# Patient Record
Sex: FEMALE | Race: ASIAN | NOT HISPANIC OR LATINO | URBAN - METROPOLITAN AREA
[De-identification: names, ages, dates, MRNs, and addresses within clinical notes are randomized per-mention and may not be internally consistent; named-entity substitution may affect disease eponyms.]

---

## 2019-04-11 ENCOUNTER — EMERGENCY (EMERGENCY)
Facility: HOSPITAL | Age: 64
LOS: 1 days | Discharge: ANOTHER HOSPITAL | End: 2019-04-11
Attending: EMERGENCY MEDICINE | Admitting: EMERGENCY MEDICINE
Payer: MEDICAID

## 2019-04-11 ENCOUNTER — INPATIENT (INPATIENT)
Facility: HOSPITAL | Age: 64
LOS: 35 days | Discharge: NURSING, REHAB OR LONG-TERM CARE | DRG: 4 | End: 2019-05-17
Attending: INTERNAL MEDICINE | Admitting: INTERNAL MEDICINE
Payer: MEDICARE

## 2019-04-11 VITALS
DIASTOLIC BLOOD PRESSURE: 90 MMHG | OXYGEN SATURATION: 100 % | SYSTOLIC BLOOD PRESSURE: 172 MMHG | RESPIRATION RATE: 15 BRPM

## 2019-04-11 VITALS
OXYGEN SATURATION: 95 % | HEART RATE: 112 BPM | SYSTOLIC BLOOD PRESSURE: 131 MMHG | DIASTOLIC BLOOD PRESSURE: 79 MMHG | TEMPERATURE: 98 F | RESPIRATION RATE: 16 BRPM

## 2019-04-11 VITALS
DIASTOLIC BLOOD PRESSURE: 103 MMHG | SYSTOLIC BLOOD PRESSURE: 242 MMHG | WEIGHT: 160.94 LBS | HEIGHT: 58 IN | RESPIRATION RATE: 12 BRPM | HEART RATE: 76 BPM

## 2019-04-11 DIAGNOSIS — Z90.49 ACQUIRED ABSENCE OF OTHER SPECIFIED PARTS OF DIGESTIVE TRACT: Chronic | ICD-10-CM

## 2019-04-11 DIAGNOSIS — I61.3 NONTRAUMATIC INTRACEREBRAL HEMORRHAGE IN BRAIN STEM: ICD-10-CM

## 2019-04-11 LAB
ALBUMIN SERPL ELPH-MCNC: 3.8 G/DL — SIGNIFICANT CHANGE UP (ref 3.3–5)
ALBUMIN SERPL ELPH-MCNC: 3.9 G/DL — SIGNIFICANT CHANGE UP (ref 3.3–5)
ALBUMIN SERPL ELPH-MCNC: 4.3 G/DL — SIGNIFICANT CHANGE UP (ref 3.3–5)
ALP SERPL-CCNC: 58 U/L — SIGNIFICANT CHANGE UP (ref 40–120)
ALP SERPL-CCNC: 67 U/L — SIGNIFICANT CHANGE UP (ref 40–120)
ALP SERPL-CCNC: 73 U/L — SIGNIFICANT CHANGE UP (ref 30–120)
ALT FLD-CCNC: 10 U/L — SIGNIFICANT CHANGE UP (ref 10–45)
ALT FLD-CCNC: 11 U/L — SIGNIFICANT CHANGE UP (ref 10–45)
ALT FLD-CCNC: 22 U/L DA — SIGNIFICANT CHANGE UP (ref 10–60)
ANION GAP SERPL CALC-SCNC: 11 MMOL/L — SIGNIFICANT CHANGE UP (ref 5–17)
ANION GAP SERPL CALC-SCNC: 17 MMOL/L — SIGNIFICANT CHANGE UP (ref 5–17)
APTT BLD: 25.7 SEC — LOW (ref 28.5–37)
APTT BLD: 26.4 SEC — LOW (ref 27.5–36.3)
AST SERPL-CCNC: 14 U/L — SIGNIFICANT CHANGE UP (ref 10–40)
AST SERPL-CCNC: 18 U/L — SIGNIFICANT CHANGE UP (ref 10–40)
AST SERPL-CCNC: 18 U/L — SIGNIFICANT CHANGE UP (ref 10–40)
BASE EXCESS BLDA CALC-SCNC: 0.8 MMOL/L — SIGNIFICANT CHANGE UP (ref -2–2)
BASOPHILS # BLD AUTO: 0.08 K/UL — SIGNIFICANT CHANGE UP (ref 0–0.2)
BASOPHILS # BLD AUTO: 0.1 K/UL — SIGNIFICANT CHANGE UP (ref 0–0.2)
BASOPHILS NFR BLD AUTO: 0.5 % — SIGNIFICANT CHANGE UP (ref 0–2)
BASOPHILS NFR BLD AUTO: 0.6 % — SIGNIFICANT CHANGE UP (ref 0–2)
BILIRUB DIRECT SERPL-MCNC: 0.1 MG/DL — SIGNIFICANT CHANGE UP (ref 0–0.2)
BILIRUB INDIRECT FLD-MCNC: 0.5 MG/DL — SIGNIFICANT CHANGE UP (ref 0.2–1)
BILIRUB SERPL-MCNC: 0.4 MG/DL — SIGNIFICANT CHANGE UP (ref 0.2–1.2)
BILIRUB SERPL-MCNC: 0.4 MG/DL — SIGNIFICANT CHANGE UP (ref 0.2–1.2)
BILIRUB SERPL-MCNC: 0.6 MG/DL — SIGNIFICANT CHANGE UP (ref 0.2–1.2)
BLD GP AB SCN SERPL QL: NEGATIVE — SIGNIFICANT CHANGE UP
BLD GP AB SCN SERPL QL: SIGNIFICANT CHANGE UP
BLOOD GAS SOURCE: SIGNIFICANT CHANGE UP
BUN SERPL-MCNC: 16 MG/DL — SIGNIFICANT CHANGE UP (ref 7–23)
BUN SERPL-MCNC: 19 MG/DL — SIGNIFICANT CHANGE UP (ref 7–23)
CALCIUM SERPL-MCNC: 8.8 MG/DL — SIGNIFICANT CHANGE UP (ref 8.4–10.5)
CALCIUM SERPL-MCNC: 9.3 MG/DL — SIGNIFICANT CHANGE UP (ref 8.4–10.5)
CHLORIDE SERPL-SCNC: 103 MMOL/L — SIGNIFICANT CHANGE UP (ref 96–108)
CHLORIDE SERPL-SCNC: 104 MMOL/L — SIGNIFICANT CHANGE UP (ref 96–108)
CK MB BLD-MCNC: 1.4 % — SIGNIFICANT CHANGE UP (ref 0–3.5)
CK MB CFR SERPL CALC: 1.5 NG/ML — SIGNIFICANT CHANGE UP (ref 0–3.6)
CK MB CFR SERPL CALC: 2 NG/ML — SIGNIFICANT CHANGE UP (ref 0–3.8)
CK SERPL-CCNC: 107 U/L — SIGNIFICANT CHANGE UP (ref 26–192)
CK SERPL-CCNC: 78 U/L — SIGNIFICANT CHANGE UP (ref 25–170)
CO2 SERPL-SCNC: 22 MMOL/L — SIGNIFICANT CHANGE UP (ref 22–31)
CO2 SERPL-SCNC: 26 MMOL/L — SIGNIFICANT CHANGE UP (ref 22–31)
CREAT SERPL-MCNC: 0.69 MG/DL — SIGNIFICANT CHANGE UP (ref 0.5–1.3)
CREAT SERPL-MCNC: 0.94 MG/DL — SIGNIFICANT CHANGE UP (ref 0.5–1.3)
EOSINOPHIL # BLD AUTO: 0 K/UL — SIGNIFICANT CHANGE UP (ref 0–0.5)
EOSINOPHIL # BLD AUTO: 0.01 K/UL — SIGNIFICANT CHANGE UP (ref 0–0.5)
EOSINOPHIL NFR BLD AUTO: 0.1 % — SIGNIFICANT CHANGE UP (ref 0–6)
EOSINOPHIL NFR BLD AUTO: 0.2 % — SIGNIFICANT CHANGE UP (ref 0–6)
GAS PNL BLDA: SIGNIFICANT CHANGE UP
GLUCOSE BLDC GLUCOMTR-MCNC: 119 MG/DL — HIGH (ref 70–99)
GLUCOSE SERPL-MCNC: 105 MG/DL — HIGH (ref 70–99)
GLUCOSE SERPL-MCNC: 131 MG/DL — HIGH (ref 70–99)
HCO3 BLDA-SCNC: 25 MMOL/L — SIGNIFICANT CHANGE UP (ref 21–29)
HCT VFR BLD CALC: 41.6 % — SIGNIFICANT CHANGE UP (ref 34.5–45)
HCT VFR BLD CALC: 41.9 % — SIGNIFICANT CHANGE UP (ref 34.5–45)
HGB BLD-MCNC: 13.6 G/DL — SIGNIFICANT CHANGE UP (ref 11.5–15.5)
HGB BLD-MCNC: 13.9 G/DL — SIGNIFICANT CHANGE UP (ref 11.5–15.5)
HOROWITZ INDEX BLDA+IHG-RTO: 60 — SIGNIFICANT CHANGE UP
IMM GRANULOCYTES NFR BLD AUTO: 0.6 % — SIGNIFICANT CHANGE UP (ref 0–1.5)
INR BLD: 1.01 RATIO — SIGNIFICANT CHANGE UP (ref 0.88–1.16)
INR BLD: 1.06 RATIO — SIGNIFICANT CHANGE UP (ref 0.88–1.16)
LYMPHOCYTES # BLD AUTO: 13 % — SIGNIFICANT CHANGE UP (ref 13–44)
LYMPHOCYTES # BLD AUTO: 2.5 K/UL — SIGNIFICANT CHANGE UP (ref 1–3.3)
LYMPHOCYTES # BLD AUTO: 34.5 % — SIGNIFICANT CHANGE UP (ref 13–44)
LYMPHOCYTES # BLD AUTO: 4.73 K/UL — HIGH (ref 1–3.3)
MCHC RBC-ENTMCNC: 31.6 PG — SIGNIFICANT CHANGE UP (ref 27–34)
MCHC RBC-ENTMCNC: 32.3 PG — SIGNIFICANT CHANGE UP (ref 27–34)
MCHC RBC-ENTMCNC: 32.7 GM/DL — SIGNIFICANT CHANGE UP (ref 32–36)
MCHC RBC-ENTMCNC: 33.1 GM/DL — SIGNIFICANT CHANGE UP (ref 32–36)
MCV RBC AUTO: 96.7 FL — SIGNIFICANT CHANGE UP (ref 80–100)
MCV RBC AUTO: 97.5 FL — SIGNIFICANT CHANGE UP (ref 80–100)
MONOCYTES # BLD AUTO: 0.86 K/UL — SIGNIFICANT CHANGE UP (ref 0–0.9)
MONOCYTES # BLD AUTO: 1.2 K/UL — HIGH (ref 0–0.9)
MONOCYTES NFR BLD AUTO: 6.2 % — SIGNIFICANT CHANGE UP (ref 2–14)
MONOCYTES NFR BLD AUTO: 6.3 % — SIGNIFICANT CHANGE UP (ref 2–14)
NEUTROPHILS # BLD AUTO: 15.6 K/UL — HIGH (ref 1.8–7.4)
NEUTROPHILS # BLD AUTO: 7.97 K/UL — HIGH (ref 1.8–7.4)
NEUTROPHILS NFR BLD AUTO: 57.9 % — SIGNIFICANT CHANGE UP (ref 43–77)
NEUTROPHILS NFR BLD AUTO: 80.1 % — HIGH (ref 43–77)
NRBC # BLD: 0 /100 WBCS — SIGNIFICANT CHANGE UP (ref 0–0)
PCO2 BLDA: 38 MMHG — SIGNIFICANT CHANGE UP (ref 32–46)
PH BLD: 7.43 — SIGNIFICANT CHANGE UP (ref 7.35–7.45)
PLATELET # BLD AUTO: 289 K/UL — SIGNIFICANT CHANGE UP (ref 150–400)
PLATELET # BLD AUTO: 316 K/UL — SIGNIFICANT CHANGE UP (ref 150–400)
PO2 BLDA: 211 MMHG — HIGH (ref 74–108)
POTASSIUM SERPL-MCNC: 3.5 MMOL/L — SIGNIFICANT CHANGE UP (ref 3.5–5.3)
POTASSIUM SERPL-MCNC: 4.1 MMOL/L — SIGNIFICANT CHANGE UP (ref 3.5–5.3)
POTASSIUM SERPL-SCNC: 3.5 MMOL/L — SIGNIFICANT CHANGE UP (ref 3.5–5.3)
POTASSIUM SERPL-SCNC: 4.1 MMOL/L — SIGNIFICANT CHANGE UP (ref 3.5–5.3)
PROT SERPL-MCNC: 6.6 G/DL — SIGNIFICANT CHANGE UP (ref 6–8.3)
PROT SERPL-MCNC: 7.4 G/DL — SIGNIFICANT CHANGE UP (ref 6–8.3)
PROT SERPL-MCNC: 7.8 G/DL — SIGNIFICANT CHANGE UP (ref 6–8.3)
PROTHROM AB SERPL-ACNC: 11.5 SEC — SIGNIFICANT CHANGE UP (ref 10–12.9)
PROTHROM AB SERPL-ACNC: 11.6 SEC — SIGNIFICANT CHANGE UP (ref 10–12.9)
RBC # BLD: 4.3 M/UL — SIGNIFICANT CHANGE UP (ref 3.8–5.2)
RBC # BLD: 4.3 M/UL — SIGNIFICANT CHANGE UP (ref 3.8–5.2)
RBC # FLD: 12.3 % — SIGNIFICANT CHANGE UP (ref 10.3–14.5)
RBC # FLD: 12.7 % — SIGNIFICANT CHANGE UP (ref 10.3–14.5)
RH IG SCN BLD-IMP: POSITIVE — SIGNIFICANT CHANGE UP
RH IG SCN BLD-IMP: POSITIVE — SIGNIFICANT CHANGE UP
SAO2 % BLDA: 100 % — HIGH (ref 92–96)
SODIUM SERPL-SCNC: 141 MMOL/L — SIGNIFICANT CHANGE UP (ref 135–145)
SODIUM SERPL-SCNC: 142 MMOL/L — SIGNIFICANT CHANGE UP (ref 135–145)
T4 FREE SERPL-MCNC: 1.2 NG/DL — SIGNIFICANT CHANGE UP (ref 0.9–1.8)
TROPONIN I SERPL-MCNC: 0 NG/ML — LOW (ref 0.02–0.06)
TROPONIN T, HIGH SENSITIVITY RESULT: 15 NG/L — SIGNIFICANT CHANGE UP (ref 0–51)
TSH SERPL-MCNC: 0.97 UIU/ML — SIGNIFICANT CHANGE UP (ref 0.27–4.2)
WBC # BLD: 13.73 K/UL — HIGH (ref 3.8–10.5)
WBC # BLD: 19.5 K/UL — HIGH (ref 3.8–10.5)
WBC # FLD AUTO: 13.73 K/UL — HIGH (ref 3.8–10.5)
WBC # FLD AUTO: 19.5 K/UL — HIGH (ref 3.8–10.5)

## 2019-04-11 PROCEDURE — 84484 ASSAY OF TROPONIN QUANT: CPT

## 2019-04-11 PROCEDURE — 85610 PROTHROMBIN TIME: CPT

## 2019-04-11 PROCEDURE — 36415 COLL VENOUS BLD VENIPUNCTURE: CPT

## 2019-04-11 PROCEDURE — 71045 X-RAY EXAM CHEST 1 VIEW: CPT | Mod: 26,77,76

## 2019-04-11 PROCEDURE — 70450 CT HEAD/BRAIN W/O DYE: CPT | Mod: 26,59

## 2019-04-11 PROCEDURE — 82550 ASSAY OF CK (CPK): CPT

## 2019-04-11 PROCEDURE — 86850 RBC ANTIBODY SCREEN: CPT

## 2019-04-11 PROCEDURE — 82803 BLOOD GASES ANY COMBINATION: CPT

## 2019-04-11 PROCEDURE — 99291 CRITICAL CARE FIRST HOUR: CPT | Mod: 25

## 2019-04-11 PROCEDURE — 86900 BLOOD TYPING SEROLOGIC ABO: CPT

## 2019-04-11 PROCEDURE — 70450 CT HEAD/BRAIN W/O DYE: CPT

## 2019-04-11 PROCEDURE — 70498 CT ANGIOGRAPHY NECK: CPT | Mod: 26

## 2019-04-11 PROCEDURE — 93005 ELECTROCARDIOGRAM TRACING: CPT

## 2019-04-11 PROCEDURE — 80053 COMPREHEN METABOLIC PANEL: CPT

## 2019-04-11 PROCEDURE — 93010 ELECTROCARDIOGRAM REPORT: CPT

## 2019-04-11 PROCEDURE — 99291 CRITICAL CARE FIRST HOUR: CPT

## 2019-04-11 PROCEDURE — 82962 GLUCOSE BLOOD TEST: CPT

## 2019-04-11 PROCEDURE — 85027 COMPLETE CBC AUTOMATED: CPT

## 2019-04-11 PROCEDURE — 36620 INSERTION CATHETER ARTERY: CPT

## 2019-04-11 PROCEDURE — 70496 CT ANGIOGRAPHY HEAD: CPT | Mod: 26

## 2019-04-11 PROCEDURE — 31500 INSERT EMERGENCY AIRWAY: CPT

## 2019-04-11 PROCEDURE — 85730 THROMBOPLASTIN TIME PARTIAL: CPT

## 2019-04-11 PROCEDURE — 71045 X-RAY EXAM CHEST 1 VIEW: CPT | Mod: 26

## 2019-04-11 PROCEDURE — 86901 BLOOD TYPING SEROLOGIC RH(D): CPT

## 2019-04-11 PROCEDURE — 82553 CREATINE MB FRACTION: CPT

## 2019-04-11 PROCEDURE — 99292 CRITICAL CARE ADDL 30 MIN: CPT

## 2019-04-11 PROCEDURE — 71045 X-RAY EXAM CHEST 1 VIEW: CPT

## 2019-04-11 PROCEDURE — 99222 1ST HOSP IP/OBS MODERATE 55: CPT | Mod: 25

## 2019-04-11 PROCEDURE — 93970 EXTREMITY STUDY: CPT | Mod: 26

## 2019-04-11 RX ORDER — NICARDIPINE HYDROCHLORIDE 30 MG/1
5 CAPSULE, EXTENDED RELEASE ORAL
Qty: 40 | Refills: 0 | Status: DISCONTINUED | OUTPATIENT
Start: 2019-04-11 | End: 2019-04-15

## 2019-04-11 RX ORDER — LEVETIRACETAM 250 MG/1
500 TABLET, FILM COATED ORAL EVERY 12 HOURS
Qty: 0 | Refills: 0 | Status: DISCONTINUED | OUTPATIENT
Start: 2019-04-11 | End: 2019-04-12

## 2019-04-11 RX ORDER — DESMOPRESSIN ACETATE 0.1 MG/1
20 TABLET ORAL ONCE
Qty: 0 | Refills: 0 | Status: COMPLETED | OUTPATIENT
Start: 2019-04-11 | End: 2019-04-11

## 2019-04-11 RX ORDER — CHLORHEXIDINE GLUCONATE 213 G/1000ML
1 SOLUTION TOPICAL
Qty: 0 | Refills: 0 | Status: DISCONTINUED | OUTPATIENT
Start: 2019-04-11 | End: 2019-04-16

## 2019-04-11 RX ORDER — DOCUSATE SODIUM 100 MG
100 CAPSULE ORAL EVERY 8 HOURS
Qty: 0 | Refills: 0 | Status: DISCONTINUED | OUTPATIENT
Start: 2019-04-11 | End: 2019-04-16

## 2019-04-11 RX ORDER — NICARDIPINE HYDROCHLORIDE 30 MG/1
5 CAPSULE, EXTENDED RELEASE ORAL
Qty: 40 | Refills: 0 | Status: DISCONTINUED | OUTPATIENT
Start: 2019-04-11 | End: 2019-04-12

## 2019-04-11 RX ORDER — ONDANSETRON 8 MG/1
4 TABLET, FILM COATED ORAL EVERY 6 HOURS
Qty: 0 | Refills: 0 | Status: DISCONTINUED | OUTPATIENT
Start: 2019-04-11 | End: 2019-04-16

## 2019-04-11 RX ORDER — SODIUM CHLORIDE 5 G/100ML
1000 INJECTION, SOLUTION INTRAVENOUS
Qty: 0 | Refills: 0 | Status: DISCONTINUED | OUTPATIENT
Start: 2019-04-11 | End: 2019-04-14

## 2019-04-11 RX ORDER — SENNA PLUS 8.6 MG/1
2 TABLET ORAL AT BEDTIME
Qty: 0 | Refills: 0 | Status: DISCONTINUED | OUTPATIENT
Start: 2019-04-11 | End: 2019-04-11

## 2019-04-11 RX ORDER — DILTIAZEM HCL 120 MG
60 CAPSULE, EXT RELEASE 24 HR ORAL EVERY 6 HOURS
Qty: 0 | Refills: 0 | Status: DISCONTINUED | OUTPATIENT
Start: 2019-04-11 | End: 2019-04-11

## 2019-04-11 RX ORDER — ACETAMINOPHEN 500 MG
650 TABLET ORAL EVERY 6 HOURS
Qty: 0 | Refills: 0 | Status: DISCONTINUED | OUTPATIENT
Start: 2019-04-11 | End: 2019-04-16

## 2019-04-11 RX ORDER — PROPOFOL 10 MG/ML
10 INJECTION, EMULSION INTRAVENOUS
Qty: 500 | Refills: 0 | Status: DISCONTINUED | OUTPATIENT
Start: 2019-04-11 | End: 2019-04-11

## 2019-04-11 RX ORDER — DILTIAZEM HCL 120 MG
10 CAPSULE, EXT RELEASE 24 HR ORAL ONCE
Qty: 0 | Refills: 0 | Status: COMPLETED | OUTPATIENT
Start: 2019-04-11 | End: 2019-04-11

## 2019-04-11 RX ORDER — SODIUM CHLORIDE 9 MG/ML
3 INJECTION INTRAMUSCULAR; INTRAVENOUS; SUBCUTANEOUS EVERY 8 HOURS
Qty: 0 | Refills: 0 | Status: DISCONTINUED | OUTPATIENT
Start: 2019-04-11 | End: 2019-04-11

## 2019-04-11 RX ORDER — PROPOFOL 10 MG/ML
10 INJECTION, EMULSION INTRAVENOUS
Qty: 1000 | Refills: 0 | Status: DISCONTINUED | OUTPATIENT
Start: 2019-04-11 | End: 2019-04-15

## 2019-04-11 RX ORDER — LABETALOL HCL 100 MG
100 TABLET ORAL EVERY 8 HOURS
Qty: 0 | Refills: 0 | Status: DISCONTINUED | OUTPATIENT
Start: 2019-04-11 | End: 2019-04-15

## 2019-04-11 RX ORDER — LABETALOL HCL 100 MG
100 TABLET ORAL THREE TIMES A DAY
Qty: 0 | Refills: 0 | Status: DISCONTINUED | OUTPATIENT
Start: 2019-04-11 | End: 2019-04-11

## 2019-04-11 RX ORDER — PANTOPRAZOLE SODIUM 20 MG/1
40 TABLET, DELAYED RELEASE ORAL DAILY
Qty: 0 | Refills: 0 | Status: DISCONTINUED | OUTPATIENT
Start: 2019-04-11 | End: 2019-04-16

## 2019-04-11 RX ORDER — SENNA PLUS 8.6 MG/1
2 TABLET ORAL AT BEDTIME
Qty: 0 | Refills: 0 | Status: DISCONTINUED | OUTPATIENT
Start: 2019-04-11 | End: 2019-04-16

## 2019-04-11 RX ORDER — DILTIAZEM HCL 120 MG
60 CAPSULE, EXT RELEASE 24 HR ORAL EVERY 6 HOURS
Qty: 0 | Refills: 0 | Status: DISCONTINUED | OUTPATIENT
Start: 2019-04-11 | End: 2019-04-15

## 2019-04-11 RX ORDER — DOCUSATE SODIUM 100 MG
100 CAPSULE ORAL THREE TIMES A DAY
Qty: 0 | Refills: 0 | Status: DISCONTINUED | OUTPATIENT
Start: 2019-04-11 | End: 2019-04-12

## 2019-04-11 RX ORDER — MIDAZOLAM HYDROCHLORIDE 1 MG/ML
5 INJECTION, SOLUTION INTRAMUSCULAR; INTRAVENOUS ONCE
Qty: 0 | Refills: 0 | Status: DISCONTINUED | OUTPATIENT
Start: 2019-04-11 | End: 2019-04-11

## 2019-04-11 RX ORDER — VALACYCLOVIR 500 MG/1
1 TABLET, FILM COATED ORAL
Qty: 0 | Refills: 0 | COMMUNITY

## 2019-04-11 RX ADMIN — DESMOPRESSIN ACETATE 220 MICROGRAM(S): 0.1 TABLET ORAL at 15:35

## 2019-04-11 RX ADMIN — CHLORHEXIDINE GLUCONATE 1 APPLICATION(S): 213 SOLUTION TOPICAL at 21:05

## 2019-04-11 RX ADMIN — Medication 60 MILLIGRAM(S): at 19:41

## 2019-04-11 RX ADMIN — LEVETIRACETAM 500 MILLIGRAM(S): 250 TABLET, FILM COATED ORAL at 17:55

## 2019-04-11 RX ADMIN — Medication 60 MILLIGRAM(S): at 23:19

## 2019-04-11 RX ADMIN — SENNA PLUS 2 TABLET(S): 8.6 TABLET ORAL at 21:06

## 2019-04-11 RX ADMIN — Medication 100 MILLIGRAM(S): at 17:55

## 2019-04-11 RX ADMIN — MIDAZOLAM HYDROCHLORIDE 5 MILLIGRAM(S): 1 INJECTION, SOLUTION INTRAMUSCULAR; INTRAVENOUS at 12:10

## 2019-04-11 RX ADMIN — PROPOFOL 4.38 MICROGRAM(S)/KG/MIN: 10 INJECTION, EMULSION INTRAVENOUS at 14:18

## 2019-04-11 RX ADMIN — Medication 10 MILLIGRAM(S): at 15:23

## 2019-04-11 RX ADMIN — Medication 10 MILLIGRAM(S): at 19:38

## 2019-04-11 RX ADMIN — NICARDIPINE HYDROCHLORIDE 25 MG/HR: 30 CAPSULE, EXTENDED RELEASE ORAL at 12:45

## 2019-04-11 RX ADMIN — Medication 100 MILLIGRAM(S): at 21:15

## 2019-04-11 NOTE — CHART NOTE - NSCHARTNOTEFT_GEN_A_CORE
CAPRINI SCORE [CLOT] Score on Admission for     AGE RELATED RISK FACTORS                                                       MOBILITY RELATED FACTORS  [ ] Age 41-60 years                                            (1 Point)                  [ x] Bed rest                                                        (1 Point)  [x] Age: 61-74 years                                           (2 Points)                 [ ] Plaster cast                                                   (2 Points)  [ ] Age= 75 years                                              (3 Points)                 [ ] Bed bound for more than 72 hours                 (2 Points)    DISEASE RELATED RISK FACTORS                                               GENDER SPECIFIC FACTORS  [ ] Edema in the lower extremities                       (1 Point)                  [ ] Pregnancy                                                     (1 Point)  [ ] Varicose veins                                               (1 Point)                  [ ] Post-partum < 6 weeks                                   (1 Point)             [ ] BMI > 25 Kg/m2                                            (1 Point)                  [ ] Hormonal therapy  or oral contraception          (1 Point)                 [ ] Sepsis (in the previous month)                        (1 Point)                  [ ] History of pregnancy complications                 (1 point)  [ ] Pneumonia or serious lung disease                                               [ ] Unexplained or recurrent                     (1 Point)           (in the previous month)                               (1 Point)  [ ] Abnormal pulmonary function test                     (1 Point)                 SURGERY RELATED RISK FACTORS (include planned surgeries)  [ ] Acute myocardial infarction                              (1 Point)                 [ ]  Section                                             (1 Point)  [ ] Congestive heart failure (in the previous month)  (1 Point)         [ ] Minor surgery                                                  (1 Point)   [ ] Inflammatory bowel disease                             (1 Point)                 [ ] Arthroscopic surgery                                        (2 Points)  [ ] Central venous access                                      (2 Points)                [ ] General surgery lasting more than 45 minutes   (2 Points)       [x ] Stroke (in the previous month)                          (5 Points)               [ ] Elective arthroplasty                                         (5 Points)            [ ] current or past malignancy                              (2 Points)                                                                                                       HEMATOLOGY RELATED FACTORS                                                 TRAUMA RELATED RISK FACTORS  [ ] Prior episodes of VTE                                     (3 Points)                [ ] Fracture of the hip, pelvis, or leg                       (5 Points)  [ ] Positive family history for VTE                         (3 Points)                 [ ] Acute spinal cord injury (in the previous month)  (5 Points)  [ ] Prothrombin 75182 A                                     (3 Points)                 [ ] Paralysis  (less than 1 month)                             (5 Points)  [ ] Factor V Leiden                                             (3 Points)                  [ ] Multiple Trauma within 1 month                        (5 Points)  [ ] Lupus anticoagulants                                     (3 Points)                                                           [ ] Anticardiolipin antibodies                               (3 Points)                                                       [ ] High homocysteine in the blood                      (3 Points)                                             [ ] Other congenital or acquired thrombophilia      (3 Points)                                                [ ] Heparin induced thrombocytopenia                  (3 Points)                                          Total Score [  8  ]    Risk:  Very low 0   Low 1 to 2   Moderate 3 to 4   High =5       VTE Prophylasix Recommednations:  [ x] mechanical pneumatic compression devices                                      [ ] contraindicated: _____________________  [ ] chemo prophylasix                                                                                   [ x] contraindicated _____________________    **** HIGH LIKELIHOOD DVT PRESENT ON ADMISSION  [ ] (please order LE dopplers within 24 hours of admission)

## 2019-04-11 NOTE — ED PROVIDER NOTE - PROGRESS NOTE DETAILS
per EMS; patient on ASA; per d/w neurosurgery, to start ddavp and platelets. -Giles ecg shows afib - no apparent h/o afib; neurosurgery resident aware. -Giles

## 2019-04-11 NOTE — PROGRESS NOTE ADULT - SUBJECTIVE AND OBJECTIVE BOX
Rapid afib. Given diltiazem. Rapid afib. Given diltiazem.     No eye opening, no commands, right pupil 2mm reactive, left pupil 1mm reactive, no cough, no gag, no corneals, trace hand movement with noxious, b/l LE w/d

## 2019-04-11 NOTE — PROCEDURE NOTE - NSINDICATIONS_GEN_A_CORE
arterial puncture to obtain ABG's/monitoring purposes/blood sampling/critical patient/cannulation purposes

## 2019-04-11 NOTE — PROGRESS NOTE ADULT - ASSESSMENT
pontine intracerebral hemorrhage   - -140mmHg  - afib: diltiazem  - 2% for cerebral edema  - CT head for stability  - Vean vent as tolerated  - Goals of care discussion with family    40 minutes critical care time

## 2019-04-11 NOTE — ED ADULT NURSE NOTE - NSIMPLEMENTINTERV_GEN_ALL_ED
Implemented All Fall with Harm Risk Interventions:  [place] to call system. Call bell, personal items and telephone within reach. Instruct patient to call for assistance. Room bathroom lighting operational. Non-slip footwear when patient is off stretcher. Physically safe environment: no spills, clutter or unnecessary equipment. Stretcher in lowest position, wheels locked, appropriate side rails in place. Provide visual cue, wrist band, yellow gown, etc. Monitor gait and stability. Monitor for mental status changes and reorient to person, place, and time. Review medications for side effects contributing to fall risk. Reinforce activity limits and safety measures with patient and family. Provide visual clues: red socks.

## 2019-04-11 NOTE — ED PROVIDER NOTE - UNABLE TO OBTAIN
transfer for pontine/brainstem hemorrhage Urgent need for Intervention intubated, brainstem hemorrhage

## 2019-04-11 NOTE — ED PROVIDER NOTE - NSREASONFORTRANSFER_ED_A_ED
Higher Level of Care or Service Not Available/Consultant Request/No Available Appropriate Bed at this Facility

## 2019-04-11 NOTE — H&P ADULT - NSHPPHYSICALEXAM_GEN_ALL_CORE
Intubated, held prop for 15 minutes  Eyes closed, R pupil 3 and sluggish, L 1mm and brisk  extensor posturing BUE and TF lowers to noxious  No cough/gag/corneals

## 2019-04-11 NOTE — CONSULT NOTE ADULT - SUBJECTIVE AND OBJECTIVE BOX
CHIEF COMPLAINT:Patient is a 63y old  Female who presents with a chief complaint of ICH (11 Apr 2019 16:04)      HISTORY OF PRESENT ILLNESS:    63 female with history as below admitted with pontine hemorrhage likely due to uncontrolled htn  ROS is limited as pt currently sedated on ventilator.     PAST MEDICAL & SURGICAL HISTORY:  High cholesterol  Hypertension, unspecified type  Cerebrovascular accident (CVA), unspecified mechanism  History of appendectomy          MEDICATIONS:  niCARdipine Infusion 5 mG/Hr IV Continuous <Continuous>        acetaminophen   Tablet .. 650 milliGRAM(s) Oral every 6 hours PRN  levETIRAcetam 500 milliGRAM(s) Oral every 12 hours  ondansetron Injectable 4 milliGRAM(s) IV Push every 6 hours PRN    docusate sodium 100 milliGRAM(s) Oral three times a day  pantoprazole  Injectable 40 milliGRAM(s) IV Push daily  senna 2 Tablet(s) Oral at bedtime PRN      chlorhexidine 4% Liquid 1 Application(s) Topical <User Schedule>  sodium chloride 0.9% lock flush 3 milliLiter(s) IV Push every 8 hours  sodium chloride 2% . 1000 milliLiter(s) IV Continuous <Continuous>      FAMILY HISTORY:      Non-contributory    SOCIAL HISTORY:    No tobacco, drugs or etoh    Allergies    Allergy Status Unknown    Intolerances    	    REVIEW OF SYSTEMS:  as above  The rest of the 14 points ROS reviewed and except above they are unremarkable.        PHYSICAL EXAM:  T(C): 37.3 (04-11-19 @ 15:23), Max: 37.3 (04-11-19 @ 15:23)  HR: 97 (04-11-19 @ 15:45) (76 - 133)  BP: 121/100 (04-11-19 @ 15:45) (114/74 - 242/103)  RR: 16 (04-11-19 @ 15:45) (12 - 21)  SpO2: 100% (04-11-19 @ 15:45) (95% - 100%)  Wt(kg): --  I&O's Summary    11 Apr 2019 07:01  -  11 Apr 2019 16:27  --------------------------------------------------------  IN: 15 mL / OUT: 350 mL / NET: -335 mL      Appearance: on vent 	  Cardiovascular: Normal S1 S2,    Murmur:   Neck: JVP limited to be evaluated   Respiratory: Lungs few rhonchi   Gastrointestinal:  Soft  Skin: normal   Neuro: limited as pt on vent   LABS/DATA:    TELEMETRY: 	  afib , lvh , st changes likely due to LVH  ECG:  	   	  CARDIAC MARKERS:  Troponin I, Serum: .000 ng/mL (04-11 @ 12:35)                                      13.9   19.5  )-----------( 289      ( 11 Apr 2019 14:31 )             41.9     04-11    142  |  103  |  16  ----------------------------<  105<H>  3.5   |  22  |  0.69    Ca    9.3      11 Apr 2019 14:31    TPro  7.4  /  Alb  4.3  /  TBili  0.4  /  DBili  x   /  AST  18  /  ALT  10  /  AlkPhos  67  04-11    proBNP:   Lipid Profile:   HgA1c:   TSH:

## 2019-04-11 NOTE — ED ADULT NURSE NOTE - OBJECTIVE STATEMENT
Patient arrives to ED unresponsive with 100 O2 via NRB, and a patent 18G IV LAC, as per EMS patient was on the phone with a family member at 11am today and suddenly became unresponsive, no family present on arrival, code stroke called upon arrival, patient immediately weighed and then brought to CT for CT head, no obvious injury noted, blood sugar in triage 119

## 2019-04-11 NOTE — ED ADULT NURSE NOTE - PMH
Cerebrovascular accident (CVA), unspecified mechanism    High cholesterol    Hypertension, unspecified type

## 2019-04-11 NOTE — ED ADULT NURSE NOTE - NSIMPLEMENTINTERV_GEN_ALL_ED
Implemented All Fall with Harm Risk Interventions:  [place] to call system. Call bell, personal items and telephone within reach. Instruct patient to call for assistance. Room bathroom lighting operational. Non-slip footwear when patient is off stretcher. Physically safe environment: no spills, clutter or unnecessary equipment. Stretcher in lowest position, wheels locked, appropriate side rails in place. Provide visual cue, wrist band, yellow gown, etc. Monitor gait and stability. Monitor for mental status changes and reorient to person, place, and time. Review medications for side effects contributing to fall risk. Reinforce activity limits and safety measures with patient and family. Provide visual clues: red socks.

## 2019-04-11 NOTE — CONSULT NOTE ADULT - ASSESSMENT
64 y/o  lady w/ PMHx HTN, HLD, ischemic stroke (2004), developed AMS around 11AM found to have elevated BP and a pontine hemorrhage at OSH and transferred to [facility] for neuroSx eval intubated.     Impression: change in mentation likely from hemorrhagic stroke involving pradip etiology likely long-standing HTN; less likely vascular or amyloid angiopathy.     [] Strict BP control goal <140/90 w/ Cardene drip if needed  [x] NSx following; hydrocephalus watch  [] Repeat CTH in 24hrs or prn for worsening mental status or neuro exam  [] q2 neuro checks, vitals  [] hold ASA, lovenox, use mechanical DVT prophylaxis for now  [] Telemetry Monitoring  [] MRI brain w/ and w/o cont to look for underlying lesions, malformations.   [] MRA brain w/o contast MRA neck w/con  [] HgA1c, Lipid profile  [] TTE  [] GOC

## 2019-04-11 NOTE — PROGRESS NOTE ADULT - ASSESSMENT
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     NEURO:    Neurochecks q1 hrs,    Pontine hemorrhage:  s/p DDAVP and Plts   Hydrocephalus watch: CT Head tonight - EVD per NSGY   Activity: [] mobilize as tolerated [x] Bedrest [] PT [] OT [] PMNR    PULM:  Respiratory failure 2/2 pontine hemorrhage     CV:  SBP goal 100 - 160    RENAL:  Fluids:  NS @ 75    GI:    Diet: NPO   GI prophylaxis [] not indicated [x] PPI [] other:  Bowel regimen [x] colace [x] senna [] other:    ENDO:   Goal euglycemia (-180)    HEME/ONC:  VTE prophylaxis: [] SCDs [] chemoprophylaxis [x] hold chemoprophylaxis due to: [facility]    ID:  afebrile     SOCIAL/FAMILY:  [x] awaiting [] updated at bedside [] family meeting    CODE STATUS:  [x] Full Code [] DNR [] DNI [] Palliative/Comfort Care    DISPOSITION:  [x] ICU [] Stroke Unit [] Floor [] EMU [] RCU [] PCU    [x] Patient is at high risk of neurologic deterioration/death due to:  hemorrhage, herniation       Time spent: 45 critical care minutes    Contact: [phone] 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     NEURO:    Neurochecks q1 hrs,    Pontine hemorrhage:  s/p DDAVP and Plts   Hydrocephalus watch: CT Head tonight - EVD per NSGY   Activity: [] mobilize as tolerated [x] Bedrest [] PT [] OT [] PMNR    PULM:  Respiratory failure 2/2 pontine hemorrhage   full mechanical vent support  Daily CXR, ABG nightly     CV:  SBP goal 100 - 160  ?New onset Afib - start Labetalol 100 TID  Appreciate Cardiology consult     RENAL:  Fluids:  NS @ 75    GI:    Diet: NPO, NGT   GI prophylaxis [] not indicated [x] PPI [] other:  Bowel regimen [x] colace [x] senna [] other:    ENDO:   Goal euglycemia (-180)    HEME/ONC:  VTE prophylaxis: [] SCDs [] chemoprophylaxis [x] hold chemoprophylaxis due to: IPH    ID:  afebrile     SOCIAL/FAMILY:  [x] awaiting [] updated at bedside [] family meeting    CODE STATUS:  [x] Full Code [] DNR [] DNI [] Palliative/Comfort Care    DISPOSITION:  [x] ICU [] Stroke Unit [] Floor [] EMU [] RCU [] PCU    [x] Patient is at high risk of neurologic deterioration/death due to:  hemorrhage, herniation       Time spent: 45 critical care minutes    Contact: [phone] 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     NEURO:    Neurochecks q1 hrs,    Pontine hemorrhage:  s/p DDAVP and Plts   Hydrocephalus watch: CT Head tonight - EVD per NSGY   Activity: [] mobilize as tolerated [x] Bedrest [] PT [] OT [] PMNR    PULM:  Respiratory failure 2/2 pontine hemorrhage   full mechanical vent support  Daily CXR, ABG nightly     CV:  SBP goal 100 - 160  ?New onset Afib - start Labetalol 100 TID  Appreciate Cardiology consult     RENAL:  Fluids:  NS @ 75    GI:    Diet: NPO, NGT   GI prophylaxis [] not indicated [x] PPI [] other:  Bowel regimen [x] colace [x] senna [] other:    ENDO:   Goal euglycemia (-180)    HEME/ONC:  VTE prophylaxis: [] SCDs [] chemoprophylaxis [x] hold chemoprophylaxis due to: IPH    ID:  afebrile     SOCIAL/FAMILY:  [] awaiting [x] updated at bedside met with , daughter, son, and daughter-in-law--in shock, appropriately grieving [] family meeting    CODE STATUS:  [x] Full Code [] DNR [] DNI [] Palliative/Comfort Care    DISPOSITION:  [x] ICU [] Stroke Unit [] Floor [] EMU [] RCU [] PCU    [x] Patient is at high risk of neurologic deterioration/death due to:  hemorrhage, herniation     Time spent: 45 critical care minutes    Contact: [phone]

## 2019-04-11 NOTE — H&P ADULT - HISTORY OF PRESENT ILLNESS
62 y/o F w/ PMHx HTN, HLD, ischemic CVA (2004), presents to the ED BIBA from home for evaluation of AMS- was reportedly talking. As per EMS, pt was talking to a family member on the phone at 11:00 today. Hx is limited due to pt's current medical condition.  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

## 2019-04-11 NOTE — ED PROVIDER NOTE - OBJECTIVE STATEMENT
62 y/o F w/ PMHx HTN, HLD, ischemic CVA (2004), presents to the ED BIBA from home for evaluation of AMS- was reportedly talking. As per EMS, pt was talking to a family member on the phone at 11:00 today. Hx is limited due to pt's current medical condition.  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; intubated; on cardiene infusion.

## 2019-04-11 NOTE — H&P ADULT - NSICDXPASTMEDICALHX_GEN_ALL_CORE_FT
PAST MEDICAL HISTORY:  Cerebrovascular accident (CVA), unspecified mechanism     High cholesterol     Hypertension, unspecified type

## 2019-04-11 NOTE — H&P ADULT - ATTENDING COMMENTS
Pt seen and examined with resident.  Agree with resident evaluation and assessment.  PT with HTN collapsed and found to have pontine hemorrhage.  There is no hydrocephalus and repeat imaging revealed no significant change and no evidence of ventricular obstruction at this time  Pt with right extensor posturing, Pupils 2 mm bilaterally, no corneal, cough or gag.  Plan medical management.  Can follow serial CT to see if pt develops hydrocephalus and can insert EVD.  Discussed with family regarding pt's condition.

## 2019-04-11 NOTE — CONSULT NOTE ADULT - SUBJECTIVE AND OBJECTIVE BOX
*************************************  VASCULAR NEUROLOGY CONSULT  NOTE  **************************************    PATTI RODRIGUEZ  Female  MRN-[id number]    HPI:  62 y/o F w/ PMHx HTN, HLD, ischemic stroke (2004), presents to the ED BIBA from home for evaluation of AMS- was reportedly talking. As per EMS, pt was talking to a family member on the phone at 11:00 today. Hx is limited due to pt's current medical condition.  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; intubated; on cardiene infusion (11 Apr 2019 14:29)    NIHSS = ; MRS = 2; ICH =3        ROS: All negative except as mentioned in HPI    PAST MEDICAL & SURGICAL HISTORY:  High cholesterol  Hypertension, unspecified type  Cerebrovascular accident (CVA), unspecified mechanism  History of appendectomy    MEDICATIONS  (STANDING):  desmopressin IVPB 20 MICROGram(s) IV Intermittent Once  docusate sodium 100 milliGRAM(s) Oral three times a day  levETIRAcetam 500 milliGRAM(s) Oral every 12 hours  niCARdipine Infusion 5 mG/Hr (25 mL/Hr) IV Continuous <Continuous>  sodium chloride 0.9% lock flush 3 milliLiter(s) IV Push every 8 hours    MEDICATIONS  (PRN):  acetaminophen   Tablet .. 650 milliGRAM(s) Oral every 6 hours PRN Temp greater or equal to 38C (100.4F), Mild Pain (1 - 3)  ondansetron Injectable 4 milliGRAM(s) IV Push every 6 hours PRN Nausea and/or Vomiting  senna 2 Tablet(s) Oral at bedtime PRN Constipation    Allergies    Allergy Status Unknown    Intolerances        VITAL SIGNS:  Vital Signs Last 24 Hrs  T(C): 36.9 (11 Apr 2019 14:13), Max: 36.9 (11 Apr 2019 14:13)  T(F): 98.4 (11 Apr 2019 14:13), Max: 98.4 (11 Apr 2019 14:13)  HR: 109 (11 Apr 2019 14:23) (76 - 123)  BP: 131/76 (11 Apr 2019 14:23) (131/76 - 242/103)  BP(mean): 91 (11 Apr 2019 14:13) (91 - 91)  RR: 14 (11 Apr 2019 14:23) (12 - 21)  SpO2: 100% (11 Apr 2019 14:23) (95% - 100%)    PHYSICAL EXAMINATION:  General: Well-developed, well nourished, in no acute distress.  Eyes: Conjunctiva and sclera clear.  Neck: Supple, nontender  Skin: no rash, no edema noted  Lung: no respiratory distress noted  Neurologic:  - Mental Status:  Alert, awake, oriented to person, place, and time; Speech is fluent with intact naming, repetition, and comprehension; crosses midline, no extinction or neglect noted; good recall  - Cranial Nerves II-XII:  VFF, No nystagmus or APD noted, EOMI, PERRLA, V1-V3 intact, no facial asymmetry, t/p midline, SCM/trap intact.  - Motor:  Strength is 5/5 throughout.  There is no pronator drift.  Normal muscle bulk and tone throughout. No myoclonus or tremor.  - Reflexes:  2+ and symmetric at the biceps, triceps, brachioradialis, knees, and ankles.  Plantar responses flexor.  - Sensory:  Intact to light touch, pin prick, vibration, and joint-position sense throughout.  - Coordination:  Finger-nose-finger and heel-knee-shin intact without dysmetria.  Rapid alternating hand movements intact.  - Gait:   Normal steps, base, arm swing, and turning.  Heel and toe walking are normal.  Tandem gait is normal.  Romberg testing is negative.    LABS:                          13.9   19.5  )-----------( 289      ( 11 Apr 2019 14:31 )             41.9     04-11    142  |  103  |  16  ----------------------------<  105<H>  3.5   |  22  |  0.69    Ca    9.3      11 Apr 2019 14:31    TPro  7.4  /  Alb  4.3  /  TBili  0.4  /  DBili  x   /  AST  18  /  ALT  10  /  AlkPhos  67  04-11    PT/INR - ( 11 Apr 2019 14:31 )   PT: 11.5 sec;   INR: 1.01 ratio         PTT - ( 11 Apr 2019 14:31 )  PTT:26.4 sec    RADIOLOGY & ADDITIONAL STUDIES: *************************************  VASCULAR NEUROLOGY CONSULT  NOTE  **************************************    PATTI RODRIGUEZ  Female  MRN-[id number]    HPI:  64 y/o  lady w/ PMHx HTN, HLD, ischemic stroke (2004), presents to the ED BIBA from home for evaluation of AMS- was reportedly talking. As per EMS, pt was talking to a family member on the phone at 11:00 today when she suddenly stopped talking and reportedly had AMS. Hx is limited due to pt's current medical condition.  Evaluated at OSH, found to have a SBP >240s and a pontine hemorrhage on CT; intubated; on Cardene infusion. Transferred to [facility] for neuroSx assessment. As per neuroSx note pt was having extensor posturing B/l UE and TF lowers to noxious stimuli. Admitted to NSCU for further management.   NIHSS = ; MRS = 2; ICH =3    ROS: unable to obtain    PAST MEDICAL & SURGICAL HISTORY:  High cholesterol  Hypertension, unspecified type  Cerebrovascular accident (CVA), unspecified mechanism  History of appendectomy    MEDICATIONS  (STANDING):  desmopressin IVPB 20 MICROGram(s) IV Intermittent Once  docusate sodium 100 milliGRAM(s) Oral three times a day  levETIRAcetam 500 milliGRAM(s) Oral every 12 hours  niCARdipine Infusion 5 mG/Hr (25 mL/Hr) IV Continuous <Continuous>  sodium chloride 0.9% lock flush 3 milliLiter(s) IV Push every 8 hours    MEDICATIONS  (PRN):  acetaminophen   Tablet .. 650 milliGRAM(s) Oral every 6 hours PRN Temp greater or equal to 38C (100.4F), Mild Pain (1 - 3)  ondansetron Injectable 4 milliGRAM(s) IV Push every 6 hours PRN Nausea and/or Vomiting  senna 2 Tablet(s) Oral at bedtime PRN Constipation    Allergies  Allergy Status Unknown  Intolerances      VITAL SIGNS:  Vital Signs Last 24 Hrs  T(C): 36.9 (11 Apr 2019 14:13), Max: 36.9 (11 Apr 2019 14:13)  T(F): 98.4 (11 Apr 2019 14:13), Max: 98.4 (11 Apr 2019 14:13)  HR: 109 (11 Apr 2019 14:23) (76 - 123)  BP: 131/76 (11 Apr 2019 14:23) (131/76 - 242/103)  BP(mean): 91 (11 Apr 2019 14:13) (91 - 91)  RR: 14 (11 Apr 2019 14:23) (12 - 21)  SpO2: 100% (11 Apr 2019 14:23) (95% - 100%)    PHYSICAL EXAMINATION:  Eyes: Conjunctiva and sclera clear.  Neck: Supple, nontender  Skin: no rash, no edema noted  Lung: no respiratory distress noted  Neurologic:  - Mental Status:  intubated; recently turned off sedation; does not follow commands  - Cranial Nerves II-XII:  pinpoint pupils, no blink to threat b/l; no apparent facial droop  - Motor:  no spont movement noted  - Sensory: does not localize to noxious stimuli    LABS:                          13.9   19.5  )-----------( 289      ( 11 Apr 2019 14:31 )             41.9     04-11    142  |  103  |  16  ----------------------------<  105<H>  3.5   |  22  |  0.69    Ca    9.3      11 Apr 2019 14:31    TPro  7.4  /  Alb  4.3  /  TBili  0.4  /  DBili  x   /  AST  18  /  ALT  10  /  AlkPhos  67  04-11    PT/INR - ( 11 Apr 2019 14:31 )   PT: 11.5 sec;   INR: 1.01 ratio         PTT - ( 11 Apr 2019 14:31 )  PTT:26.4 sec    RADIOLOGY & ADDITIONAL STUDIES:    < from: CT Brain Stroke Protocol (04.11.19 @ 12:06) >  A pontine hemorrhage is present.    No evidence of  midline shift epidural or subdural collection. Fourth   ventricle is compressed. No unusual calcifications present. Calvarium   intact. No fluid levels in visualized paranasal sinuses. Polyp, anterior   left ethmoid sinus. Right frontal sinus not pneumatized. Right mastoid   pneumatized. Left mastoid air cells fluid or debris-filled.    IMPRESSION: Pontine hemorrhage    < end of copied text >

## 2019-04-11 NOTE — PROCEDURE NOTE - NSPROCDETAILS_GEN_ALL_CORE
location identified, draped/prepped, sterile technique used, needle inserted/introduced/connected to a pressurized flush line/hemostasis with direct pressure, dressing applied/positive blood return obtained via catheter/sutured in place/all materials/supplies accounted for at end of procedure/Seldinger technique

## 2019-04-11 NOTE — PROGRESS NOTE ADULT - SUBJECTIVE AND OBJECTIVE BOX
HPI:  64 y/o F w/ PMHx HTN, HLD, ischemic CVA (2004), presents to the ED BIBA from home for evaluation of AMS- was reportedly talking. As per EMS, pt was talking to a family member on the phone at 11:00 on 4/11. Hx is limited due to pt's current medical condition.  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    EXAMINATION:  General:  intubated   Neuro:  no OE, no FC, pupils non-reactive b/l, not moving extremities to noxious stimuli, no corneals, no cough or gag, overbreaths vent  Cards:  s1, s2 RRR  Respiratory:  lungs clear b/l   Adomen:  soft  Extremities:  no edema  Skin:  warm/dry    ET tube  Left Radial A line HPI:  64 y/o F w/ PMHx HTN, HLD, ischemic CVA (2004), presents to the ED BIBA from home for evaluation of stroke.  As per family patient was having facial droop and was prescribed Valacyclovir and prednisone for "Bell's palsy. As per EMS, pt was talking to a family member on the phone at 11:00 on 4/11 and then had slurred speech and unresponsive.  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    EXAMINATION:  General:  intubated   Neuro:  no OE, no FC, pupils non-reactive b/l, not moving extremities to noxious stimuli, no corneals, no cough or gag, overbreaths vent  Cards:  s1, s2 RRR  Respiratory:  lungs clear b/l   Adomen:  soft  Extremities:  no edema  Skin:  warm/dry    ET tube  Left Radial A line

## 2019-04-11 NOTE — ED PROVIDER NOTE - OBJECTIVE STATEMENT
62 y/o F w/ PMHx CVA presents to the ED BIBA from home for evaluation of unresponsiveness. As per EMS, pt was talking to a family member on the phone at 11:00 today. 62 y/o F w/ PMHx CVA presents to the ED BIBA from home for evaluation of unresponsiveness. As per EMS, pt was talking to a family member on the phone at 11:00 today. Hx is limited due to pt's current medical condition. 62 y/o F w/ PMHx CVA presents to the ED BIBA from home for evaluation of unresponsiveness. As per EMS, pt was talking to a family member on the phone at 11:00 today. Hx is limited due to pt's current medical condition.    Son arrived in ED to provide further information. States pt had a CVA 15 years ago and has been in good health ever since. Pt was talking to a family member around 11:00 today when she suddenly said I do not feel well and went unresponsive. All doctors in [place], no local doctors.

## 2019-04-11 NOTE — H&P ADULT - ASSESSMENT
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  - Admit to intensivist  - CT / CTA head and neck r/o hydro  - Continue holding sedation  - GOC discussion with family

## 2019-04-11 NOTE — CONSULT NOTE ADULT - ASSESSMENT
HTN  Pontine Hemorrhage   uncontrolled afib, newly discovered    BP is stable now  start labetalol for rate control and wean off nicardipine  echo   off a/c due to pontine hemorrhage

## 2019-04-11 NOTE — ED ADULT NURSE NOTE - OBJECTIVE STATEMENT
63 y.o. Female transferred from [facility] for head bleed. As per EMS, pt has history of HTN, CAD, hyperlipidemia, on aspirin. EMS states pt was talking on the phone at 11am and said she did not feel well and stopped responding. GCS was 3. See neuro flowsheet for neuro check. R 3mm pupil sluggish responsive and L pupil 1mm nonreactive. Intubated 22 at lip prior to arrival. Pt arrived with cardizem at 5mg/hr and propofol at 10mcg/kg/min - per Dr. Skaggs, stop propofol infusion. Afib noted on cardiac monitor. Pt arrived with sánchez - draining urine. Pt is responsive to painful stimuli. Dr. Skaggs at bedside. Neurosurg MD at bedside. Pt to CT and to NSCU. 63 y.o. Female transferred from [facility] for head bleed. As per EMS, pt has history of HTN, CAD, hyperlipidemia, on aspirin. EMS states pt was talking on the phone at 11am and said she did not feel well and stopped responding. GCS was 3. See neuro flowsheet for neuro check. R 3mm pupil sluggish responsive and L pupil 1mm nonreactive. Intubated 22 at lip prior to arrival. Pt arrived with cardizem at 5mg/hr and propofol at 10mcg/kg/min - per Dr. Skaggs, stop propofol infusion. Afib noted on cardiac monitor. Pt arrived with sánchez - draining urine. Pt is responsive to painful stimuli. Pt is in no current distress. Comfort and safety provided. Will continue to monitor. Dr. Skaggs at bedside. Neurosurg MD at bedside. Pt to CT and to NSCU.

## 2019-04-11 NOTE — ED PROVIDER NOTE - NSRISKOFTRANSFER_ED_A_ED
Death or Disability/Transportation Risk (There is always a risk of traffic delays resulting in deterioration of condition.)/Deterioration of Condition En Route

## 2019-04-11 NOTE — ED PROVIDER NOTE - CLINICAL SUMMARY MEDICAL DECISION MAKING FREE TEXT BOX
Pt presenting from OSH, for pontine hemorrhage, intubated, on cardiene infusion and propofol.  Propofol held on arrival for neurologic examination (in conjunction with neurosurgery

## 2019-04-11 NOTE — CONSULT NOTE ADULT - ATTENDING COMMENTS
VASCULAR NEUROLOGY ATTENDING  The patient is seen and examined the history and imaging are reviewed. I agree with the resident note unless otherwise noted. Presumed hypertensive pontine ICH. Agree with goals of care discussion. Serial CT. SBP below 160. Supportive care.

## 2019-04-11 NOTE — PATIENT PROFILE ADULT - VISION (WITH CORRECTIVE LENSES IF THE PATIENT USUALLY WEARS THEM):
Normal vision: sees adequately in most situations; can see medication labels, newsprint
Is This A New Presentation, Or A Follow-Up?: Skin Lesion
How Severe Is Your Skin Lesion?: mild
Has Your Skin Lesion Been Treated?: not been treated

## 2019-04-12 LAB
ANION GAP SERPL CALC-SCNC: 10 MMOL/L — SIGNIFICANT CHANGE UP (ref 5–17)
ANION GAP SERPL CALC-SCNC: 11 MMOL/L — SIGNIFICANT CHANGE UP (ref 5–17)
ANION GAP SERPL CALC-SCNC: 11 MMOL/L — SIGNIFICANT CHANGE UP (ref 5–17)
ANION GAP SERPL CALC-SCNC: 13 MMOL/L — SIGNIFICANT CHANGE UP (ref 5–17)
BUN SERPL-MCNC: 16 MG/DL — SIGNIFICANT CHANGE UP (ref 7–23)
BUN SERPL-MCNC: 16 MG/DL — SIGNIFICANT CHANGE UP (ref 7–23)
BUN SERPL-MCNC: 18 MG/DL — SIGNIFICANT CHANGE UP (ref 7–23)
BUN SERPL-MCNC: 19 MG/DL — SIGNIFICANT CHANGE UP (ref 7–23)
CALCIUM SERPL-MCNC: 8.3 MG/DL — LOW (ref 8.4–10.5)
CALCIUM SERPL-MCNC: 8.4 MG/DL — SIGNIFICANT CHANGE UP (ref 8.4–10.5)
CALCIUM SERPL-MCNC: 8.5 MG/DL — SIGNIFICANT CHANGE UP (ref 8.4–10.5)
CALCIUM SERPL-MCNC: 8.8 MG/DL — SIGNIFICANT CHANGE UP (ref 8.4–10.5)
CHLORIDE SERPL-SCNC: 105 MMOL/L — SIGNIFICANT CHANGE UP (ref 96–108)
CHLORIDE SERPL-SCNC: 109 MMOL/L — HIGH (ref 96–108)
CHLORIDE SERPL-SCNC: 111 MMOL/L — HIGH (ref 96–108)
CHLORIDE SERPL-SCNC: 111 MMOL/L — HIGH (ref 96–108)
CHOLEST SERPL-MCNC: 236 MG/DL — HIGH (ref 10–199)
CO2 SERPL-SCNC: 22 MMOL/L — SIGNIFICANT CHANGE UP (ref 22–31)
CO2 SERPL-SCNC: 23 MMOL/L — SIGNIFICANT CHANGE UP (ref 22–31)
CO2 SERPL-SCNC: 23 MMOL/L — SIGNIFICANT CHANGE UP (ref 22–31)
CO2 SERPL-SCNC: 24 MMOL/L — SIGNIFICANT CHANGE UP (ref 22–31)
CREAT SERPL-MCNC: 0.61 MG/DL — SIGNIFICANT CHANGE UP (ref 0.5–1.3)
CREAT SERPL-MCNC: 0.66 MG/DL — SIGNIFICANT CHANGE UP (ref 0.5–1.3)
CREAT SERPL-MCNC: 0.72 MG/DL — SIGNIFICANT CHANGE UP (ref 0.5–1.3)
CREAT SERPL-MCNC: 0.74 MG/DL — SIGNIFICANT CHANGE UP (ref 0.5–1.3)
GAS PNL BLDA: SIGNIFICANT CHANGE UP
GLUCOSE SERPL-MCNC: 123 MG/DL — HIGH (ref 70–99)
GLUCOSE SERPL-MCNC: 129 MG/DL — HIGH (ref 70–99)
GLUCOSE SERPL-MCNC: 132 MG/DL — HIGH (ref 70–99)
GLUCOSE SERPL-MCNC: 147 MG/DL — HIGH (ref 70–99)
HBA1C BLD-MCNC: 5.8 % — HIGH (ref 4–5.6)
HCT VFR BLD CALC: 33.7 % — LOW (ref 34.5–45)
HCT VFR BLD CALC: 34.5 % — SIGNIFICANT CHANGE UP (ref 34.5–45)
HCV AB S/CO SERPL IA: 0.14 S/CO — SIGNIFICANT CHANGE UP (ref 0–0.99)
HCV AB SERPL-IMP: SIGNIFICANT CHANGE UP
HDLC SERPL-MCNC: 63 MG/DL — SIGNIFICANT CHANGE UP
HGB BLD-MCNC: 11.1 G/DL — LOW (ref 11.5–15.5)
HGB BLD-MCNC: 11.3 G/DL — LOW (ref 11.5–15.5)
LIPID PNL WITH DIRECT LDL SERPL: 155 MG/DL — HIGH
MAGNESIUM SERPL-MCNC: 2.1 MG/DL — SIGNIFICANT CHANGE UP (ref 1.6–2.6)
MAGNESIUM SERPL-MCNC: 2.2 MG/DL — SIGNIFICANT CHANGE UP (ref 1.6–2.6)
MAGNESIUM SERPL-MCNC: 2.2 MG/DL — SIGNIFICANT CHANGE UP (ref 1.6–2.6)
MCHC RBC-ENTMCNC: 31.6 PG — SIGNIFICANT CHANGE UP (ref 27–34)
MCHC RBC-ENTMCNC: 32.2 PG — SIGNIFICANT CHANGE UP (ref 27–34)
MCHC RBC-ENTMCNC: 32.7 GM/DL — SIGNIFICANT CHANGE UP (ref 32–36)
MCHC RBC-ENTMCNC: 33 GM/DL — SIGNIFICANT CHANGE UP (ref 32–36)
MCV RBC AUTO: 95.7 FL — SIGNIFICANT CHANGE UP (ref 80–100)
MCV RBC AUTO: 98.4 FL — SIGNIFICANT CHANGE UP (ref 80–100)
PHOSPHATE SERPL-MCNC: 2.4 MG/DL — LOW (ref 2.5–4.5)
PHOSPHATE SERPL-MCNC: 2.6 MG/DL — SIGNIFICANT CHANGE UP (ref 2.5–4.5)
PHOSPHATE SERPL-MCNC: 3.2 MG/DL — SIGNIFICANT CHANGE UP (ref 2.5–4.5)
PLATELET # BLD AUTO: 258 K/UL — SIGNIFICANT CHANGE UP (ref 150–400)
PLATELET # BLD AUTO: 319 K/UL — SIGNIFICANT CHANGE UP (ref 150–400)
POTASSIUM SERPL-MCNC: 3.6 MMOL/L — SIGNIFICANT CHANGE UP (ref 3.5–5.3)
POTASSIUM SERPL-MCNC: 3.7 MMOL/L — SIGNIFICANT CHANGE UP (ref 3.5–5.3)
POTASSIUM SERPL-MCNC: 3.8 MMOL/L — SIGNIFICANT CHANGE UP (ref 3.5–5.3)
POTASSIUM SERPL-MCNC: 4 MMOL/L — SIGNIFICANT CHANGE UP (ref 3.5–5.3)
POTASSIUM SERPL-SCNC: 3.6 MMOL/L — SIGNIFICANT CHANGE UP (ref 3.5–5.3)
POTASSIUM SERPL-SCNC: 3.7 MMOL/L — SIGNIFICANT CHANGE UP (ref 3.5–5.3)
POTASSIUM SERPL-SCNC: 3.8 MMOL/L — SIGNIFICANT CHANGE UP (ref 3.5–5.3)
POTASSIUM SERPL-SCNC: 4 MMOL/L — SIGNIFICANT CHANGE UP (ref 3.5–5.3)
RBC # BLD: 3.5 M/UL — LOW (ref 3.8–5.2)
RBC # BLD: 3.52 M/UL — LOW (ref 3.8–5.2)
RBC # FLD: 12 % — SIGNIFICANT CHANGE UP (ref 10.3–14.5)
RBC # FLD: 12.3 % — SIGNIFICANT CHANGE UP (ref 10.3–14.5)
SODIUM SERPL-SCNC: 141 MMOL/L — SIGNIFICANT CHANGE UP (ref 135–145)
SODIUM SERPL-SCNC: 143 MMOL/L — SIGNIFICANT CHANGE UP (ref 135–145)
SODIUM SERPL-SCNC: 144 MMOL/L — SIGNIFICANT CHANGE UP (ref 135–145)
SODIUM SERPL-SCNC: 145 MMOL/L — SIGNIFICANT CHANGE UP (ref 135–145)
T3 SERPL-MCNC: 63 NG/DL — LOW (ref 80–200)
T4 AB SER-ACNC: 6.3 UG/DL — SIGNIFICANT CHANGE UP (ref 4.6–12)
TOTAL CHOLESTEROL/HDL RATIO MEASUREMENT: 3.8 RATIO — SIGNIFICANT CHANGE UP (ref 3.3–7.1)
TRIGL SERPL-MCNC: 90 MG/DL — SIGNIFICANT CHANGE UP (ref 10–149)
TSH SERPL-MCNC: 0.97 UIU/ML — SIGNIFICANT CHANGE UP (ref 0.27–4.2)
WBC # BLD: 13.8 K/UL — HIGH (ref 3.8–10.5)
WBC # BLD: 16.1 K/UL — HIGH (ref 3.8–10.5)
WBC # FLD AUTO: 13.8 K/UL — HIGH (ref 3.8–10.5)
WBC # FLD AUTO: 16.1 K/UL — HIGH (ref 3.8–10.5)

## 2019-04-12 PROCEDURE — 70450 CT HEAD/BRAIN W/O DYE: CPT | Mod: 26

## 2019-04-12 PROCEDURE — 99231 SBSQ HOSP IP/OBS SF/LOW 25: CPT

## 2019-04-12 PROCEDURE — 99291 CRITICAL CARE FIRST HOUR: CPT

## 2019-04-12 PROCEDURE — 93306 TTE W/DOPPLER COMPLETE: CPT | Mod: 26

## 2019-04-12 RX ORDER — HYDRALAZINE HCL 50 MG
50 TABLET ORAL EVERY 8 HOURS
Qty: 0 | Refills: 0 | Status: DISCONTINUED | OUTPATIENT
Start: 2019-04-12 | End: 2019-04-13

## 2019-04-12 RX ORDER — FENTANYL CITRATE 50 UG/ML
25 INJECTION INTRAVENOUS
Qty: 0 | Refills: 0 | Status: DISCONTINUED | OUTPATIENT
Start: 2019-04-12 | End: 2019-04-16

## 2019-04-12 RX ORDER — POTASSIUM CHLORIDE 20 MEQ
20 PACKET (EA) ORAL ONCE
Qty: 0 | Refills: 0 | Status: COMPLETED | OUTPATIENT
Start: 2019-04-12 | End: 2019-04-12

## 2019-04-12 RX ORDER — CHLORHEXIDINE GLUCONATE 213 G/1000ML
15 SOLUTION TOPICAL
Qty: 0 | Refills: 0 | Status: DISCONTINUED | OUTPATIENT
Start: 2019-04-12 | End: 2019-04-16

## 2019-04-12 RX ORDER — POTASSIUM PHOSPHATE, MONOBASIC POTASSIUM PHOSPHATE, DIBASIC 236; 224 MG/ML; MG/ML
15 INJECTION, SOLUTION INTRAVENOUS ONCE
Qty: 0 | Refills: 0 | Status: COMPLETED | OUTPATIENT
Start: 2019-04-12 | End: 2019-04-13

## 2019-04-12 RX ORDER — POTASSIUM CHLORIDE 20 MEQ
40 PACKET (EA) ORAL ONCE
Qty: 0 | Refills: 0 | Status: COMPLETED | OUTPATIENT
Start: 2019-04-12 | End: 2019-04-12

## 2019-04-12 RX ADMIN — CHLORHEXIDINE GLUCONATE 15 MILLILITER(S): 213 SOLUTION TOPICAL at 06:10

## 2019-04-12 RX ADMIN — CHLORHEXIDINE GLUCONATE 15 MILLILITER(S): 213 SOLUTION TOPICAL at 17:08

## 2019-04-12 RX ADMIN — PANTOPRAZOLE SODIUM 40 MILLIGRAM(S): 20 TABLET, DELAYED RELEASE ORAL at 11:42

## 2019-04-12 RX ADMIN — Medication 100 MILLIGRAM(S): at 14:56

## 2019-04-12 RX ADMIN — Medication 100 MILLIGRAM(S): at 05:06

## 2019-04-12 RX ADMIN — CHLORHEXIDINE GLUCONATE 1 APPLICATION(S): 213 SOLUTION TOPICAL at 21:13

## 2019-04-12 RX ADMIN — Medication 100 MILLIGRAM(S): at 21:13

## 2019-04-12 RX ADMIN — Medication 50 MILLIGRAM(S): at 22:04

## 2019-04-12 RX ADMIN — Medication 60 MILLIGRAM(S): at 23:58

## 2019-04-12 RX ADMIN — Medication 20 MILLIEQUIVALENT(S): at 03:24

## 2019-04-12 RX ADMIN — Medication 40 MILLIEQUIVALENT(S): at 22:04

## 2019-04-12 RX ADMIN — SENNA PLUS 2 TABLET(S): 8.6 TABLET ORAL at 21:13

## 2019-04-12 RX ADMIN — LEVETIRACETAM 500 MILLIGRAM(S): 250 TABLET, FILM COATED ORAL at 05:06

## 2019-04-12 RX ADMIN — Medication 100 MILLIGRAM(S): at 17:08

## 2019-04-12 RX ADMIN — SODIUM CHLORIDE 60 MILLILITER(S): 5 INJECTION, SOLUTION INTRAVENOUS at 05:06

## 2019-04-12 NOTE — PROGRESS NOTE ADULT - SUBJECTIVE AND OBJECTIVE BOX
Visit Summary: Patient seen and evaluated at bedside.    Overnight Events:    Exam:  T(C): 37.8 (04-11-19 @ 23:00), Max: 37.8 (04-11-19 @ 23:00)  HR: 62 (04-12-19 @ 03:15) (52 - 133)  BP: 137/113 (04-11-19 @ 16:00) (114/74 - 242/103)  RR: 29 (04-12-19 @ 03:15) (12 - 29)  SpO2: 100% (04-12-19 @ 03:15) (95% - 100%)  Wt(kg): --    Intubated,  Rt. 3R Lt 2R , overbreathing,no corneal, no cough or gag B/L UE trace mov. to nox, b/l LE WD                        11.1   16.1  )-----------( 319      ( 12 Apr 2019 00:17 )             33.7     04-12    141  |  105  |  16  ----------------------------<  147<H>  3.8   |  23  |  0.66    Ca    8.4      12 Apr 2019 00:17  Phos  3.2     04-12  Mg     2.1     04-12    TPro  6.6  /  Alb  3.8  /  TBili  0.6  /  DBili  0.1  /  AST  14  /  ALT  11  /  AlkPhos  58  04-11  PT/INR - ( 11 Apr 2019 14:31 )   PT: 11.5 sec;   INR: 1.01 ratio         PTT - ( 11 Apr 2019 14:31 )  PTT:26.4 sec

## 2019-04-12 NOTE — PROGRESS NOTE ADULT - ASSESSMENT
HTN  Pontine Hemorrhage   afib, newly discovered    BP is stable now  uptitrate labetalol as needed to wean off nicardipine  echo reviewed   off a/c due to pontine hemorrhage   cont cardizem

## 2019-04-12 NOTE — DIETITIAN INITIAL EVALUATION ADULT. - ENERGY NEEDS
Pt is a 64 yo F with PMH: HTN, hyperlipidemia, ischemic CVA (2004). Presented from home for evaluation of stroke related to slurred speech and unresponsiveness. 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, likely hypertensive related. Pt currently intubated and sedated.     Ht: ? 63"   Wt: 160.9 lbs  BMI: 28.5  kg/m2   IBW: 115 lbs (+/-10%)    140 % IBW       Skin: Intact per nursing flow sheet Edema: None noted

## 2019-04-12 NOTE — PROGRESS NOTE ADULT - SUBJECTIVE AND OBJECTIVE BOX
Subjective: Patient seen and examined. No new events except as noted.     SUBJECTIVE/ROS:  ROS limited       MEDICATIONS:  MEDICATIONS  (STANDING):  chlorhexidine 0.12% Liquid 15 milliLiter(s) Oral Mucosa two times a day  chlorhexidine 4% Liquid 1 Application(s) Topical <User Schedule>  diltiazem    Tablet 60 milliGRAM(s) Oral every 6 hours  docusate sodium Liquid 100 milliGRAM(s) Oral every 8 hours  labetalol 100 milliGRAM(s) Oral every 8 hours  niCARdipine Infusion 5 mG/Hr (25 mL/Hr) IV Continuous <Continuous>  pantoprazole  Injectable 40 milliGRAM(s) IV Push daily  senna 2 Tablet(s) Oral at bedtime  sodium chloride 2% . 1000 milliLiter(s) (60 mL/Hr) IV Continuous <Continuous>      PHYSICAL EXAM:  T(C): 36.7 (04-12-19 @ 11:00), Max: 38.1 (04-12-19 @ 03:00)  HR: 67 (04-12-19 @ 14:30) (52 - 127)  BP: -- 148/55  RR: 17 (04-12-19 @ 14:30) (14 - 36)  SpO2: 100% (04-12-19 @ 14:30) (100% - 100%)  Wt(kg): --  I&O's Summary    11 Apr 2019 07:01  -  12 Apr 2019 07:00  --------------------------------------------------------  IN: 1523.5 mL / OUT: 1195 mL / NET: 328.5 mL    12 Apr 2019 07:01  -  12 Apr 2019 16:13  --------------------------------------------------------  IN: 655 mL / OUT: 350 mL / NET: 305 mL        Appearance: on vent 	  Cardiovascular: Normal S1 S2,    Murmur:   Neck: JVP limited to be evaluated   Respiratory: Lungs few rhonchi   Gastrointestinal:  Soft  Skin: normal   Neuro: limited as pt on vent      LABS/DATA:    CARDIAC MARKERS:  CARDIAC MARKERS ( 11 Apr 2019 18:55 )  x     / x     / 78 U/L / x     / 2.0 ng/mL  CARDIAC MARKERS ( 11 Apr 2019 12:35 )  .000 ng/mL / x     / 107 U/L / x     / 1.5 ng/mL                                11.1   16.1  )-----------( 319      ( 12 Apr 2019 00:17 )             33.7     04-12    145  |  111<H>  |  19  ----------------------------<  123<H>  3.7   |  24  |  0.72    Ca    8.5      12 Apr 2019 14:19  Phos  2.6     04-12  Mg     2.2     04-12    TPro  6.6  /  Alb  3.8  /  TBili  0.6  /  DBili  0.1  /  AST  14  /  ALT  11  /  AlkPhos  58  04-11    proBNP:   Lipid Profile:   HgA1c: Hemoglobin A1C, Whole Blood: 5.8 % (04-12 @ 00:10)    TSH: Thyroid Stimulating Hormone, Serum: 0.97 uIU/mL (04-11 @ 23:11)  Thyroid Stimulating Hormone, Serum: 0.97 uIU/mL (04-11 @ 22:29)      TELE:  EKG:    < from: Transthoracic Echocardiogram (04.12.19 @ 05:22) >  Conclusions:  1. Normal mitral valve. Minimal mitral regurgitation.  2. Calcified trileaflet aortic valve with normal opening.  No aortic valve regurgitation seen.  3. Mildly dilated left atrium.  LA volume index = 37 cc/m2.  4. Hyperdynamic left ventricular systolic function.  5. Normal right ventricular size and function.  *** No previous Echo exam.    < end of copied text >

## 2019-04-12 NOTE — PROGRESS NOTE ADULT - ASSESSMENT
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  - GOC discussion with family  -supportive care per ICU

## 2019-04-12 NOTE — DIETITIAN INITIAL EVALUATION ADULT. - PERTINENT MEDS FT
Peridex, NaCl 2%, Fentanyl, Cardizem, Colace, Normodyne, Keppra, Nicardipine, Zofran, Protonix, Senna

## 2019-04-12 NOTE — PROGRESS NOTE ADULT - ASSESSMENT
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     NEURO:    Neurochecks q1 hrs,    Pontine hemorrhage:  s/p DDAVP and Plts   Hydrocephalus watch: CT Head tonight - EVD per NSGY   Activity: [] mobilize as tolerated [x] Bedrest [] PT [] OT [] PMNR    PULM:  Respiratory failure 2/2 pontine hemorrhage   full mechanical vent support  Daily CXR, ABG nightly     CV:  SBP goal 100 - 160  ?New onset Afib - Labetalol 100 TID and coreg  Appreciate Cardiology consult     RENAL:  Fluids:  NS @ 75    GI:    Diet: NPO, NGT   GI prophylaxis [] not indicated [x] PPI [] other:  Bowel regimen [x] colace [x] senna [] other:    ENDO:   Goal euglycemia (-180)    HEME/ONC:  VTE prophylaxis: [] SCDs [] chemoprophylaxis [x] hold chemoprophylaxis due to: IPH    ID:  afebrile     SOCIAL/FAMILY:  [] awaiting [x] updated at bedside met with , daughter, son, and daughter-in-law--in shock, appropriately grieving [] family meeting    CODE STATUS:  [x] Full Code [] DNR [] DNI [] Palliative/Comfort Care    DISPOSITION:  [x] ICU [] Stroke Unit [] Floor [] EMU [] RCU [] PCU    [x] Patient is at high risk of neurologic deterioration/death due to:  hemorrhage, herniation     Time spent: 45 critical care minutes    Contact: [phone] 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     NEURO:    Neurochecks q1 hrs,    Pontine hemorrhage:  s/p DDAVP and Plts   Pall care consult   Activity: [] mobilize as tolerated [x] Bedrest [] PT [] OT [] PMNR    PULM:  Respiratory failure 2/2 pontine hemorrhage   full mechanical vent support  Daily CXR, ABG nightly     CV:  SBP goal 100 - 160  ?New onset Afib - Labetalol 100 TID and coreg  Appreciate Cardiology consult     RENAL:  Fluids:  NS @ 75    GI:    Diet: Start TF, NGT   GI prophylaxis [] not indicated [x] PPI [] other:  Bowel regimen [x] colace [x] senna [] other:    ENDO:   Goal euglycemia (-180)    HEME/ONC:  VTE prophylaxis: [] SCDs [] chemoprophylaxis [x] hold chemoprophylaxis due to: [facility]    ID:  afebrile     SOCIAL/FAMILY:  [] awaiting [x] updated at bedside met with , daughter, son, and daughter-in-law--in shock, appropriately grieving [] family meeting    CODE STATUS:  [x] Full Code [] DNR [] DNI [] Palliative/Comfort Care    DISPOSITION:  [x] ICU [] Stroke Unit [] Floor [] EMU [] RCU [] PCU    [x] Patient is at high risk of neurologic deterioration/death due to:  hemorrhage, herniation     Time spent: 45 critical care minutes    Contact: [phone] 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     NEURO:    Neurochecks q1 hrs,    Pontine hemorrhage:  s/p DDAVP and Plts   Pall care consult   GOC discussion with family   Activity: [] mobilize as tolerated [x] Bedrest [] PT [] OT [] PMNR    PULM:  Respiratory failure 2/2 pontine hemorrhage   full mechanical vent support  Daily CXR, ABG nightly     CV:  SBP goal 100 - 160  ?New onset Afib - Labetalol 100 TID and coreg  Appreciate Cardiology consult     RENAL:  Fluids:  NS @ 75    GI:    Diet: Start TF, NGT   GI prophylaxis [] not indicated [x] PPI [] other:  Bowel regimen [x] colace [x] senna [] other:    ENDO:   Goal euglycemia (-180)    HEME/ONC:  VTE prophylaxis: [] SCDs [] chemoprophylaxis [x] hold chemoprophylaxis due to: [facility]    ID:  afebrile     SOCIAL/FAMILY:  [] awaiting [x] updated at bedside met with , daughter, son, and daughter-in-law--in shock, appropriately grieving [] family meeting    CODE STATUS:  [x] Full Code [] DNR [] DNI [] Palliative/Comfort Care    DISPOSITION:  [x] ICU [] Stroke Unit [] Floor [] EMU [] RCU [] PCU    [x] Patient is at high risk of neurologic deterioration/death due to:  hemorrhage, herniation     Time spent: 45 critical care minutes    Contact: [phone]

## 2019-04-12 NOTE — PROVIDER CONTACT NOTE (OTHER) - SITUATION
change in exam, patient is extension posturing in response to noxious stimuli on bilateral upper extremeties, abnormal flexion/ triple flexion bilaterally on the lower extremities to noxious stimuli.

## 2019-04-12 NOTE — PROGRESS NOTE ADULT - SUBJECTIVE AND OBJECTIVE BOX
HPI:  64 y/o F w/ PMHx HTN, HLD, ischemic CVA (2004), presents to the ED BIBA from home for evaluation of stroke.  As per family patient was having facial droop and was prescribed Valacyclovir and prednisone for "Bell's palsy. As per EMS, pt was talking to a family member on the phone at 11:00 on 4/11 and then had slurred speech and unresponsive.  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    EXAMINATION:  General:  intubated   Neuro:  no OE, no FC, pupils non-reactive b/l, not moving extremities to noxious stimuli, no corneals, no cough or gag, overbreaths vent  Cards:  s1, s2 RRR  Respiratory:  lungs clear b/l   Adomen:  soft  Extremities:  no edema  Skin:  warm/dry    ET tube  Left Radial A line HPI:  62 y/o F w/ PMHx HTN, HLD, ischemic CVA (2004), presents to the ED BIBA from home for evaluation of stroke.  As per family patient was having facial droop and was prescribed Valacyclovir and prednisone for "Bell's palsy. As per EMS, pt was talking to a family member on the phone at 11:00 on 4/11 and then had slurred speech and unresponsive.  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    EXAMINATION:  General:  intubated   Neuro:  no OE, no FC, pupils non-reactive b/l, ext posturing to noxious stimuli, no corneals, + cough or gag, overbreaths vent  Cards:  s1, s2 RRR  Respiratory:  lungs clear b/l   Adomen:  soft  Extremities:  no edema  Skin:  warm/dry    ET tube  Left Radial A line

## 2019-04-12 NOTE — DISCHARGE NOTE NURSING/CASE MANAGEMENT/SOCIAL WORK - NSDCPEPTSTRK_GEN_ALL_CORE
Risk factors for stroke/Stroke support groups for patients, families, and friends/Call 911 for stroke/Prescribed medications/Stroke warning signs and symptoms/Signs and symptoms of stroke/Need for follow up after discharge/Stroke education booklet

## 2019-04-12 NOTE — DIETITIAN INITIAL EVALUATION ADULT. - NS AS NUTRI INTERV MEALS SNACK
1) Defer EN vs PO diet to medical team. Consistency per medical team/SLP. 2) If PO diet, recommend no therapeutic restrictions at this time./General/healthful diet

## 2019-04-12 NOTE — DISCHARGE NOTE NURSING/CASE MANAGEMENT/SOCIAL WORK - NSDCDPATPORTLINK_GEN_ALL_CORE
You can access the [facility] Patient Portal, offered by [facility], by registering with the following website: [email or web address]

## 2019-04-12 NOTE — PROVIDER CONTACT NOTE (OTHER) - ASSESSMENT
pupils are 2 round and reactive to light bilaterally, upper extremities bilaterally extension posturing in response to noxious stimuli, lower extremities triple flexion.

## 2019-04-12 NOTE — DIETITIAN INITIAL EVALUATION ADULT. - NS AS NUTRI INTERV ENTERAL NUTRITION
Composition/3) If EN initiated, recommend Jevity 1.5 at goal rate 75ml/hr x 18 hrs. To provide: (based on current body weight 73kg): 2025kcal (28kcal/kg), (upper IBW 57.6kg): 86g protein (1.5g protein/kg IBW). 4) Monitor GI tolerance, pressors/propofol infusion. RD to adjust EN formulary, volume/rate PRN. 5) Monitor nutrition related labs, skin integrity and hydration status. 6) Obtain subjective wt/diet history as able.

## 2019-04-12 NOTE — DIETITIAN INITIAL EVALUATION ADULT. - OTHER INFO
Pt seen for length of stay in NSCU. Pt intubated and sedated, with no family present at bedside. Will obtain subjective wt/diet history as able. No known food allergies noted, no history per H&P of prior intake of vitamins/supplements PTA. Baseline chewing/swallowing status unknown at this time. No BM's recorded in-house; on bowel regimen as ordered. Pt currently NPO.

## 2019-04-12 NOTE — PROGRESS NOTE ADULT - SUBJECTIVE AND OBJECTIVE BOX
PT on ventilator, seen this afternoon.  She has equal pupils and has spontaneous stereotypical movement of right LE, and sometime are indicative of withdrawal and are not stereotypical.  Has non-specific movements of both UE.  CT today with no change in pontine hemorrhage and no HCP.  No neurosurgical intervention required at this time.

## 2019-04-12 NOTE — DIETITIAN INITIAL EVALUATION ADULT. - PHYSICAL APPEARANCE
Based on visual observation, no apparent signs of body fat or muscle mass depletion noted. Pt intubated/sedated, unable to obtain consent for Nutrition Focused Physical Assessment./well nourished

## 2019-04-13 DIAGNOSIS — G93.40 ENCEPHALOPATHY, UNSPECIFIED: ICD-10-CM

## 2019-04-13 DIAGNOSIS — Z51.5 ENCOUNTER FOR PALLIATIVE CARE: ICD-10-CM

## 2019-04-13 DIAGNOSIS — I61.3 NONTRAUMATIC INTRACEREBRAL HEMORRHAGE IN BRAIN STEM: ICD-10-CM

## 2019-04-13 LAB
ANION GAP SERPL CALC-SCNC: 12 MMOL/L — SIGNIFICANT CHANGE UP (ref 5–17)
ANION GAP SERPL CALC-SCNC: 12 MMOL/L — SIGNIFICANT CHANGE UP (ref 5–17)
BUN SERPL-MCNC: 13 MG/DL — SIGNIFICANT CHANGE UP (ref 7–23)
BUN SERPL-MCNC: 16 MG/DL — SIGNIFICANT CHANGE UP (ref 7–23)
CALCIUM SERPL-MCNC: 8.5 MG/DL — SIGNIFICANT CHANGE UP (ref 8.4–10.5)
CALCIUM SERPL-MCNC: 8.9 MG/DL — SIGNIFICANT CHANGE UP (ref 8.4–10.5)
CHLORIDE SERPL-SCNC: 112 MMOL/L — HIGH (ref 96–108)
CHLORIDE SERPL-SCNC: 114 MMOL/L — HIGH (ref 96–108)
CO2 SERPL-SCNC: 21 MMOL/L — LOW (ref 22–31)
CO2 SERPL-SCNC: 22 MMOL/L — SIGNIFICANT CHANGE UP (ref 22–31)
CREAT SERPL-MCNC: 0.55 MG/DL — SIGNIFICANT CHANGE UP (ref 0.5–1.3)
CREAT SERPL-MCNC: 0.63 MG/DL — SIGNIFICANT CHANGE UP (ref 0.5–1.3)
GAS PNL BLDA: SIGNIFICANT CHANGE UP
GLUCOSE SERPL-MCNC: 123 MG/DL — HIGH (ref 70–99)
GLUCOSE SERPL-MCNC: 185 MG/DL — HIGH (ref 70–99)
POTASSIUM SERPL-MCNC: 3.9 MMOL/L — SIGNIFICANT CHANGE UP (ref 3.5–5.3)
POTASSIUM SERPL-MCNC: 4 MMOL/L — SIGNIFICANT CHANGE UP (ref 3.5–5.3)
POTASSIUM SERPL-SCNC: 3.9 MMOL/L — SIGNIFICANT CHANGE UP (ref 3.5–5.3)
POTASSIUM SERPL-SCNC: 4 MMOL/L — SIGNIFICANT CHANGE UP (ref 3.5–5.3)
SODIUM SERPL-SCNC: 145 MMOL/L — SIGNIFICANT CHANGE UP (ref 135–145)
SODIUM SERPL-SCNC: 148 MMOL/L — HIGH (ref 135–145)

## 2019-04-13 PROCEDURE — 99223 1ST HOSP IP/OBS HIGH 75: CPT | Mod: GC

## 2019-04-13 PROCEDURE — 99291 CRITICAL CARE FIRST HOUR: CPT

## 2019-04-13 PROCEDURE — 71045 X-RAY EXAM CHEST 1 VIEW: CPT | Mod: 26

## 2019-04-13 RX ORDER — OXYCODONE HYDROCHLORIDE 5 MG/1
5 TABLET ORAL EVERY 8 HOURS
Qty: 0 | Refills: 0 | Status: DISCONTINUED | OUTPATIENT
Start: 2019-04-13 | End: 2019-04-14

## 2019-04-13 RX ORDER — OXYCODONE HYDROCHLORIDE 5 MG/1
10 TABLET ORAL EVERY 6 HOURS
Qty: 0 | Refills: 0 | Status: DISCONTINUED | OUTPATIENT
Start: 2019-04-13 | End: 2019-04-14

## 2019-04-13 RX ORDER — ENOXAPARIN SODIUM 100 MG/ML
40 INJECTION SUBCUTANEOUS
Qty: 0 | Refills: 0 | Status: DISCONTINUED | OUTPATIENT
Start: 2019-04-13 | End: 2019-04-13

## 2019-04-13 RX ORDER — ENOXAPARIN SODIUM 100 MG/ML
40 INJECTION SUBCUTANEOUS
Qty: 0 | Refills: 0 | Status: DISCONTINUED | OUTPATIENT
Start: 2019-04-13 | End: 2019-04-16

## 2019-04-13 RX ORDER — HYDRALAZINE HCL 50 MG
75 TABLET ORAL EVERY 8 HOURS
Qty: 0 | Refills: 0 | Status: DISCONTINUED | OUTPATIENT
Start: 2019-04-13 | End: 2019-04-16

## 2019-04-13 RX ORDER — HYDRALAZINE HCL 50 MG
25 TABLET ORAL ONCE
Qty: 0 | Refills: 0 | Status: COMPLETED | OUTPATIENT
Start: 2019-04-13 | End: 2019-04-13

## 2019-04-13 RX ADMIN — ENOXAPARIN SODIUM 40 MILLIGRAM(S): 100 INJECTION SUBCUTANEOUS at 17:17

## 2019-04-13 RX ADMIN — Medication 100 MILLIGRAM(S): at 09:44

## 2019-04-13 RX ADMIN — CHLORHEXIDINE GLUCONATE 15 MILLILITER(S): 213 SOLUTION TOPICAL at 17:17

## 2019-04-13 RX ADMIN — SODIUM CHLORIDE 60 MILLILITER(S): 5 INJECTION, SOLUTION INTRAVENOUS at 13:36

## 2019-04-13 RX ADMIN — Medication 100 MILLIGRAM(S): at 16:37

## 2019-04-13 RX ADMIN — POTASSIUM PHOSPHATE, MONOBASIC POTASSIUM PHOSPHATE, DIBASIC 62.5 MILLIMOLE(S): 236; 224 INJECTION, SOLUTION INTRAVENOUS at 02:04

## 2019-04-13 RX ADMIN — CHLORHEXIDINE GLUCONATE 15 MILLILITER(S): 213 SOLUTION TOPICAL at 05:02

## 2019-04-13 RX ADMIN — Medication 100 MILLIGRAM(S): at 21:05

## 2019-04-13 RX ADMIN — SODIUM CHLORIDE 60 MILLILITER(S): 5 INJECTION, SOLUTION INTRAVENOUS at 05:01

## 2019-04-13 RX ADMIN — Medication 60 MILLIGRAM(S): at 23:55

## 2019-04-13 RX ADMIN — Medication 25 MILLIGRAM(S): at 23:55

## 2019-04-13 RX ADMIN — Medication 60 MILLIGRAM(S): at 17:17

## 2019-04-13 RX ADMIN — Medication 60 MILLIGRAM(S): at 11:40

## 2019-04-13 RX ADMIN — FENTANYL CITRATE 25 MICROGRAM(S): 50 INJECTION INTRAVENOUS at 20:15

## 2019-04-13 RX ADMIN — Medication 100 MILLIGRAM(S): at 13:35

## 2019-04-13 RX ADMIN — Medication 50 MILLIGRAM(S): at 13:35

## 2019-04-13 RX ADMIN — FENTANYL CITRATE 25 MICROGRAM(S): 50 INJECTION INTRAVENOUS at 20:00

## 2019-04-13 RX ADMIN — SODIUM CHLORIDE 60 MILLILITER(S): 5 INJECTION, SOLUTION INTRAVENOUS at 21:05

## 2019-04-13 RX ADMIN — SENNA PLUS 2 TABLET(S): 8.6 TABLET ORAL at 21:05

## 2019-04-13 RX ADMIN — Medication 100 MILLIGRAM(S): at 02:15

## 2019-04-13 RX ADMIN — PANTOPRAZOLE SODIUM 40 MILLIGRAM(S): 20 TABLET, DELAYED RELEASE ORAL at 11:40

## 2019-04-13 RX ADMIN — CHLORHEXIDINE GLUCONATE 1 APPLICATION(S): 213 SOLUTION TOPICAL at 21:05

## 2019-04-13 RX ADMIN — Medication 100 MILLIGRAM(S): at 05:02

## 2019-04-13 RX ADMIN — Medication 50 MILLIGRAM(S): at 21:05

## 2019-04-13 RX ADMIN — SODIUM CHLORIDE 60 MILLILITER(S): 5 INJECTION, SOLUTION INTRAVENOUS at 21:11

## 2019-04-13 RX ADMIN — SODIUM CHLORIDE 60 MILLILITER(S): 5 INJECTION, SOLUTION INTRAVENOUS at 18:49

## 2019-04-13 RX ADMIN — Medication 50 MILLIGRAM(S): at 05:02

## 2019-04-13 NOTE — CONSULT NOTE ADULT - SUBJECTIVE AND OBJECTIVE BOX
HPI:  62 y/o F w/ PMHx HTN, HLD, ischemic CVA (2004), presents to the ED BIBA from home for evaluation of AMS- was reportedly talking. As per EMS, pt was talking to a family member on the phone at 11:00 today. Hx is limited due to pt's current medical condition.  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; intubated; on cardiene infusion (11 Apr 2019 14:29)    Spoke to son at bedside.  Pt had residual left sided arm weakness after first stroke in 2004.  She was 90% independent with ADLs and iADLs.  She had a facial droop last Sunday and went to PMD on Monday who took a CT and reported was negative.  Pt also as per son had not been taking her bp meds as consistantly lately. Pt then found on floor as above on Thursday of this past week.  Pt now intubated.  Moves right upper and lower extremity spontaneously.      PERTINENT PM/SXH:   High cholesterol  Hypertension, unspecified type  Cerebrovascular accident (CVA), unspecified mechanism    History of appendectomy    FAMILY HISTORY:    ITEMS NOT CHECKED ARE NOT PRESENT    SOCIAL HISTORY:   Significant other/partner:  [x ]  Children:  [x ]  Buddhism/Spirituality:  Substance hx:  [ ]   Tobacco hx:  [ ]   Alcohol hx: [ ]   Home Opioid hx:  [ ] I-Stop Reference No:  Living Situation: [x ]Home  [ ]Long term care  [ ]Rehab [ ]Other    ADVANCE DIRECTIVES:    DNR  MOLST  [ ]  Living Will  [ ]   DECISION MAKER(s):  [ ] Health Care Proxy(s)  [ ] Surrogate(s)  [ ] Guardian           Name(s): Phone Number(s): Ender- son states that pt's , son and daughter make decisions together    BASELINE (I)ADL(s) (prior to admission):  [place]: [ ]Total  [ x] Moderate [ ]Dependent    Allergies    Allergy Status Unknown    Intolerances    MEDICATIONS  (STANDING):  chlorhexidine 0.12% Liquid 15 milliLiter(s) Oral Mucosa two times a day  chlorhexidine 4% Liquid 1 Application(s) Topical <User Schedule>  diltiazem    Tablet 60 milliGRAM(s) Oral every 6 hours  docusate sodium Liquid 100 milliGRAM(s) Oral every 8 hours  enoxaparin Injectable 40 milliGRAM(s) SubCutaneous <User Schedule>  hydrALAZINE 50 milliGRAM(s) Oral every 8 hours  labetalol 100 milliGRAM(s) Oral every 8 hours  pantoprazole  Injectable 40 milliGRAM(s) IV Push daily  senna 2 Tablet(s) Oral at bedtime  sodium chloride 2% . 1000 milliLiter(s) (60 mL/Hr) IV Continuous <Continuous>    MEDICATIONS  (PRN):  acetaminophen   Tablet .. 650 milliGRAM(s) Oral every 6 hours PRN Temp greater or equal to 38C (100.4F), Mild Pain (1 - 3)  fentaNYL    Injectable 25 MICROGram(s) IV Push every 2 hours PRN Severe Pain (7 - 10)  ondansetron Injectable 4 milliGRAM(s) IV Push every 6 hours PRN Nausea and/or Vomiting    PRESENT SYMPTOMS: [x ]Unable to obtain due to poor mentation   Source if other than patient:  [ ]Family   [ ]Team     Pain (Impact on QOL):    Location -         Minimal acceptable level (0-10 scale):                    Aggrevating factors -  Quality -  Radiation -  Severity (0-10 scale) -    Timing -    PAIN AD Score:     [email or web address] (press ctrl +  left click to view)    Dyspnea:                           [ ]Mild [ ]Moderate [ ]Severe  Anxiety:                             [ ]Mild [ ]Moderate [ ]Severe  Fatigue:                             [ ]Mild [ ]Moderate [ ]Severe  Nausea:                             [ ]Mild [ ]Moderate [ ]Severe  Loss of appetite:              [ ]Mild [ ]Moderate [ ]Severe  Constipation:                    [ ]Mild [ ]Moderate [ ]Severe    Other Symptoms:  [ ]All other review of systems negative     Karnofsky Performance Score/Palliative Performance Status Version 2:       20  %  PHYSICAL EXAM:  Vital Signs Last 24 Hrs  T(C): 37 (13 Apr 2019 07:00), Max: 37.9 (12 Apr 2019 23:00)  T(F): 98.6 (13 Apr 2019 07:00), Max: 100.2 (12 Apr 2019 23:00)  HR: 68 (13 Apr 2019 11:10) (57 - 87)  BP: --  BP(mean): --  RR: 16 (13 Apr 2019 09:00) (15 - 35)  SpO2: 100% (13 Apr 2019 11:10) (100% - 100%) I&O's Summary    12 Apr 2019 07:01  -  13 Apr 2019 07:00  --------------------------------------------------------  IN: 2055 mL / OUT: 1800 mL / NET: 255 mL    13 Apr 2019 07:01  -  13 Apr 2019 11:22  --------------------------------------------------------  IN: 343 mL / OUT: 0 mL / NET: 343 mL    GENERAL:  [ ]Alert  [ ]Oriented x   [ ]Lethargic  [ ]Cachexia  [ ]Unarousable  [ ]Verbal  [x ]Non-Verbal  Behavioral:   [ ] Anxiety  [ ] Delirium [ ] Agitation [ ] Other  HEENT:  [ ]Normal   [ ]Dry mouth   [ x]ET Tube/Trach  [ ]Oral lesions  PULMONARY:   [ x]Clear [ ]Tachypnea  [ ]Audible excessive secretions   [ ]Rhonchi        [ ]Right [ ]Left [ ]Bilateral  [ ]Crackles        [ ]Right [ ]Left [ ]Bilateral  [ ]Wheezing     [ ]Right [ ]Left [ ]Bilateral  CARDIOVASCULAR:    [x ]Regular [ ]Irregular [ ]Tachy  [ ]Yobany [ ]Murmur [ ]Other  GASTROINTESTINAL:  [x ]Soft  [ ]Distended   [ x]+BS  [x]Non tender [ ]Tender  [ ]PEG [ x]OGT/ NGT  Last BM:   GENITOURINARY:  [ ]Normal [ ] Incontinent   [ ]Oliguria/Anuria   [ x]Bedoya  MUSCULOSKELETAL:   [ ]Normal   [ ]Weakness  [ x]Bed/Wheelchair bound [ ]Edema  NEUROLOGIC:   [ ]No focal deficits  [ ] Cognitive impairment  [ ] Dysphagia [ ]Dysarthria [x] Paresis [ ]Other   SKIN:   [ x]Normal   [ ]Pressure ulcer(s)  [ ]Rash    CRITICAL CARE:  [ ] Shock Present  [ ]Septic [ ]Cardiogenic [ ]Neurologic [ ]Hypovolemic  [ ]  Vasopressors [ ]  Inotropes   [ ] Respiratory failure present  [ ] Acute  [ ] Chronic [ ] Hypoxic  [ ] Hypercarbic [ ] Other  [ ] Other organ failure     LABS:                        11.3   13.8  )-----------( 258      ( 12 Apr 2019 21:08 )             34.5   04-13    145  |  112<H>  |  13  ----------------------------<  123<H>  4.0   |  21<L>  |  0.55    Ca    8.5      13 Apr 2019 04:07  Phos  2.4     04-12  Mg     2.2     04-12    TPro  6.6  /  Alb  3.8  /  TBili  0.6  /  DBili  0.1  /  AST  14  /  ALT  11  /  AlkPhos  58  04-11  PT/INR - ( 11 Apr 2019 14:31 )   PT: 11.5 sec;   INR: 1.01 ratio         PTT - ( 11 Apr 2019 14:31 )  PTT:26.4 sec      RADIOLOGY & ADDITIONAL STUDIES:    PROTEIN CALORIE MALNUTRITION:   [ ] PPSV2 < or = to 30% [ ] significant weight loss  [ ] poor nutritional intake [ ] catabolic state [ ] anasarca     Albumin, Serum: 3.8 g/dL (04-11-19 @ 21:02)  Artificial Nutrition [ ]     REFERRALS:   [ ]Chaplaincy  [ ] Hospice  [ ]Child Life  [ ]Social Work  [ ]Case management [ ]Holistic Therapy   Goals of Care Discussion Document:

## 2019-04-13 NOTE — PROGRESS NOTE ADULT - SUBJECTIVE AND OBJECTIVE BOX
HPI:  62 y/o F w/ PMHx HTN, HLD, ischemic CVA (2004), presents to the ED BIBA from home for evaluation of stroke.  As per family patient was having facial droop and was prescribed Valacyclovir and prednisone for "Bell's palsy. As per EMS, pt was talking to a family member on the phone at 11:00 on 4/11 and then had slurred speech and unresponsive.  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; intubated; on cardene infusion     On admission, the patient was:  GCS: 3 T  ICH score: 4    Did not tolerate CPAP today     VITALS:  Recent Vitals Reviewed   LABS:  Recent Labs Reviewed  MEDICATIONS: All Recent Medications Reviewed   IMAGING:   Recent imaging studies were reviewed.    EXAMINATION:  General:  intubated   Neuro:  no OE, no FC, pupils non-reactive b/l, purposeful movements L>R on UE, moving spont LE + cough or gag, overbreaths vent  Cards:  s1, s2 RRR  Respiratory:  lungs clear b/l   Adomen:  soft  Extremities:  no edema  Skin:  warm/dry    ET tube  Left Radial A line

## 2019-04-13 NOTE — PROGRESS NOTE ADULT - ASSESSMENT
HTN  Pontine Hemorrhage   afib, newly discovered    cont current meds  HR stable  cont cardizem HTN  Pontine Hemorrhage   afib, newly discovered    cont current meds  HR stable in sinus   cont cardizem

## 2019-04-13 NOTE — PROGRESS NOTE ADULT - SUBJECTIVE AND OBJECTIVE BOX
Subjective: Patient seen and examined. No new events except as noted.     SUBJECTIVE/ROS:  awake       MEDICATIONS:  MEDICATIONS  (STANDING):  chlorhexidine 0.12% Liquid 15 milliLiter(s) Oral Mucosa two times a day  chlorhexidine 4% Liquid 1 Application(s) Topical <User Schedule>  diltiazem    Tablet 60 milliGRAM(s) Oral every 6 hours  docusate sodium Liquid 100 milliGRAM(s) Oral every 8 hours  enoxaparin Injectable 40 milliGRAM(s) SubCutaneous <User Schedule>  hydrALAZINE 50 milliGRAM(s) Oral every 8 hours  labetalol 100 milliGRAM(s) Oral every 8 hours  pantoprazole  Injectable 40 milliGRAM(s) IV Push daily  senna 2 Tablet(s) Oral at bedtime  sodium chloride 2% . 1000 milliLiter(s) (60 mL/Hr) IV Continuous <Continuous>      PHYSICAL EXAM:  T(C): 37 (04-13-19 @ 07:00), Max: 37.9 (04-12-19 @ 23:00)  HR: 68 (04-13-19 @ 11:10) (57 - 87)  BP: --  RR: 16 (04-13-19 @ 09:00) (15 - 35)  SpO2: 100% (04-13-19 @ 11:10) (100% - 100%)  Wt(kg): --  I&O's Summary    12 Apr 2019 07:01  -  13 Apr 2019 07:00  --------------------------------------------------------  IN: 2055 mL / OUT: 1800 mL / NET: 255 mL    13 Apr 2019 07:01  -  13 Apr 2019 11:39  --------------------------------------------------------  IN: 343 mL / OUT: 0 mL / NET: 343 mL            JVP: Normal  Neck: supple  Lung: clear   CV: S1 S2 , Murmur:  Abd: soft  Ext: No edema  neuro: Awake / alert  Psych: flat affect  Skin: normal``    LABS/DATA:    CARDIAC MARKERS:  CARDIAC MARKERS ( 11 Apr 2019 18:55 )  x     / x     / 78 U/L / x     / 2.0 ng/mL  CARDIAC MARKERS ( 11 Apr 2019 12:35 )  .000 ng/mL / x     / 107 U/L / x     / 1.5 ng/mL                                11.3   13.8  )-----------( 258      ( 12 Apr 2019 21:08 )             34.5     04-13    145  |  112<H>  |  13  ----------------------------<  123<H>  4.0   |  21<L>  |  0.55    Ca    8.5      13 Apr 2019 04:07  Phos  2.4     04-12  Mg     2.2     04-12    TPro  6.6  /  Alb  3.8  /  TBili  0.6  /  DBili  0.1  /  AST  14  /  ALT  11  /  AlkPhos  58  04-11    proBNP:   Lipid Profile:   HgA1c:   TSH:     TELE:  EKG: Subjective: Patient seen and examined. No new events except as noted.     SUBJECTIVE/ROS:        MEDICATIONS:  MEDICATIONS  (STANDING):  chlorhexidine 0.12% Liquid 15 milliLiter(s) Oral Mucosa two times a day  chlorhexidine 4% Liquid 1 Application(s) Topical <User Schedule>  diltiazem    Tablet 60 milliGRAM(s) Oral every 6 hours  docusate sodium Liquid 100 milliGRAM(s) Oral every 8 hours  enoxaparin Injectable 40 milliGRAM(s) SubCutaneous <User Schedule>  hydrALAZINE 50 milliGRAM(s) Oral every 8 hours  labetalol 100 milliGRAM(s) Oral every 8 hours  pantoprazole  Injectable 40 milliGRAM(s) IV Push daily  senna 2 Tablet(s) Oral at bedtime  sodium chloride 2% . 1000 milliLiter(s) (60 mL/Hr) IV Continuous <Continuous>      PHYSICAL EXAM:  T(C): 37 (04-13-19 @ 07:00), Max: 37.9 (04-12-19 @ 23:00)  HR: 68 (04-13-19 @ 11:10) (57 - 87)  BP: --  RR: 16 (04-13-19 @ 09:00) (15 - 35)  SpO2: 100% (04-13-19 @ 11:10) (100% - 100%)  Wt(kg): --  I&O's Summary    12 Apr 2019 07:01  -  13 Apr 2019 07:00  --------------------------------------------------------  IN: 2055 mL / OUT: 1800 mL / NET: 255 mL    13 Apr 2019 07:01  -  13 Apr 2019 11:39  --------------------------------------------------------  IN: 343 mL / OUT: 0 mL / NET: 343 mL            JVP: Normal  Neck: supple  Lung: clear   CV: S1 S2 , Murmur:  Abd: soft  Ext: No edema  neuro: Awake / alert  Psych: flat affect  Skin: normal``    LABS/DATA:    CARDIAC MARKERS:  CARDIAC MARKERS ( 11 Apr 2019 18:55 )  x     / x     / 78 U/L / x     / 2.0 ng/mL  CARDIAC MARKERS ( 11 Apr 2019 12:35 )  .000 ng/mL / x     / 107 U/L / x     / 1.5 ng/mL                                11.3   13.8  )-----------( 258      ( 12 Apr 2019 21:08 )             34.5     04-13    145  |  112<H>  |  13  ----------------------------<  123<H>  4.0   |  21<L>  |  0.55    Ca    8.5      13 Apr 2019 04:07  Phos  2.4     04-12  Mg     2.2     04-12    TPro  6.6  /  Alb  3.8  /  TBili  0.6  /  DBili  0.1  /  AST  14  /  ALT  11  /  AlkPhos  58  04-11    proBNP:   Lipid Profile:   HgA1c:   TSH:     TELE:  EKG: Subjective: Patient seen and examined. No new events except as noted.     SUBJECTIVE/ROS:        MEDICATIONS:  MEDICATIONS  (STANDING):  chlorhexidine 0.12% Liquid 15 milliLiter(s) Oral Mucosa two times a day  chlorhexidine 4% Liquid 1 Application(s) Topical <User Schedule>  diltiazem    Tablet 60 milliGRAM(s) Oral every 6 hours  docusate sodium Liquid 100 milliGRAM(s) Oral every 8 hours  enoxaparin Injectable 40 milliGRAM(s) SubCutaneous <User Schedule>  hydrALAZINE 50 milliGRAM(s) Oral every 8 hours  labetalol 100 milliGRAM(s) Oral every 8 hours  pantoprazole  Injectable 40 milliGRAM(s) IV Push daily  senna 2 Tablet(s) Oral at bedtime  sodium chloride 2% . 1000 milliLiter(s) (60 mL/Hr) IV Continuous <Continuous>      PHYSICAL EXAM:  T(C): 37 (04-13-19 @ 07:00), Max: 37.9 (04-12-19 @ 23:00)  HR: 68 (04-13-19 @ 11:10) (57 - 87)  BP: --  RR: 16 (04-13-19 @ 09:00) (15 - 35)  SpO2: 100% (04-13-19 @ 11:10) (100% - 100%)  Wt(kg): --  I&O's Summary    12 Apr 2019 07:01  -  13 Apr 2019 07:00  --------------------------------------------------------  IN: 2055 mL / OUT: 1800 mL / NET: 255 mL    13 Apr 2019 07:01  -  13 Apr 2019 11:39  --------------------------------------------------------  IN: 343 mL / OUT: 0 mL / NET: 343 mL          Appearance: on vent 	  Cardiovascular: Normal S1 S2,    Murmur:   Neck: JVP limited to be evaluated   Respiratory: Lungs few rhonchi   Gastrointestinal:  Soft  Skin: normal   Neuro: limited as pt on vent     LABS/DATA:    CARDIAC MARKERS:  CARDIAC MARKERS ( 11 Apr 2019 18:55 )  x     / x     / 78 U/L / x     / 2.0 ng/mL  CARDIAC MARKERS ( 11 Apr 2019 12:35 )  .000 ng/mL / x     / 107 U/L / x     / 1.5 ng/mL                                11.3   13.8  )-----------( 258      ( 12 Apr 2019 21:08 )             34.5     04-13    145  |  112<H>  |  13  ----------------------------<  123<H>  4.0   |  21<L>  |  0.55    Ca    8.5      13 Apr 2019 04:07  Phos  2.4     04-12  Mg     2.2     04-12    TPro  6.6  /  Alb  3.8  /  TBili  0.6  /  DBili  0.1  /  AST  14  /  ALT  11  /  AlkPhos  58  04-11    proBNP:   Lipid Profile:   HgA1c:   TSH:     TELE:  EKG:

## 2019-04-13 NOTE — PROGRESS NOTE ADULT - ASSESSMENT
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     NEURO:    Neurochecks q1 hrs,    Pontine hemorrhage:  s/p DDAVP and Plts   Pall care consult   GOC discussion with family - family wanting aggressive care- will consult ENT and GI for TRACH and PEG   Activity: [] mobilize as tolerated [x] Bedrest [] PT [] OT [] PMNR    PULM:  Respiratory failure 2/2 pontine hemorrhage   full mechanical vent support - did not tolerated CPAP  Daily CXR, ABG nightly     CV:  SBP goal 100 - 160  ?New onset Afib - Labetalol 100 TID and coreg  Appreciate Cardiology consult     RENAL:  Fluids:  NS @ 75    GI:    Diet: Start TF, NGT   GI prophylaxis [] not indicated [x] PPI [] other:  Bowel regimen [x] colace [x] senna [] other:    ENDO:   Goal euglycemia (-180)    HEME/ONC:  VTE prophylaxis: [] SCDs [] chemoprophylaxis - Lovenox     ID:  afebrile     SOCIAL/FAMILY:  [] awaiting [x] updated at bedside met with , daughter, son, and daughter-in-law--in shock, appropriately grieving [] family meeting    CODE STATUS:  [x] Full Code [] DNR [] DNI [] Palliative/Comfort Care    DISPOSITION:  [x] ICU [] Stroke Unit [] Floor [] EMU [] RCU [] PCU    [x] Patient is at high risk of neurologic deterioration/death due to:  hemorrhage, herniation     Time spent: 45 critical care minutes    Contact: [phone]

## 2019-04-14 LAB
ANION GAP SERPL CALC-SCNC: 10 MMOL/L — SIGNIFICANT CHANGE UP (ref 5–17)
ANION GAP SERPL CALC-SCNC: 13 MMOL/L — SIGNIFICANT CHANGE UP (ref 5–17)
BUN SERPL-MCNC: 17 MG/DL — SIGNIFICANT CHANGE UP (ref 7–23)
BUN SERPL-MCNC: 18 MG/DL — SIGNIFICANT CHANGE UP (ref 7–23)
CALCIUM SERPL-MCNC: 8.8 MG/DL — SIGNIFICANT CHANGE UP (ref 8.4–10.5)
CALCIUM SERPL-MCNC: 8.9 MG/DL — SIGNIFICANT CHANGE UP (ref 8.4–10.5)
CHLORIDE SERPL-SCNC: 115 MMOL/L — HIGH (ref 96–108)
CHLORIDE SERPL-SCNC: 115 MMOL/L — HIGH (ref 96–108)
CO2 SERPL-SCNC: 24 MMOL/L — SIGNIFICANT CHANGE UP (ref 22–31)
CO2 SERPL-SCNC: 24 MMOL/L — SIGNIFICANT CHANGE UP (ref 22–31)
CREAT SERPL-MCNC: 0.65 MG/DL — SIGNIFICANT CHANGE UP (ref 0.5–1.3)
CREAT SERPL-MCNC: 0.71 MG/DL — SIGNIFICANT CHANGE UP (ref 0.5–1.3)
GLUCOSE SERPL-MCNC: 164 MG/DL — HIGH (ref 70–99)
GLUCOSE SERPL-MCNC: 196 MG/DL — HIGH (ref 70–99)
HCT VFR BLD CALC: 31.1 % — LOW (ref 34.5–45)
HGB BLD-MCNC: 10.4 G/DL — LOW (ref 11.5–15.5)
MAGNESIUM SERPL-MCNC: 2.2 MG/DL — SIGNIFICANT CHANGE UP (ref 1.6–2.6)
MAGNESIUM SERPL-MCNC: 2.2 MG/DL — SIGNIFICANT CHANGE UP (ref 1.6–2.6)
MCHC RBC-ENTMCNC: 33.4 PG — SIGNIFICANT CHANGE UP (ref 27–34)
MCHC RBC-ENTMCNC: 33.5 GM/DL — SIGNIFICANT CHANGE UP (ref 32–36)
MCV RBC AUTO: 99.4 FL — SIGNIFICANT CHANGE UP (ref 80–100)
PHOSPHATE SERPL-MCNC: 2.3 MG/DL — LOW (ref 2.5–4.5)
PHOSPHATE SERPL-MCNC: 2.3 MG/DL — LOW (ref 2.5–4.5)
PLATELET # BLD AUTO: 233 K/UL — SIGNIFICANT CHANGE UP (ref 150–400)
POTASSIUM SERPL-MCNC: 3.7 MMOL/L — SIGNIFICANT CHANGE UP (ref 3.5–5.3)
POTASSIUM SERPL-MCNC: 3.8 MMOL/L — SIGNIFICANT CHANGE UP (ref 3.5–5.3)
POTASSIUM SERPL-SCNC: 3.7 MMOL/L — SIGNIFICANT CHANGE UP (ref 3.5–5.3)
POTASSIUM SERPL-SCNC: 3.8 MMOL/L — SIGNIFICANT CHANGE UP (ref 3.5–5.3)
RBC # BLD: 3.12 M/UL — LOW (ref 3.8–5.2)
RBC # FLD: 12.6 % — SIGNIFICANT CHANGE UP (ref 10.3–14.5)
SODIUM SERPL-SCNC: 149 MMOL/L — HIGH (ref 135–145)
SODIUM SERPL-SCNC: 152 MMOL/L — HIGH (ref 135–145)
WBC # BLD: 16.3 K/UL — HIGH (ref 3.8–10.5)
WBC # FLD AUTO: 16.3 K/UL — HIGH (ref 3.8–10.5)

## 2019-04-14 PROCEDURE — 71045 X-RAY EXAM CHEST 1 VIEW: CPT | Mod: 26

## 2019-04-14 PROCEDURE — 99291 CRITICAL CARE FIRST HOUR: CPT

## 2019-04-14 PROCEDURE — 99221 1ST HOSP IP/OBS SF/LOW 40: CPT

## 2019-04-14 RX ORDER — POTASSIUM PHOSPHATE, MONOBASIC POTASSIUM PHOSPHATE, DIBASIC 236; 224 MG/ML; MG/ML
15 INJECTION, SOLUTION INTRAVENOUS ONCE
Qty: 0 | Refills: 0 | Status: COMPLETED | OUTPATIENT
Start: 2019-04-14 | End: 2019-04-14

## 2019-04-14 RX ORDER — POLYETHYLENE GLYCOL 3350 17 G/17G
17 POWDER, FOR SOLUTION ORAL DAILY
Qty: 0 | Refills: 0 | Status: DISCONTINUED | OUTPATIENT
Start: 2019-04-14 | End: 2019-04-15

## 2019-04-14 RX ADMIN — Medication 100 MILLIGRAM(S): at 05:16

## 2019-04-14 RX ADMIN — FENTANYL CITRATE 25 MICROGRAM(S): 50 INJECTION INTRAVENOUS at 09:15

## 2019-04-14 RX ADMIN — Medication 100 MILLIGRAM(S): at 17:31

## 2019-04-14 RX ADMIN — Medication 100 MILLIGRAM(S): at 23:04

## 2019-04-14 RX ADMIN — SENNA PLUS 2 TABLET(S): 8.6 TABLET ORAL at 23:03

## 2019-04-14 RX ADMIN — CHLORHEXIDINE GLUCONATE 1 APPLICATION(S): 213 SOLUTION TOPICAL at 23:04

## 2019-04-14 RX ADMIN — POTASSIUM PHOSPHATE, MONOBASIC POTASSIUM PHOSPHATE, DIBASIC 62.5 MILLIMOLE(S): 236; 224 INJECTION, SOLUTION INTRAVENOUS at 03:21

## 2019-04-14 RX ADMIN — Medication 100 MILLIGRAM(S): at 10:21

## 2019-04-14 RX ADMIN — Medication 75 MILLIGRAM(S): at 23:03

## 2019-04-14 RX ADMIN — Medication 75 MILLIGRAM(S): at 05:16

## 2019-04-14 RX ADMIN — PANTOPRAZOLE SODIUM 40 MILLIGRAM(S): 20 TABLET, DELAYED RELEASE ORAL at 12:30

## 2019-04-14 RX ADMIN — Medication 60 MILLIGRAM(S): at 12:30

## 2019-04-14 RX ADMIN — ENOXAPARIN SODIUM 40 MILLIGRAM(S): 100 INJECTION SUBCUTANEOUS at 17:31

## 2019-04-14 RX ADMIN — OXYCODONE HYDROCHLORIDE 5 MILLIGRAM(S): 5 TABLET ORAL at 01:47

## 2019-04-14 RX ADMIN — Medication 100 MILLIGRAM(S): at 01:47

## 2019-04-14 RX ADMIN — CHLORHEXIDINE GLUCONATE 15 MILLILITER(S): 213 SOLUTION TOPICAL at 17:31

## 2019-04-14 RX ADMIN — OXYCODONE HYDROCHLORIDE 5 MILLIGRAM(S): 5 TABLET ORAL at 02:17

## 2019-04-14 RX ADMIN — Medication 75 MILLIGRAM(S): at 14:57

## 2019-04-14 RX ADMIN — Medication 60 MILLIGRAM(S): at 20:44

## 2019-04-14 RX ADMIN — POLYETHYLENE GLYCOL 3350 17 GRAM(S): 17 POWDER, FOR SOLUTION ORAL at 12:30

## 2019-04-14 RX ADMIN — CHLORHEXIDINE GLUCONATE 15 MILLILITER(S): 213 SOLUTION TOPICAL at 05:16

## 2019-04-14 RX ADMIN — Medication 60 MILLIGRAM(S): at 05:16

## 2019-04-14 RX ADMIN — FENTANYL CITRATE 25 MICROGRAM(S): 50 INJECTION INTRAVENOUS at 09:00

## 2019-04-14 NOTE — CONSULT NOTE ADULT - ASSESSMENT
62 y/o F w/ PMHx HTN, HLD, ischemic CVA (2004), presents to the ED BIBA from home for evaluation of AMS- was reportedly talking. As per EMS, pt was talking to a family member on the phone at 11:00 4/11. Hx is limited due to pt's current medical condition.  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; intubated; on cardiene infusion. ENT asked to evaluate patient for Tracheostomy placement.  Plan for Tracheostomy this week, awaiting family decision/consent (procedure discussed at length with son, would like to discuss further with family prior to consenting)

## 2019-04-14 NOTE — CONSULT NOTE ADULT - SUBJECTIVE AND OBJECTIVE BOX
CC: Respiratory Failure    HPI: 64 y/o F w/ PMHx HTN, HLD, ischemic CVA (2004), presents to the ED BIBA from home for evaluation of AMS- was reportedly talking. As per EMS, pt was talking to a family member on the phone at 11:00 4/11. Hx is limited due to pt's current medical condition.  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; intubated; on cardiene infusion. ENT asked to evaluate patient for Tracheostomy placement, as possible extubation attempted, however patient failed CPAP trials ROS N/A        PAST MEDICAL & SURGICAL HISTORY:  High cholesterol  Hypertension, unspecified type  Cerebrovascular accident (CVA), unspecified mechanism  History of appendectomy    Allergies    Allergy Status Unknown    Intolerances      MEDICATIONS  (STANDING):  chlorhexidine 0.12% Liquid 15 milliLiter(s) Oral Mucosa two times a day  chlorhexidine 4% Liquid 1 Application(s) Topical <User Schedule>  diltiazem    Tablet 60 milliGRAM(s) Oral every 6 hours  docusate sodium Liquid 100 milliGRAM(s) Oral every 8 hours  enoxaparin Injectable 40 milliGRAM(s) SubCutaneous <User Schedule>  hydrALAZINE 75 milliGRAM(s) Oral every 8 hours  labetalol 100 milliGRAM(s) Oral every 8 hours  pantoprazole  Injectable 40 milliGRAM(s) IV Push daily  polyethylene glycol 3350 17 Gram(s) Oral daily  senna 2 Tablet(s) Oral at bedtime    MEDICATIONS  (PRN):  acetaminophen   Tablet .. 650 milliGRAM(s) Oral every 6 hours PRN Temp greater or equal to 38C (100.4F), Mild Pain (1 - 3)  fentaNYL    Injectable 25 MICROGram(s) IV Push every 2 hours PRN Severe Pain (7 - 10)  ondansetron Injectable 4 milliGRAM(s) IV Push every 6 hours PRN Nausea and/or Vomiting      Social History: n/a    Family history: n/a      ROS:   ENT: all negative except as noted in HPI   CV: denies palpitations  Pulm: denies SOB, cough, hemoptysis  GI: denies change in apetite, indigestion, n/v  : denies pertinent urinary symptoms, urgency  Neuro: denies numbness/tingling, loss of sensation  Psych: denies anxiety  MS: denies muscle weakness, instability  Heme: denies easy bruising or bleeding  Endo: denies heat/cold intolerance, excessive sweating  Vascular: denies LE edema    Vital Signs Last 24 Hrs  T(C): 37.8 (15 Apr 2019 03:00), Max: 38.4 (14 Apr 2019 23:00)  T(F): 100 (15 Apr 2019 03:00), Max: 101.1 (14 Apr 2019 23:00)  HR: 60 (15 Apr 2019 04:32) (58 - 90)  BP: --  BP(mean): --  RR: 14 (15 Apr 2019 04:00) (13 - 18)  SpO2: 100% (15 Apr 2019 04:32) (96% - 100%)                          10.4   16.3  )-----------( 233      ( 13 Apr 2019 23:47 )             31.1    04-14    152<H>  |  115<H>  |  18  ----------------------------<  164<H>  3.7   |  24  |  0.71    Ca    8.8      14 Apr 2019 18:13  Phos  2.3     04-14  Mg     2.2     04-14         PHYSICAL EXAM:  Gen: Guarded  Skin: No rashes, bruises, or lesions  Head: Normocephalic, Atraumatic  Face: no edema, erythema, or fluctuance. Parotid glands soft without mass  Eyes: no scleral injection  Nose: Nares bilaterally patent, no discharge  Mouth:ETT in place FOM soft  No Stridor / Drooling / Trismus.  Mucosa moist, tongue/uvula midline, oropharynx clear  Neck: Flat, supple, no lymphadenopathy, trachea midline, no masses neck landmarks visible  Lymphatic: No lymphadenopathy  Resp: breathing easily, no stridor  Neuro: facial nerve intact, no facial droop

## 2019-04-14 NOTE — PROGRESS NOTE ADULT - ASSESSMENT
HTN  Pontine Hemorrhage   afib, newly discovered    cont current meds  HR stable in sinus   cont cardizem   BP control   agree with current meds

## 2019-04-14 NOTE — PROGRESS NOTE ADULT - SUBJECTIVE AND OBJECTIVE BOX
HPI:  64 y/o F w/ PMHx HTN, HLD, ischemic CVA (2004), presents to the ED BIBA from home for evaluation of AMS- was reportedly talking. As per EMS, pt was talking to a family member on the phone at 11:00 today. Hx is limited due to pt's current medical condition.  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; intubated; on cardiene infusion (11 Apr 2019 14:29)    SURGERY:   PAST MEDICAL HISTORY: High cholesterol  Hypertension, unspecified type  Cerebrovascular accident (CVA), unspecified mechanism    PAST SURGICAL HISTORY: History of appendectomy    FAMILY HISTORY:    ALLERGIES: Allergy Status Unknown    **************************************  **************************************    OVERNIGHT EVENTS: [x] None    ROS  Unobtainable due to mental status[] Negative []  Positives:    ADMISSION SCORES: GCS: HH: MF: NIHSS: RASS: CAM-ICU: ICP:    ICU Vital Signs Last 24 Hrs  T(C): 37.7 (14 Apr 2019 07:00), Max: 37.7 (14 Apr 2019 07:00)  T(F): 99.9 (14 Apr 2019 07:00), Max: 99.9 (14 Apr 2019 07:00)  HR: 67 (14 Apr 2019 10:00) (56 - 79)  BP: --  BP(mean): --  ABP: 164/62 (14 Apr 2019 10:00) (132/50 - 166/64)  ABP(mean): 98 (14 Apr 2019 10:00) (77 - 101)  RR: 13 (14 Apr 2019 10:00) (13 - 22)  SpO2: 100% (14 Apr 2019 10:00) (100% - 100%)     04-13 @ 07:01  -  04-14 @ 07:00  --------------------------------------------------------  IN: 2766 mL / OUT: 2175 mL / NET: 591 mL       Mode: AC/ CMV (Assist Control/ Continuous Mandatory Ventilation)  RR (machine): 14  TV (machine): 400  FiO2: 40  PEEP: 5  ITime: 0.8  MAP: 8  PIP: 18      DEVICES: [] Restraints [] ANABELLA/HMV []LD [] ET tube [] Trach [] Chest Tube [] A-line [] Bedoya [] NGT [] Rectal Tube [] EVD [] CVL  [] ICP/LiCOx    NEUROIMAGING:     EEG REPORT:     MEDICATIONS:  acetaminophen   Tablet .. 650 milliGRAM(s) Oral every 6 hours PRN  chlorhexidine 0.12% Liquid 15 milliLiter(s) Oral Mucosa two times a day  chlorhexidine 4% Liquid 1 Application(s) Topical <User Schedule>  diltiazem    Tablet 60 milliGRAM(s) Oral every 6 hours  docusate sodium Liquid 100 milliGRAM(s) Oral every 8 hours  enoxaparin Injectable 40 milliGRAM(s) SubCutaneous <User Schedule>  fentaNYL    Injectable 25 MICROGram(s) IV Push every 2 hours PRN  hydrALAZINE 75 milliGRAM(s) Oral every 8 hours  labetalol 100 milliGRAM(s) Oral every 8 hours  ondansetron Injectable 4 milliGRAM(s) IV Push every 6 hours PRN  oxyCODONE    IR 5 milliGRAM(s) Oral every 8 hours PRN  oxyCODONE    IR 10 milliGRAM(s) Oral every 6 hours PRN  pantoprazole  Injectable 40 milliGRAM(s) IV Push daily  senna 2 Tablet(s) Oral at bedtime  sodium chloride 2% . 1000 milliLiter(s) IV Continuous <Continuous>      PHYSICAL EXAM:  General:  intubated   Neuro:  no OE, no FC, pupils non-reactive b/l, purposeful movements L>R on UE, moving spont LE + cough or gag, overbreaths vent  Cards:  s1, s2 RRR  Respiratory:  lungs clear b/l   Adomen:  soft  Extremities:  no edema  Skin:  warm/dry    LABS:                        10.4   16.3  )-----------( 233      ( 13 Apr 2019 23:47 )             31.1    04-13    149<H>  |  115<H>  |  17  ----------------------------<  196<H>  3.8   |  24  |  0.65    Ca    8.9      13 Apr 2019 23:47  Phos  2.3     04-13  Mg     2.2     04-13     ABG - ( 13 Apr 2019 23:41 )  pH, Arterial: 7.41  pH, Blood: x     /  pCO2: 39    /  pO2: 126   / HCO3: 24    / Base Excess: .2    /  SaO2: 99

## 2019-04-14 NOTE — PROGRESS NOTE ADULT - SUBJECTIVE AND OBJECTIVE BOX
Subjective: Patient seen and examined. No new events except as noted.     SUBJECTIVE/ROS:  ROS limited       MEDICATIONS:  MEDICATIONS  (STANDING):  chlorhexidine 0.12% Liquid 15 milliLiter(s) Oral Mucosa two times a day  chlorhexidine 4% Liquid 1 Application(s) Topical <User Schedule>  diltiazem    Tablet 60 milliGRAM(s) Oral every 6 hours  docusate sodium Liquid 100 milliGRAM(s) Oral every 8 hours  enoxaparin Injectable 40 milliGRAM(s) SubCutaneous <User Schedule>  hydrALAZINE 75 milliGRAM(s) Oral every 8 hours  labetalol 100 milliGRAM(s) Oral every 8 hours  pantoprazole  Injectable 40 milliGRAM(s) IV Push daily  senna 2 Tablet(s) Oral at bedtime  sodium chloride 2% . 1000 milliLiter(s) (60 mL/Hr) IV Continuous <Continuous>      PHYSICAL EXAM:  T(C): 37.7 (04-14-19 @ 07:00), Max: 37.7 (04-14-19 @ 07:00)  HR: 67 (04-14-19 @ 10:00) (56 - 79)  BP: --  RR: 13 (04-14-19 @ 10:00) (13 - 22)  SpO2: 100% (04-14-19 @ 10:00) (100% - 100%)  Wt(kg): --  I&O's Summary    13 Apr 2019 07:01  -  14 Apr 2019 07:00  --------------------------------------------------------  IN: 2766 mL / OUT: 2175 mL / NET: 591 mL          Appearance: on vent 	  Cardiovascular: Normal S1 S2,    Murmur:   Neck: JVP limited to be evaluated   Respiratory: Lungs few rhonchi   Gastrointestinal:  Soft  Skin: normal   Neuro: limited as pt on vent   LABS/DATA:    CARDIAC MARKERS:  CARDIAC MARKERS ( 11 Apr 2019 18:55 )  x     / x     / 78 U/L / x     / 2.0 ng/mL  CARDIAC MARKERS ( 11 Apr 2019 12:35 )  .000 ng/mL / x     / 107 U/L / x     / 1.5 ng/mL                                10.4   16.3  )-----------( 233      ( 13 Apr 2019 23:47 )             31.1     04-13    149<H>  |  115<H>  |  17  ----------------------------<  196<H>  3.8   |  24  |  0.65    Ca    8.9      13 Apr 2019 23:47  Phos  2.3     04-13  Mg     2.2     04-13      proBNP:   Lipid Profile:   HgA1c:   TSH:     TELE:  EKG:

## 2019-04-14 NOTE — PROGRESS NOTE ADULT - ASSESSMENT
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     NEURO:    Neurochecks q1 hrs,    Pontine hemorrhage:  s/p DDAVP and Plts   Pall care consult   GOC discussion with family - family wanting aggressive care- will consult ENT and GI for TRACH and PEG   Activity: [] mobilize as tolerated [x] Bedrest [] PT [] OT [] PMNR    PULM:  Respiratory failure 2/2 pontine hemorrhage   full mechanical vent support - did not tolerated CPAP  Daily CXR, ABG nightly     CV:  SBP goal 100 - 160  ?New onset Afib - Labetalol 100 TID and coreg  Appreciate Cardiology consult     RENAL:  Fluids:  NS @ 75    GI:    Diet: Start TF, NGT   GI prophylaxis [] not indicated [x] PPI [] other:  Bowel regimen [x] colace [x] senna [] other: add miralax-no BM     ENDO:   Goal euglycemia (-180)    HEME/ONC:  VTE prophylaxis: [] SCDs [] chemoprophylaxis - Lovenox     ID:  afebrile     SOCIAL/FAMILY:  [] awaiting [x] updated at bedside met with , daughter, son, and daughter-in-law--in shock, appropriately grieving [] family meeting    CODE STATUS:  [x] Full Code [] DNR [] DNI [] Palliative/Comfort Care    DISPOSITION:  [x] ICU [] Stroke Unit [] Floor [] EMU [] RCU [] PCU    [x] Patient is at high risk of neurologic deterioration/death due to:  hemorrhage, herniation     Time spent: 45 critical care minutes    Contact: [phone]

## 2019-04-15 DIAGNOSIS — J96.90 RESPIRATORY FAILURE, UNSPECIFIED, UNSPECIFIED WHETHER WITH HYPOXIA OR HYPERCAPNIA: ICD-10-CM

## 2019-04-15 DIAGNOSIS — Z71.89 OTHER SPECIFIED COUNSELING: ICD-10-CM

## 2019-04-15 LAB
ANION GAP SERPL CALC-SCNC: 10 MMOL/L — SIGNIFICANT CHANGE UP (ref 5–17)
ANION GAP SERPL CALC-SCNC: 12 MMOL/L — SIGNIFICANT CHANGE UP (ref 5–17)
APPEARANCE UR: CLEAR — SIGNIFICANT CHANGE UP
APTT BLD: 33 SEC — SIGNIFICANT CHANGE UP (ref 27.5–36.3)
BACTERIA # UR AUTO: ABNORMAL
BILIRUB UR-MCNC: NEGATIVE — SIGNIFICANT CHANGE UP
BLD GP AB SCN SERPL QL: NEGATIVE — SIGNIFICANT CHANGE UP
BUN SERPL-MCNC: 18 MG/DL — SIGNIFICANT CHANGE UP (ref 7–23)
BUN SERPL-MCNC: 19 MG/DL — SIGNIFICANT CHANGE UP (ref 7–23)
CALCIUM SERPL-MCNC: 8.6 MG/DL — SIGNIFICANT CHANGE UP (ref 8.4–10.5)
CALCIUM SERPL-MCNC: 8.7 MG/DL — SIGNIFICANT CHANGE UP (ref 8.4–10.5)
CHLORIDE SERPL-SCNC: 110 MMOL/L — HIGH (ref 96–108)
CHLORIDE SERPL-SCNC: 110 MMOL/L — HIGH (ref 96–108)
CO2 SERPL-SCNC: 25 MMOL/L — SIGNIFICANT CHANGE UP (ref 22–31)
CO2 SERPL-SCNC: 27 MMOL/L — SIGNIFICANT CHANGE UP (ref 22–31)
COLOR SPEC: YELLOW — SIGNIFICANT CHANGE UP
CREAT SERPL-MCNC: 0.55 MG/DL — SIGNIFICANT CHANGE UP (ref 0.5–1.3)
CREAT SERPL-MCNC: 0.58 MG/DL — SIGNIFICANT CHANGE UP (ref 0.5–1.3)
DIFF PNL FLD: ABNORMAL
EPI CELLS # UR: 0 /HPF — SIGNIFICANT CHANGE UP
GLUCOSE SERPL-MCNC: 161 MG/DL — HIGH (ref 70–99)
GLUCOSE SERPL-MCNC: 170 MG/DL — HIGH (ref 70–99)
GLUCOSE UR QL: ABNORMAL
GRAM STN FLD: SIGNIFICANT CHANGE UP
HCT VFR BLD CALC: 28.8 % — LOW (ref 34.5–45)
HGB BLD-MCNC: 9.4 G/DL — LOW (ref 11.5–15.5)
HYALINE CASTS # UR AUTO: 1 /LPF — SIGNIFICANT CHANGE UP (ref 0–2)
INR BLD: 1.13 RATIO — SIGNIFICANT CHANGE UP (ref 0.88–1.16)
KETONES UR-MCNC: NEGATIVE — SIGNIFICANT CHANGE UP
LEUKOCYTE ESTERASE UR-ACNC: ABNORMAL
MAGNESIUM SERPL-MCNC: 2.3 MG/DL — SIGNIFICANT CHANGE UP (ref 1.6–2.6)
MCHC RBC-ENTMCNC: 32.8 GM/DL — SIGNIFICANT CHANGE UP (ref 32–36)
MCHC RBC-ENTMCNC: 33.1 PG — SIGNIFICANT CHANGE UP (ref 27–34)
MCV RBC AUTO: 101 FL — HIGH (ref 80–100)
NITRITE UR-MCNC: POSITIVE
PH UR: 5.5 — SIGNIFICANT CHANGE UP (ref 5–8)
PHOSPHATE SERPL-MCNC: 2.3 MG/DL — LOW (ref 2.5–4.5)
PLATELET # BLD AUTO: 189 K/UL — SIGNIFICANT CHANGE UP (ref 150–400)
POTASSIUM SERPL-MCNC: 3.5 MMOL/L — SIGNIFICANT CHANGE UP (ref 3.5–5.3)
POTASSIUM SERPL-MCNC: 4 MMOL/L — SIGNIFICANT CHANGE UP (ref 3.5–5.3)
POTASSIUM SERPL-SCNC: 3.5 MMOL/L — SIGNIFICANT CHANGE UP (ref 3.5–5.3)
POTASSIUM SERPL-SCNC: 4 MMOL/L — SIGNIFICANT CHANGE UP (ref 3.5–5.3)
PROT UR-MCNC: ABNORMAL
PROTHROM AB SERPL-ACNC: 12.9 SEC — SIGNIFICANT CHANGE UP (ref 10–12.9)
RBC # BLD: 2.86 M/UL — LOW (ref 3.8–5.2)
RBC # FLD: 12.5 % — SIGNIFICANT CHANGE UP (ref 10.3–14.5)
RBC CASTS # UR COMP ASSIST: 3 /HPF — SIGNIFICANT CHANGE UP (ref 0–4)
RH IG SCN BLD-IMP: POSITIVE — SIGNIFICANT CHANGE UP
SODIUM SERPL-SCNC: 147 MMOL/L — HIGH (ref 135–145)
SODIUM SERPL-SCNC: 147 MMOL/L — HIGH (ref 135–145)
SP GR SPEC: 1.02 — SIGNIFICANT CHANGE UP (ref 1.01–1.02)
SPECIMEN SOURCE: SIGNIFICANT CHANGE UP
UROBILINOGEN FLD QL: ABNORMAL
WBC # BLD: 12.7 K/UL — HIGH (ref 3.8–10.5)
WBC # FLD AUTO: 12.7 K/UL — HIGH (ref 3.8–10.5)
WBC UR QL: 9 /HPF — HIGH (ref 0–5)

## 2019-04-15 PROCEDURE — 99292 CRITICAL CARE ADDL 30 MIN: CPT

## 2019-04-15 PROCEDURE — 99291 CRITICAL CARE FIRST HOUR: CPT

## 2019-04-15 PROCEDURE — 71045 X-RAY EXAM CHEST 1 VIEW: CPT | Mod: 26

## 2019-04-15 PROCEDURE — 99233 SBSQ HOSP IP/OBS HIGH 50: CPT

## 2019-04-15 PROCEDURE — 99222 1ST HOSP IP/OBS MODERATE 55: CPT

## 2019-04-15 RX ORDER — CEFTRIAXONE 500 MG/1
1 INJECTION, POWDER, FOR SOLUTION INTRAMUSCULAR; INTRAVENOUS EVERY 24 HOURS
Qty: 0 | Refills: 0 | Status: DISCONTINUED | OUTPATIENT
Start: 2019-04-16 | End: 2019-04-16

## 2019-04-15 RX ORDER — CEFTRIAXONE 500 MG/1
INJECTION, POWDER, FOR SOLUTION INTRAMUSCULAR; INTRAVENOUS
Qty: 0 | Refills: 0 | Status: DISCONTINUED | OUTPATIENT
Start: 2019-04-15 | End: 2019-04-16

## 2019-04-15 RX ORDER — POLYETHYLENE GLYCOL 3350 17 G/17G
17 POWDER, FOR SOLUTION ORAL
Qty: 0 | Refills: 0 | Status: DISCONTINUED | OUTPATIENT
Start: 2019-04-15 | End: 2019-04-16

## 2019-04-15 RX ORDER — DOXAZOSIN MESYLATE 4 MG
4 TABLET ORAL DAILY
Qty: 0 | Refills: 0 | Status: DISCONTINUED | OUTPATIENT
Start: 2019-04-15 | End: 2019-04-16

## 2019-04-15 RX ORDER — DILTIAZEM HCL 120 MG
60 CAPSULE, EXT RELEASE 24 HR ORAL EVERY 6 HOURS
Qty: 0 | Refills: 0 | Status: DISCONTINUED | OUTPATIENT
Start: 2019-04-15 | End: 2019-04-16

## 2019-04-15 RX ORDER — POTASSIUM PHOSPHATE, MONOBASIC POTASSIUM PHOSPHATE, DIBASIC 236; 224 MG/ML; MG/ML
15 INJECTION, SOLUTION INTRAVENOUS ONCE
Qty: 0 | Refills: 0 | Status: COMPLETED | OUTPATIENT
Start: 2019-04-15 | End: 2019-04-16

## 2019-04-15 RX ORDER — SODIUM CHLORIDE 9 MG/ML
500 INJECTION, SOLUTION INTRAVENOUS
Qty: 0 | Refills: 0 | Status: DISCONTINUED | OUTPATIENT
Start: 2019-04-15 | End: 2019-04-16

## 2019-04-15 RX ORDER — POTASSIUM CHLORIDE 20 MEQ
40 PACKET (EA) ORAL ONCE
Qty: 0 | Refills: 0 | Status: COMPLETED | OUTPATIENT
Start: 2019-04-15 | End: 2019-04-15

## 2019-04-15 RX ORDER — SODIUM CHLORIDE 9 MG/ML
500 INJECTION INTRAMUSCULAR; INTRAVENOUS; SUBCUTANEOUS ONCE
Qty: 0 | Refills: 0 | Status: COMPLETED | OUTPATIENT
Start: 2019-04-15 | End: 2019-04-15

## 2019-04-15 RX ORDER — DOXAZOSIN MESYLATE 4 MG
4 TABLET ORAL AT BEDTIME
Qty: 0 | Refills: 0 | Status: DISCONTINUED | OUTPATIENT
Start: 2019-04-15 | End: 2019-04-15

## 2019-04-15 RX ORDER — CEFTRIAXONE 500 MG/1
1 INJECTION, POWDER, FOR SOLUTION INTRAMUSCULAR; INTRAVENOUS ONCE
Qty: 0 | Refills: 0 | Status: COMPLETED | OUTPATIENT
Start: 2019-04-15 | End: 2019-04-15

## 2019-04-15 RX ORDER — LABETALOL HCL 100 MG
100 TABLET ORAL
Qty: 0 | Refills: 0 | Status: DISCONTINUED | OUTPATIENT
Start: 2019-04-15 | End: 2019-04-16

## 2019-04-15 RX ADMIN — CHLORHEXIDINE GLUCONATE 15 MILLILITER(S): 213 SOLUTION TOPICAL at 17:22

## 2019-04-15 RX ADMIN — CHLORHEXIDINE GLUCONATE 15 MILLILITER(S): 213 SOLUTION TOPICAL at 06:11

## 2019-04-15 RX ADMIN — POLYETHYLENE GLYCOL 3350 17 GRAM(S): 17 POWDER, FOR SOLUTION ORAL at 11:38

## 2019-04-15 RX ADMIN — FENTANYL CITRATE 25 MICROGRAM(S): 50 INJECTION INTRAVENOUS at 06:10

## 2019-04-15 RX ADMIN — SENNA PLUS 2 TABLET(S): 8.6 TABLET ORAL at 21:49

## 2019-04-15 RX ADMIN — FENTANYL CITRATE 25 MICROGRAM(S): 50 INJECTION INTRAVENOUS at 01:00

## 2019-04-15 RX ADMIN — Medication 4 MILLIGRAM(S): at 13:16

## 2019-04-15 RX ADMIN — Medication 100 MILLIGRAM(S): at 21:49

## 2019-04-15 RX ADMIN — CHLORHEXIDINE GLUCONATE 1 APPLICATION(S): 213 SOLUTION TOPICAL at 21:50

## 2019-04-15 RX ADMIN — CEFTRIAXONE 100 GRAM(S): 500 INJECTION, POWDER, FOR SOLUTION INTRAMUSCULAR; INTRAVENOUS at 20:14

## 2019-04-15 RX ADMIN — Medication 100 MILLIGRAM(S): at 15:07

## 2019-04-15 RX ADMIN — Medication 100 MILLIGRAM(S): at 06:11

## 2019-04-15 RX ADMIN — ENOXAPARIN SODIUM 40 MILLIGRAM(S): 100 INJECTION SUBCUTANEOUS at 17:22

## 2019-04-15 RX ADMIN — FENTANYL CITRATE 25 MICROGRAM(S): 50 INJECTION INTRAVENOUS at 01:15

## 2019-04-15 RX ADMIN — Medication 60 MILLIGRAM(S): at 02:57

## 2019-04-15 RX ADMIN — Medication 60 MILLIGRAM(S): at 08:30

## 2019-04-15 RX ADMIN — Medication 100 MILLIGRAM(S): at 05:10

## 2019-04-15 RX ADMIN — PANTOPRAZOLE SODIUM 40 MILLIGRAM(S): 20 TABLET, DELAYED RELEASE ORAL at 11:38

## 2019-04-15 RX ADMIN — SODIUM CHLORIDE 1000 MILLILITER(S): 9 INJECTION INTRAMUSCULAR; INTRAVENOUS; SUBCUTANEOUS at 11:00

## 2019-04-15 RX ADMIN — FENTANYL CITRATE 25 MICROGRAM(S): 50 INJECTION INTRAVENOUS at 06:25

## 2019-04-15 RX ADMIN — Medication 75 MILLIGRAM(S): at 06:11

## 2019-04-15 RX ADMIN — POLYETHYLENE GLYCOL 3350 17 GRAM(S): 17 POWDER, FOR SOLUTION ORAL at 22:46

## 2019-04-15 RX ADMIN — Medication 60 MILLIGRAM(S): at 22:46

## 2019-04-15 RX ADMIN — Medication 100 MILLIGRAM(S): at 13:13

## 2019-04-15 RX ADMIN — Medication 75 MILLIGRAM(S): at 15:08

## 2019-04-15 RX ADMIN — SODIUM CHLORIDE 75 MILLILITER(S): 9 INJECTION, SOLUTION INTRAVENOUS at 17:22

## 2019-04-15 RX ADMIN — Medication 40 MILLIEQUIVALENT(S): at 20:14

## 2019-04-15 NOTE — CONSULT NOTE ADULT - PROBLEM SELECTOR RECOMMENDATION 2
supportive care  as per son pt is spontaneously moving right side of body upper and lower extremities  hope is for her to be back to baseline
INTUBATED; MANAGEMENT PER NSCU TEAM

## 2019-04-15 NOTE — PRE-ANESTHESIA EVALUATION ADULT - NSANTHOSAYNRD_GEN_A_CORE
Pt prepped for procedure  
No. DAVE screening performed.  STOP BANG Legend: 0-2 = LOW Risk; 3-4 = INTERMEDIATE Risk; 5-8 = HIGH Risk

## 2019-04-15 NOTE — PROGRESS NOTE ADULT - SUBJECTIVE AND OBJECTIVE BOX
Family leaning towards trach/PEG.    Intubated, no eye opening, overbreathing, no corneals, UE no movement, LE some TF, reportedly intermittent eye movements to command (I did not witness this)

## 2019-04-15 NOTE — CONSULT NOTE ADULT - SUBJECTIVE AND OBJECTIVE BOX
HPI:  62 y/o F w/ PMHx HTN, HLD, ischemic CVA (), presents to the ED BIBA from home for evaluation of AMS- was reportedly talking. As per EMS, pt was talking to a family member on the phone at 11:00 today. Hx is limited due to pt's current medical condition.  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; intubated; on cardiene infusion (2019 14:29)    PERTINENT PM/SXH:   High cholesterol  Hypertension, unspecified type  Cerebrovascular accident (CVA), unspecified mechanism    History of appendectomy    FAMILY HISTORY:    ITEMS NOT CHECKED ARE NOT PRESENT    SOCIAL HISTORY:   Significant other/partner:  [X ]  Children:  [X ]  Spiritism/Spirituality:UNK  Substance hx:  [ ]   Tobacco hx:  [ ]   Alcohol hx: [ ]   Home Opioid hx:  [ ] I-Stop Reference No:  Living Situation: [X ]Home  [ ]Long term care  [ ]Rehab [ ]Other    ADVANCE DIRECTIVES:    DNR  MOLST  [ ]  Living Will  [ ]   DECISION MAKER(s):  [ ] Health Care Proxy(s)  [X ] Surrogate(s)  [ ] Guardian           Name(s): Phone Number(s):  : SHE JUSTINE /SON FAVIAN FLETCHER [phone]  BASELINE (I)ADL(s) (prior to admission):  [place]: [ ]Total  [ X] Moderate [ ]Dependent    Allergies    Allergy Status Unknown    Intolerances    MEDICATIONS  (STANDING):  cefTRIAXone   IVPB      chlorhexidine 0.12% Liquid 15 milliLiter(s) Oral Mucosa two times a day  chlorhexidine 4% Liquid 1 Application(s) Topical <User Schedule>  diltiazem    Tablet 60 milliGRAM(s) Oral every 6 hours  docusate sodium Liquid 100 milliGRAM(s) Oral every 8 hours  doxazosin 4 milliGRAM(s) Oral daily  enoxaparin Injectable 40 milliGRAM(s) SubCutaneous <User Schedule>  hydrALAZINE 75 milliGRAM(s) Oral every 8 hours  labetalol 100 milliGRAM(s) Oral every 8 hours  multiple electrolytes Injection Type 1 500 milliLiter(s) (75 mL/Hr) IV Continuous <Continuous>  pantoprazole  Injectable 40 milliGRAM(s) IV Push daily  polyethylene glycol 3350 17 Gram(s) Oral daily  senna 2 Tablet(s) Oral at bedtime    MEDICATIONS  (PRN):  acetaminophen   Tablet .. 650 milliGRAM(s) Oral every 6 hours PRN Temp greater or equal to 38C (100.4F), Mild Pain (1 - 3)  fentaNYL    Injectable 25 MICROGram(s) IV Push every 2 hours PRN Severe Pain (7 - 10)  ondansetron Injectable 4 milliGRAM(s) IV Push every 6 hours PRN Nausea and/or Vomiting    PRESENT SYMPTOMS: [X ]Unable to obtain due to poor mentation   Source if other than patient:  [ ]Family   [ ]Team     Pain (Impact on QOL):    Location -         Minimal acceptable level (0-10 scale):                    Aggrevating factors -  Quality -  Radiation -  Severity (0-10 scale) -    Timing -    PAIN AD Score: 0    [email or web address] (press ctrl +  left click to view)    Dyspnea:                           [ ]Mild [ ]Moderate [ ]Severe  Anxiety:                             [ ]Mild [ ]Moderate [ ]Severe  Fatigue:                             [ ]Mild [ ]Moderate [ ]Severe  Nausea:                             [ ]Mild [ ]Moderate [ ]Severe  Loss of appetite:              [ ]Mild [ ]Moderate [ ]Severe  Constipation:                    [ ]Mild [ ]Moderate [ ]Severe    Other Symptoms:  [ ]All other review of systems negative     Karnofsky Performance Score/Palliative Performance Status Version 2:     10    %  PHYSICAL EXAM:  Vital Signs Last 24 Hrs  T(C): 37.5 (15 Apr 2019 20:21), Max: 38.4 (2019 23:00)  T(F): 99.5 (15 Apr 2019 19:00), Max: 101.1 (2019 23:00)  HR: 63 (15 Apr 2019 20:27) (58 - 78)  BP: --  BP(mean): --  RR: 16 (15 Apr 2019 20:21) (12 - 18)  SpO2: 100% (15 Apr 2019 20:27) (99% - 100%) I&O's Summary    2019 07:01  -  15 Apr 2019 07:00  --------------------------------------------------------  IN: 1955 mL / OUT: 1135 mL / NET: 820 mL    15 Apr 2019 07:01  -  15 Apr 2019 20:37  --------------------------------------------------------  IN: 1640 mL / OUT: 550 mL / NET: 1090 mL    GENERAL:  [ ]Alert  [ ]Oriented x   [ ]Lethargic  [ ]Cachexia  [X ]Unarousable  [ ]Verbal  [ X]Non-Verbal  Behavioral:   [ ] Anxiety  [ ] Delirium [ ] Agitation [ ] Other  HEENT:  [ ]Normal   [ ]Dry mouth   [ X]ET Tube/Trach  [ ]Oral lesions  PULMONARY: VENTED  [ ]Clear [ ]Tachypnea  [ ]Audible excessive secretions   [ ]Rhonchi        [ ]Right [ ]Left [ ]Bilateral  [ ]Crackles        [ ]Right [ ]Left [ ]Bilateral  [ ]Wheezing     [ ]Right [ ]Left [ ]Bilateral  CARDIOVASCULAR:    [ ]Regular [X ]Irregular [ ]Tachy  [ ]Yobany [ ]Murmur [ ]Other  GASTROINTESTINAL:  [X ]Soft  [ ]Distended   [X ]+BS  [ ]Non tender [ ]Tender  [ ]PEG [ X]OGT/ NGT  Last BM:   GENITOURINARY:  [ ]Normal [ X] Incontinent   [ ]Oliguria/Anuria   [ ]Bedoya  MUSCULOSKELETAL:   [ ]Normal   [ ]Weakness  [ X]Bed/Wheelchair bound [ ]Edema  NEUROLOGIC:   [ ]No focal deficits  X[ ] Cognitive impairment  [ ] Dysphagia [ ]Dysarthria [ ] Paresis [ ]Other   SKIN:   [X ]Normal   [ ]Pressure ulcer(s)  [ ]Rash    CRITICAL CARE:  [ ] Shock Present  [ ]Septic [ ]Cardiogenic [ ]Neurologic [ ]Hypovolemic  [ ]  Vasopressors [ ]  Inotropes   [X ] Respiratory failure present  [ ] Acute  [ ] Chronic [ X] Hypoxic  [ X] Hypercarbic [ ] Other  [ ] Other organ failure     LABS:                        10.4   16.3  )-----------( 233      ( 2019 23:47 )             31.1   04-15    147<H>  |  110<H>  |  19  ----------------------------<  170<H>  3.5   |  25  |  0.58    Ca    8.6      15 Apr 2019 18:05  Phos  2.3     04-14  Mg     2.2     04-14        Urinalysis Basic - ( 15 Apr 2019 07:05 )    Color: Yellow / Appearance: Clear / S.025 / pH: x  Gluc: x / Ketone: Negative  / Bili: Negative / Urobili: 2 mg/dL   Blood: x / Protein: 30 mg/dL / Nitrite: Positive   Leuk Esterase: Small / RBC: 3 /hpf / WBC 9 /HPF   Sq Epi: x / Non Sq Epi: 0 /hpf / Bacteria: Many      RADIOLOGY & ADDITIONAL STUDIES:      INTERPRETATION:  Noncontrast CT of the brain.    CLINICAL INDICATION:  pontine heme    TECHNIQUE : Axial CT scanning of the brain was obtained from the skull   base to the vertex without the administration of intravenous contrast.      COMPARISON: CT brain 2019    FINDINGS:      Similar-appearing 1.4 x 2.5 cm parenchymal hemorrhage in the pradip and   midbrain.    No hydrocephalus or supratentorial midline shift.    IMPRESSION:    Similar-appearing 1.4 x 2.5 cm parenchymal hemorrhage in the pradip and   midbrain.    No hydrocephalus or supratentorial midline shift.        PROTEIN CALORIE MALNUTRITION:   [ ] PPSV2 < or = to 30% [ ] significant weight loss  [ ] poor nutritional intake [ ] catabolic state [ ] anasarca     Albumin, Serum: 3.8 g/dL (19 @ 21:02)  Artificial Nutrition [ ]     REFERRALS:   [ ]Chaplaincy  [ ] Hospice  [ ]Child Life  [ ]Social Work  [ ]Case management [ ]Holistic Therapy   Goals of Care Discussion Document:

## 2019-04-15 NOTE — PROGRESS NOTE ADULT - ASSESSMENT
HTN  Pontine Hemorrhage   afib, newly discovered    cont current meds  HR stable in sinus   cont cardizem   BP control   agree with current meds   plan for peg / trach

## 2019-04-15 NOTE — CONSULT NOTE ADULT - ASSESSMENT
64 Y/O MANDARIN SPEAKING FEMALE WITH PMH AS ABOVE ADMITTED WITH AMS IN SETTING OF PONTINE HEMORRHAGE , CALLED FOR GOAL OF CARE

## 2019-04-15 NOTE — PROGRESS NOTE ADULT - SUBJECTIVE AND OBJECTIVE BOX
Subjective: Patient seen and examined. No new events except as noted.     SUBJECTIVE/ROS:  on vent       MEDICATIONS:  MEDICATIONS  (STANDING):  chlorhexidine 0.12% Liquid 15 milliLiter(s) Oral Mucosa two times a day  chlorhexidine 4% Liquid 1 Application(s) Topical <User Schedule>  diltiazem    Tablet 60 milliGRAM(s) Oral every 6 hours  docusate sodium Liquid 100 milliGRAM(s) Oral every 8 hours  enoxaparin Injectable 40 milliGRAM(s) SubCutaneous <User Schedule>  hydrALAZINE 75 milliGRAM(s) Oral every 8 hours  labetalol 100 milliGRAM(s) Oral every 8 hours  pantoprazole  Injectable 40 milliGRAM(s) IV Push daily  polyethylene glycol 3350 17 Gram(s) Oral daily  senna 2 Tablet(s) Oral at bedtime      PHYSICAL EXAM:  T(C): 37.4 (04-15-19 @ 07:00), Max: 38.4 (04-14-19 @ 23:00)  HR: 68 (04-15-19 @ 08:48) (58 - 90)  BP: --  RR: 13 (04-15-19 @ 08:00) (13 - 18)  SpO2: 100% (04-15-19 @ 08:48) (96% - 100%)  Wt(kg): --  I&O's Summary    14 Apr 2019 07:01  -  15 Apr 2019 07:00  --------------------------------------------------------  IN: 1955 mL / OUT: 1135 mL / NET: 820 mL    15 Apr 2019 07:01  -  15 Apr 2019 08:56  --------------------------------------------------------  IN: 55 mL / OUT: 0 mL / NET: 55 mL          Appearance: on vent 	  Cardiovascular: Normal S1 S2,    Murmur:   Neck: JVP limited to be evaluated   Respiratory: Lungs few rhonchi   Gastrointestinal:  Soft  Skin: normal   Neuro: limited as pt on vent     LABS/DATA:    CARDIAC MARKERS:                                10.4   16.3  )-----------( 233      ( 13 Apr 2019 23:47 )             31.1     04-14    152<H>  |  115<H>  |  18  ----------------------------<  164<H>  3.7   |  24  |  0.71    Ca    8.8      14 Apr 2019 18:13  Phos  2.3     04-14  Mg     2.2     04-14      proBNP:   Lipid Profile:   HgA1c:   TSH:     TELE:  EKG:

## 2019-04-15 NOTE — CONSULT NOTE ADULT - SUBJECTIVE AND OBJECTIVE BOX
Patient is a 63y old  Female who presented with a chief complaint of ICH (15 Apr 2019 08:55)      HPI:  62 y/o F w/ PMHx HTN, HLD, ischemic CVA (2004), presents to the ED BIBA from home for evaluation of AMS- was reportedly talking. As per EMS, pt was talking to a family member on the phone at 11:00 today. Hx is limited due to pt's current medical condition.  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; intubated; on cardiene infusion (11 Apr 2019 14:29)    per review of chart and consultant notes :  Pt had residual left sided arm weakness after first stroke in 2004.  She was 90% independent with ADLs and iADLs.  She had a facial droop last Sunday and went to PMD on Monday who took a CT and reported was negative.  Pt also as per son had not been taking her bp meds as consistently lately. Pt then found on floor as above on Thursday of this past week.  Pt now intubated.  Moves right upper and lower extremity spontaneously.  also uncontrolled new AF- medically managed as per Cardiology  Failed CPAP trials for attempt to wean/extubate. Planned trach tomorrow  Family reported to want to pursue PEG    PAST MEDICAL & SURGICAL HISTORY:  High cholesterol  Hypertension, unspecified type  Cerebrovascular accident (CVA), unspecified mechanism  History of appendectomy    Allergies  Allergy Status Unknown      MEDICATIONS  (STANDING):  chlorhexidine 0.12% Liquid 15 milliLiter(s) Oral Mucosa two times a day  chlorhexidine 4% Liquid 1 Application(s) Topical <User Schedule>  diltiazem    Tablet 60 milliGRAM(s) Oral every 6 hours  docusate sodium Liquid 100 milliGRAM(s) Oral every 8 hours  doxazosin 4 milliGRAM(s) Oral daily  enoxaparin Injectable 40 milliGRAM(s) SubCutaneous <User Schedule>  hydrALAZINE 75 milliGRAM(s) Oral every 8 hours  labetalol 100 milliGRAM(s) Oral every 8 hours  pantoprazole  Injectable 40 milliGRAM(s) IV Push daily  polyethylene glycol 3350 17 Gram(s) Oral daily  senna 2 Tablet(s) Oral at bedtime  sodium chloride 0.9% Bolus 500 milliLiter(s) IV Bolus once    MEDICATIONS  (PRN):  acetaminophen   Tablet .. 650 milliGRAM(s) Oral every 6 hours PRN Temp greater or equal to 38C (100.4F), Mild Pain (1 - 3)  fentaNYL    Injectable 25 MICROGram(s) IV Push every 2 hours PRN Severe Pain (7 - 10)  ondansetron Injectable 4 milliGRAM(s) IV Push every 6 hours PRN Nausea and/or Vomiting      Social History:  lives with spouse/family (mandarin speaking)        Advanced Directives: (  X   ) None    (      ) DNR    (     ) DNI    (     ) Health Care Proxy:     Review of Systems:    unable to obtain      Vital Signs Last 24 Hrs  T(C): 37.4 (15 Apr 2019 07:00), Max: 38.4 (14 Apr 2019 23:00)  T(F): 99.3 (15 Apr 2019 07:00), Max: 101.1 (14 Apr 2019 23:00)  HR: 64 (15 Apr 2019 12:19) (58 - 90)  BP: --  BP(mean): --  RR: 13 (15 Apr 2019 10:00) (12 - 18)  SpO2: 100% (15 Apr 2019 12:19) (96% - 100%)    PHYSICAL EXAM:    Constitutional: appears comfortable orally intubated. not responsive, not following commands +NGT  Neck: No LAD, supple  Respiratory: b/l air entry, occ rhonchi  Cardiovascular: S1 and S2, RRR, no M  Gastrointestinal: BS+, softly distended NT no rebound or rigidity  Extremities: No peripheral edema, neg clubbing, cyanosis  Vascular: 2+ peripheral pulses  Neurological: unresponsive, not following commands  Skin: No rashes      LABS:                        10.4   16.3  )-----------( 233      ( 13 Apr 2019 23:47 )             31.1     04-14    152<H>  |  115<H>  |  18  ----------------------------<  164<H>  3.7   |  24  |  0.71    Ca    8.8      14 Apr 2019 18:13  Phos  2.3     04-14  Mg     2.2     04-14        RADIOLOGY & ADDITIONAL TESTS:  < from: CT Head No Cont (04.12.19 @ 11:29) >  FINDINGS:      Similar-appearing 1.4 x 2.5 cm parenchymal hemorrhage in the pradip and   midbrain.    No hydrocephalus or supratentorial midline shift.    IMPRESSION:    Similar-appearing 1.4 x 2.5 cm parenchymal hemorrhage in the pradip and   midbrain.    No hydrocephalus or supratentorial midline shift. Patient is a 63y old  Female who presented with a chief complaint of ICH (15 Apr 2019 08:55)      HPI:  62 y/o F w/ PMHx HTN, HLD, ischemic CVA (2004), presents to the ED BIBA from home for evaluation of AMS- was reportedly talking. As per EMS, pt was talking to a family member on the phone at 11:00 today. Hx is limited due to pt's current medical condition.  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; intubated; on cardene infusion (11 Apr 2019 14:29)    per review of chart and consultant notes :  Pt had residual left sided arm weakness after first stroke in 2004.  She was 90% independent with ADLs and iADLs.  She had a facial droop last Sunday and went to PMD on Monday who took a CT and reported was negative.  Pt also as per son had not been taking her bp meds as consistently lately. Pt then found on floor as above on Thursday of this past week.  Pt now intubated.  Moves right upper and lower extremity spontaneously.  also uncontrolled new AF- medically managed as per Cardiology  Failed CPAP trials for attempt to wean/extubate. Planned trach tomorrow  Family reported to want to pursue PEG    PAST MEDICAL & SURGICAL HISTORY:  High cholesterol  Hypertension, unspecified type  Cerebrovascular accident (CVA), unspecified mechanism  History of appendectomy    Allergies  Allergy Status Unknown      MEDICATIONS  (STANDING):  chlorhexidine 0.12% Liquid 15 milliLiter(s) Oral Mucosa two times a day  chlorhexidine 4% Liquid 1 Application(s) Topical <User Schedule>  diltiazem    Tablet 60 milliGRAM(s) Oral every 6 hours  docusate sodium Liquid 100 milliGRAM(s) Oral every 8 hours  doxazosin 4 milliGRAM(s) Oral daily  enoxaparin Injectable 40 milliGRAM(s) SubCutaneous <User Schedule>  hydrALAZINE 75 milliGRAM(s) Oral every 8 hours  labetalol 100 milliGRAM(s) Oral every 8 hours  pantoprazole  Injectable 40 milliGRAM(s) IV Push daily  polyethylene glycol 3350 17 Gram(s) Oral daily  senna 2 Tablet(s) Oral at bedtime  sodium chloride 0.9% Bolus 500 milliLiter(s) IV Bolus once    MEDICATIONS  (PRN):  acetaminophen   Tablet .. 650 milliGRAM(s) Oral every 6 hours PRN Temp greater or equal to 38C (100.4F), Mild Pain (1 - 3)  fentaNYL    Injectable 25 MICROGram(s) IV Push every 2 hours PRN Severe Pain (7 - 10)  ondansetron Injectable 4 milliGRAM(s) IV Push every 6 hours PRN Nausea and/or Vomiting      Social History:  lives with spouse/family (mandarin speaking)        Advanced Directives: (  X   ) None    (      ) DNR    (     ) DNI    (     ) Health Care Proxy:     Review of Systems:    unable to obtain      Vital Signs Last 24 Hrs  T(C): 37.4 (15 Apr 2019 07:00), Max: 38.4 (14 Apr 2019 23:00)  T(F): 99.3 (15 Apr 2019 07:00), Max: 101.1 (14 Apr 2019 23:00)  HR: 64 (15 Apr 2019 12:19) (58 - 90)  BP: --  BP(mean): --  RR: 13 (15 Apr 2019 10:00) (12 - 18)  SpO2: 100% (15 Apr 2019 12:19) (96% - 100%)    PHYSICAL EXAM:    Constitutional: appears comfortable orally intubated. not responsive, not following commands +NGT  Neck: No LAD, supple  Respiratory: b/l air entry, occ rhonchi  Cardiovascular: S1 and S2, RRR, no M  Gastrointestinal: BS+, softly distended NT no rebound or rigidity  Extremities: No peripheral edema, neg clubbing, cyanosis  Vascular: 2+ peripheral pulses  Neurological: unresponsive, not following commands  Skin: No rashes      LABS:                        10.4   16.3  )-----------( 233      ( 13 Apr 2019 23:47 )             31.1     04-14    152<H>  |  115<H>  |  18  ----------------------------<  164<H>  3.7   |  24  |  0.71    Ca    8.8      14 Apr 2019 18:13  Phos  2.3     04-14  Mg     2.2     04-14        RADIOLOGY & ADDITIONAL TESTS:  < from: CT Head No Cont (04.12.19 @ 11:29) >  FINDINGS:      Similar-appearing 1.4 x 2.5 cm parenchymal hemorrhage in the pradip and   midbrain.    No hydrocephalus or supratentorial midline shift.    IMPRESSION:    Similar-appearing 1.4 x 2.5 cm parenchymal hemorrhage in the pradip and   midbrain.    No hydrocephalus or supratentorial midline shift.

## 2019-04-15 NOTE — CONSULT NOTE ADULT - PROBLEM SELECTOR RECOMMENDATION 4
SPENT >35 MINUTES WITH PATIENTS  (REFUSED , REQUESTED SON) SON , FAVIAN, DAUGHTER GUILHERME WITH OUR FELLOW DR QUEEN AND THIS WRITER.  FAMILY ALL IN AGREEMENT WITH CONTINUATION OF MEDICAL INTERVENTION INCLUDING TRACH AND PEG.  FAMILY AWARE PATIENT WILL NEED EXTENSIVE REHAB WITH LIKELY LONG TERM CARE FACILITY.  WE DISCUSSED CODE STATUS, MOLST FORM, AT THIS TIME, FAMILY DISCUSS DNR AMONGST THEMSELVES AND WILL REACH OUT TO TEAM.  FOR NOW PATIENT REMAINS FULL CODE

## 2019-04-15 NOTE — CONSULT NOTE ADULT - PROBLEM SELECTOR RECOMMENDATION 3
met with son at bedside  pt has  and no hcp  son states  and two children make decisions together  pt had never discussed AD even after pt had last cva.  They are happy she is still here with us and hoping for her to improve and get back to baseline.  agreed to have team follow and follow up in next few days to discuss further AD if pt's symptoms do not improve  pt remains full code
IN SETTING OF ACUTE PONTINE HEMORRHAGE

## 2019-04-15 NOTE — CONSULT NOTE ADULT - PROBLEM SELECTOR RECOMMENDATION 9
s/p hemorrhage  pt previously had CVA with left upper ext weakness  now significant decline
NO SURGICAL INTERVENTION AT THIS TIME  LOCKED IN SYNDROME
Plan for Trach this week  Consent to be obtained  please optimize patient for OR

## 2019-04-15 NOTE — CONSULT NOTE ADULT - ASSESSMENT
64 y/o F w/ PMHx HTN, HLD, ischemic CVA (2004), presents to the ED BIBA from home for evaluation of AMS- found to have pontine hemorrhage    Resp failure - awaiting trach  Dysphagia- on NGTF    RECS:  -Tracha s per ENT  -Continue NGT for feeds and meds  -Will arrange for PEG this week, planned Wednesday. Will d/w family  -Continue Miralax Senna for bowel regimen    Discussed with NSCU team  Thank you for the courtesy of this consult.    Amari Unger PA-C    [facility]  [phone]

## 2019-04-15 NOTE — PROGRESS NOTE ADULT - ASSESSMENT
pontine hemorrhage  - -160mmHg  - afib: rate control  - Vent support, tracheostomy pending  - PEG pending    40 minutes critical care time

## 2019-04-15 NOTE — CHART NOTE - NSCHARTNOTEFT_GEN_A_CORE
Preop Dx: Respiratory Failure   Surgeon: Dr Quintero  Procedure: Tracheostomy     Vital Signs Last 24 Hrs  T(C): 37.5 (15 Apr 2019 20:21), Max: 38.4 (14 Apr 2019 23:00)  T(F): 99.5 (15 Apr 2019 19:00), Max: 101.1 (14 Apr 2019 23:00)  HR: 63 (15 Apr 2019 20:27) (58 - 78)  BP: --  BP(mean): --  RR: 16 (15 Apr 2019 20:21) (12 - 18)  SpO2: 100% (15 Apr 2019 20:27) (99% - 100%)                        10.4   16.3  )-----------( 233      ( 13 Apr 2019 23:47 )             31.1     04-15    147<H>  |  110<H>  |  19  ----------------------------<  170<H>  3.5   |  25  |  0.58    Ca    8.6      15 Apr 2019 18:05  Phos  2.3     04-14  Mg     2.2     04-14        Daily Height in cm: 154.94 (15 Apr 2019 20:21)    Daily     EKG:    Ventricular Rate 115 BPM    Atrial Rate 136 BPM    QRS Duration 96 ms    Q-T Interval 322 ms    QTC Calculation(Bezet) 445 ms    R Axis 75 degrees    T Axis -44 degrees    Diagnosis Line ATRIAL FIBRILLATION WITH RAPID VENTRICULAR RESPONSE  MODERATE VOLTAGE CRITERIA FOR LVH, MAY BE NORMAL VARIANT  ST & T WAVE ABNORMALITY, CONSIDER INFERIOR ISCHEMIA  ABNORMAL ECG        CXR:     PROCEDURE DATE:  04/15/2019            INTERPRETATION:  A single chest x-ray was obtained on April 15, 2019.    Indication: Intubated.    Impression:    The heart is normal in size. The lungs are clear. Endotracheal tube is in   good position. An NG tube is in place and the tip is in the antrum of the   stomach or the pylorus.          Type and Screen: Pending     Plan:  - OR 04/16/19 for tracheostomy with Dr. Quintero  - NPO after midnight except meds  - IVF while NPO  - Consent done  - Medical clearance for OR

## 2019-04-16 DIAGNOSIS — Z93.0 TRACHEOSTOMY STATUS: ICD-10-CM

## 2019-04-16 PROCEDURE — 31600 PLANNED TRACHEOSTOMY: CPT

## 2019-04-16 PROCEDURE — 71045 X-RAY EXAM CHEST 1 VIEW: CPT | Mod: 26

## 2019-04-16 PROCEDURE — 99291 CRITICAL CARE FIRST HOUR: CPT

## 2019-04-16 RX ORDER — PANTOPRAZOLE SODIUM 20 MG/1
40 TABLET, DELAYED RELEASE ORAL DAILY
Qty: 0 | Refills: 0 | Status: DISCONTINUED | OUTPATIENT
Start: 2019-04-16 | End: 2019-04-18

## 2019-04-16 RX ORDER — ONDANSETRON 8 MG/1
4 TABLET, FILM COATED ORAL EVERY 6 HOURS
Qty: 0 | Refills: 0 | Status: DISCONTINUED | OUTPATIENT
Start: 2019-04-16 | End: 2019-05-17

## 2019-04-16 RX ORDER — CEFTRIAXONE 500 MG/1
1 INJECTION, POWDER, FOR SOLUTION INTRAMUSCULAR; INTRAVENOUS EVERY 24 HOURS
Qty: 0 | Refills: 0 | Status: DISCONTINUED | OUTPATIENT
Start: 2019-04-16 | End: 2019-04-19

## 2019-04-16 RX ORDER — ACETAMINOPHEN 500 MG
650 TABLET ORAL EVERY 6 HOURS
Qty: 0 | Refills: 0 | Status: DISCONTINUED | OUTPATIENT
Start: 2019-04-16 | End: 2019-05-14

## 2019-04-16 RX ORDER — CHLORHEXIDINE GLUCONATE 213 G/1000ML
15 SOLUTION TOPICAL
Qty: 0 | Refills: 0 | Status: DISCONTINUED | OUTPATIENT
Start: 2019-04-16 | End: 2019-04-30

## 2019-04-16 RX ORDER — ENOXAPARIN SODIUM 100 MG/ML
40 INJECTION SUBCUTANEOUS
Qty: 0 | Refills: 0 | Status: DISCONTINUED | OUTPATIENT
Start: 2019-04-16 | End: 2019-04-30

## 2019-04-16 RX ORDER — FENTANYL CITRATE 50 UG/ML
25 INJECTION INTRAVENOUS
Qty: 0 | Refills: 0 | Status: DISCONTINUED | OUTPATIENT
Start: 2019-04-16 | End: 2019-04-18

## 2019-04-16 RX ORDER — DILTIAZEM HCL 120 MG
60 CAPSULE, EXT RELEASE 24 HR ORAL EVERY 6 HOURS
Qty: 0 | Refills: 0 | Status: DISCONTINUED | OUTPATIENT
Start: 2019-04-16 | End: 2019-05-07

## 2019-04-16 RX ORDER — HYDRALAZINE HCL 50 MG
75 TABLET ORAL EVERY 8 HOURS
Qty: 0 | Refills: 0 | Status: DISCONTINUED | OUTPATIENT
Start: 2019-04-16 | End: 2019-04-23

## 2019-04-16 RX ORDER — POLYETHYLENE GLYCOL 3350 17 G/17G
17 POWDER, FOR SOLUTION ORAL
Qty: 0 | Refills: 0 | Status: DISCONTINUED | OUTPATIENT
Start: 2019-04-16 | End: 2019-04-23

## 2019-04-16 RX ORDER — LABETALOL HCL 100 MG
100 TABLET ORAL
Qty: 0 | Refills: 0 | Status: DISCONTINUED | OUTPATIENT
Start: 2019-04-16 | End: 2019-04-24

## 2019-04-16 RX ORDER — SENNA PLUS 8.6 MG/1
2 TABLET ORAL AT BEDTIME
Qty: 0 | Refills: 0 | Status: DISCONTINUED | OUTPATIENT
Start: 2019-04-16 | End: 2019-04-28

## 2019-04-16 RX ORDER — DOCUSATE SODIUM 100 MG
100 CAPSULE ORAL EVERY 8 HOURS
Qty: 0 | Refills: 0 | Status: DISCONTINUED | OUTPATIENT
Start: 2019-04-16 | End: 2019-04-28

## 2019-04-16 RX ORDER — CHLORHEXIDINE GLUCONATE 213 G/1000ML
1 SOLUTION TOPICAL
Qty: 0 | Refills: 0 | Status: DISCONTINUED | OUTPATIENT
Start: 2019-04-16 | End: 2019-04-30

## 2019-04-16 RX ORDER — SODIUM CHLORIDE 9 MG/ML
500 INJECTION, SOLUTION INTRAVENOUS
Qty: 0 | Refills: 0 | Status: DISCONTINUED | OUTPATIENT
Start: 2019-04-16 | End: 2019-04-18

## 2019-04-16 RX ADMIN — FENTANYL CITRATE 25 MICROGRAM(S): 50 INJECTION INTRAVENOUS at 19:45

## 2019-04-16 RX ADMIN — Medication 75 MILLIGRAM(S): at 05:31

## 2019-04-16 RX ADMIN — Medication 100 MILLIGRAM(S): at 22:06

## 2019-04-16 RX ADMIN — PANTOPRAZOLE SODIUM 40 MILLIGRAM(S): 20 TABLET, DELAYED RELEASE ORAL at 12:31

## 2019-04-16 RX ADMIN — FENTANYL CITRATE 25 MICROGRAM(S): 50 INJECTION INTRAVENOUS at 20:00

## 2019-04-16 RX ADMIN — Medication 100 MILLIGRAM(S): at 06:42

## 2019-04-16 RX ADMIN — FENTANYL CITRATE 25 MICROGRAM(S): 50 INJECTION INTRAVENOUS at 06:58

## 2019-04-16 RX ADMIN — POLYETHYLENE GLYCOL 3350 17 GRAM(S): 17 POWDER, FOR SOLUTION ORAL at 18:07

## 2019-04-16 RX ADMIN — SODIUM CHLORIDE 75 MILLILITER(S): 9 INJECTION, SOLUTION INTRAVENOUS at 06:43

## 2019-04-16 RX ADMIN — POTASSIUM PHOSPHATE, MONOBASIC POTASSIUM PHOSPHATE, DIBASIC 62.5 MILLIMOLE(S): 236; 224 INJECTION, SOLUTION INTRAVENOUS at 01:36

## 2019-04-16 RX ADMIN — Medication 100 MILLIGRAM(S): at 15:34

## 2019-04-16 RX ADMIN — Medication 75 MILLIGRAM(S): at 01:31

## 2019-04-16 RX ADMIN — CEFTRIAXONE 100 GRAM(S): 500 INJECTION, POWDER, FOR SOLUTION INTRAMUSCULAR; INTRAVENOUS at 15:35

## 2019-04-16 RX ADMIN — Medication 60 MILLIGRAM(S): at 20:05

## 2019-04-16 RX ADMIN — SENNA PLUS 2 TABLET(S): 8.6 TABLET ORAL at 22:06

## 2019-04-16 RX ADMIN — Medication 100 MILLIGRAM(S): at 05:31

## 2019-04-16 RX ADMIN — CHLORHEXIDINE GLUCONATE 1 APPLICATION(S): 213 SOLUTION TOPICAL at 22:06

## 2019-04-16 RX ADMIN — Medication 4 MILLIGRAM(S): at 06:42

## 2019-04-16 RX ADMIN — POLYETHYLENE GLYCOL 3350 17 GRAM(S): 17 POWDER, FOR SOLUTION ORAL at 05:30

## 2019-04-16 RX ADMIN — Medication 60 MILLIGRAM(S): at 12:30

## 2019-04-16 RX ADMIN — CHLORHEXIDINE GLUCONATE 15 MILLILITER(S): 213 SOLUTION TOPICAL at 05:30

## 2019-04-16 RX ADMIN — ENOXAPARIN SODIUM 40 MILLIGRAM(S): 100 INJECTION SUBCUTANEOUS at 18:07

## 2019-04-16 RX ADMIN — Medication 75 MILLIGRAM(S): at 22:06

## 2019-04-16 RX ADMIN — CHLORHEXIDINE GLUCONATE 15 MILLILITER(S): 213 SOLUTION TOPICAL at 18:07

## 2019-04-16 RX ADMIN — Medication 60 MILLIGRAM(S): at 05:05

## 2019-04-16 RX ADMIN — FENTANYL CITRATE 25 MICROGRAM(S): 50 INJECTION INTRAVENOUS at 06:43

## 2019-04-16 NOTE — PROGRESS NOTE ADULT - SUBJECTIVE AND OBJECTIVE BOX
Patient is a 63y old  Female who presented with a chief complaint of ICH (2019 09:20)      INTERVAL HPI/OVERNIGHT EVENTS:    awaiting trach today  no overnight events    MEDICATIONS  (STANDING):  cefTRIAXone   IVPB 1 Gram(s) IV Intermittent every 24 hours  cefTRIAXone   IVPB      chlorhexidine 0.12% Liquid 15 milliLiter(s) Oral Mucosa two times a day  chlorhexidine 4% Liquid 1 Application(s) Topical <User Schedule>  diltiazem    Tablet 60 milliGRAM(s) Oral every 6 hours  docusate sodium Liquid 100 milliGRAM(s) Oral every 8 hours  doxazosin 4 milliGRAM(s) Oral daily  enoxaparin Injectable 40 milliGRAM(s) SubCutaneous <User Schedule>  hydrALAZINE 75 milliGRAM(s) Oral every 8 hours  labetalol 100 milliGRAM(s) Oral two times a day  multiple electrolytes Injection Type 1 500 milliLiter(s) (75 mL/Hr) IV Continuous <Continuous>  pantoprazole  Injectable 40 milliGRAM(s) IV Push daily  polyethylene glycol 3350 17 Gram(s) Oral two times a day  senna 2 Tablet(s) Oral at bedtime    MEDICATIONS  (PRN):  acetaminophen   Tablet .. 650 milliGRAM(s) Oral every 6 hours PRN Temp greater or equal to 38C (100.4F), Mild Pain (1 - 3)  fentaNYL    Injectable 25 MICROGram(s) IV Push every 2 hours PRN Severe Pain (7 - 10)  ondansetron Injectable 4 milliGRAM(s) IV Push every 6 hours PRN Nausea and/or Vomiting      Allergies  Allergy Status Unknown        Review of Systems:  unable to obtain    Vital Signs Last 24 Hrs  T(C): 37.3 (2019 07:00), Max: 37.5 (15 Apr 2019 19:00)  T(F): 99.1 (2019 07:00), Max: 99.5 (15 Apr 2019 19:00)  HR: 60 (2019 08:51) (60 - 94)  BP: --  BP(mean): --  RR: 16 (2019 07:00) (12 - 19)  SpO2: 100% (2019 08:51) (98% - 100%)    PHYSICAL EXAM:  Constitutional: appears comfortable orally intubated. not responsive, not following commands +NGT son at bedside  Neck: No LAD, supple  Respiratory: b/l air entry, grossly clear  Cardiovascular: S1 and S2, RRR, no M  Gastrointestinal: BS+, softly distended NT no rebound or rigidity  Extremities: No peripheral edema, neg clubbing, cyanosis  Vascular: 2+ peripheral pulses  Neurological: unresponsive, not following commands  Skin: No rashes    LABS:                        9.4    12.7  )-----------( 189      ( 15 Apr 2019 23:04 )             28.8     04-15    147<H>  |  110<H>  |  18  ----------------------------<  161<H>  4.0   |  27  |  0.55    Ca    8.7      15 Apr 2019 23:04  Phos  2.3     04-15  Mg     2.3     04-15      PT/INR - ( 15 Apr 2019 23:04 )   PT: 12.9 sec;   INR: 1.13 ratio    PTT - ( 15 Apr 2019 23:04 )  PTT:33.0 sec    Urinalysis Basic - ( 15 Apr 2019 07:05 )    Color: Yellow / Appearance: Clear / S.025 / pH: x  Gluc: x / Ketone: Negative  / Bili: Negative / Urobili: 2 mg/dL   Blood: x / Protein: 30 mg/dL / Nitrite: Positive   Leuk Esterase: Small / RBC: 3 /hpf / WBC 9 /HPF   Sq Epi: x / Non Sq Epi: 0 /hpf / Bacteria: Many      RADIOLOGY & ADDITIONAL TESTS:

## 2019-04-16 NOTE — PROGRESS NOTE ADULT - ASSESSMENT
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     NEURO:    Neurochecks q2 hrs,    Pontine hemorrhage:  s/p DDAVP and Plts   Pall care consult   GOC discussion with family - wanting aggressive care  Activity: [] mobilize as tolerated [x] Bedrest [] PT [] OT [] PMNR    PULM:  Respiratory failure 2/2 pontine hemorrhage   full mechanical vent support   Daily CXR, ABG nightly   TRACH today     CV:  SBP goal 100 - 160  ?New onset Afib - Labetalol and coreg  Appreciate Cardiology consult     RENAL:  Fluids:  NS @ 75    GI:    Diet: NPO for trach, PEG tomorrow   GI prophylaxis [] not indicated [x] PPI [] other:  Bowel regimen [x] colace [x] senna [] other: add Miralax     ENDO:   Goal euglycemia (-180)    HEME/ONC:  VTE prophylaxis: [] SCDs [] chemoprophylaxis - Lovenox     ID:  afebrile     SOCIAL/FAMILY:  [] awaiting [x] updated at bedside met with , daughter, son, and daughter-in-law--in shock, appropriately grieving [] family meeting    CODE STATUS:  [x] Full Code [] DNR [] DNI [] Palliative/Comfort Care    DISPOSITION:  [x] ICU [] Stroke Unit [] Floor [] EMU [] RCU [] PCU    [x] Patient is at high risk of neurologic deterioration/death due to:  hemorrhage, herniation     Time spent: 45 critical care minutes    Contact: [phone]

## 2019-04-16 NOTE — PROGRESS NOTE ADULT - SUBJECTIVE AND OBJECTIVE BOX
HPI:  64 y/o F w/ PMHx HTN, HLD, ischemic CVA (2004), presents to the ED BIBA from home for evaluation of stroke.  As per family patient was having facial droop and was prescribed Valacyclovir and prednisone for "Bell's palsy. As per EMS, pt was talking to a family member on the phone at 11:00 on 4/11 and then had slurred speech and unresponsive.  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; intubated; on cardene infusion     On admission, the patient was:  GCS: 3 T  ICH score: 4    tolerated CPAP   TRACH today     VITALS:  Recent Vitals Reviewed   LABS:  Recent Labs Reviewed  MEDICATIONS: All Recent Medications Reviewed   IMAGING:   Recent imaging studies were reviewed.    EXAMINATION:  General:  intubated   Neuro:  no OE, no FC, pupils non-reactive b/l, purposeful movements L>R on UE, moving spont LE + cough or gag, overbreaths vent  Cards:  s1, s2 RRR  Respiratory:  lungs clear b/l   Adomen:  soft  Extremities:  no edema  Skin:  warm/dry    ET tube  Left Radial A line

## 2019-04-16 NOTE — PROGRESS NOTE ADULT - SUBJECTIVE AND OBJECTIVE BOX
Subjective: Patient seen and examined. No new events except as noted.     SUBJECTIVE/ROS:  on vent       MEDICATIONS:  MEDICATIONS  (STANDING):  cefTRIAXone   IVPB 1 Gram(s) IV Intermittent every 24 hours  cefTRIAXone   IVPB      chlorhexidine 0.12% Liquid 15 milliLiter(s) Oral Mucosa two times a day  chlorhexidine 4% Liquid 1 Application(s) Topical <User Schedule>  diltiazem    Tablet 60 milliGRAM(s) Oral every 6 hours  docusate sodium Liquid 100 milliGRAM(s) Oral every 8 hours  doxazosin 4 milliGRAM(s) Oral daily  enoxaparin Injectable 40 milliGRAM(s) SubCutaneous <User Schedule>  hydrALAZINE 75 milliGRAM(s) Oral every 8 hours  labetalol 100 milliGRAM(s) Oral two times a day  multiple electrolytes Injection Type 1 500 milliLiter(s) (75 mL/Hr) IV Continuous <Continuous>  pantoprazole  Injectable 40 milliGRAM(s) IV Push daily  polyethylene glycol 3350 17 Gram(s) Oral two times a day  senna 2 Tablet(s) Oral at bedtime      PHYSICAL EXAM:  T(C): 37.3 (04-16-19 @ 07:00), Max: 37.7 (04-15-19 @ 11:00)  HR: 60 (04-16-19 @ 08:51) (60 - 94)  BP: --  RR: 16 (04-16-19 @ 07:00) (12 - 19)  SpO2: 100% (04-16-19 @ 08:51) (98% - 100%)  Wt(kg): --  I&O's Summary    15 Apr 2019 07:01  -  16 Apr 2019 07:00  --------------------------------------------------------  IN: 2880 mL / OUT: 1375 mL / NET: 1505 mL      Height (cm): 154.94 (04-15 @ 20:21)  Weight (kg): 73 (04-15 @ 20:21)  BMI (kg/m2): 30.4 (04-15 @ 20:21)  BSA (m2): 1.72 (04-15 @ 20:21)      Appearance: on vent 	  Cardiovascular: Normal S1 S2,    Murmur:   Neck: JVP limited to be evaluated   Respiratory: Lungs few rhonchi   Gastrointestinal:  Soft  Skin: normal   Neuro: limited as pt on vent    LABS/DATA:    CARDIAC MARKERS:                                9.4    12.7  )-----------( 189      ( 15 Apr 2019 23:04 )             28.8     04-15    147<H>  |  110<H>  |  18  ----------------------------<  161<H>  4.0   |  27  |  0.55    Ca    8.7      15 Apr 2019 23:04  Phos  2.3     04-15  Mg     2.3     04-15      proBNP:   Lipid Profile:   HgA1c:   TSH:     TELE:  EKG:

## 2019-04-16 NOTE — PROGRESS NOTE ADULT - SUBJECTIVE AND OBJECTIVE BOX
ENT ISSUE/POD: s/p tracheostomy POD0    HPI: 64 yo female s/p tracheostomy POD0. Pt seen and examined in the NSCU. Pt with #cuffed geremias on the ventilator. No issues since the procedure.        PAST MEDICAL & SURGICAL HISTORY:  High cholesterol  Hypertension, unspecified type  Cerebrovascular accident (CVA), unspecified mechanism  History of appendectomy    Allergies    Allergy Status Unknown    Intolerances      MEDICATIONS  (STANDING):  cefTRIAXone   IVPB 1 Gram(s) IV Intermittent every 24 hours  chlorhexidine 0.12% Liquid 15 milliLiter(s) Oral Mucosa two times a day  chlorhexidine 4% Liquid 1 Application(s) Topical <User Schedule>  diltiazem    Tablet 60 milliGRAM(s) Oral every 6 hours  docusate sodium Liquid 100 milliGRAM(s) Oral every 8 hours  enoxaparin Injectable 40 milliGRAM(s) SubCutaneous <User Schedule>  hydrALAZINE 75 milliGRAM(s) Oral every 8 hours  labetalol 100 milliGRAM(s) Oral two times a day  multiple electrolytes Injection Type 1 500 milliLiter(s) (75 mL/Hr) IV Continuous <Continuous>  pantoprazole  Injectable 40 milliGRAM(s) IV Push daily  polyethylene glycol 3350 17 Gram(s) Oral two times a day  senna 2 Tablet(s) Oral at bedtime    MEDICATIONS  (PRN):  acetaminophen   Tablet .. 650 milliGRAM(s) Oral every 6 hours PRN Temp greater or equal to 38C (100.4F), Mild Pain (1 - 3)  fentaNYL    Injectable 25 MICROGram(s) IV Push every 2 hours PRN Severe Pain (7 - 10)  ondansetron Injectable 4 milliGRAM(s) IV Push every 6 hours PRN Nausea and/or Vomiting      ROS:   unable to obtain due to pts clinical condition      Vital Signs Last 24 Hrs  T(C): 36.9 (16 Apr 2019 19:00), Max: 37.5 (15 Apr 2019 20:21)  T(F): 98.5 (16 Apr 2019 19:00), Max: 99.3 (16 Apr 2019 14:15)  HR: 62 (16 Apr 2019 19:00) (60 - 94)  BP: --  BP(mean): --  RR: 14 (16 Apr 2019 19:00) (14 - 19)  SpO2: 100% (16 Apr 2019 19:00) (98% - 100%)                          9.4    12.7  )-----------( 189      ( 15 Apr 2019 23:04 )             28.8    04-15    147<H>  |  110<H>  |  18  ----------------------------<  161<H>  4.0   |  27  |  0.55    Ca    8.7      15 Apr 2019 23:04  Phos  2.3     04-15  Mg     2.3     04-15     PT/INR - ( 15 Apr 2019 23:04 )   PT: 12.9 sec;   INR: 1.13 ratio         PTT - ( 15 Apr 2019 23:04 )  PTT:33.0 sec    PHYSICAL EXAM:  Gen: NAD  Skin: No rashes, bruises, or lesions  Head: Normocephalic, Atraumatic  Face: no edema, erythema, or fluctuance. Parotid glands soft without mass  Eyes: no scleral injection  Nose: Nares bilaterally patent, no discharge  Mouth: No Stridor / Drooling / Trismus.  Mucosa moist, tongue/uvula midline, oropharynx clear  Neck: Flat, supple, no lymphadenopathy, trachea midline, no masses, #8 shiley with inflated cuff secured with umbilical tie and 4 sutures, 1 piece of surgicel in place, no active bleeding  Lymphatic: No lymphadenopathy  Resp: on ventilator  Neuro: facial nerve intact, no facial droop

## 2019-04-16 NOTE — PROGRESS NOTE ADULT - ASSESSMENT
62 y/o F w/ PMHx HTN, HLD, ischemic CVA (2004), presents to the ED BIBA from home for evaluation of AMS- found to have pontine hemorrhage    Resp failure - awaiting trach  Dysphagia-  s/ p NGT    RECS:  -Trach as per ENT  -Continue NGT, hold TF at MN for PEG  -PEG scheduled tomorrow at 1:30pm, discussed with son at bedside who is in agreement to proceed but wishes to update rest of family before signing consent.  Indications, risks, benefits and alternatives of PEG reviewed with son at bedside  -Continue Miralax Senna for bowel regimen  -current Abx are more than adequate for tavares-op prophylaxis for PEG    Discussed with NSCU team    Amari Unger PA-C    [facility]  [phone]

## 2019-04-16 NOTE — PROGRESS NOTE ADULT - ASSESSMENT
64 yo female s/p tracheostomy POD0 with #8 cuffed shiley on the ventilator w 1 piece of surgicel, no active bleeding, No issues

## 2019-04-16 NOTE — PROGRESS NOTE ADULT - PROBLEM SELECTOR PLAN 1
Suction prn  Please use omniflex extender times at least 7 days  Keep site clean and dry  ENT will continue to follow

## 2019-04-16 NOTE — CHART NOTE - NSCHARTNOTEFT_GEN_A_CORE
Family wish to continue medical care per ICU.  Do not wish to consider advance directives at this time.  Trach and peg with likely long term care facility.  Signing off/  Please reconsult with any acute issues. Thank you

## 2019-04-17 LAB
ANION GAP SERPL CALC-SCNC: 12 MMOL/L — SIGNIFICANT CHANGE UP (ref 5–17)
ANION GAP SERPL CALC-SCNC: 12 MMOL/L — SIGNIFICANT CHANGE UP (ref 5–17)
BUN SERPL-MCNC: 16 MG/DL — SIGNIFICANT CHANGE UP (ref 7–23)
BUN SERPL-MCNC: 17 MG/DL — SIGNIFICANT CHANGE UP (ref 7–23)
CALCIUM SERPL-MCNC: 8.5 MG/DL — SIGNIFICANT CHANGE UP (ref 8.4–10.5)
CALCIUM SERPL-MCNC: 8.8 MG/DL — SIGNIFICANT CHANGE UP (ref 8.4–10.5)
CHLORIDE SERPL-SCNC: 100 MMOL/L — SIGNIFICANT CHANGE UP (ref 96–108)
CHLORIDE SERPL-SCNC: 102 MMOL/L — SIGNIFICANT CHANGE UP (ref 96–108)
CO2 SERPL-SCNC: 24 MMOL/L — SIGNIFICANT CHANGE UP (ref 22–31)
CO2 SERPL-SCNC: 26 MMOL/L — SIGNIFICANT CHANGE UP (ref 22–31)
CREAT SERPL-MCNC: 0.54 MG/DL — SIGNIFICANT CHANGE UP (ref 0.5–1.3)
CREAT SERPL-MCNC: 0.57 MG/DL — SIGNIFICANT CHANGE UP (ref 0.5–1.3)
CULTURE RESULTS: SIGNIFICANT CHANGE UP
GAS PNL BLDA: SIGNIFICANT CHANGE UP
GLUCOSE SERPL-MCNC: 101 MG/DL — HIGH (ref 70–99)
GLUCOSE SERPL-MCNC: 103 MG/DL — HIGH (ref 70–99)
HCT VFR BLD CALC: 28.6 % — LOW (ref 34.5–45)
HCT VFR BLD CALC: 29.2 % — LOW (ref 34.5–45)
HGB BLD-MCNC: 9.5 G/DL — LOW (ref 11.5–15.5)
HGB BLD-MCNC: 9.9 G/DL — LOW (ref 11.5–15.5)
MAGNESIUM SERPL-MCNC: 2.2 MG/DL — SIGNIFICANT CHANGE UP (ref 1.6–2.6)
MAGNESIUM SERPL-MCNC: 2.3 MG/DL — SIGNIFICANT CHANGE UP (ref 1.6–2.6)
MCHC RBC-ENTMCNC: 31.7 PG — SIGNIFICANT CHANGE UP (ref 27–34)
MCHC RBC-ENTMCNC: 32.4 GM/DL — SIGNIFICANT CHANGE UP (ref 32–36)
MCHC RBC-ENTMCNC: 33.5 PG — SIGNIFICANT CHANGE UP (ref 27–34)
MCHC RBC-ENTMCNC: 34.6 GM/DL — SIGNIFICANT CHANGE UP (ref 32–36)
MCV RBC AUTO: 96.6 FL — SIGNIFICANT CHANGE UP (ref 80–100)
MCV RBC AUTO: 98 FL — SIGNIFICANT CHANGE UP (ref 80–100)
PHOSPHATE SERPL-MCNC: 3.8 MG/DL — SIGNIFICANT CHANGE UP (ref 2.5–4.5)
PHOSPHATE SERPL-MCNC: 4.3 MG/DL — SIGNIFICANT CHANGE UP (ref 2.5–4.5)
PLATELET # BLD AUTO: 206 K/UL — SIGNIFICANT CHANGE UP (ref 150–400)
PLATELET # BLD AUTO: 219 K/UL — SIGNIFICANT CHANGE UP (ref 150–400)
POTASSIUM SERPL-MCNC: 3.7 MMOL/L — SIGNIFICANT CHANGE UP (ref 3.5–5.3)
POTASSIUM SERPL-MCNC: 3.9 MMOL/L — SIGNIFICANT CHANGE UP (ref 3.5–5.3)
POTASSIUM SERPL-SCNC: 3.7 MMOL/L — SIGNIFICANT CHANGE UP (ref 3.5–5.3)
POTASSIUM SERPL-SCNC: 3.9 MMOL/L — SIGNIFICANT CHANGE UP (ref 3.5–5.3)
RBC # BLD: 2.96 M/UL — LOW (ref 3.8–5.2)
RBC # BLD: 2.98 M/UL — LOW (ref 3.8–5.2)
RBC # FLD: 11.9 % — SIGNIFICANT CHANGE UP (ref 10.3–14.5)
RBC # FLD: 12 % — SIGNIFICANT CHANGE UP (ref 10.3–14.5)
SODIUM SERPL-SCNC: 136 MMOL/L — SIGNIFICANT CHANGE UP (ref 135–145)
SODIUM SERPL-SCNC: 140 MMOL/L — SIGNIFICANT CHANGE UP (ref 135–145)
SPECIMEN SOURCE: SIGNIFICANT CHANGE UP
WBC # BLD: 10.2 K/UL — SIGNIFICANT CHANGE UP (ref 3.8–10.5)
WBC # BLD: 9.7 K/UL — SIGNIFICANT CHANGE UP (ref 3.8–10.5)
WBC # FLD AUTO: 10.2 K/UL — SIGNIFICANT CHANGE UP (ref 3.8–10.5)
WBC # FLD AUTO: 9.7 K/UL — SIGNIFICANT CHANGE UP (ref 3.8–10.5)

## 2019-04-17 PROCEDURE — 99291 CRITICAL CARE FIRST HOUR: CPT

## 2019-04-17 PROCEDURE — 71045 X-RAY EXAM CHEST 1 VIEW: CPT | Mod: 26

## 2019-04-17 PROCEDURE — 43246 EGD PLACE GASTROSTOMY TUBE: CPT | Mod: GC

## 2019-04-17 PROCEDURE — 99231 SBSQ HOSP IP/OBS SF/LOW 25: CPT

## 2019-04-17 RX ORDER — LACTULOSE 10 G/15ML
20 SOLUTION ORAL EVERY 8 HOURS
Qty: 0 | Refills: 0 | Status: DISCONTINUED | OUTPATIENT
Start: 2019-04-17 | End: 2019-04-18

## 2019-04-17 RX ORDER — FENTANYL CITRATE 50 UG/ML
50 INJECTION INTRAVENOUS ONCE
Qty: 0 | Refills: 0 | Status: DISCONTINUED | OUTPATIENT
Start: 2019-04-17 | End: 2019-04-17

## 2019-04-17 RX ORDER — DOXAZOSIN MESYLATE 4 MG
4 TABLET ORAL DAILY
Qty: 0 | Refills: 0 | Status: DISCONTINUED | OUTPATIENT
Start: 2019-04-17 | End: 2019-04-20

## 2019-04-17 RX ORDER — POTASSIUM CHLORIDE 20 MEQ
40 PACKET (EA) ORAL ONCE
Qty: 0 | Refills: 0 | Status: COMPLETED | OUTPATIENT
Start: 2019-04-17 | End: 2019-04-18

## 2019-04-17 RX ORDER — MIDAZOLAM HYDROCHLORIDE 1 MG/ML
2 INJECTION, SOLUTION INTRAMUSCULAR; INTRAVENOUS ONCE
Qty: 0 | Refills: 0 | Status: DISCONTINUED | OUTPATIENT
Start: 2019-04-17 | End: 2019-04-17

## 2019-04-17 RX ORDER — DOXAZOSIN MESYLATE 4 MG
4 TABLET ORAL DAILY
Qty: 0 | Refills: 0 | Status: DISCONTINUED | OUTPATIENT
Start: 2019-04-17 | End: 2019-04-17

## 2019-04-17 RX ADMIN — Medication 100 MILLIGRAM(S): at 06:17

## 2019-04-17 RX ADMIN — SENNA PLUS 2 TABLET(S): 8.6 TABLET ORAL at 21:19

## 2019-04-17 RX ADMIN — POLYETHYLENE GLYCOL 3350 17 GRAM(S): 17 POWDER, FOR SOLUTION ORAL at 18:23

## 2019-04-17 RX ADMIN — Medication 60 MILLIGRAM(S): at 09:49

## 2019-04-17 RX ADMIN — FENTANYL CITRATE 50 MICROGRAM(S): 50 INJECTION INTRAVENOUS at 14:35

## 2019-04-17 RX ADMIN — Medication 100 MILLIGRAM(S): at 17:11

## 2019-04-17 RX ADMIN — ENOXAPARIN SODIUM 40 MILLIGRAM(S): 100 INJECTION SUBCUTANEOUS at 17:11

## 2019-04-17 RX ADMIN — Medication 100 MILLIGRAM(S): at 14:54

## 2019-04-17 RX ADMIN — SODIUM CHLORIDE 75 MILLILITER(S): 9 INJECTION, SOLUTION INTRAVENOUS at 02:11

## 2019-04-17 RX ADMIN — Medication 75 MILLIGRAM(S): at 21:19

## 2019-04-17 RX ADMIN — Medication 100 MILLIGRAM(S): at 21:18

## 2019-04-17 RX ADMIN — LACTULOSE 20 GRAM(S): 10 SOLUTION ORAL at 05:06

## 2019-04-17 RX ADMIN — CHLORHEXIDINE GLUCONATE 1 APPLICATION(S): 213 SOLUTION TOPICAL at 21:18

## 2019-04-17 RX ADMIN — Medication 60 MILLIGRAM(S): at 02:11

## 2019-04-17 RX ADMIN — LACTULOSE 20 GRAM(S): 10 SOLUTION ORAL at 21:18

## 2019-04-17 RX ADMIN — Medication 75 MILLIGRAM(S): at 15:06

## 2019-04-17 RX ADMIN — Medication 60 MILLIGRAM(S): at 20:29

## 2019-04-17 RX ADMIN — POLYETHYLENE GLYCOL 3350 17 GRAM(S): 17 POWDER, FOR SOLUTION ORAL at 05:06

## 2019-04-17 RX ADMIN — LACTULOSE 20 GRAM(S): 10 SOLUTION ORAL at 14:54

## 2019-04-17 RX ADMIN — PANTOPRAZOLE SODIUM 40 MILLIGRAM(S): 20 TABLET, DELAYED RELEASE ORAL at 11:31

## 2019-04-17 RX ADMIN — Medication 100 MILLIGRAM(S): at 05:06

## 2019-04-17 RX ADMIN — Medication 75 MILLIGRAM(S): at 05:06

## 2019-04-17 RX ADMIN — CHLORHEXIDINE GLUCONATE 15 MILLILITER(S): 213 SOLUTION TOPICAL at 05:06

## 2019-04-17 RX ADMIN — FENTANYL CITRATE 50 MICROGRAM(S): 50 INJECTION INTRAVENOUS at 14:20

## 2019-04-17 RX ADMIN — CHLORHEXIDINE GLUCONATE 15 MILLILITER(S): 213 SOLUTION TOPICAL at 17:11

## 2019-04-17 RX ADMIN — CEFTRIAXONE 100 GRAM(S): 500 INJECTION, POWDER, FOR SOLUTION INTRAMUSCULAR; INTRAVENOUS at 14:54

## 2019-04-17 RX ADMIN — Medication 60 MILLIGRAM(S): at 14:54

## 2019-04-17 NOTE — PROGRESS NOTE ADULT - SUBJECTIVE AND OBJECTIVE BOX
Subjective: Patient seen and examined. No new events except as noted.     SUBJECTIVE/ROS:  s/p trach       MEDICATIONS:  MEDICATIONS  (STANDING):  cefTRIAXone   IVPB 1 Gram(s) IV Intermittent every 24 hours  chlorhexidine 0.12% Liquid 15 milliLiter(s) Oral Mucosa two times a day  chlorhexidine 4% Liquid 1 Application(s) Topical <User Schedule>  diltiazem    Tablet 60 milliGRAM(s) Oral every 6 hours  docusate sodium Liquid 100 milliGRAM(s) Oral every 8 hours  enoxaparin Injectable 40 milliGRAM(s) SubCutaneous <User Schedule>  hydrALAZINE 75 milliGRAM(s) Oral every 8 hours  labetalol 100 milliGRAM(s) Oral two times a day  lactulose Syrup 20 Gram(s) Oral every 8 hours  multiple electrolytes Injection Type 1 500 milliLiter(s) (75 mL/Hr) IV Continuous <Continuous>  pantoprazole  Injectable 40 milliGRAM(s) IV Push daily  polyethylene glycol 3350 17 Gram(s) Oral two times a day  senna 2 Tablet(s) Oral at bedtime      PHYSICAL EXAM:  T(C): 37.3 (04-17-19 @ 07:00), Max: 37.4 (04-16-19 @ 14:15)  HR: 70 (04-17-19 @ 08:12) (59 - 77)  BP: --  RR: 14 (04-17-19 @ 07:00) (14 - 17)  SpO2: 100% (04-17-19 @ 08:12) (99% - 100%)  Wt(kg): --  I&O's Summary    16 Apr 2019 07:01  -  17 Apr 2019 07:00  --------------------------------------------------------  IN: 2585 mL / OUT: 1925 mL / NET: 660 mL      Height (cm): 154.9 (04-17 @ 06:57)  Weight (kg): 73 (04-17 @ 06:57)  BMI (kg/m2): 30.4 (04-17 @ 06:57)  BSA (m2): 1.72 (04-17 @ 06:57)      Appearance: on vent 	  Cardiovascular: Normal S1 S2,    Murmur:   Neck: JVP limited to be evaluated   Respiratory: Lungs few rhonchi   Gastrointestinal:  Soft  Skin: normal   Neuro: limited as pt on vent     LABS/DATA:    CARDIAC MARKERS:                                9.5    10.2  )-----------( 206      ( 17 Apr 2019 01:45 )             29.2     04-17    140  |  102  |  17  ----------------------------<  103<H>  3.9   |  26  |  0.54    Ca    8.5      17 Apr 2019 01:45  Phos  3.8     04-17  Mg     2.2     04-17      proBNP:   Lipid Profile:   HgA1c:   TSH:     TELE:  EKG:

## 2019-04-17 NOTE — PROGRESS NOTE ADULT - ASSESSMENT
ASSESSMENT/PLAN:  pontine hemorrhage, likely hypertensive   UTI, on Ceftriaxone.    Continue current management, no changes.

## 2019-04-17 NOTE — PROGRESS NOTE ADULT - ASSESSMENT
ASSESSMENT/PLAN:  pontine hemorrhage, likely hypertensive   UTI, on Ceftriaxone till 4/23.    Continue current management, no changes.  Possible RCU eval tomorrow.

## 2019-04-17 NOTE — PROGRESS NOTE ADULT - ASSESSMENT
HTN  Pontine Hemorrhage   afib, newly discovered    cont current meds  HR stable in sinus   cont cardizem   s/p trach

## 2019-04-17 NOTE — PROGRESS NOTE ADULT - ASSESSMENT
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     NEURO:    Neurochecks q2 hrs,    Pontine hemorrhage:  s/p DDAVP and Plts   Pall care consult   GOC discussion with family - wanting aggressive care  Activity: [] mobilize as tolerated [x] Bedrest [] PT [] OT [] PMNR    PULM:  Respiratory failure 2/2 pontine hemorrhage   full mechanical vent support   Daily CXR, ABG nightly   TRACH today     CV:  SBP goal 100 - 160  ?New onset Afib - Labetalol and coreg  Appreciate Cardiology consult     RENAL:  Fluids:  NS @ 75    GI:    Diet: NPO for trach, PEG tomorrow   GI prophylaxis [] not indicated [x] PPI [] other:  Bowel regimen [x] colace [x] senna [] other: add Miralax     ENDO:   Goal euglycemia (-180)    HEME/ONC:  VTE prophylaxis: [] SCDs [] chemoprophylaxis - Lovenox     ID:  afebrile     SOCIAL/FAMILY:  [] awaiting [x] updated at bedside met with , daughter, son, and daughter-in-law--in shock, appropriately grieving [] family meeting    CODE STATUS:  [x] Full Code [] DNR [] DNI [] Palliative/Comfort Care    DISPOSITION:  [x] ICU [] Stroke Unit [] Floor [] EMU [] RCU [] PCU    [x] Patient is at high risk of neurologic deterioration/death due to:  hemorrhage, herniation     Time spent: 45 critical care minutes    Contact: [phone] 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     NEURO:    Neurochecks q2 hrs,    Pontine hemorrhage:  s/p DDAVP and Plts   Pall care consult   GOC discussion with family - wanting aggressive care  Activity: [] mobilize as tolerated [x] Bedrest [] PT [] OT [] PMNR    PULM:  Respiratory failure 2/2 pontine hemorrhage   full mechanical vent support - CPAP as tolerated   Daily CXR, ABG nightly   s/p TRACH     CV:  SBP goal 100 - 160  ?New onset Afib - Labetalol and coreg  Appreciate Cardiology consult     RENAL:  Fluids:  NS @ 75    GI:    Diet: NPO for PEG today   GI prophylaxis [] not indicated [x] PPI [] other:  Bowel regimen [x] colace [x] senna [] other: add Miralax     ENDO:   Goal euglycemia (-180)    HEME/ONC:  VTE prophylaxis: [] SCDs [] chemoprophylaxis - Lovenox     ID:  afebrile     SOCIAL/FAMILY:  [] awaiting [x] updated at bedside met with , daughter, son, and daughter-in-law--in shock, appropriately grieving [] family meeting    CODE STATUS:  [x] Full Code [] DNR [] DNI [] Palliative/Comfort Care    DISPOSITION:  [x] ICU [] Stroke Unit [] Floor [] EMU [] RCU [] PCU    [x] Patient is at high risk of neurologic deterioration/death due to:  hemorrhage, herniation     Time spent: 45 critical care minutes    Contact: [phone]

## 2019-04-17 NOTE — PROGRESS NOTE ADULT - SUBJECTIVE AND OBJECTIVE BOX
HPI:  64 y/o F w/ PMHx HTN, HLD, ischemic CVA (2004), presents to the ED BIBA from home for evaluation of stroke.  As per family patient was having facial droop and was prescribed Valacyclovir and prednisone for "Bell's palsy. As per EMS, pt was talking to a family member on the phone at 11:00 on 4/11 and then had slurred speech and unresponsive.  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; intubated; on cardene infusion     On admission, the patient was:  GCS: 3 T  ICH score: 4    tolerated CPAP   TRACH today     VITALS:  Recent Vitals Reviewed   LABS:  Recent Labs Reviewed  MEDICATIONS: All Recent Medications Reviewed   IMAGING:   Recent imaging studies were reviewed.    EXAMINATION:  General:  intubated   Neuro:  no OE, no FC, pupils non-reactive b/l, purposeful movements L>R on UE, moving spont LE + cough or gag, overbreaths vent  Cards:  s1, s2 RRR  Respiratory:  lungs clear b/l   Adomen:  soft  Extremities:  no edema  Skin:  warm/dry    ET tube  Left Radial A line HPI:  64 y/o F w/ PMHx HTN, HLD, ischemic CVA (2004), presents to the ED BIBA from home for evaluation of stroke.  As per family patient was having facial droop and was prescribed Valacyclovir and prednisone for "Bell's palsy. As per EMS, pt was talking to a family member on the phone at 11:00 on 4/11 and then had slurred speech and unresponsive.  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; intubated; on cardene infusion     On admission, the patient was:  GCS: 3 T  ICH score: 4    tolerated CPAP   PEG today     VITALS:  Recent Vitals Reviewed   LABS:  Recent Labs Reviewed  MEDICATIONS: All Recent Medications Reviewed   IMAGING:   Recent imaging studies were reviewed.    EXAMINATION:  General:  intubated   Neuro:  no OE, wiggles Right toe - however inconsistently, pupils non-reactive b/l, purposeful movements L>R on UE, moving spont LE + cough or gag, overbreaths vent  Cards:  s1, s2 RRR  Respiratory:  lungs clear b/l   Adomen:  soft  Extremities:  no edema  Skin:  warm/dry    ET tube  Left Radial A line

## 2019-04-17 NOTE — PROGRESS NOTE ADULT - SUBJECTIVE AND OBJECTIVE BOX
62 y/o F w/ PMHx HTN, HLD, ischemic CVA (2004), presented with pontine hemorrhage on CT.  On admission, the patient was:  GCS: 3 T  ICH score: 4    VITALS:  Recent Vitals Reviewed   LABS:  Recent Labs Reviewed  MEDICATIONS: All Recent Medications Reviewed   IMAGING:   Recent imaging studies were reviewed.    EXAMINATION:  Neuro:  no OE, no FC, pupils non-reactive b/l, purposeful movements L>R on UE, moving spont LE + cough or gag, overbreaths vent  Cards:  s1, s2 RRR  Respiratory: trach, lungs clear b/l   Abdomen:  soft, PEG in place  Extremities:  no edema  Skin:  warm/dry

## 2019-04-17 NOTE — PROGRESS NOTE ADULT - PROBLEM SELECTOR PLAN 1
- Do not remove umbilical trach tie  - HOB elevation  - Suction PRN  - Continue trach care  - Care per primary team

## 2019-04-17 NOTE — PROGRESS NOTE ADULT - SUBJECTIVE AND OBJECTIVE BOX
64 y/o F w/ PMHx HTN, HLD, ischemic CVA (2004), presented with pontine hemorrhage on CT.    On admission, the patient was:  GCS: 3 T  ICH score: 4    VITALS:  Recent Vitals Reviewed   LABS:  Recent Labs Reviewed  MEDICATIONS: All Recent Medications Reviewed   IMAGING:   Recent imaging studies were reviewed.    EXAMINATION:  Neuro:  no OE, no FC, pupils non-reactive b/l, purposeful movements L>R on UE, moving spont LE + cough or gag, overbreaths vent  Cards:  s1, s2 RRR  Respiratory: trach, lungs clear b/l   Abdomen:  soft  Extremities:  no edema  Skin:  warm/dry    ET tube  Left Radial A line

## 2019-04-17 NOTE — PROGRESS NOTE ADULT - SUBJECTIVE AND OBJECTIVE BOX
ENT ISSUE/POD: s/p tracheostomy POD 1    HPI: 62 yo female s/p tracheostomy POD 1. Pt seen and examined at bedside. No acute events overnight. Pt with #8 cuffed shiley, on the ventilator. No issues since the procedure.        PAST MEDICAL & SURGICAL HISTORY:  High cholesterol  Hypertension, unspecified type  Cerebrovascular accident (CVA), unspecified mechanism  History of appendectomy    Allergies    Allergy Status Unknown    Intolerances      MEDICATIONS  (STANDING):  cefTRIAXone   IVPB 1 Gram(s) IV Intermittent every 24 hours  chlorhexidine 0.12% Liquid 15 milliLiter(s) Oral Mucosa two times a day  chlorhexidine 4% Liquid 1 Application(s) Topical <User Schedule>  diltiazem    Tablet 60 milliGRAM(s) Oral every 6 hours  docusate sodium Liquid 100 milliGRAM(s) Oral every 8 hours  enoxaparin Injectable 40 milliGRAM(s) SubCutaneous <User Schedule>  hydrALAZINE 75 milliGRAM(s) Oral every 8 hours  labetalol 100 milliGRAM(s) Oral two times a day  lactulose Syrup 20 Gram(s) Oral every 8 hours  multiple electrolytes Injection Type 1 500 milliLiter(s) (75 mL/Hr) IV Continuous <Continuous>  pantoprazole  Injectable 40 milliGRAM(s) IV Push daily  polyethylene glycol 3350 17 Gram(s) Oral two times a day  senna 2 Tablet(s) Oral at bedtime    MEDICATIONS  (PRN):  acetaminophen   Tablet .. 650 milliGRAM(s) Oral every 6 hours PRN Temp greater or equal to 38C (100.4F), Mild Pain (1 - 3)  fentaNYL    Injectable 25 MICROGram(s) IV Push every 2 hours PRN Severe Pain (7 - 10)  ondansetron Injectable 4 milliGRAM(s) IV Push every 6 hours PRN Nausea and/or Vomiting      Social History: see consult note    Family history: see consult note    ROS:   unable to obtain due to pt's condition       Vital Signs Last 24 Hrs  T(C): 37.3 (17 Apr 2019 07:00), Max: 37.4 (16 Apr 2019 14:15)  T(F): 99.1 (17 Apr 2019 07:00), Max: 99.3 (16 Apr 2019 14:15)  HR: 62 (17 Apr 2019 07:00) (59 - 77)  BP: --  BP(mean): --  RR: 14 (17 Apr 2019 07:00) (14 - 17)  SpO2: 100% (17 Apr 2019 07:00) (99% - 100%)                          9.5    10.2  )-----------( 206      ( 17 Apr 2019 01:45 )             29.2    04-17    140  |  102  |  17  ----------------------------<  103<H>  3.9   |  26  |  0.54    Ca    8.5      17 Apr 2019 01:45  Phos  3.8     04-17  Mg     2.2     04-17     PT/INR - ( 15 Apr 2019 23:04 )   PT: 12.9 sec;   INR: 1.13 ratio         PTT - ( 15 Apr 2019 23:04 )  PTT:33.0 sec    PHYSICAL EXAM:  Gen: NAD  Skin: No rashes, bruises, or lesions  Head: Normocephalic, Atraumatic  Face: no edema, erythema, or fluctuance. Parotid glands soft without mass  Eyes: no scleral injection  Nose: Nares bilaterally patent, no discharge  Mouth: No Stridor / Drooling / Trismus.  Mucosa moist, tongue/uvula midline, oropharynx clear  Neck: Flat, supple, no lymphadenopathy, trachea midline, no masses, #8 shiley with inflated cuff secured with umbilical tie and 4 sutures, surgicel in place, no active bleeding  Lymphatic: No lymphadenopathy  Resp: on ventilator  Neuro: unable to assess ENT ISSUE/POD: s/p tracheostomy POD 1    HPI: 64 yo female s/p tracheostomy POD 1. Pt seen and examined at bedside. No acute events overnight. Pt with #8 cuffed shiley, on the ventilator. No issues since the procedure.        PAST MEDICAL & SURGICAL HISTORY:  High cholesterol  Hypertension, unspecified type  Cerebrovascular accident (CVA), unspecified mechanism  History of appendectomy    Allergies    Allergy Status Unknown    Intolerances      MEDICATIONS  (STANDING):  cefTRIAXone   IVPB 1 Gram(s) IV Intermittent every 24 hours  chlorhexidine 0.12% Liquid 15 milliLiter(s) Oral Mucosa two times a day  chlorhexidine 4% Liquid 1 Application(s) Topical <User Schedule>  diltiazem    Tablet 60 milliGRAM(s) Oral every 6 hours  docusate sodium Liquid 100 milliGRAM(s) Oral every 8 hours  enoxaparin Injectable 40 milliGRAM(s) SubCutaneous <User Schedule>  hydrALAZINE 75 milliGRAM(s) Oral every 8 hours  labetalol 100 milliGRAM(s) Oral two times a day  lactulose Syrup 20 Gram(s) Oral every 8 hours  multiple electrolytes Injection Type 1 500 milliLiter(s) (75 mL/Hr) IV Continuous <Continuous>  pantoprazole  Injectable 40 milliGRAM(s) IV Push daily  polyethylene glycol 3350 17 Gram(s) Oral two times a day  senna 2 Tablet(s) Oral at bedtime    MEDICATIONS  (PRN):  acetaminophen   Tablet .. 650 milliGRAM(s) Oral every 6 hours PRN Temp greater or equal to 38C (100.4F), Mild Pain (1 - 3)  fentaNYL    Injectable 25 MICROGram(s) IV Push every 2 hours PRN Severe Pain (7 - 10)  ondansetron Injectable 4 milliGRAM(s) IV Push every 6 hours PRN Nausea and/or Vomiting      Social History: see consult note    Family history: see consult note    ROS:   unable to obtain due to pt's condition       Vital Signs Last 24 Hrs  T(C): 37.3 (17 Apr 2019 07:00), Max: 37.4 (16 Apr 2019 14:15)  T(F): 99.1 (17 Apr 2019 07:00), Max: 99.3 (16 Apr 2019 14:15)  HR: 62 (17 Apr 2019 07:00) (59 - 77)  BP: --  BP(mean): --  RR: 14 (17 Apr 2019 07:00) (14 - 17)  SpO2: 100% (17 Apr 2019 07:00) (99% - 100%)                          9.5    10.2  )-----------( 206      ( 17 Apr 2019 01:45 )             29.2    04-17    140  |  102  |  17  ----------------------------<  103<H>  3.9   |  26  |  0.54    Ca    8.5      17 Apr 2019 01:45  Phos  3.8     04-17  Mg     2.2     04-17     PT/INR - ( 15 Apr 2019 23:04 )   PT: 12.9 sec;   INR: 1.13 ratio         PTT - ( 15 Apr 2019 23:04 )  PTT:33.0 sec    PHYSICAL EXAM:  Gen: NAD  Skin: No rashes, bruises, or lesions  Head: Normocephalic, Atraumatic  Face: no edema, erythema, or fluctuance. Parotid glands soft without mass  Eyes: no scleral injection  Nose: Nares bilaterally patent, no discharge  Mouth: No Stridor / Drooling / Trismus.  Mucosa moist, tongue/uvula midline, oropharynx clear  Neck: Flat, supple, no lymphadenopathy, trachea midline, no masses, #8 shiley with inflated cuff secured with umbilical tie and 4 sutures, surgicel removed, no active bleeding  Lymphatic: No lymphadenopathy  Resp: on ventilator  Neuro: unable to assess ENT ISSUE/POD: s/p tracheostomy POD 1    HPI: 64 yo female s/p tracheostomy POD 1. Pt seen and examined at bedside. No acute events overnight. Pt with #8 cuffed shiley, on the ventilator. No issues since the procedure.        PAST MEDICAL & SURGICAL HISTORY:  High cholesterol  Hypertension, unspecified type  Cerebrovascular acciden	t (CVA), unspecified mechanism  History of appendectomy    Allergies    Allergy Status Unknown    Intolerances      MEDICATIONS  (STANDING):  cefTRIAXone   IVPB 1 Gram(s) IV Intermittent every 24 hours  chlorhexidine 0.12% Liquid 15 milliLiter(s) Oral Mucosa two times a day  chlorhexidine 4% Liquid 1 Application(s) Topical <User Schedule>  diltiazem    Tablet 60 milliGRAM(s) Oral every 6 hours  docusate sodium Liquid 100 milliGRAM(s) Oral every 8 hours  enoxaparin Injectable 40 milliGRAM(s) SubCutaneous <User Schedule>  hydrALAZINE 75 milliGRAM(s) Oral every 8 hours  labetalol 100 milliGRAM(s) Oral two times a day  lactulose Syrup 20 Gram(s) Oral every 8 hours  multiple electrolytes Injection Type 1 500 milliLiter(s) (75 mL/Hr) IV Continuous <Continuous>  pantoprazole  Injectable 40 milliGRAM(s) IV Push daily  polyethylene glycol 3350 17 Gram(s) Oral two times a day  senna 2 Tablet(s) Oral at bedtime    MEDICATIONS  (PRN):  acetaminophen   Tablet .. 650 milliGRAM(s) Oral every 6 hours PRN Temp greater or equal to 38C (100.4F), Mild Pain (1 - 3)  fentaNYL    Injectable 25 MICROGram(s) IV Push every 2 hours PRN Severe Pain (7 - 10)  ondansetron Injectable 4 milliGRAM(s) IV Push every 6 hours PRN Nausea and/or Vomiting      Social History: see consult note    Family history: see consult note    ROS:   unable to obtain due to pt's condition       Vital Signs Last 24 Hrs  T(C): 37.3 (17 Apr 2019 07:00), Max: 37.4 (16 Apr 2019 14:15)  T(F): 99.1 (17 Apr 2019 07:00), Max: 99.3 (16 Apr 2019 14:15)  HR: 62 (17 Apr 2019 07:00) (59 - 77)  BP: --  BP(mean): --  RR: 14 (17 Apr 2019 07:00) (14 - 17)  SpO2: 100% (17 Apr 2019 07:00) (99% - 100%)                          9.5    10.2  )-----------( 206      ( 17 Apr 2019 01:45 )             29.2    04-17    140  |  102  |  17  ----------------------------<  103<H>  3.9   |  26  |  0.54    Ca    8.5      17 Apr 2019 01:45  Phos  3.8     04-17  Mg     2.2     04-17     PT/INR - ( 15 Apr 2019 23:04 )   PT: 12.9 sec;   INR: 1.13 ratio         PTT - ( 15 Apr 2019 23:04 )  PTT:33.0 sec    PHYSICAL EXAM:  Gen: NAD  Skin: No rashes, bruises, or lesions  Head: Normocephalic, Atraumatic  Face: no edema, erythema, or fluctuance. Parotid glands soft without mass  Eyes: no scleral injection  Nose: Nares bilaterally patent, no discharge  Mouth: No Stridor / Drooling / Trismus.  Mucosa moist, tongue/uvula midline, oropharynx clear  Neck: Flat, supple, no lymphadenopathy, trachea midline, no masses, #8 shiley with inflated cuff secured with umbilical tie and 4 sutures, surgicel removed, no active bleeding  Lymphatic: No lymphadenopathy  Resp: on ventilator  Neuro: unable to assess

## 2019-04-17 NOTE — PROGRESS NOTE ADULT - ASSESSMENT
62yo male s/p trach POD 1. #8 cuffed shiley in place, on the ventilator w 1 piece of surgicel, no active bleeding, No issues. Will remove surgicel today. 62yo male s/p trach POD 1. #8 cuffed shiley in place, on the ventilator. Surgicel removed, no active bleeding, No issues.

## 2019-04-18 LAB
-  AMIKACIN: SIGNIFICANT CHANGE UP
-  AMPICILLIN/SULBACTAM: SIGNIFICANT CHANGE UP
-  AMPICILLIN: SIGNIFICANT CHANGE UP
-  AZTREONAM: SIGNIFICANT CHANGE UP
-  CEFAZOLIN: SIGNIFICANT CHANGE UP
-  CEFEPIME: SIGNIFICANT CHANGE UP
-  CEFOXITIN: SIGNIFICANT CHANGE UP
-  CEFTRIAXONE: SIGNIFICANT CHANGE UP
-  CIPROFLOXACIN: SIGNIFICANT CHANGE UP
-  ERTAPENEM: SIGNIFICANT CHANGE UP
-  GENTAMICIN: SIGNIFICANT CHANGE UP
-  IMIPENEM: SIGNIFICANT CHANGE UP
-  LEVOFLOXACIN: SIGNIFICANT CHANGE UP
-  MEROPENEM: SIGNIFICANT CHANGE UP
-  NITROFURANTOIN: SIGNIFICANT CHANGE UP
-  PIPERACILLIN/TAZOBACTAM: SIGNIFICANT CHANGE UP
-  TIGECYCLINE: SIGNIFICANT CHANGE UP
-  TOBRAMYCIN: SIGNIFICANT CHANGE UP
-  TRIMETHOPRIM/SULFAMETHOXAZOLE: SIGNIFICANT CHANGE UP
ANION GAP SERPL CALC-SCNC: 10 MMOL/L — SIGNIFICANT CHANGE UP (ref 5–17)
BUN SERPL-MCNC: 17 MG/DL — SIGNIFICANT CHANGE UP (ref 7–23)
CALCIUM SERPL-MCNC: 8.6 MG/DL — SIGNIFICANT CHANGE UP (ref 8.4–10.5)
CHLORIDE SERPL-SCNC: 100 MMOL/L — SIGNIFICANT CHANGE UP (ref 96–108)
CO2 SERPL-SCNC: 25 MMOL/L — SIGNIFICANT CHANGE UP (ref 22–31)
CREAT SERPL-MCNC: 0.64 MG/DL — SIGNIFICANT CHANGE UP (ref 0.5–1.3)
CULTURE RESULTS: SIGNIFICANT CHANGE UP
GAS PNL BLDA: SIGNIFICANT CHANGE UP
GLUCOSE SERPL-MCNC: 141 MG/DL — HIGH (ref 70–99)
HCT VFR BLD CALC: 30.8 % — LOW (ref 34.5–45)
HGB BLD-MCNC: 10 G/DL — LOW (ref 11.5–15.5)
MAGNESIUM SERPL-MCNC: 2.2 MG/DL — SIGNIFICANT CHANGE UP (ref 1.6–2.6)
MCHC RBC-ENTMCNC: 31.7 PG — SIGNIFICANT CHANGE UP (ref 27–34)
MCHC RBC-ENTMCNC: 32.3 GM/DL — SIGNIFICANT CHANGE UP (ref 32–36)
MCV RBC AUTO: 98.1 FL — SIGNIFICANT CHANGE UP (ref 80–100)
METHOD TYPE: SIGNIFICANT CHANGE UP
ORGANISM # SPEC MICROSCOPIC CNT: SIGNIFICANT CHANGE UP
ORGANISM # SPEC MICROSCOPIC CNT: SIGNIFICANT CHANGE UP
PHOSPHATE SERPL-MCNC: 3.2 MG/DL — SIGNIFICANT CHANGE UP (ref 2.5–4.5)
PLATELET # BLD AUTO: 231 K/UL — SIGNIFICANT CHANGE UP (ref 150–400)
POTASSIUM SERPL-MCNC: 3.7 MMOL/L — SIGNIFICANT CHANGE UP (ref 3.5–5.3)
POTASSIUM SERPL-SCNC: 3.7 MMOL/L — SIGNIFICANT CHANGE UP (ref 3.5–5.3)
RBC # BLD: 3.14 M/UL — LOW (ref 3.8–5.2)
RBC # FLD: 12.3 % — SIGNIFICANT CHANGE UP (ref 10.3–14.5)
SODIUM SERPL-SCNC: 135 MMOL/L — SIGNIFICANT CHANGE UP (ref 135–145)
SPECIMEN SOURCE: SIGNIFICANT CHANGE UP
WBC # BLD: 7.4 K/UL — SIGNIFICANT CHANGE UP (ref 3.8–10.5)
WBC # FLD AUTO: 7.4 K/UL — SIGNIFICANT CHANGE UP (ref 3.8–10.5)

## 2019-04-18 PROCEDURE — 99232 SBSQ HOSP IP/OBS MODERATE 35: CPT

## 2019-04-18 PROCEDURE — 99223 1ST HOSP IP/OBS HIGH 75: CPT

## 2019-04-18 PROCEDURE — 99291 CRITICAL CARE FIRST HOUR: CPT

## 2019-04-18 PROCEDURE — 99231 SBSQ HOSP IP/OBS SF/LOW 25: CPT

## 2019-04-18 RX ORDER — OXYCODONE HYDROCHLORIDE 5 MG/1
10 TABLET ORAL EVERY 4 HOURS
Qty: 0 | Refills: 0 | Status: DISCONTINUED | OUTPATIENT
Start: 2019-04-18 | End: 2019-04-20

## 2019-04-18 RX ORDER — PANTOPRAZOLE SODIUM 20 MG/1
40 TABLET, DELAYED RELEASE ORAL DAILY
Qty: 0 | Refills: 0 | Status: DISCONTINUED | OUTPATIENT
Start: 2019-04-18 | End: 2019-05-17

## 2019-04-18 RX ADMIN — SENNA PLUS 2 TABLET(S): 8.6 TABLET ORAL at 21:08

## 2019-04-18 RX ADMIN — CHLORHEXIDINE GLUCONATE 1 APPLICATION(S): 213 SOLUTION TOPICAL at 21:07

## 2019-04-18 RX ADMIN — PANTOPRAZOLE SODIUM 40 MILLIGRAM(S): 20 TABLET, DELAYED RELEASE ORAL at 14:48

## 2019-04-18 RX ADMIN — Medication 100 MILLIGRAM(S): at 05:01

## 2019-04-18 RX ADMIN — Medication 40 MILLIEQUIVALENT(S): at 01:55

## 2019-04-18 RX ADMIN — ENOXAPARIN SODIUM 40 MILLIGRAM(S): 100 INJECTION SUBCUTANEOUS at 17:43

## 2019-04-18 RX ADMIN — Medication 100 MILLIGRAM(S): at 21:08

## 2019-04-18 RX ADMIN — Medication 100 MILLIGRAM(S): at 14:48

## 2019-04-18 RX ADMIN — Medication 100 MILLIGRAM(S): at 05:00

## 2019-04-18 RX ADMIN — Medication 75 MILLIGRAM(S): at 05:01

## 2019-04-18 RX ADMIN — CHLORHEXIDINE GLUCONATE 15 MILLILITER(S): 213 SOLUTION TOPICAL at 05:00

## 2019-04-18 RX ADMIN — Medication 60 MILLIGRAM(S): at 01:55

## 2019-04-18 RX ADMIN — CHLORHEXIDINE GLUCONATE 15 MILLILITER(S): 213 SOLUTION TOPICAL at 17:43

## 2019-04-18 RX ADMIN — Medication 60 MILLIGRAM(S): at 14:48

## 2019-04-18 RX ADMIN — Medication 60 MILLIGRAM(S): at 09:37

## 2019-04-18 RX ADMIN — Medication 60 MILLIGRAM(S): at 21:08

## 2019-04-18 RX ADMIN — POLYETHYLENE GLYCOL 3350 17 GRAM(S): 17 POWDER, FOR SOLUTION ORAL at 17:43

## 2019-04-18 RX ADMIN — Medication 100 MILLIGRAM(S): at 17:43

## 2019-04-18 RX ADMIN — Medication 75 MILLIGRAM(S): at 13:41

## 2019-04-18 RX ADMIN — Medication 75 MILLIGRAM(S): at 21:08

## 2019-04-18 RX ADMIN — CEFTRIAXONE 100 GRAM(S): 500 INJECTION, POWDER, FOR SOLUTION INTRAMUSCULAR; INTRAVENOUS at 14:48

## 2019-04-18 RX ADMIN — Medication 4 MILLIGRAM(S): at 05:00

## 2019-04-18 RX ADMIN — POLYETHYLENE GLYCOL 3350 17 GRAM(S): 17 POWDER, FOR SOLUTION ORAL at 05:01

## 2019-04-18 NOTE — PROGRESS NOTE ADULT - ASSESSMENT
62yo male s/p trach POD 2. #8 cuffed shiley in place, on the ventilator. Surgicel removed, no active bleeding, No issues.

## 2019-04-18 NOTE — PROGRESS NOTE ADULT - SUBJECTIVE AND OBJECTIVE BOX
ENT ISSUE/POD: s/p tracheostomy POD 2    HPI: 64 yo female s/p tracheostomy POD 2. Pt seen and examined at bedside. No acute events overnight. Pt with #8 cuffed shiley, on the ventilator. No issues since the procedure.        PAST MEDICAL & SURGICAL HISTORY:  High cholesterol  Hypertension, unspecified type  Cerebrovascular accident (CVA), unspecified mechanism  History of appendectomy    Allergies    Allergy Status Unknown    Intolerances      MEDICATIONS  (STANDING):  cefTRIAXone   IVPB 1 Gram(s) IV Intermittent every 24 hours  chlorhexidine 0.12% Liquid 15 milliLiter(s) Oral Mucosa two times a day  chlorhexidine 4% Liquid 1 Application(s) Topical <User Schedule>  diltiazem    Tablet 60 milliGRAM(s) Oral every 6 hours  docusate sodium Liquid 100 milliGRAM(s) Oral every 8 hours  doxazosin 4 milliGRAM(s) Oral daily  enoxaparin Injectable 40 milliGRAM(s) SubCutaneous <User Schedule>  hydrALAZINE 75 milliGRAM(s) Oral every 8 hours  labetalol 100 milliGRAM(s) Oral two times a day  multiple electrolytes Injection Type 1 500 milliLiter(s) (75 mL/Hr) IV Continuous <Continuous>  pantoprazole  Injectable 40 milliGRAM(s) IV Push daily  polyethylene glycol 3350 17 Gram(s) Oral two times a day  senna 2 Tablet(s) Oral at bedtime    MEDICATIONS  (PRN):  acetaminophen   Tablet .. 650 milliGRAM(s) Oral every 6 hours PRN Temp greater or equal to 38C (100.4F), Mild Pain (1 - 3)  fentaNYL    Injectable 25 MICROGram(s) IV Push every 2 hours PRN Severe Pain (7 - 10)  ondansetron Injectable 4 milliGRAM(s) IV Push every 6 hours PRN Nausea and/or Vomiting      Social History: see consult    Family history: see consult    ROS:   ENT: all negative except as noted in HPI   Pulm: denies SOB, cough, hemoptysis  Neuro: denies numbness/tingling, loss of sensation  Endo: denies heat/cold intolerance, excessive sweating      Vital Signs Last 24 Hrs  T(C): 37.5 (18 Apr 2019 03:00), Max: 37.8 (17 Apr 2019 15:00)  T(F): 99.5 (18 Apr 2019 03:00), Max: 100 (17 Apr 2019 15:00)  HR: 63 (18 Apr 2019 06:00) (60 - 74)  BP: --  BP(mean): --  RR: 15 (18 Apr 2019 06:00) (14 - 22)  SpO2: 100% (18 Apr 2019 06:00) (96% - 100%)                          9.9    9.7   )-----------( 219      ( 17 Apr 2019 21:20 )             28.6    04-17    136  |  100  |  16  ----------------------------<  101<H>  3.7   |  24  |  0.57    Ca    8.8      17 Apr 2019 21:20  Phos  4.3     04-17  Mg     2.3     04-17         PHYSICAL EXAM:  Gen: NAD  Skin: No rashes, bruises, or lesions  Head: Normocephalic, Atraumatic  Face: no edema, erythema, or fluctuance. Parotid glands soft without mass  Eyes: no scleral injection  Nose: Nares bilaterally patent, no discharge  Mouth: No Stridor / Drooling / Trismus.  Mucosa moist, tongue/uvula midline, oropharynx clear  Neck: Flat, supple, no lymphadenopathy, trachea midline, no masses, #8 shiley with inflated cuff secured with umbilical tie and 4 sutures, surgicel removed, no active bleeding  Lymphatic: No lymphadenopathy  Resp: on ventilator  Neuro: unable to assess

## 2019-04-18 NOTE — PROGRESS NOTE ADULT - PROBLEM SELECTOR PLAN 1
- Do not remove umbilical trach tie  - HOB elevation  - Suction PRN  - Continue trach care  - Care per primary team.

## 2019-04-18 NOTE — PROGRESS NOTE ADULT - SUBJECTIVE AND OBJECTIVE BOX
HPI:  62 y/o F w/ PMHx HTN, HLD, ischemic CVA (2004), presents to the ED BIBA from home for evaluation of stroke.  As per family patient was having facial droop and was prescribed Valacyclovir and prednisone for "Bell's palsy. As per EMS, pt was talking to a family member on the phone at 11:00 on 4/11 and then had slurred speech and unresponsive.  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; intubated; on cardene infusion     On admission, the patient was:  GCS: 3 T  ICH score: 4    EVENTS:  s/p PEG yesterday  CPAP well overnight    VITALS:  Recent Vitals Reviewed   LABS:  Recent Labs Reviewed  MEDICATIONS: All Recent Medications Reviewed   IMAGING:   Recent imaging studies were reviewed.    EXAMINATION:  General:  intubated   Neuro:  no OE, wiggles Right toe - however inconsistently, pupils non-reactive b/l, purposeful movements L>R on UE, moving spont LE + cough or gag, overbreaths vent  Cards:  s1, s2 RRR  Respiratory:  lungs clear b/l   Adomen:  soft  Extremities:  no edema  Skin:  warm/dry    ET tube  Left Radial A line

## 2019-04-18 NOTE — PROGRESS NOTE ADULT - ASSESSMENT
HTN  Pontine Hemorrhage   afib, newly discovered    cont current meds  HR stable in sinus   cont cardizem   s/p trach   CV stable

## 2019-04-18 NOTE — PROGRESS NOTE ADULT - ASSESSMENT
ASSESSMENT/PLAN:  pontine hemorrhage, likely hypertensive   UTI, on Ceftriaxone till 4/23.    Continue current management, no changes.  Pending  RCU bed

## 2019-04-18 NOTE — CONSULT NOTE ADULT - ASSESSMENT
63 F with HTN, HLD, ischemic CVA in 2004, now presenting with a acute pontine hemorrhage likely 2/2 uncontrolled HTN, now with no significant neurological recovery and persistent resp failure with hypoxia.     1. Acute resp failure with hypoxia:  - Cont mechanical ventilation at current settings  - Daily weaning trials as tolerated  - Weaning will likely be limited by mental status   - Cont pulm toilet     2. Pontine hemorrhage/ HTN:  - Last Ct head on 4/17 stable   - Neuro exam remains dismal  - Cont neuro checks   - SBP target 100-160 per neuroICU team   - Cont Cardizem and hydralazine    3. A fib:  - Now in SR  - Cont rate control with Cardizem   - unable to AC 2/2 ICH    4. Oropharyngeal dysphagia  - Cont tube feeds via PEG    5. E coli UTI:  - Cont Ceftriaxone  - Follow temp curve and WBC    6. Gen:  - Full code, family refusing to discuss advanced care directives at present Dismal prognosis, will require vent facility.   - Transfer to RCU once bed available 63 F with HTN, HLD, ischemic CVA in 2004, now presenting with a acute pontine hemorrhage likely 2/2 uncontrolled HTN, now with no significant neurological recovery and persistent resp failure with hypoxia.     1. Acute resp failure with hypoxia:  - Cont mechanical ventilation at current settings  - Daily weaning trials as tolerated  - Weaning will likely be limited by mental status   - Cont pulm toilet     2. Pontine hemorrhage/ HTN:  - Last Ct head on 4/17 stable   - Neuro exam remains dismal  - Cont neuro checks   - SBP target 100-160 per neuroICU team   - Cont Labetalol, Cardizem and hydralazine    3. A fib:  - Now in SR  - Cont rate control with Cardizem   - unable to AC 2/2 ICH    4. Oropharyngeal dysphagia  - Cont tube feeds via PEG    5. E coli UTI:  - Cont Ceftriaxone  - Follow temp curve and WBC    6. Gen:  - Full code, family refusing to discuss advanced care directives at present Dismal prognosis, will require vent facility.   - Transfer to RCU once bed available 63 F with HTN, HLD, ischemic CVA in 2004, now presenting with a acute pontine hemorrhage likely 2/2 uncontrolled HTN, now with no significant neurological recovery and persistent resp failure with hypoxia.     1. Acute resp failure with hypoxia:  - Cont mechanical ventilation at current settings  - Daily weaning trials as tolerated  - Weaning will likely be limited by mental status   - Cont pulm toilet     2. Pontine hemorrhage/ HTN:  - Last Ct head on 4/17 stable   - Neuro exam remains dismal  - Cont neuro checks   - SBP target 100-160 per neuroICU team   - Cont Labetalol, Cardizem and hydralazine    3. A fib:  - Now in SR  - Cont rate control with Cardizem   - unable to AC 2/2 ICH    4. Oropharyngeal dysphagia  - Cont tube feeds via PEG    5. E coli UTI:  - Cont Ceftriaxone  - Follow temp curve and WBC    6. Gen:  - Full code, family refusing to discuss advanced care directives at present. Poor prognosis, will require vent facility.   - Transfer to RCU once bed available

## 2019-04-18 NOTE — CHART NOTE - NSCHARTNOTEFT_GEN_A_CORE
Nutrition Follow Up Note  Patient seen for: Nutrition follow up    Pt is a 62 yo F with PMH: HTN, hyperlipidemia, ischemic CVA (). Presented from home for evaluation of stroke related to slurred speech and unresponsiveness. 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, likely hypertensive related. Interim hospital course: Pt s/p trach (), PEG placement today ().     Source: RN, comprehensive chart review, interdisciplinary medical rounds    Diet: Jevity 1.2 at 55ml/hr x 18 hrs + Tolu Active twice daily. To provide (at goal): 1188kcal, 55g protein.     EN Provision:   () Held for PEG placement  () Held s/p Trach   () Held for Trach placement  (4/15) 880ml/89% of goal   () 990ml/100% of goal   () 690ml/70% of goal    Stool Count (per nursing flow sheet):   () 2 ; Pt on bowel regimen as ordered.     Daily Weight in k.1 (-), Weight in k.9 (-)  % Weight Change Wt appears stable.     Pertinent Medications: MEDICATIONS  (STANDING):  cefTRIAXone   IVPB 1 Gram(s) IV Intermittent every 24 hours  chlorhexidine 0.12% Liquid 15 milliLiter(s) Oral Mucosa two times a day  chlorhexidine 4% Liquid 1 Application(s) Topical <User Schedule>  diltiazem    Tablet 60 milliGRAM(s) Oral every 6 hours  docusate sodium Liquid 100 milliGRAM(s) Oral every 8 hours  doxazosin 4 milliGRAM(s) Oral daily  enoxaparin Injectable 40 milliGRAM(s) SubCutaneous <User Schedule>  hydrALAZINE 75 milliGRAM(s) Oral every 8 hours  labetalol 100 milliGRAM(s) Oral two times a day  multiple electrolytes Injection Type 1 500 milliLiter(s) (75 mL/Hr) IV Continuous <Continuous>  polyethylene glycol 3350 17 Gram(s) Oral two times a day  senna 2 Tablet(s) Oral at bedtime    MEDICATIONS  (PRN):  acetaminophen   Tablet .. 650 milliGRAM(s) Oral every 6 hours PRN Temp greater or equal to 38C (100.4F), Mild Pain (1 - 3)  ondansetron Injectable 4 milliGRAM(s) IV Push every 6 hours PRN Nausea and/or Vomiting  oxyCODONE    Solution 10 milliGRAM(s) Oral every 4 hours PRN Severe Pain (7 - 10)    Pertinent Labs:  @ 21:20: Na 136, BUN 16, Cr 0.57, <H>, K+ 3.7, Phos 4.3, Mg 2.3, Alk Phos --, ALT/SGPT --, AST/SGOT --, HbA1c --    Finger Sticks:      Skin per nursing documentation: intact  Edema: 2+ left wrist;  right wrist;  left hand;  right hand    Estimated Needs:   [x] no change since previous assessment  [ ] recalculated:     Previous Nutrition Diagnosis: Increased nutrient needs  Nutrition Diagnosis is: Remains appropriate, being addressed with enteral nutrition    Interventions:     Recommend  1) Recommend to change enteral nutrition formulary to Jevity 1.5 at goal rate 75ml/hr x 18 hrs. To provide: (based on current body weight 73kg): 2025kcal (28kcal/kg), (upper IBW 57.6kg): 86g protein (1.5g protein/kg IBW).   2) Monitor GI tolerance, pressors/propofol infusion. RD to adjust EN formulary, volume/rate PRN.   3) Monitor nutrition related labs, skin integrity and hydration status.   4) Obtain subjective wt/diet history as able.    Monitoring and Evaluation:     Continue to monitor Nutritional intake, Tolerance to diet prescription, weights, labs, skin integrity    RD remains available upon request and will follow up per protocol

## 2019-04-18 NOTE — CONSULT NOTE ADULT - SUBJECTIVE AND OBJECTIVE BOX
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 Per family pt was evaluated for a facial droop as an oupt a few days prior to hospitalization and was prescribed Prednisone and Valacyclovir for possible Bell's palsy. On the day on admission she developed slurred speech followed by unresponsiveness while on the phone with family. She was brought in by EMS to an OSH and required a Cardene gtt for severe hypertension. A CT head showed pontine hemorrhage with 4th ventricle compression. She was transferred to [facility] for neurosurgical management. During her ICU stay she has had minimal recovery of neuro function. Her course has been complicated by persistent hypoxic resp failure, new onset A fib and a UTI. She had a trach placed on 4/18 and PEG placed on 4/17.     PAST MEDICAL & SURGICAL HISTORY:  High cholesterol  Hypertension, unspecified type  Cerebrovascular accident (CVA), unspecified mechanism  History of appendectomy      FAMILY HISTORY:  FH reviewed and no significant FH in first degree relatives     SOCIAL HISTORY:  Smoking: [x ] Never Smoked [ ] Former Smoker (__ packs x ___ years) [ ] Current Smoker  (__ packs x ___ years)  Substance Use: [ x] Never Used [ ] Used ____  EtOH Use: Social  Marital Status: [ ] Single [x ]  [ ]  [ ]   Sexual History: NC  Occupation: Unemployed  Recent Travel: none  Advance Directives: Full code     Allergies    Allergy Status Unknown    Intolerances        HOME MEDICATIONS:  Reviewed     REVIEW OF SYSTEMS:  Constitutional: [ ] negative [ ] fevers [ ] chills [ ] weight loss [ ] weight gain  HEENT: [ ] negative [ ] dry eyes [ ] eye irritation [ ] postnasal drip [ ] nasal congestion  CV: [ ] negative  [ ] chest pain [ ] orthopnea [ ] palpitations [ ] murmur  Resp: [ ] negative [ ] cough [ ] shortness of breath [ ] dyspnea [ ] wheezing [ ] sputum [ ] hemoptysis  GI: [ ] negative [ ] nausea [ ] vomiting [ ] diarrhea [ ] constipation [ ] abd pain [ ] dysphagia   : [ ] negative [ ] dysuria [ ] nocturia [ ] hematuria [ ] increased urinary frequency  Musculoskeletal: [ ] negative [ ] back pain [ ] myalgias [ ] arthralgias [ ] fracture  Skin: [ ] negative [ ] rash [ ] itch  Neurological: [ ] negative [ ] headache [ ] dizziness [ ] syncope [ ] weakness [ ] numbness  Psychiatric: [ ] negative [ ] anxiety [ ] depression  Endocrine: [ ] negative [ ] diabetes [ ] thyroid problem  Hematologic/Lymphatic: [ ] negative [ ] anemia [ ] bleeding problem  Allergic/Immunologic: [ ] negative [ ] itchy eyes [ ] nasal discharge [ ] hives [ ] angioedema  [ ] All other systems negative  [x ] Unable to assess ROS because __pt on vent ______    OBJECTIVE:  ICU Vital Signs Last 24 Hrs  T(C): 37.6 (18 Apr 2019 15:00), Max: 37.7 (17 Apr 2019 23:00)  T(F): 99.7 (18 Apr 2019 15:00), Max: 99.9 (17 Apr 2019 23:00)  HR: 72 (18 Apr 2019 17:22) (61 - 75)  BP: --  BP(mean): --  ABP: 151/57 (18 Apr 2019 15:00) (135/58 - 170/68)  ABP(mean): 89 (18 Apr 2019 15:00) (82 - 103)  RR: 14 (18 Apr 2019 11:00) (14 - 22)  SpO2: 100% (18 Apr 2019 17:22) (96% - 100%)    Mode: AC/ CMV (Assist Control/ Continuous Mandatory Ventilation), RR (machine): 14, TV (machine): 400, FiO2: 30, PEEP: 5, ITime: 0.8, MAP: 9, PIP: 20    04-17 @ 07:01 - 04-18 @ 07:00  --------------------------------------------------------  IN: 1980 mL / OUT: 2275 mL / NET: -295 mL    04-18 @ 07:01 - 04-18 @ 17:36  --------------------------------------------------------  IN: 635 mL / OUT: 750 mL / NET: -115 mL      CAPILLARY BLOOD GLUCOSE          PHYSICAL EXAM:  General: NAD  HEENT: Pupils 2-3 mm b/l, non reactive   Lymph Nodes: No palpable cervical LNs  Neck: Trach in place  Respiratory: Decreased BS b/l bases, no wheezing or crackles   Cardiovascular: RRR, S1+S2+0  Abdomen: Soft, NT, ND, BS+, PEG in place  Extremities: Minimal b/l Le edema  Skin: No rash   Neurological: No response when called, does not open eyes, does not follow commands. + gag, overbreathes the vent    Psychiatry: Unable to assess    HOSPITAL MEDICATIONS:  enoxaparin Injectable 40 milliGRAM(s) SubCutaneous <User Schedule>    cefTRIAXone   IVPB 1 Gram(s) IV Intermittent every 24 hours    diltiazem    Tablet 60 milliGRAM(s) Oral every 6 hours  doxazosin 4 milliGRAM(s) Oral daily  hydrALAZINE 75 milliGRAM(s) Oral every 8 hours  labetalol 100 milliGRAM(s) Oral two times a day        acetaminophen   Tablet .. 650 milliGRAM(s) Oral every 6 hours PRN  ondansetron Injectable 4 milliGRAM(s) IV Push every 6 hours PRN  oxyCODONE    Solution 10 milliGRAM(s) Oral every 4 hours PRN    docusate sodium Liquid 100 milliGRAM(s) Oral every 8 hours  pantoprazole   Suspension 40 milliGRAM(s) Oral daily  polyethylene glycol 3350 17 Gram(s) Oral two times a day  senna 2 Tablet(s) Oral at bedtime        multiple electrolytes Injection Type 1 500 milliLiter(s) IV Continuous <Continuous>      chlorhexidine 0.12% Liquid 15 milliLiter(s) Oral Mucosa two times a day  chlorhexidine 4% Liquid 1 Application(s) Topical <User Schedule>        LABS:                        9.9    9.7   )-----------( 219      ( 17 Apr 2019 21:20 )             28.6     Hgb Trend: 9.9<--, 9.5<--, 9.4<--, 10.4<--, 11.3<--  04-17    136  |  100  |  16  ----------------------------<  101<H>  3.7   |  24  |  0.57    Ca    8.8      17 Apr 2019 21:20  Phos  4.3     04-17  Mg     2.3     04-17      Creatinine Trend: 0.57<--, 0.54<--, 0.55<--, 0.58<--, 0.71<--, 0.65<--      Arterial Blood Gas:  04-17 @ 21:16  7.46/39/92/27/98/3.3  ABG lactate: --        MICROBIOLOGY:   Culture - Urine (04.16.19 @ 06:07)    -  Amikacin: S <=16    -  Ampicillin: R >16 These ampicillin results predict results for amoxicillin    -  Ampicillin/Sulbactam: S <=8/4    -  Aztreonam: S <=4    -  Cefazolin: S <=8 For uncomplicated UTI with K. pneumoniae, E. coli, or P. mirablis: ALISE <=16 is sensitive and ALISE >=32 is resistant. This also predicts results for oral agents cefaclor, cefdinir, cefpodoxime, cefprozil, cefuroxime axetil, cephalexin and locarbef for uncomplicated UTI. Note that some isolates may be susceptible to these agents while testing resistant to cefazolin.    -  Cefepime: S <=4    -  Cefoxitin: S <=8    -  Ceftriaxone: S <=1 Enterobacter, Citrobacter, and Serratia may develop resistance during prolonged therapy    -  Ciprofloxacin: S <=1    -  Ertapenem: S <=1    -  Gentamicin: S <=4    -  Imipenem: S <=1    -  Levofloxacin: S <=2    -  Meropenem: S <=1    -  Nitrofurantoin: S <=32 Should not be used to treat pyelonephritis    -  Piperacillin/Tazobactam: S <=16    -  Tigecycline: S <=2    -  Tobramycin: S <=4    -  Trimethoprim/Sulfamethoxazole: S <=2/38    Specimen Source: .Urine    Culture Results:   >100,000 CFU/ml Escherichia coli    Organism Identification: Escherichia coli    Organism: Escherichia coli    Method Type: ALISE      RADIOLOGY:  < from: Xray Chest 1 View- PORTABLE-Urgent (04.17.19 @ 11:17) >  The heart is normal in size. Poorinspiratory effort. No acute   consolidation could be identified. A tracheostomy tube is in good   position. An NG tube is in the stomach and the tip is in the antrum or   the pylorus. The tip of the NG tube appears to be kinked.      < from: CT Head No Cont (04.12.19 @ 11:29) >  Similar-appearing 1.4 x 2.5 cm parenchymal hemorrhage in the pradip and   midbrain.    No hydrocephalus or supratentorial midline shift.    < from: CT Brain Stroke Protocol (04.11.19 @ 12:06) >  A pontine hemorrhage is present.    No evidence of  midline shift epidural or subdural collection. Fourth   ventricle is compressed. No unusual calcifications present. Calvarium   intact. No fluid levels in visualized paranasal sinuses. Polyp, anterior   left ethmoid sinus. Right frontal sinus not pneumatized. Right mastoid   pneumatized. Left mastoid air cells fluid or debris-filled.      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 Per family pt was evaluated for a facial droop as an oupt a few days prior to hospitalization and was prescribed Prednisone and Valacyclovir for possible Bell's palsy. On the day on admission she developed slurred speech followed by unresponsiveness while on the phone with family. She was brought in by EMS to an OSH and required a Cardene gtt for severe hypertension. A CT head showed pontine hemorrhage with 4th ventricle compression. She was transferred to [facility] for neurosurgical management. During her ICU stay she has had minimal recovery of neuro function. Her course has been complicated by persistent hypoxic resp failure, new onset A fib and a UTI. She had a trach placed on 4/16 and PEG placed on 4/17.     PAST MEDICAL & SURGICAL HISTORY:  High cholesterol  Hypertension, unspecified type  Cerebrovascular accident (CVA), unspecified mechanism  History of appendectomy      FAMILY HISTORY:  FH reviewed and no significant FH in first degree relatives     SOCIAL HISTORY:  Smoking: [x ] Never Smoked [ ] Former Smoker (__ packs x ___ years) [ ] Current Smoker  (__ packs x ___ years)  Substance Use: [ x] Never Used [ ] Used ____  EtOH Use: Social  Marital Status: [ ] Single [x ]  [ ]  [ ]   Sexual History: NC  Occupation: Unemployed  Recent Travel: none  Advance Directives: Full code     Allergies    Allergy Status Unknown    Intolerances        HOME MEDICATIONS:  Reviewed     REVIEW OF SYSTEMS:  Constitutional: [ ] negative [ ] fevers [ ] chills [ ] weight loss [ ] weight gain  HEENT: [ ] negative [ ] dry eyes [ ] eye irritation [ ] postnasal drip [ ] nasal congestion  CV: [ ] negative  [ ] chest pain [ ] orthopnea [ ] palpitations [ ] murmur  Resp: [ ] negative [ ] cough [ ] shortness of breath [ ] dyspnea [ ] wheezing [ ] sputum [ ] hemoptysis  GI: [ ] negative [ ] nausea [ ] vomiting [ ] diarrhea [ ] constipation [ ] abd pain [ ] dysphagia   : [ ] negative [ ] dysuria [ ] nocturia [ ] hematuria [ ] increased urinary frequency  Musculoskeletal: [ ] negative [ ] back pain [ ] myalgias [ ] arthralgias [ ] fracture  Skin: [ ] negative [ ] rash [ ] itch  Neurological: [ ] negative [ ] headache [ ] dizziness [ ] syncope [ ] weakness [ ] numbness  Psychiatric: [ ] negative [ ] anxiety [ ] depression  Endocrine: [ ] negative [ ] diabetes [ ] thyroid problem  Hematologic/Lymphatic: [ ] negative [ ] anemia [ ] bleeding problem  Allergic/Immunologic: [ ] negative [ ] itchy eyes [ ] nasal discharge [ ] hives [ ] angioedema  [ ] All other systems negative  [x ] Unable to assess ROS because __pt on vent ______    OBJECTIVE:  ICU Vital Signs Last 24 Hrs  T(C): 37.6 (18 Apr 2019 15:00), Max: 37.7 (17 Apr 2019 23:00)  T(F): 99.7 (18 Apr 2019 15:00), Max: 99.9 (17 Apr 2019 23:00)  HR: 72 (18 Apr 2019 17:22) (61 - 75)  BP: --  BP(mean): --  ABP: 151/57 (18 Apr 2019 15:00) (135/58 - 170/68)  ABP(mean): 89 (18 Apr 2019 15:00) (82 - 103)  RR: 14 (18 Apr 2019 11:00) (14 - 22)  SpO2: 100% (18 Apr 2019 17:22) (96% - 100%)    Mode: AC/ CMV (Assist Control/ Continuous Mandatory Ventilation), RR (machine): 14, TV (machine): 400, FiO2: 30, PEEP: 5, ITime: 0.8, MAP: 9, PIP: 20    04-17 @ 07:01 - 04-18 @ 07:00  --------------------------------------------------------  IN: 1980 mL / OUT: 2275 mL / NET: -295 mL    04-18 @ 07:01 - 04-18 @ 17:36  --------------------------------------------------------  IN: 635 mL / OUT: 750 mL / NET: -115 mL      CAPILLARY BLOOD GLUCOSE          PHYSICAL EXAM:  General: NAD  HEENT: Pupils 2-3 mm b/l, non reactive   Lymph Nodes: No palpable cervical LNs  Neck: Trach in place  Respiratory: Decreased BS b/l bases, no wheezing or crackles   Cardiovascular: RRR, S1+S2+0  Abdomen: Soft, NT, ND, BS+, PEG in place  Extremities: Minimal b/l Le edema  Skin: No rash   Neurological: No response when called, does not open eyes, does not follow commands. + gag, overbreathes the vent    Psychiatry: Unable to assess    HOSPITAL MEDICATIONS:  enoxaparin Injectable 40 milliGRAM(s) SubCutaneous <User Schedule>    cefTRIAXone   IVPB 1 Gram(s) IV Intermittent every 24 hours    diltiazem    Tablet 60 milliGRAM(s) Oral every 6 hours  doxazosin 4 milliGRAM(s) Oral daily  hydrALAZINE 75 milliGRAM(s) Oral every 8 hours  labetalol 100 milliGRAM(s) Oral two times a day        acetaminophen   Tablet .. 650 milliGRAM(s) Oral every 6 hours PRN  ondansetron Injectable 4 milliGRAM(s) IV Push every 6 hours PRN  oxyCODONE    Solution 10 milliGRAM(s) Oral every 4 hours PRN    docusate sodium Liquid 100 milliGRAM(s) Oral every 8 hours  pantoprazole   Suspension 40 milliGRAM(s) Oral daily  polyethylene glycol 3350 17 Gram(s) Oral two times a day  senna 2 Tablet(s) Oral at bedtime        multiple electrolytes Injection Type 1 500 milliLiter(s) IV Continuous <Continuous>      chlorhexidine 0.12% Liquid 15 milliLiter(s) Oral Mucosa two times a day  chlorhexidine 4% Liquid 1 Application(s) Topical <User Schedule>        LABS:                        9.9    9.7   )-----------( 219      ( 17 Apr 2019 21:20 )             28.6     Hgb Trend: 9.9<--, 9.5<--, 9.4<--, 10.4<--, 11.3<--  04-17    136  |  100  |  16  ----------------------------<  101<H>  3.7   |  24  |  0.57    Ca    8.8      17 Apr 2019 21:20  Phos  4.3     04-17  Mg     2.3     04-17      Creatinine Trend: 0.57<--, 0.54<--, 0.55<--, 0.58<--, 0.71<--, 0.65<--      Arterial Blood Gas:  04-17 @ 21:16  7.46/39/92/27/98/3.3  ABG lactate: --        MICROBIOLOGY:   Culture - Urine (04.16.19 @ 06:07)    -  Amikacin: S <=16    -  Ampicillin: R >16 These ampicillin results predict results for amoxicillin    -  Ampicillin/Sulbactam: S <=8/4    -  Aztreonam: S <=4    -  Cefazolin: S <=8 For uncomplicated UTI with K. pneumoniae, E. coli, or P. mirablis: ALISE <=16 is sensitive and ALISE >=32 is resistant. This also predicts results for oral agents cefaclor, cefdinir, cefpodoxime, cefprozil, cefuroxime axetil, cephalexin and locarbef for uncomplicated UTI. Note that some isolates may be susceptible to these agents while testing resistant to cefazolin.    -  Cefepime: S <=4    -  Cefoxitin: S <=8    -  Ceftriaxone: S <=1 Enterobacter, Citrobacter, and Serratia may develop resistance during prolonged therapy    -  Ciprofloxacin: S <=1    -  Ertapenem: S <=1    -  Gentamicin: S <=4    -  Imipenem: S <=1    -  Levofloxacin: S <=2    -  Meropenem: S <=1    -  Nitrofurantoin: S <=32 Should not be used to treat pyelonephritis    -  Piperacillin/Tazobactam: S <=16    -  Tigecycline: S <=2    -  Tobramycin: S <=4    -  Trimethoprim/Sulfamethoxazole: S <=2/38    Specimen Source: .Urine    Culture Results:   >100,000 CFU/ml Escherichia coli    Organism Identification: Escherichia coli    Organism: Escherichia coli    Method Type: ALISE      RADIOLOGY:  < from: Xray Chest 1 View- PORTABLE-Urgent (04.17.19 @ 11:17) >  The heart is normal in size. Poorinspiratory effort. No acute   consolidation could be identified. A tracheostomy tube is in good   position. An NG tube is in the stomach and the tip is in the antrum or   the pylorus. The tip of the NG tube appears to be kinked.      < from: CT Head No Cont (04.12.19 @ 11:29) >  Similar-appearing 1.4 x 2.5 cm parenchymal hemorrhage in the pradip and   midbrain.    No hydrocephalus or supratentorial midline shift.    < from: CT Brain Stroke Protocol (04.11.19 @ 12:06) >  A pontine hemorrhage is present.    No evidence of  midline shift epidural or subdural collection. Fourth   ventricle is compressed. No unusual calcifications present. Calvarium   intact. No fluid levels in visualized paranasal sinuses. Polyp, anterior   left ethmoid sinus. Right frontal sinus not pneumatized. Right mastoid   pneumatized. Left mastoid air cells fluid or debris-filled.      [x ] Reviewed and interpreted by me    PULMONARY FUNCTION TESTS:    EKG:

## 2019-04-18 NOTE — PROGRESS NOTE ADULT - ASSESSMENT
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     NEURO:    Neurochecks q4 hrs,    Pontine hemorrhage:  s/p DDAVP and Plts   GOC discussion with family - wanting aggressive care  Activity: [] mobilize as tolerated [x] Bedrest [] PT [] OT [] PMNR    PULM:  Respiratory failure 2/2 pontine hemorrhage   full mechanical vent support - CPAP as tolerated   Daily CXR, ABG nightly   s/p TRACH   RCU consult     CV:  SBP goal 100 - 160  New onset Afib on admission- Labetalol and coreg for rate control   Appreciate Cardiology consult     RENAL:  Fluids:  NS @ 75    GI:    Diet: start TF via PEG   GI prophylaxis [] not indicated [x] PPI [] other:  Bowel regimen [x] colace [x] senna [] other: add Miralax     ENDO:   Goal euglycemia (-180)    HEME/ONC:  VTE prophylaxis: [] SCDs [] chemoprophylaxis - Lovenox     ID:  UTI - +U/A c/w ceftriaxone until final cx's return      SOCIAL/FAMILY:  [] awaiting [x] updated at bedside met with , daughter, son, and daughter-in-law--in shock, appropriately grieving [] family meeting    CODE STATUS:  [x] Full Code [] DNR [] DNI [] Palliative/Comfort Care    DISPOSITION:  [x] ICU [] Stroke Unit [] Floor [] EMU [] RCU [] PCU    [x] Patient is at high risk of neurologic deterioration/death due to:  hemorrhage, herniation     Time spent: 45 critical care minutes    Contact: [phone]

## 2019-04-18 NOTE — PROGRESS NOTE ADULT - SUBJECTIVE AND OBJECTIVE BOX
Subjective: Patient seen and examined. No new events except as noted.     SUBJECTIVE/ROS:        MEDICATIONS:  MEDICATIONS  (STANDING):  cefTRIAXone   IVPB 1 Gram(s) IV Intermittent every 24 hours  chlorhexidine 0.12% Liquid 15 milliLiter(s) Oral Mucosa two times a day  chlorhexidine 4% Liquid 1 Application(s) Topical <User Schedule>  diltiazem    Tablet 60 milliGRAM(s) Oral every 6 hours  docusate sodium Liquid 100 milliGRAM(s) Oral every 8 hours  doxazosin 4 milliGRAM(s) Oral daily  enoxaparin Injectable 40 milliGRAM(s) SubCutaneous <User Schedule>  hydrALAZINE 75 milliGRAM(s) Oral every 8 hours  labetalol 100 milliGRAM(s) Oral two times a day  multiple electrolytes Injection Type 1 500 milliLiter(s) (75 mL/Hr) IV Continuous <Continuous>  polyethylene glycol 3350 17 Gram(s) Oral two times a day  senna 2 Tablet(s) Oral at bedtime      PHYSICAL EXAM:  T(C): 37 (04-18-19 @ 07:00), Max: 37.8 (04-17-19 @ 15:00)  HR: 64 (04-18-19 @ 08:28) (61 - 74)  BP: --  RR: 15 (04-18-19 @ 08:00) (14 - 22)  SpO2: 100% (04-18-19 @ 08:28) (96% - 100%)  Wt(kg): --  I&O's Summary    17 Apr 2019 07:01  -  18 Apr 2019 07:00  --------------------------------------------------------  IN: 1980 mL / OUT: 2275 mL / NET: -295 mL    18 Apr 2019 07:01  -  18 Apr 2019 09:34  --------------------------------------------------------  IN: 75 mL / OUT: 0 mL / NET: 75 mL            JVP: Normal  Neck: supple  Lung: clear   CV: S1 S2 , Murmur:  Abd: soft  Ext: No edema  Psych: flat affect  Skin: normal``    LABS/DATA:    CARDIAC MARKERS:                                9.9    9.7   )-----------( 219      ( 17 Apr 2019 21:20 )             28.6     04-17    136  |  100  |  16  ----------------------------<  101<H>  3.7   |  24  |  0.57    Ca    8.8      17 Apr 2019 21:20  Phos  4.3     04-17  Mg     2.3     04-17      proBNP:   Lipid Profile:   HgA1c:   TSH:     TELE:  EKG:

## 2019-04-18 NOTE — PROGRESS NOTE ADULT - SUBJECTIVE AND OBJECTIVE BOX
Patient is a 63y old  Female who presented with a chief complaint of ICH (18 Apr 2019 09:34)      INTERVAL HPI/OVERNIGHT EVENTS:  no overnight events  +Bms overnight    MEDICATIONS  (STANDING):  cefTRIAXone   IVPB 1 Gram(s) IV Intermittent every 24 hours  chlorhexidine 0.12% Liquid 15 milliLiter(s) Oral Mucosa two times a day  chlorhexidine 4% Liquid 1 Application(s) Topical <User Schedule>  diltiazem    Tablet 60 milliGRAM(s) Oral every 6 hours  docusate sodium Liquid 100 milliGRAM(s) Oral every 8 hours  doxazosin 4 milliGRAM(s) Oral daily  enoxaparin Injectable 40 milliGRAM(s) SubCutaneous <User Schedule>  hydrALAZINE 75 milliGRAM(s) Oral every 8 hours  labetalol 100 milliGRAM(s) Oral two times a day  multiple electrolytes Injection Type 1 500 milliLiter(s) (75 mL/Hr) IV Continuous <Continuous>  pantoprazole   Suspension 40 milliGRAM(s) Oral daily  polyethylene glycol 3350 17 Gram(s) Oral two times a day  senna 2 Tablet(s) Oral at bedtime    MEDICATIONS  (PRN):  acetaminophen   Tablet .. 650 milliGRAM(s) Oral every 6 hours PRN Temp greater or equal to 38C (100.4F), Mild Pain (1 - 3)  ondansetron Injectable 4 milliGRAM(s) IV Push every 6 hours PRN Nausea and/or Vomiting  oxyCODONE    Solution 10 milliGRAM(s) Oral every 4 hours PRN Severe Pain (7 - 10)      Allergies  Allergy Status Unknown      Review of Systems:  unable to obtain    Vital Signs Last 24 Hrs  T(C): 37.2 (18 Apr 2019 11:00), Max: 37.8 (17 Apr 2019 15:00)  T(F): 99 (18 Apr 2019 11:00), Max: 100 (17 Apr 2019 15:00)  HR: 75 (18 Apr 2019 11:00) (61 - 75)  BP: --  BP(mean): --  RR: 14 (18 Apr 2019 11:00) (14 - 22)  SpO2: 100% (18 Apr 2019 11:00) (96% - 100%)    PHYSICAL EXAM:  Constitutional: appears comfortable orally intubated. not responsive, not following commands. spouse at bedside  Neck: +trach  Respiratory: b/l air entry, grossly clear  Cardiovascular: S1 and S2, RRR, no M  Gastrointestinal: BS+, soft ND NT no rebound or rigidity +PEG site clean and dry, bumper at 5cm spins freely 360 degrees  Extremities: No peripheral edema, neg clubbing, cyanosis  Vascular: 2+ peripheral pulses  Neurological: unresponsive, not following commands  Skin: No rashes    LABS:                        9.9    9.7   )-----------( 219      ( 17 Apr 2019 21:20 )             28.6     04-17    136  |  100  |  16  ----------------------------<  101<H>  3.7   |  24  |  0.57    Ca    8.8      17 Apr 2019 21:20  Phos  4.3     04-17  Mg     2.3     04-17        RADIOLOGY & ADDITIONAL TESTS:

## 2019-04-18 NOTE — PROGRESS NOTE ADULT - ASSESSMENT
62 y/o F w/ PMHx HTN, HLD, ischemic CVA (2004), presents to the ED BIBA from home for evaluation of AMS- found to have pontine hemorrhage    Resp failure - s/p trach  Dysphagia-  s/ p PEG 4/18/19    RECS:  -PEG feeds, titrate to goal. Monitor tolerance  -Continue Miralax Senna for bowel regimen    Discussed with NSCU team    Amari Unger PA-C    [facility]  [phone]

## 2019-04-18 NOTE — PROGRESS NOTE ADULT - SUBJECTIVE AND OBJECTIVE BOX
62 y/o F w/ PMHx HTN, HLD, ischemic CVA (2004), presented with pontine hemorrhage on CT.  On admission, the patient was:  GCS: 3 T  ICH score: 4    Accepted by RCU, awaiting for a bed.    VITALS:  Recent Vitals Reviewed   LABS:  Recent Labs Reviewed  MEDICATIONS: All Recent Medications Reviewed   IMAGING:   Recent imaging studies were reviewed.    EXAMINATION:  Neuro:  no OE, no FC, pupils non-reactive b/l, purposeful movements L>R on UE, moving spont LE + cough or gag, overbreaths vent  Cards:  s1, s2 RRR  Respiratory: trach, lungs clear b/l   Abdomen:  soft, PEG in place  Extremities:  no edema  Skin:  warm/dry

## 2019-04-19 LAB — GAS PNL BLDA: SIGNIFICANT CHANGE UP

## 2019-04-19 PROCEDURE — ZZZZZ: CPT

## 2019-04-19 PROCEDURE — 99231 SBSQ HOSP IP/OBS SF/LOW 25: CPT

## 2019-04-19 PROCEDURE — 99233 SBSQ HOSP IP/OBS HIGH 50: CPT

## 2019-04-19 PROCEDURE — 99232 SBSQ HOSP IP/OBS MODERATE 35: CPT

## 2019-04-19 RX ORDER — CEPHALEXIN 500 MG
500 CAPSULE ORAL EVERY 12 HOURS
Qty: 0 | Refills: 0 | Status: DISCONTINUED | OUTPATIENT
Start: 2019-04-19 | End: 2019-04-19

## 2019-04-19 RX ORDER — POTASSIUM CHLORIDE 20 MEQ
40 PACKET (EA) ORAL ONCE
Qty: 0 | Refills: 0 | Status: COMPLETED | OUTPATIENT
Start: 2019-04-19 | End: 2019-04-19

## 2019-04-19 RX ORDER — CEPHALEXIN 500 MG
500 CAPSULE ORAL EVERY 12 HOURS
Qty: 0 | Refills: 0 | Status: COMPLETED | OUTPATIENT
Start: 2019-04-19 | End: 2019-04-21

## 2019-04-19 RX ADMIN — Medication 100 MILLIGRAM(S): at 17:28

## 2019-04-19 RX ADMIN — CHLORHEXIDINE GLUCONATE 1 APPLICATION(S): 213 SOLUTION TOPICAL at 21:22

## 2019-04-19 RX ADMIN — POLYETHYLENE GLYCOL 3350 17 GRAM(S): 17 POWDER, FOR SOLUTION ORAL at 17:28

## 2019-04-19 RX ADMIN — Medication 75 MILLIGRAM(S): at 05:01

## 2019-04-19 RX ADMIN — Medication 60 MILLIGRAM(S): at 21:22

## 2019-04-19 RX ADMIN — Medication 60 MILLIGRAM(S): at 08:41

## 2019-04-19 RX ADMIN — Medication 100 MILLIGRAM(S): at 05:00

## 2019-04-19 RX ADMIN — SENNA PLUS 2 TABLET(S): 8.6 TABLET ORAL at 21:23

## 2019-04-19 RX ADMIN — Medication 60 MILLIGRAM(S): at 01:23

## 2019-04-19 RX ADMIN — Medication 100 MILLIGRAM(S): at 13:01

## 2019-04-19 RX ADMIN — Medication 4 MILLIGRAM(S): at 05:01

## 2019-04-19 RX ADMIN — Medication 100 MILLIGRAM(S): at 05:01

## 2019-04-19 RX ADMIN — Medication 60 MILLIGRAM(S): at 14:26

## 2019-04-19 RX ADMIN — Medication 75 MILLIGRAM(S): at 21:22

## 2019-04-19 RX ADMIN — PANTOPRAZOLE SODIUM 40 MILLIGRAM(S): 20 TABLET, DELAYED RELEASE ORAL at 12:43

## 2019-04-19 RX ADMIN — Medication 500 MILLIGRAM(S): at 12:44

## 2019-04-19 RX ADMIN — Medication 75 MILLIGRAM(S): at 13:01

## 2019-04-19 RX ADMIN — ENOXAPARIN SODIUM 40 MILLIGRAM(S): 100 INJECTION SUBCUTANEOUS at 17:28

## 2019-04-19 RX ADMIN — Medication 40 MILLIEQUIVALENT(S): at 01:23

## 2019-04-19 RX ADMIN — CHLORHEXIDINE GLUCONATE 15 MILLILITER(S): 213 SOLUTION TOPICAL at 05:00

## 2019-04-19 RX ADMIN — CHLORHEXIDINE GLUCONATE 15 MILLILITER(S): 213 SOLUTION TOPICAL at 17:27

## 2019-04-19 RX ADMIN — Medication 500 MILLIGRAM(S): at 17:28

## 2019-04-19 RX ADMIN — Medication 100 MILLIGRAM(S): at 21:23

## 2019-04-19 RX ADMIN — POLYETHYLENE GLYCOL 3350 17 GRAM(S): 17 POWDER, FOR SOLUTION ORAL at 05:00

## 2019-04-19 NOTE — PROGRESS NOTE ADULT - SUBJECTIVE AND OBJECTIVE BOX
Subjective: Patient seen and examined. No new events except as noted.     SUBJECTIVE/ROS:        MEDICATIONS:  MEDICATIONS  (STANDING):  cephalexin Suspension 50 mG/mL 500 milliGRAM(s) Oral every 12 hours  chlorhexidine 0.12% Liquid 15 milliLiter(s) Oral Mucosa two times a day  chlorhexidine 4% Liquid 1 Application(s) Topical <User Schedule>  diltiazem    Tablet 60 milliGRAM(s) Oral every 6 hours  docusate sodium Liquid 100 milliGRAM(s) Oral every 8 hours  doxazosin 4 milliGRAM(s) Oral daily  enoxaparin Injectable 40 milliGRAM(s) SubCutaneous <User Schedule>  hydrALAZINE 75 milliGRAM(s) Oral every 8 hours  labetalol 100 milliGRAM(s) Oral two times a day  pantoprazole   Suspension 40 milliGRAM(s) Oral daily  polyethylene glycol 3350 17 Gram(s) Oral two times a day  senna 2 Tablet(s) Oral at bedtime      PHYSICAL EXAM:  T(C): 37.1 (04-19-19 @ 11:00), Max: 37.8 (04-18-19 @ 23:00)  HR: 70 (04-19-19 @ 12:13) (61 - 668)  BP: --  RR: 18 (04-19-19 @ 11:00) (14 - 21)  SpO2: 98% (04-19-19 @ 12:13) (98% - 100%)  Wt(kg): --  I&O's Summary    18 Apr 2019 07:01  -  19 Apr 2019 07:00  --------------------------------------------------------  IN: 1511 mL / OUT: 2200 mL / NET: -689 mL    19 Apr 2019 07:01  -  19 Apr 2019 12:22  --------------------------------------------------------  IN: 325 mL / OUT: 0 mL / NET: 325 mL            JVP: Normal  Neck: supple  Lung: clear   CV: S1 S2 , Murmur:  Abd: soft  Ext: No edema  neuro: Awake   Psych: flat affect  Skin: normal``    LABS/DATA:    CARDIAC MARKERS:                                10.0   7.4   )-----------( 231      ( 18 Apr 2019 21:43 )             30.8     04-18    135  |  100  |  17  ----------------------------<  141<H>  3.7   |  25  |  0.64    Ca    8.6      18 Apr 2019 21:43  Phos  3.2     04-18  Mg     2.2     04-18      proBNP:   Lipid Profile:   HgA1c:   TSH:     TELE:  EKG:

## 2019-04-19 NOTE — PROGRESS NOTE ADULT - ASSESSMENT
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     NEURO:    Neurochecks q4 hrs,    Pontine hemorrhage:  s/p DDAVP and Plts   GOC discussion with family - wanting aggressive care  Activity: [] mobilize as tolerated [x] Bedrest [] PT [] OT [] PMNR    PULM:  Respiratory failure 2/2 pontine hemorrhage   full mechanical vent support - CPAP as tolerated   Daily CXR, ABG nightly   s/p TRACH   RCU consult     CV:  SBP goal 100 - 160  New onset Afib on admission- Labetalol and coreg for rate control   Appreciate Cardiology consult     RENAL:  Fluids:  NS @ 75    GI:    Diet: start TF via PEG   GI prophylaxis [] not indicated [x] PPI [] other:  Bowel regimen [x] colace [x] senna [] other: add Miralax     ENDO:   Goal euglycemia (-180)    HEME/ONC:  VTE prophylaxis: [] SCDs [] chemoprophylaxis - Lovenox     ID:  UTI - +U/A c/w ceftriaxone until final cx's return      SOCIAL/FAMILY:  [] awaiting [x] updated at bedside met with , daughter, son, and daughter-in-law--in shock, appropriately grieving [] family meeting    CODE STATUS:  [x] Full Code [] DNR [] DNI [] Palliative/Comfort Care    DISPOSITION:  [x] ICU [] Stroke Unit [] Floor [] EMU [] RCU [] PCU    [x] Patient is at high risk of neurologic deterioration/death due to:  hemorrhage, herniation     Time spent: 45 critical care minutes    Contact: [phone] 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     NEURO:    Neurochecks q4 hrs,    Pontine hemorrhage:  s/p DDAVP and Plts   GOC discussion with family - wanting aggressive care  Activity: [] mobilize as tolerated [x] Bedrest [] PT [] OT [] PMNR    PULM:  Respiratory failure 2/2 pontine hemorrhage   full mechanical vent support - CPAP as tolerated   Daily CXR, ABG nightly   s/p TRACH   RCU consult appreciated     CV:  SBP goal 100 - 160  New onset Afib on admission- Labetalol and coreg for rate control     RENAL:  Fluids:  NS @ 75 - IVL once at goal TF     GI:    Diet: TF via PEG   GI prophylaxis [] not indicated [x] PPI [] other:  Bowel regimen [x] colace [x] senna [] other: add Miralax     ENDO:   Goal euglycemia (-180)    HEME/ONC:  VTE prophylaxis: [] SCDs [] chemoprophylaxis - Lovenox     ID:  UTI - +U/A c/w ceftriaxone until final cx's return      SOCIAL/FAMILY:  [] awaiting [x] updated at bedside met with , daughter, son, and daughter-in-law--in shock, appropriately grieving [] family meeting    CODE STATUS:  [x] Full Code [] DNR [] DNI [] Palliative/Comfort Care    DISPOSITION:  [] Stroke Unit [] Floor [] EMU [x] RCU [] PCU    [x] Patient is at high risk of neurologic deterioration/death due to:  hemorrhage, herniation     Time spent: 45 critical care minutes    Contact: [phone]

## 2019-04-19 NOTE — PROGRESS NOTE ADULT - SUBJECTIVE AND OBJECTIVE BOX
ENT ISSUE/POD: s/p tracheostomy POD #3    HPI:   64 yo female with Respiratory failure now s/p tracheostomy POD #3. Pt seen and examined at bedside. No acute events overnight. Pt with #8 cuffed shiley, on the ventilator. No air leak.    PAST MEDICAL & SURGICAL HISTORY:  High cholesterol  Hypertension, unspecified type  Cerebrovascular accident (CVA), unspecified mechanism  History of appendectomy    Allergies    Allergy Status Unknown    Intolerances      MEDICATIONS  (STANDING):  cefTRIAXone   IVPB 1 Gram(s) IV Intermittent every 24 hours  chlorhexidine 0.12% Liquid 15 milliLiter(s) Oral Mucosa two times a day  chlorhexidine 4% Liquid 1 Application(s) Topical <User Schedule>  diltiazem    Tablet 60 milliGRAM(s) Oral every 6 hours  docusate sodium Liquid 100 milliGRAM(s) Oral every 8 hours  doxazosin 4 milliGRAM(s) Oral daily  enoxaparin Injectable 40 milliGRAM(s) SubCutaneous <User Schedule>  hydrALAZINE 75 milliGRAM(s) Oral every 8 hours  labetalol 100 milliGRAM(s) Oral two times a day  pantoprazole   Suspension 40 milliGRAM(s) Oral daily  polyethylene glycol 3350 17 Gram(s) Oral two times a day  senna 2 Tablet(s) Oral at bedtime    MEDICATIONS  (PRN):  acetaminophen   Tablet .. 650 milliGRAM(s) Oral every 6 hours PRN Temp greater or equal to 38C (100.4F), Mild Pain (1 - 3)  ondansetron Injectable 4 milliGRAM(s) IV Push every 6 hours PRN Nausea and/or Vomiting  oxyCODONE    Solution 10 milliGRAM(s) Oral every 4 hours PRN Severe Pain (7 - 10)      Social History: See consult    Family history: See consult    ROS:   ENT: all negative except as noted in HPI   Pulm: denies SOB, cough, hemoptysis  Neuro: denies numbness/tingling, loss of sensation  Endo: denies heat/cold intolerance, excessive sweating      Vital Signs Last 24 Hrs  T(C): 37.2 (19 Apr 2019 03:00), Max: 37.8 (18 Apr 2019 23:00)  T(F): 99 (19 Apr 2019 03:00), Max: 100 (18 Apr 2019 23:00)  HR: 61 (19 Apr 2019 06:00) (61 - 668)  BP: --  BP(mean): --  RR: 17 (19 Apr 2019 06:00) (14 - 21)  SpO2: 100% (19 Apr 2019 06:00) (99% - 100%)                          10.0   7.4   )-----------( 231      ( 18 Apr 2019 21:43 )             30.8    04-18    135  |  100  |  17  ----------------------------<  141<H>  3.7   |  25  |  0.64    Ca    8.6      18 Apr 2019 21:43  Phos  3.2     04-18  Mg     2.2     04-18    PHYSICAL EXAM:  Gen: NAD  Skin: No rashes, bruises, or lesions  Head: Normocephalic, Atraumatic  Face: no edema, erythema, or fluctuance. Parotid glands soft without mass  Eyes: no scleral injection  Nose: Nares bilaterally patent, no discharge  Mouth: No Stridor / Drooling / Trismus.  Mucosa moist, tongue/uvula midline, oropharynx clear  Neck: Flat, supple, no lymphadenopathy, no masses, #8 shiley with inflated cuff secured with umbilical tie and 4 sutures. No active bleeding.+ Tracheal secretions.  Lymphatic: No lymphadenopathy  Resp: on ventilator  Neuro: unable to assess

## 2019-04-19 NOTE — PROGRESS NOTE ADULT - ASSESSMENT
62yo male s/p trach POD #3. Patient with #8 cuffed shiley in place, doing well on the ventilator.      Problem/Plan - 1:  ·  Problem: Tracheostomy in place.    Plan:   - Keep umbilical trach tie/sutures in place  - HOB elevation to 30 degrees  - Suction PRN, use normal saline bullets every 4-6h to prevent mucous plugging of trach  - Continue trach care  - Care per primary team  - ENT to follow

## 2019-04-19 NOTE — PROGRESS NOTE ADULT - ASSESSMENT
62 y/o F w/ PMHx HTN, HLD, ischemic CVA (2004), presents to the ED BIBA from home for evaluation of AMS- found to have pontine hemorrhage    Resp failure - s/p trach  Dysphagia-  s/ p PEG 4/18/19    RECS:  -PEG feeds and local care.  -Continue Miralax Senna for bowel regimen  -Monitor BMs  -Abx per NSCU    Discussed with NSCU team  Please call over weekend prn with GI concerns   GI service : [phone]    Amari Unger PA-C    [facility]

## 2019-04-19 NOTE — PROGRESS NOTE ADULT - SUBJECTIVE AND OBJECTIVE BOX
HPI:  62 y/o F w/ PMHx HTN, HLD, ischemic CVA (2004), presents to the ED BIBA from home for evaluation of stroke.  As per family patient was having facial droop and was prescribed Valacyclovir and prednisone for "Bell's palsy. As per EMS, pt was talking to a family member on the phone at 11:00 on 4/11 and then had slurred speech and unresponsive.  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; intubated; on cardene infusion     On admission, the patient was:  GCS: 3 T  ICH score: 4    EVENTS:  s/p PEG yesterday  CPAP well overnight    VITALS:  Recent Vitals Reviewed   LABS:  Recent Labs Reviewed  MEDICATIONS: All Recent Medications Reviewed   IMAGING:   Recent imaging studies were reviewed.    EXAMINATION:  General:  intubated   Neuro:  no OE, wiggles Right toe - however inconsistently, pupils non-reactive b/l, purposeful movements L>R on UE, moving spont LE + cough or gag, overbreaths vent  Cards:  s1, s2 RRR  Respiratory:  lungs clear b/l   Adomen:  soft  Extremities:  no edema  Skin:  warm/dry    ET tube  Left Radial A line HPI:  64 y/o F w/ PMHx HTN, HLD, ischemic CVA (2004), presents to the ED BIBA from home for evaluation of stroke.  As per family patient was having facial droop and was prescribed Valacyclovir and prednisone for "Bell's palsy. As per EMS, pt was talking to a family member on the phone at 11:00 on 4/11 and then had slurred speech and unresponsive.  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; intubated; on cardene infusion     On admission, the patient was:  GCS: 3 T  ICH score: 4    EVENTS:  CPAP well overnight, still following commands     VITALS:  Recent Vitals Reviewed   LABS:  Recent Labs Reviewed  MEDICATIONS: All Recent Medications Reviewed   IMAGING:   Recent imaging studies were reviewed.    EXAMINATION:  General:  intubated   Neuro:  no OE, wiggles Right toe - however inconsistently, pupils non-reactive b/l, purposeful movements L>R on UE, moving spont LE + cough or gag, overbreaths vent  Cards:  s1, s2 RRR  Respiratory:  lungs clear b/l   Adomen:  soft  Extremities:  no edema  Skin:  warm/dry    ET tube  Left Radial A line

## 2019-04-19 NOTE — PROGRESS NOTE ADULT - ASSESSMENT
HTN  stable  cont current meds    Afib  resolved  cont current avn blockers  off a/c due to pontine bleed

## 2019-04-19 NOTE — PROGRESS NOTE ADULT - SUBJECTIVE AND OBJECTIVE BOX
Patient is a 63y old  Female who presented with a chief complaint of ICH (19 Apr 2019 12:22)      INTERVAL HPI/OVERNIGHT EVENTS:  tolerating PEG feeds at goal  no diarrhea      MEDICATIONS  (STANDING):  cephalexin Suspension 50 mG/mL 500 milliGRAM(s) Oral every 12 hours  chlorhexidine 0.12% Liquid 15 milliLiter(s) Oral Mucosa two times a day  chlorhexidine 4% Liquid 1 Application(s) Topical <User Schedule>  diltiazem    Tablet 60 milliGRAM(s) Oral every 6 hours  docusate sodium Liquid 100 milliGRAM(s) Oral every 8 hours  doxazosin 4 milliGRAM(s) Oral daily  enoxaparin Injectable 40 milliGRAM(s) SubCutaneous <User Schedule>  hydrALAZINE 75 milliGRAM(s) Oral every 8 hours  labetalol 100 milliGRAM(s) Oral two times a day  pantoprazole   Suspension 40 milliGRAM(s) Oral daily  polyethylene glycol 3350 17 Gram(s) Oral two times a day  senna 2 Tablet(s) Oral at bedtime    MEDICATIONS  (PRN):  acetaminophen   Tablet .. 650 milliGRAM(s) Oral every 6 hours PRN Temp greater or equal to 38C (100.4F), Mild Pain (1 - 3)  ondansetron Injectable 4 milliGRAM(s) IV Push every 6 hours PRN Nausea and/or Vomiting  oxyCODONE    Solution 10 milliGRAM(s) Oral every 4 hours PRN Severe Pain (7 - 10)      Allergies  Allergy Status Unknown      Review of Systems:  unable to obtain    Vital Signs Last 24 Hrs  T(C): 37.1 (19 Apr 2019 11:00), Max: 37.8 (18 Apr 2019 23:00)  T(F): 98.7 (19 Apr 2019 11:00), Max: 100 (18 Apr 2019 23:00)  HR: 70 (19 Apr 2019 12:13) (61 - 668)  BP: --  BP(mean): --  RR: 18 (19 Apr 2019 11:00) (14 - 21)  SpO2: 98% (19 Apr 2019 12:13) (98% - 100%)    PHYSICAL EXAM:  Constitutional: appears comfortable orally intubated. not responsive, not following commands  Neck: +trach  Respiratory: b/l air entry, grossly clear  Cardiovascular: S1 and S2, RRR, no M  Gastrointestinal: BS+, soft ND NT no rebound or rigidity +PEG site clean and dry, bumper at 5cm spins freely 360 degrees  Extremities: No peripheral edema, neg clubbing, cyanosis  Vascular: 2+ peripheral pulses  Neurological: unresponsive, not following commands  Skin: No rashes    LABS:                        10.0   7.4   )-----------( 231      ( 18 Apr 2019 21:43 )             30.8     04-18    135  |  100  |  17  ----------------------------<  141<H>  3.7   |  25  |  0.64    Ca    8.6      18 Apr 2019 21:43  Phos  3.2     04-18  Mg     2.2     04-18      RADIOLOGY & ADDITIONAL TESTS:

## 2019-04-19 NOTE — PROGRESS NOTE ADULT - SUBJECTIVE AND OBJECTIVE BOX
64 y/o F w/ PMHx HTN, HLD, ischemic CVA (2004), presented with pontine hemorrhage on CT.  On admission, the patient was:  GCS: 3 T  ICH score: 4    Accepted by RCU, awaiting for a bed.    VITALS:  Recent Vitals Reviewed   LABS:  Recent Labs Reviewed  MEDICATIONS: All Recent Medications Reviewed   IMAGING:   Recent imaging studies were reviewed.    EXAMINATION:  Neuro:  no OE, no FC, pupils non-reactive b/l, purposeful movements L>R on UE, moving spont LE + cough or gag, overbreaths vent  Cards:  s1, s2 RRR  Respiratory: trach, lungs clear b/l   Abdomen:  soft, PEG in place  Extremities:  no edema  Skin:  warm/dry

## 2019-04-19 NOTE — PROGRESS NOTE ADULT - ASSESSMENT
ASSESSMENT/PLAN:  pontine hemorrhage, likely hypertensive   UTI, on Ceftriaxone till 4/23.    Pending  RCU bed  not critically ill

## 2019-04-20 LAB
CULTURE RESULTS: SIGNIFICANT CHANGE UP
CULTURE RESULTS: SIGNIFICANT CHANGE UP
GAS PNL BLDA: SIGNIFICANT CHANGE UP
SPECIMEN SOURCE: SIGNIFICANT CHANGE UP
SPECIMEN SOURCE: SIGNIFICANT CHANGE UP

## 2019-04-20 PROCEDURE — 99232 SBSQ HOSP IP/OBS MODERATE 35: CPT

## 2019-04-20 RX ORDER — DOXAZOSIN MESYLATE 4 MG
8 TABLET ORAL AT BEDTIME
Qty: 0 | Refills: 0 | Status: DISCONTINUED | OUTPATIENT
Start: 2019-04-20 | End: 2019-05-14

## 2019-04-20 RX ADMIN — Medication 75 MILLIGRAM(S): at 21:53

## 2019-04-20 RX ADMIN — Medication 100 MILLIGRAM(S): at 21:53

## 2019-04-20 RX ADMIN — CHLORHEXIDINE GLUCONATE 1 APPLICATION(S): 213 SOLUTION TOPICAL at 21:54

## 2019-04-20 RX ADMIN — Medication 100 MILLIGRAM(S): at 13:02

## 2019-04-20 RX ADMIN — Medication 60 MILLIGRAM(S): at 21:53

## 2019-04-20 RX ADMIN — POLYETHYLENE GLYCOL 3350 17 GRAM(S): 17 POWDER, FOR SOLUTION ORAL at 17:54

## 2019-04-20 RX ADMIN — PANTOPRAZOLE SODIUM 40 MILLIGRAM(S): 20 TABLET, DELAYED RELEASE ORAL at 12:25

## 2019-04-20 RX ADMIN — ENOXAPARIN SODIUM 40 MILLIGRAM(S): 100 INJECTION SUBCUTANEOUS at 17:54

## 2019-04-20 RX ADMIN — Medication 100 MILLIGRAM(S): at 05:16

## 2019-04-20 RX ADMIN — CHLORHEXIDINE GLUCONATE 15 MILLILITER(S): 213 SOLUTION TOPICAL at 05:17

## 2019-04-20 RX ADMIN — Medication 60 MILLIGRAM(S): at 03:30

## 2019-04-20 RX ADMIN — Medication 100 MILLIGRAM(S): at 05:17

## 2019-04-20 RX ADMIN — Medication 4 MILLIGRAM(S): at 05:12

## 2019-04-20 RX ADMIN — CHLORHEXIDINE GLUCONATE 15 MILLILITER(S): 213 SOLUTION TOPICAL at 17:54

## 2019-04-20 RX ADMIN — Medication 60 MILLIGRAM(S): at 13:41

## 2019-04-20 RX ADMIN — Medication 60 MILLIGRAM(S): at 08:33

## 2019-04-20 RX ADMIN — Medication 100 MILLIGRAM(S): at 17:54

## 2019-04-20 RX ADMIN — Medication 75 MILLIGRAM(S): at 05:16

## 2019-04-20 RX ADMIN — Medication 8 MILLIGRAM(S): at 22:13

## 2019-04-20 RX ADMIN — Medication 75 MILLIGRAM(S): at 13:02

## 2019-04-20 RX ADMIN — SENNA PLUS 2 TABLET(S): 8.6 TABLET ORAL at 21:53

## 2019-04-20 RX ADMIN — POLYETHYLENE GLYCOL 3350 17 GRAM(S): 17 POWDER, FOR SOLUTION ORAL at 05:16

## 2019-04-20 RX ADMIN — OXYCODONE HYDROCHLORIDE 10 MILLIGRAM(S): 5 TABLET ORAL at 00:30

## 2019-04-20 RX ADMIN — Medication 500 MILLIGRAM(S): at 17:55

## 2019-04-20 RX ADMIN — OXYCODONE HYDROCHLORIDE 10 MILLIGRAM(S): 5 TABLET ORAL at 01:00

## 2019-04-20 RX ADMIN — Medication 500 MILLIGRAM(S): at 05:17

## 2019-04-20 NOTE — PROGRESS NOTE ADULT - ASSESSMENT
HTN  stable  cont current meds    Afib  resolved  cont current avn blockers  off a/c due to pontine bleed     resp failrue  s/p trach

## 2019-04-20 NOTE — PROGRESS NOTE ADULT - PROBLEM SELECTOR PLAN 1
- Keep umbilical trach tie/sutures in place  - HOB elevation to 30 degrees  - Continue trach care  - Care per primary team  - ENT to follow

## 2019-04-20 NOTE — PROGRESS NOTE ADULT - SUBJECTIVE AND OBJECTIVE BOX
ENT ISSUE/POD: s/p tracheostomy POD #4    HPI:   64 yo female with Respiratory failure now s/p tracheostomy POD # 4. Pt seen and examined at bedside. No acute events overnight. Pt with #8 cuffed shiley, on the ventilator. No air leak.        PAST MEDICAL & SURGICAL HISTORY:  High cholesterol  Hypertension, unspecified type  Cerebrovascular accident (CVA), unspecified mechanism  History of appendectomy    Allergies    Allergy Status Unknown    Intolerances      MEDICATIONS  (STANDING):  cephalexin Suspension 50 mG/mL 500 milliGRAM(s) Oral every 12 hours  chlorhexidine 0.12% Liquid 15 milliLiter(s) Oral Mucosa two times a day  chlorhexidine 4% Liquid 1 Application(s) Topical <User Schedule>  diltiazem    Tablet 60 milliGRAM(s) Oral every 6 hours  docusate sodium Liquid 100 milliGRAM(s) Oral every 8 hours  doxazosin 8 milliGRAM(s) Oral at bedtime  enoxaparin Injectable 40 milliGRAM(s) SubCutaneous <User Schedule>  hydrALAZINE 75 milliGRAM(s) Oral every 8 hours  labetalol 100 milliGRAM(s) Oral two times a day  pantoprazole   Suspension 40 milliGRAM(s) Oral daily  polyethylene glycol 3350 17 Gram(s) Oral two times a day  senna 2 Tablet(s) Oral at bedtime    MEDICATIONS  (PRN):  acetaminophen   Tablet .. 650 milliGRAM(s) Oral every 6 hours PRN Temp greater or equal to 38C (100.4F), Mild Pain (1 - 3)  ondansetron Injectable 4 milliGRAM(s) IV Push every 6 hours PRN Nausea and/or Vomiting  oxyCODONE    Solution 10 milliGRAM(s) Oral every 4 hours PRN Severe Pain (7 - 10)          ROS:   ENT: all negative except as noted in HPI   Pulm: denies SOB, cough, hemoptysis  Neuro: denies numbness/tingling, loss of sensation  Endo: denies heat/cold intolerance, excessive sweating      Vital Signs Last 24 Hrs  T(C): 37.3 (20 Apr 2019 11:00), Max: 37.9 (20 Apr 2019 07:00)  T(F): 99.1 (20 Apr 2019 11:00), Max: 100.2 (20 Apr 2019 07:00)  HR: 62 (20 Apr 2019 16:00) (62 - 89)  BP: 122/60 (20 Apr 2019 16:00) (119/59 - 143/67)  BP(mean): 79 (20 Apr 2019 16:00) (78 - 88)  RR: 15 (20 Apr 2019 16:00) (14 - 23)  SpO2: 98% (20 Apr 2019 16:00) (96% - 100%)                          10.0   7.4   )-----------( 231      ( 18 Apr 2019 21:43 )             30.8    04-18    135  |  100  |  17  ----------------------------<  141<H>  3.7   |  25  |  0.64    Ca    8.6      18 Apr 2019 21:43  Phos  3.2     04-18  Mg     2.2     04-18         PHYSICAL EXAM:  Gen: Guarded  Skin: No rashes, bruises, or lesions  Head: Normocephalic, Atraumatic  Face: no edema, erythema, or fluctuance. Parotid glands soft without mass  Eyes: no scleral injection  Nose: Nares bilaterally patent, no discharge  Mouth: No Stridor / Drooling / Trismus.  Mucosa moist, tongue/uvula midline, oropharynx clear  Neck: Shiley 8 LPC in place stoma intact with  minimal secretions no bleeding or purulence neck Flat, supple, no lymphadenopathy, trachea midline, no masses  Lymphatic: No lymphadenopathy  Resp: breathing easily, no stridor  Neuro: facial nerve intact, no facial droop

## 2019-04-20 NOTE — PROGRESS NOTE ADULT - SUBJECTIVE AND OBJECTIVE BOX
Subjective: Patient seen and examined. No new events except as noted.     SUBJECTIVE/ROS:        MEDICATIONS:  MEDICATIONS  (STANDING):  cephalexin Suspension 50 mG/mL 500 milliGRAM(s) Oral every 12 hours  chlorhexidine 0.12% Liquid 15 milliLiter(s) Oral Mucosa two times a day  chlorhexidine 4% Liquid 1 Application(s) Topical <User Schedule>  diltiazem    Tablet 60 milliGRAM(s) Oral every 6 hours  docusate sodium Liquid 100 milliGRAM(s) Oral every 8 hours  doxazosin 8 milliGRAM(s) Oral at bedtime  enoxaparin Injectable 40 milliGRAM(s) SubCutaneous <User Schedule>  hydrALAZINE 75 milliGRAM(s) Oral every 8 hours  labetalol 100 milliGRAM(s) Oral two times a day  pantoprazole   Suspension 40 milliGRAM(s) Oral daily  polyethylene glycol 3350 17 Gram(s) Oral two times a day  senna 2 Tablet(s) Oral at bedtime      PHYSICAL EXAM:  T(C): 37.3 (04-20-19 @ 11:00), Max: 37.9 (04-20-19 @ 07:00)  HR: 68 (04-20-19 @ 11:00) (65 - 89)  BP: --  RR: 17 (04-20-19 @ 11:00) (14 - 23)  SpO2: 97% (04-20-19 @ 11:00) (96% - 100%)  Wt(kg): --  I&O's Summary    19 Apr 2019 07:01  -  20 Apr 2019 07:00  --------------------------------------------------------  IN: 2043 mL / OUT: 2250 mL / NET: -207 mL    20 Apr 2019 07:01  -  20 Apr 2019 12:13  --------------------------------------------------------  IN: 500 mL / OUT: 550 mL / NET: -50 mL            JVP: Normal  Neck: supple  Lung: clear   CV: S1 S2 , Murmur:  Abd: soft  Ext: No edema  neuro: Awake   Psych: flat affect  Skin: normal``    LABS/DATA:    CARDIAC MARKERS:                                10.0   7.4   )-----------( 231      ( 18 Apr 2019 21:43 )             30.8     04-18    135  |  100  |  17  ----------------------------<  141<H>  3.7   |  25  |  0.64    Ca    8.6      18 Apr 2019 21:43  Phos  3.2     04-18  Mg     2.2     04-18      proBNP:   Lipid Profile:   HgA1c:   TSH:     TELE:  EKG:

## 2019-04-20 NOTE — PROGRESS NOTE ADULT - SUBJECTIVE AND OBJECTIVE BOX
PI:  62 y/o F w/ PMHx HTN, HLD, ischemic CVA (2004), presents to the ED BIBA from home for evaluation of stroke.  As per family patient was having facial droop and was prescribed Valacyclovir and prednisone for "Bell's palsy. As per EMS, pt was talking to a family member on the phone at 11:00 on 4/11 and then had slurred speech and unresponsive.  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; intubated; on cardene infusion     On admission, the patient was:  GCS: 3 T  ICH score: 4            REVIEW OF SYSTEMS: [x ] Unable to Assess due to neurologic exam   [ ] All ROS addressed below are non-contributory, except:  Neuro: [ ] Headache [ ] Back pain [ ] Numbness [ ] Weakness [ ] Ataxia [ ] Dizziness [ ] Aphasia [ ] Dysarthria [ ] Visual disturbance  Resp: [ ] Shortness of breath/dyspnea, [ ] Orthopnea [ ] Cough  CV: [ ] Chest pain [ ] Palpitation [ ] Lightheadedness [ ] Syncope  Renal: [ ] Thirst [ ] Edema  GI: [ ] Nausea [ ] Emesis [ ] Abdominal pain [ ] Constipation [ ] Diarrhea  Hem: [ ] Hematemesis [ ] bright red blood per rectum  ID: [ ] Fever [ ] Chills [ ] Dysuria  ENT: [ ] Rhinorrhea    PAST MEDICAL & SURGICAL HISTORY:  High cholesterol  Hypertension, unspecified type  Cerebrovascular accident (CVA), unspecified mechanism  History of appendectomy    Allergies    Allergy Status Unknown    Intolerances          T(C): 37.9 (04-20-19 @ 07:00), Max: 37.9 (04-20-19 @ 07:00)  HR: 68 (04-20-19 @ 09:01) (65 - 89)  BP: --  FAMILY HISTORY:  RR: 16 (04-20-19 @ 09:00) (14 - 23)  SpO2: 97% (04-20-19 @ 09:01) (96% - 100%)  04-19-19 @ 07:01  -  04-20-19 @ 07:00  --------------------------------------------------------  IN: 2043 mL / OUT: 2250 mL / NET: -207 mL    04-20-19 @ 07:01  -  04-20-19 @ 09:59  --------------------------------------------------------  IN: 150 mL / OUT: 0 mL / NET: 150 mL    acetaminophen   Tablet .. 650 milliGRAM(s) Oral every 6 hours PRN  cephalexin Suspension 50 mG/mL 500 milliGRAM(s) Oral every 12 hours  chlorhexidine 0.12% Liquid 15 milliLiter(s) Oral Mucosa two times a day  chlorhexidine 4% Liquid 1 Application(s) Topical <User Schedule>  diltiazem    Tablet 60 milliGRAM(s) Oral every 6 hours  docusate sodium Liquid 100 milliGRAM(s) Oral every 8 hours  doxazosin 4 milliGRAM(s) Oral daily  enoxaparin Injectable 40 milliGRAM(s) SubCutaneous <User Schedule>  hydrALAZINE 75 milliGRAM(s) Oral every 8 hours  labetalol 100 milliGRAM(s) Oral two times a day  ondansetron Injectable 4 milliGRAM(s) IV Push every 6 hours PRN  oxyCODONE    Solution 10 milliGRAM(s) Oral every 4 hours PRN  pantoprazole   Suspension 40 milliGRAM(s) Oral daily  polyethylene glycol 3350 17 Gram(s) Oral two times a day  senna 2 Tablet(s) Oral at bedtime  Mode: AC/ CMV (Assist Control/ Continuous Mandatory Ventilation), RR (machine): 14, TV (machine): 400, FiO2: 30, PEEP: 5, ITime: 0.8, MAP: 8, PIP: 18    EXAMINATION:  General:  intubated   Neuro:  no OE, wiggles Right toe - however inconsistently, pupils non-reactive b/l, purposeful movements L>R on UE, moving spont LE + cough or gag, overbreaths vent  Cards:  s1, s2 RRR  Respiratory:  lungs clear b/l   Adomen:  soft  Extremities:  no edema  Skin:  warm/dry      LABS:  Na: 135 (04-18 @ 21:43), 136 (04-17 @ 21:20)  K: 3.7 (04-18 @ 21:43), 3.7 (04-17 @ 21:20)  Cl: 100 (04-18 @ 21:43), 100 (04-17 @ 21:20)  CO2: 25 (04-18 @ 21:43), 24 (04-17 @ 21:20)  BUN: 17 (04-18 @ 21:43), 16 (04-17 @ 21:20)  Cr: 0.64 (04-18 @ 21:43), 0.57 (04-17 @ 21:20)  Glu: 141(04-18 @ 21:43), 101(04-17 @ 21:20)    Hgb: 10.0 (04-18 @ 21:43), 9.9 (04-17 @ 21:20)  Hct: 30.8 (04-18 @ 21:43), 28.6 (04-17 @ 21:20)  WBC: 7.4 (04-18 @ 21:43), 9.7 (04-17 @ 21:20)  Plt: 231 (04-18 @ 21:43), 219 (04-17 @ 21:20)    INR:   PTT:       ET tube  Left Radial A line     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     NEURO:    Neurochecks q4 hrs,    Pontine hemorrhage:  likley HTN  [facility] discussion with family - wanting aggressive care  Activity: [] mobilize as tolerated [x] Bedrest [] PT [] OT [] PMNR    PULM:  Respiratory failure 2/2 pontine hemorrhage   full mechanical vent support - CPAP as tolerated   s/p TRACH   RCU consult   oral secretions     CV:  SBP goal 100 - 160 mmhg   d/c a line  New onset Afib on admission- Labetalol and coreg for rate control       RENAL:  Fluids:  NS @ 75    GI:    Diet: start TF via PEG   GI prophylaxis [] not indicated [x] PPI [] other:  Bowel regimen [x] colace [x] senna [] other: add Miralax   last BM 4/18    ENDO:   Goal euglycemia (-180)    HEME/ONC:  VTE prophylaxis: [] SCDs [] chemoprophylaxis - Lovenox     ID:  UTI - +U/A on cephalexin     SOCIAL/FAMILY:  [] awaiting [x] updated at bedside met with , daughter, son, and daughter-in-law--in shock, appropriately grieving [] family meeting    CODE STATUS:  [x] Full Code [] DNR [] DNI [] Palliative/Comfort Care    DISPOSITION:  [x] ICU [] Stroke Unit [] Floor [] EMU [] RCU [] PCU    [x] Patient is at high risk of neurologic deterioration/death due to:  hemorrhage, herniation     moderate complexity     Contact: [phone] PI:  62 y/o F w/ PMHx HTN, HLD, ischemic CVA (2004), presents to the ED BIBA from home for evaluation of stroke.  As per family patient was having facial droop and was prescribed Valacyclovir and prednisone for "Bell's palsy. As per EMS, pt was talking to a family member on the phone at 11:00 on 4/11 and then had slurred speech and unresponsive.  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; intubated; on cardene infusion     On admission, the patient was:  GCS: 3 T  ICH score: 4            REVIEW OF SYSTEMS: [x ] Unable to Assess due to neurologic exam   [ ] All ROS addressed below are non-contributory, except:  Neuro: [ ] Headache [ ] Back pain [ ] Numbness [ ] Weakness [ ] Ataxia [ ] Dizziness [ ] Aphasia [ ] Dysarthria [ ] Visual disturbance  Resp: [ ] Shortness of breath/dyspnea, [ ] Orthopnea [ ] Cough  CV: [ ] Chest pain [ ] Palpitation [ ] Lightheadedness [ ] Syncope  Renal: [ ] Thirst [ ] Edema  GI: [ ] Nausea [ ] Emesis [ ] Abdominal pain [ ] Constipation [ ] Diarrhea  Hem: [ ] Hematemesis [ ] bright red blood per rectum  ID: [ ] Fever [ ] Chills [ ] Dysuria  ENT: [ ] Rhinorrhea    PAST MEDICAL & SURGICAL HISTORY:  High cholesterol  Hypertension, unspecified type  Cerebrovascular accident (CVA), unspecified mechanism  History of appendectomy    Allergies    Allergy Status Unknown    Intolerances          T(C): 37.9 (04-20-19 @ 07:00), Max: 37.9 (04-20-19 @ 07:00)  HR: 68 (04-20-19 @ 09:01) (65 - 89)  BP: --  FAMILY HISTORY:  RR: 16 (04-20-19 @ 09:00) (14 - 23)  SpO2: 97% (04-20-19 @ 09:01) (96% - 100%)  04-19-19 @ 07:01  -  04-20-19 @ 07:00  --------------------------------------------------------  IN: 2043 mL / OUT: 2250 mL / NET: -207 mL    04-20-19 @ 07:01  -  04-20-19 @ 09:59  --------------------------------------------------------  IN: 150 mL / OUT: 0 mL / NET: 150 mL    acetaminophen   Tablet .. 650 milliGRAM(s) Oral every 6 hours PRN  cephalexin Suspension 50 mG/mL 500 milliGRAM(s) Oral every 12 hours  chlorhexidine 0.12% Liquid 15 milliLiter(s) Oral Mucosa two times a day  chlorhexidine 4% Liquid 1 Application(s) Topical <User Schedule>  diltiazem    Tablet 60 milliGRAM(s) Oral every 6 hours  docusate sodium Liquid 100 milliGRAM(s) Oral every 8 hours  doxazosin 4 milliGRAM(s) Oral daily  enoxaparin Injectable 40 milliGRAM(s) SubCutaneous <User Schedule>  hydrALAZINE 75 milliGRAM(s) Oral every 8 hours  labetalol 100 milliGRAM(s) Oral two times a day  ondansetron Injectable 4 milliGRAM(s) IV Push every 6 hours PRN  oxyCODONE    Solution 10 milliGRAM(s) Oral every 4 hours PRN  pantoprazole   Suspension 40 milliGRAM(s) Oral daily  polyethylene glycol 3350 17 Gram(s) Oral two times a day  senna 2 Tablet(s) Oral at bedtime  Mode: AC/ CMV (Assist Control/ Continuous Mandatory Ventilation), RR (machine): 14, TV (machine): 400, FiO2: 30, PEEP: 5, ITime: 0.8, MAP: 8, PIP: 18    EXAMINATION:  General:  intubated   Neuro:  no OE, wiggles Right toe - however inconsistently, she turns her head to the right when asked, moves eyes up and down, pupils non-reactive b/l, purposeful movements L>R on UE, moving spont LE + cough or gag, overbreaths vent  Cards:  s1, s2 RRR  Respiratory:  lungs clear b/l   Adomen:  soft  Extremities:  no edema  Skin:  warm/dry      LABS:  Na: 135 (04-18 @ 21:43), 136 (04-17 @ 21:20)  K: 3.7 (04-18 @ 21:43), 3.7 (04-17 @ 21:20)  Cl: 100 (04-18 @ 21:43), 100 (04-17 @ 21:20)  CO2: 25 (04-18 @ 21:43), 24 (04-17 @ 21:20)  BUN: 17 (04-18 @ 21:43), 16 (04-17 @ 21:20)  Cr: 0.64 (04-18 @ 21:43), 0.57 (04-17 @ 21:20)  Glu: 141(04-18 @ 21:43), 101(04-17 @ 21:20)    Hgb: 10.0 (04-18 @ 21:43), 9.9 (04-17 @ 21:20)  Hct: 30.8 (04-18 @ 21:43), 28.6 (04-17 @ 21:20)  WBC: 7.4 (04-18 @ 21:43), 9.7 (04-17 @ 21:20)  Plt: 231 (04-18 @ 21:43), 219 (04-17 @ 21:20)    INR:   PTT:       ET tube  Left Radial A line     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     NEURO:    Neurochecks q4 hrs,    Pontine hemorrhage:  likley HTN  [facility] discussion with family - wanting aggressive care  Activity: [] mobilize as tolerated [x] Bedrest [] PT [] OT [] PMNR    PULM:  Respiratory failure 2/2 pontine hemorrhage   full mechanical vent support - CPAP as tolerated   s/p TRACH   RCU consult   oral secretions     CV:  SBP goal 100 - 160 mmhg   d/c a line  New onset Afib on admission- Labetalol and coreg for rate control       RENAL:  Fluids:  NS @ 75    GI:    Diet: start TF via PEG   GI prophylaxis [] not indicated [x] PPI [] other:  Bowel regimen [x] colace [x] senna [] other: add Miralax   last BM 4/18    ENDO:   Goal euglycemia (-180)    HEME/ONC:  VTE prophylaxis: [] SCDs [] chemoprophylaxis - Lovenox     ID:  UTI - +U/A on cephalexin     SOCIAL/FAMILY:  [] awaiting [x] updated at bedside met with , daughter, son, and daughter-in-law--in shock, appropriately grieving [] family meeting    CODE STATUS:  [x] Full Code [] DNR [] DNI [] Palliative/Comfort Care    DISPOSITION:  [x] ICU [] Stroke Unit [] Floor [] EMU [] RCU [] PCU    [x] Patient is at high risk of neurologic deterioration/death due to:  hemorrhage, herniation     moderate complexity     Contact: [phone]

## 2019-04-21 LAB
ANION GAP SERPL CALC-SCNC: 8 MMOL/L — SIGNIFICANT CHANGE UP (ref 5–17)
BUN SERPL-MCNC: 15 MG/DL — SIGNIFICANT CHANGE UP (ref 7–23)
CALCIUM SERPL-MCNC: 9.4 MG/DL — SIGNIFICANT CHANGE UP (ref 8.4–10.5)
CHLORIDE SERPL-SCNC: 101 MMOL/L — SIGNIFICANT CHANGE UP (ref 96–108)
CO2 SERPL-SCNC: 29 MMOL/L — SIGNIFICANT CHANGE UP (ref 22–31)
CREAT SERPL-MCNC: 0.57 MG/DL — SIGNIFICANT CHANGE UP (ref 0.5–1.3)
GLUCOSE SERPL-MCNC: 115 MG/DL — HIGH (ref 70–99)
HCT VFR BLD CALC: 31.4 % — LOW (ref 34.5–45)
HGB BLD-MCNC: 10.5 G/DL — LOW (ref 11.5–15.5)
MAGNESIUM SERPL-MCNC: 2.3 MG/DL — SIGNIFICANT CHANGE UP (ref 1.6–2.6)
MCHC RBC-ENTMCNC: 33.2 PG — SIGNIFICANT CHANGE UP (ref 27–34)
MCHC RBC-ENTMCNC: 33.3 GM/DL — SIGNIFICANT CHANGE UP (ref 32–36)
MCV RBC AUTO: 99.6 FL — SIGNIFICANT CHANGE UP (ref 80–100)
PHOSPHATE SERPL-MCNC: 5.1 MG/DL — HIGH (ref 2.5–4.5)
PLATELET # BLD AUTO: 250 K/UL — SIGNIFICANT CHANGE UP (ref 150–400)
POTASSIUM SERPL-MCNC: 4.1 MMOL/L — SIGNIFICANT CHANGE UP (ref 3.5–5.3)
POTASSIUM SERPL-SCNC: 4.1 MMOL/L — SIGNIFICANT CHANGE UP (ref 3.5–5.3)
RBC # BLD: 3.15 M/UL — LOW (ref 3.8–5.2)
RBC # FLD: 12.5 % — SIGNIFICANT CHANGE UP (ref 10.3–14.5)
SODIUM SERPL-SCNC: 138 MMOL/L — SIGNIFICANT CHANGE UP (ref 135–145)
WBC # BLD: 8.8 K/UL — SIGNIFICANT CHANGE UP (ref 3.8–10.5)
WBC # FLD AUTO: 8.8 K/UL — SIGNIFICANT CHANGE UP (ref 3.8–10.5)

## 2019-04-21 PROCEDURE — 99233 SBSQ HOSP IP/OBS HIGH 50: CPT

## 2019-04-21 RX ADMIN — Medication 60 MILLIGRAM(S): at 16:00

## 2019-04-21 RX ADMIN — CHLORHEXIDINE GLUCONATE 1 APPLICATION(S): 213 SOLUTION TOPICAL at 21:22

## 2019-04-21 RX ADMIN — Medication 60 MILLIGRAM(S): at 21:19

## 2019-04-21 RX ADMIN — Medication 100 MILLIGRAM(S): at 14:41

## 2019-04-21 RX ADMIN — SENNA PLUS 2 TABLET(S): 8.6 TABLET ORAL at 21:19

## 2019-04-21 RX ADMIN — Medication 75 MILLIGRAM(S): at 21:19

## 2019-04-21 RX ADMIN — Medication 100 MILLIGRAM(S): at 05:39

## 2019-04-21 RX ADMIN — Medication 500 MILLIGRAM(S): at 05:41

## 2019-04-21 RX ADMIN — Medication 100 MILLIGRAM(S): at 21:19

## 2019-04-21 RX ADMIN — CHLORHEXIDINE GLUCONATE 15 MILLILITER(S): 213 SOLUTION TOPICAL at 05:40

## 2019-04-21 RX ADMIN — CHLORHEXIDINE GLUCONATE 15 MILLILITER(S): 213 SOLUTION TOPICAL at 17:47

## 2019-04-21 RX ADMIN — POLYETHYLENE GLYCOL 3350 17 GRAM(S): 17 POWDER, FOR SOLUTION ORAL at 17:46

## 2019-04-21 RX ADMIN — Medication 60 MILLIGRAM(S): at 09:44

## 2019-04-21 RX ADMIN — Medication 60 MILLIGRAM(S): at 04:13

## 2019-04-21 RX ADMIN — PANTOPRAZOLE SODIUM 40 MILLIGRAM(S): 20 TABLET, DELAYED RELEASE ORAL at 12:51

## 2019-04-21 RX ADMIN — Medication 100 MILLIGRAM(S): at 19:03

## 2019-04-21 RX ADMIN — Medication 75 MILLIGRAM(S): at 05:40

## 2019-04-21 RX ADMIN — POLYETHYLENE GLYCOL 3350 17 GRAM(S): 17 POWDER, FOR SOLUTION ORAL at 05:40

## 2019-04-21 RX ADMIN — ENOXAPARIN SODIUM 40 MILLIGRAM(S): 100 INJECTION SUBCUTANEOUS at 17:46

## 2019-04-21 RX ADMIN — Medication 8 MILLIGRAM(S): at 21:19

## 2019-04-21 RX ADMIN — Medication 75 MILLIGRAM(S): at 14:41

## 2019-04-21 NOTE — PROGRESS NOTE ADULT - PROBLEM SELECTOR PLAN 1
Keep umbilical trach tie/sutures in place  - HOB elevation to 30 degrees  - Continue trach care  - Care per primary team  - ENT to follow.

## 2019-04-21 NOTE — PROGRESS NOTE ADULT - SUBJECTIVE AND OBJECTIVE BOX
Subjective: Patient seen and examined. No new events except as noted.     SUBJECTIVE/ROS:        MEDICATIONS:  MEDICATIONS  (STANDING):  chlorhexidine 0.12% Liquid 15 milliLiter(s) Oral Mucosa two times a day  chlorhexidine 4% Liquid 1 Application(s) Topical <User Schedule>  diltiazem    Tablet 60 milliGRAM(s) Oral every 6 hours  docusate sodium Liquid 100 milliGRAM(s) Oral every 8 hours  doxazosin 8 milliGRAM(s) Oral at bedtime  enoxaparin Injectable 40 milliGRAM(s) SubCutaneous <User Schedule>  hydrALAZINE 75 milliGRAM(s) Oral every 8 hours  labetalol 100 milliGRAM(s) Oral two times a day  pantoprazole   Suspension 40 milliGRAM(s) Oral daily  polyethylene glycol 3350 17 Gram(s) Oral two times a day  senna 2 Tablet(s) Oral at bedtime      PHYSICAL EXAM:  T(C): 36.6 (04-21-19 @ 04:00), Max: 37.3 (04-20-19 @ 11:00)  HR: 67 (04-21-19 @ 07:00) (56 - 78)  BP: 141/67 (04-21-19 @ 07:00) (117/61 - 146/70)  RR: 17 (04-21-19 @ 07:00) (14 - 21)  SpO2: 97% (04-21-19 @ 07:00) (96% - 99%)  Wt(kg): --  I&O's Summary    20 Apr 2019 07:01  -  21 Apr 2019 07:00  --------------------------------------------------------  IN: 2125 mL / OUT: 3150 mL / NET: -1025 mL            JVP: Normal  Neck: supple  Lung: clear   CV: S1 S2 , Murmur:  Abd: soft  Ext: No edema  neuro: somnolent   Psych: flat affect  Skin: normal``    LABS/DATA:    CARDIAC MARKERS:                                10.5   8.8   )-----------( 250      ( 20 Apr 2019 23:57 )             31.4     04-20    138  |  101  |  15  ----------------------------<  115<H>  4.1   |  29  |  0.57    Ca    9.4      20 Apr 2019 23:57  Phos  5.1     04-20  Mg     2.3     04-20      proBNP:   Lipid Profile:   HgA1c:   TSH:     TELE:  EKG:

## 2019-04-21 NOTE — PROGRESS NOTE ADULT - ASSESSMENT
HTN  stable  cont current meds    Afib  resolved  cont current avn blockers  off a/c due to pontine bleed     resp failrue  s/p trach     awaiting RCU bed

## 2019-04-21 NOTE — PROGRESS NOTE ADULT - ASSESSMENT
ASSESSMENT/PLAN:  pontine hemorrhage, likely hypertensive   UTI, on Ceftriaxone till 4/23.    lab holiday  no CBCs  Pending  RCU bed  not critically ill

## 2019-04-21 NOTE — PROGRESS NOTE ADULT - SUBJECTIVE AND OBJECTIVE BOX
64 y/o F w/ PMHx HTN, HLD, ischemic CVA (2004), presented with pontine hemorrhage on CT.  On admission, the patient was:  GCS: 3 T  ICH score: 4    Accepted by RCU, awaiting for a bed.  No acute events.     VITALS:  Recent Vitals Reviewed   LABS:  Recent Labs Reviewed  MEDICATIONS: All Recent Medications Reviewed   IMAGING:   Recent imaging studies were reviewed.    EXAMINATION:  Neuro:  no OE, no FC, pupils non-reactive b/l, purposeful movements L>R on UE, moving spont LE + cough or gag, overbreaths vent  Cards:  s1, s2 RRR  Respiratory: trach, lungs clear b/l   Abdomen:  soft, PEG in place  Extremities:  no edema  Skin:  warm/dry

## 2019-04-21 NOTE — PROGRESS NOTE ADULT - SUBJECTIVE AND OBJECTIVE BOX
Subjective and Objective:   HPI:  62 y/o F w/ PMHx HTN, HLD, ischemic CVA (2004), presents to the ED BIBA from home for evaluation of stroke.  As per family patient was having facial droop and was prescribed Valacyclovir and prednisone for "Bell's palsy. As per EMS, pt was talking to a family member on the phone at 11:00 on 4/11 and then had slurred speech and unresponsive.  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; intubated; on cardene infusion     On admission, the patient was:  GCS: 3 T  ICH score: 4        REVIEW OF SYSTEMS: [x ] Unable to Assess due to neurologic exam   [ ] All ROS addressed below are non-contributory, except:  Neuro: [ ] Headache [ ] Back pain [ ] Numbness [ ] Weakness [ ] Ataxia [ ] Dizziness [ ] Aphasia [ ] Dysarthria [ ] Visual disturbance  Resp: [ ] Shortness of breath/dyspnea, [ ] Orthopnea [ ] Cough  CV: [ ] Chest pain [ ] Palpitation [ ] Lightheadedness [ ] Syncope  Renal: [ ] Thirst [ ] Edema  GI: [ ] Nausea [ ] Emesis [ ] Abdominal pain [ ] Constipation [ ] Diarrhea  Hem: [ ] Hematemesis [ ] bright red blood per rectum  ID: [ ] Fever [ ] Chills [ ] Dysuria  ENT: [ ] Rhinorrhea    PAST MEDICAL & SURGICAL HISTORY:  High cholesterol  Hypertension, unspecified type  Cerebrovascular accident (CVA), unspecified mechanism  History of appendectomy    Allergies    Allergy Status Unknown    Intolerances      T(C): 36.6 (04-21-19 @ 04:00), Max: 37.3 (04-20-19 @ 11:00)  HR: 67 (04-21-19 @ 07:00) (56 - 78)  BP: 141/67 (04-21-19 @ 07:00) (117/61 - 146/70)  RR: 17 (04-21-19 @ 07:00) (14 - 21)  SpO2: 97% (04-21-19 @ 07:00) (96% - 99%)  04-20-19 @ 07:01  -  04-21-19 @ 07:00  --------------------------------------------------------  IN: 2125 mL / OUT: 3150 mL / NET: -1025 mL    acetaminophen   Tablet .. 650 milliGRAM(s) Oral every 6 hours PRN  chlorhexidine 0.12% Liquid 15 milliLiter(s) Oral Mucosa two times a day  chlorhexidine 4% Liquid 1 Application(s) Topical <User Schedule>  diltiazem    Tablet 60 milliGRAM(s) Oral every 6 hours  docusate sodium Liquid 100 milliGRAM(s) Oral every 8 hours  doxazosin 8 milliGRAM(s) Oral at bedtime  enoxaparin Injectable 40 milliGRAM(s) SubCutaneous <User Schedule>  hydrALAZINE 75 milliGRAM(s) Oral every 8 hours  labetalol 100 milliGRAM(s) Oral two times a day  ondansetron Injectable 4 milliGRAM(s) IV Push every 6 hours PRN  oxyCODONE    Solution 10 milliGRAM(s) Oral every 4 hours PRN  pantoprazole   Suspension 40 milliGRAM(s) Oral daily  polyethylene glycol 3350 17 Gram(s) Oral two times a day  senna 2 Tablet(s) Oral at bedtime  Mode: AC/ CMV (Assist Control/ Continuous Mandatory Ventilation), RR (machine): 14, TV (machine): 400, FiO2: 30, PEEP: 5, ITime: 1, MAP: 8, PIP: 13    EXAMINATION:  General:  intubated   Neuro:  no OE, wiggles Right toe - however inconsistently, she turns her head to the right when asked, moves eyes up and down, pupils non-reactive b/l, purposeful movements L>R on UE, moving spont LE + cough or gag, overbreaths vent  Cards:  s1, s2 RRR  Respiratory:  lungs clear b/l   Adomen:  soft  Extremities:  no edema  Skin:  warm/dry      LABS:  Na: 138 (04-20 @ 23:57), 135 (04-18 @ 21:43)  K: 4.1 (04-20 @ 23:57), 3.7 (04-18 @ 21:43)  Cl: 101 (04-20 @ 23:57), 100 (04-18 @ 21:43)  CO2: 29 (04-20 @ 23:57), 25 (04-18 @ 21:43)  BUN: 15 (04-20 @ 23:57), 17 (04-18 @ 21:43)  Cr: 0.57 (04-20 @ 23:57), 0.64 (04-18 @ 21:43)  Glu: 115(04-20 @ 23:57), 141(04-18 @ 21:43)    Hgb: 10.5 (04-20 @ 23:57), 10.0 (04-18 @ 21:43)  Hct: 31.4 (04-20 @ 23:57), 30.8 (04-18 @ 21:43)  WBC: 8.8 (04-20 @ 23:57), 7.4 (04-18 @ 21:43)  Plt: 250 (04-20 @ 23:57), 231 (04-18 @ 21:43)    INR:   PTT:             ET tube  Left Radial A line     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 locked in     NEURO:    Neurochecks q4 hrs,    Pontine hemorrhage:  likley HTN  [facility] discussion with family - wanting aggressive care  Activity: [] mobilize as tolerated [x] Bedrest [] PT [] OT [] PMNR    PULM:  Respiratory failure 2/2 pontine hemorrhage   full mechanical vent support - CPAP as tolerated   s/p TRACH   RCU consult   oral secretions     CV:  SBP goal 100 - 160 mmhg   d/c a line  New onset Afib on admission- Labetalol and coreg for rate control       RENAL:  Fluids:  IVL     GI:    Diet: start TF via PEG   GI prophylaxis [] not indicated [x] PPI [] other:  Bowel regimen [x] colace [x] senna [] other: add Miralax   last BM 4/18    ENDO:   Goal euglycemia (-180)    HEME/ONC:  VTE prophylaxis: [] SCDs [] chemoprophylaxis - Lovenox     ID:  UTI - +U/A on cephalexin     SOCIAL/FAMILY:  [] awaiting [x] updated at bedside met with , daughter, son, and daughter-in-law--in shock, appropriately grieving [] family meeting    CODE STATUS:  [x] Full Code [] DNR [] DNI [] Palliative/Comfort Care    DISPOSITION:  [x] ICU [] Stroke Unit [] Floor [] EMU [] RCU [] PCU    [x] Patient is at high risk of neurologic deterioration/death due to:  hemorrhage, herniation     Contact: [phone]

## 2019-04-21 NOTE — PROGRESS NOTE ADULT - SUBJECTIVE AND OBJECTIVE BOX
ENT ISSUE/POD:  s/p tracheostomy POD #5    HPI:   64 yo female with Respiratory failure now s/p tracheostomy POD # 5. Pt seen and examined at bedside. No acute events overnight. Pt with #8 cuffed shiley, on the ventilator.        PAST MEDICAL & SURGICAL HISTORY:  High cholesterol  Hypertension, unspecified type  Cerebrovascular accident (CVA), unspecified mechanism  History of appendectomy    Allergies    Allergy Status Unknown    Intolerances      MEDICATIONS  (STANDING):  chlorhexidine 0.12% Liquid 15 milliLiter(s) Oral Mucosa two times a day  chlorhexidine 4% Liquid 1 Application(s) Topical <User Schedule>  diltiazem    Tablet 60 milliGRAM(s) Oral every 6 hours  docusate sodium Liquid 100 milliGRAM(s) Oral every 8 hours  doxazosin 8 milliGRAM(s) Oral at bedtime  enoxaparin Injectable 40 milliGRAM(s) SubCutaneous <User Schedule>  hydrALAZINE 75 milliGRAM(s) Oral every 8 hours  labetalol 100 milliGRAM(s) Oral two times a day  pantoprazole   Suspension 40 milliGRAM(s) Oral daily  polyethylene glycol 3350 17 Gram(s) Oral two times a day  senna 2 Tablet(s) Oral at bedtime    MEDICATIONS  (PRN):  acetaminophen   Tablet .. 650 milliGRAM(s) Oral every 6 hours PRN Temp greater or equal to 38C (100.4F), Mild Pain (1 - 3)  ondansetron Injectable 4 milliGRAM(s) IV Push every 6 hours PRN Nausea and/or Vomiting  oxyCODONE    Solution 10 milliGRAM(s) Oral every 4 hours PRN Severe Pain (7 - 10)          ROS:   ENT: all negative except as noted in HPI   Pulm: denies SOB, cough, hemoptysis  Neuro: denies numbness/tingling, loss of sensation  Endo: denies heat/cold intolerance, excessive sweating      Vital Signs Last 24 Hrs  T(C): 36.6 (21 Apr 2019 04:00), Max: 37.3 (20 Apr 2019 11:00)  T(F): 97.8 (21 Apr 2019 04:00), Max: 99.1 (20 Apr 2019 11:00)  HR: 67 (21 Apr 2019 07:00) (56 - 78)  BP: 141/67 (21 Apr 2019 07:00) (117/61 - 146/70)  BP(mean): 96 (21 Apr 2019 07:00) (78 - 101)  RR: 17 (21 Apr 2019 07:00) (14 - 21)  SpO2: 97% (21 Apr 2019 07:00) (96% - 99%)                          10.5   8.8   )-----------( 250      ( 20 Apr 2019 23:57 )             31.4    04-20    138  |  101  |  15  ----------------------------<  115<H>  4.1   |  29  |  0.57    Ca    9.4      20 Apr 2019 23:57  Phos  5.1     04-20  Mg     2.3     04-20         PHYSICAL EXAM:  Gen: NAD  Skin: No rashes, bruises, or lesions  Head: Normocephalic, Atraumatic  Face: no edema, erythema, or fluctuance. Parotid glands soft without mass  Nose: Nares bilaterally patent, no discharge  Mouth: No Stridor / Drooling / Trismus.  Mucosa moist, tongue/uvula midline, oropharynx clear  Neck: Shiley 8 LPC in place stoma clean and dry no bleeding or purulence, sutures x 4 c/d/i no erythema or collections neck Flat, supple, no lymphadenopathy, trachea midline, no masses  Lymphatic: No lymphadenopathy  Resp: breathing easily, no stridor on vent support  Neuro: facial nerve intact, no facial droop

## 2019-04-22 PROCEDURE — 99233 SBSQ HOSP IP/OBS HIGH 50: CPT

## 2019-04-22 PROCEDURE — 99231 SBSQ HOSP IP/OBS SF/LOW 25: CPT

## 2019-04-22 RX ADMIN — Medication 8 MILLIGRAM(S): at 21:36

## 2019-04-22 RX ADMIN — Medication 75 MILLIGRAM(S): at 15:41

## 2019-04-22 RX ADMIN — Medication 75 MILLIGRAM(S): at 21:43

## 2019-04-22 RX ADMIN — Medication 100 MILLIGRAM(S): at 17:58

## 2019-04-22 RX ADMIN — Medication 100 MILLIGRAM(S): at 15:41

## 2019-04-22 RX ADMIN — Medication 650 MILLIGRAM(S): at 04:13

## 2019-04-22 RX ADMIN — Medication 100 MILLIGRAM(S): at 05:31

## 2019-04-22 RX ADMIN — Medication 60 MILLIGRAM(S): at 15:41

## 2019-04-22 RX ADMIN — CHLORHEXIDINE GLUCONATE 1 APPLICATION(S): 213 SOLUTION TOPICAL at 21:35

## 2019-04-22 RX ADMIN — Medication 60 MILLIGRAM(S): at 21:34

## 2019-04-22 RX ADMIN — CHLORHEXIDINE GLUCONATE 15 MILLILITER(S): 213 SOLUTION TOPICAL at 17:58

## 2019-04-22 RX ADMIN — CHLORHEXIDINE GLUCONATE 15 MILLILITER(S): 213 SOLUTION TOPICAL at 05:30

## 2019-04-22 RX ADMIN — Medication 650 MILLIGRAM(S): at 18:28

## 2019-04-22 RX ADMIN — SENNA PLUS 2 TABLET(S): 8.6 TABLET ORAL at 21:43

## 2019-04-22 RX ADMIN — ENOXAPARIN SODIUM 40 MILLIGRAM(S): 100 INJECTION SUBCUTANEOUS at 17:58

## 2019-04-22 RX ADMIN — POLYETHYLENE GLYCOL 3350 17 GRAM(S): 17 POWDER, FOR SOLUTION ORAL at 05:30

## 2019-04-22 RX ADMIN — Medication 60 MILLIGRAM(S): at 03:22

## 2019-04-22 RX ADMIN — POLYETHYLENE GLYCOL 3350 17 GRAM(S): 17 POWDER, FOR SOLUTION ORAL at 17:58

## 2019-04-22 RX ADMIN — Medication 100 MILLIGRAM(S): at 06:58

## 2019-04-22 RX ADMIN — Medication 60 MILLIGRAM(S): at 09:02

## 2019-04-22 RX ADMIN — Medication 100 MILLIGRAM(S): at 21:37

## 2019-04-22 RX ADMIN — PANTOPRAZOLE SODIUM 40 MILLIGRAM(S): 20 TABLET, DELAYED RELEASE ORAL at 12:18

## 2019-04-22 RX ADMIN — Medication 75 MILLIGRAM(S): at 05:31

## 2019-04-22 NOTE — CHART NOTE - NSCHARTNOTEFT_GEN_A_CORE
Nutrition Follow Up Note  Patient seen for: f/u    Chart reviewed, events noted. Adm dx: ICH. S/P trach , PEG     Source: chart    Diet :  Jevity 1.5 75cc/hr x 18 hrs via PEG, Danactive 2 times/day     Enteral /Parenteral Nutrition: goal 1350cc/day  24 hr EN intake  1500cc,  1575cc,  1350cc meeting 109% estimated nutrition needs      Daily Weight in k.4 (), Weight in k.4 (), Weight in k.1 ()  has 1+ generalized, 3+ B/L hand edema    Pertinent Medications: MEDICATIONS  (STANDING):  chlorhexidine 0.12% Liquid 15 milliLiter(s) Oral Mucosa two times a day  chlorhexidine 4% Liquid 1 Application(s) Topical <User Schedule>  diltiazem    Tablet 60 milliGRAM(s) Oral every 6 hours  docusate sodium Liquid 100 milliGRAM(s) Oral every 8 hours  doxazosin 8 milliGRAM(s) Oral at bedtime  enoxaparin Injectable 40 milliGRAM(s) SubCutaneous <User Schedule>  hydrALAZINE 75 milliGRAM(s) Oral every 8 hours  labetalol 100 milliGRAM(s) Oral two times a day  pantoprazole   Suspension 40 milliGRAM(s) Oral daily  polyethylene glycol 3350 17 Gram(s) Oral two times a day  senna 2 Tablet(s) Oral at bedtime    MEDICATIONS  (PRN):  acetaminophen   Tablet .. 650 milliGRAM(s) Oral every 6 hours PRN Temp greater or equal to 38C (100.4F), Mild Pain (1 - 3)  ondansetron Injectable 4 milliGRAM(s) IV Push every 6 hours PRN Nausea and/or Vomiting  oxyCODONE    Solution 10 milliGRAM(s) Oral every 4 hours PRN Severe Pain (7 - 10)      Finger Sticks:      Skin per nursing documentation: no pressure injuries documented       Estimated Needs:   [x ] no change since previous assessment: 0592-3293 kcals, 81-92 gm protein  [ ] recalculated:     Previous Nutrition Diagnosis: increased needs  Nutrition Diagnosis is: ongoing, addressed w/ EN    New Nutrition Diagnosis: none  Related to:    As evidenced by:      Interventions:     Recommend  1) Continue Jevity 1.5 at goal rate 75ml/hr x 18 hrs. To provide: (based on  dosing weight 73kg): 2025kcal (28kcal/kg), (upper IBW 57.6kg): 86g protein (1.5g protein/kg IBW).       Monitoring and Evaluation:     Continue to monitor Nutritional intake, Tolerance to diet prescription, weights, labs, skin integrity    RD remains available upon request and will follow up per protocol Nutrition Follow Up Note  Patient seen for: f/u    Chart reviewed, events noted. Adm dx: ICH. S/P trach , PEG     Source: chart    Diet :  Jevity 1.5 75cc/hr x 18 hrs via PEG, Danactive 2 times/day     Enteral /Parenteral Nutrition: goal 1350cc/day  24 hr EN intake  1500cc,  1575cc,  1350cc meeting 109% estimated nutrition needs      Daily Weight in k.4 (), Weight in k.4 (), Weight in k.1 ()  has 1+ generalized, 3+ B/L hand edema    Pertinent Medications: MEDICATIONS  (STANDING):  chlorhexidine 0.12% Liquid 15 milliLiter(s) Oral Mucosa two times a day  chlorhexidine 4% Liquid 1 Application(s) Topical <User Schedule>  diltiazem    Tablet 60 milliGRAM(s) Oral every 6 hours  docusate sodium Liquid 100 milliGRAM(s) Oral every 8 hours  doxazosin 8 milliGRAM(s) Oral at bedtime  enoxaparin Injectable 40 milliGRAM(s) SubCutaneous <User Schedule>  hydrALAZINE 75 milliGRAM(s) Oral every 8 hours  labetalol 100 milliGRAM(s) Oral two times a day  pantoprazole   Suspension 40 milliGRAM(s) Oral daily  polyethylene glycol 3350 17 Gram(s) Oral two times a day  senna 2 Tablet(s) Oral at bedtime    MEDICATIONS  (PRN):  acetaminophen   Tablet .. 650 milliGRAM(s) Oral every 6 hours PRN Temp greater or equal to 38C (100.4F), Mild Pain (1 - 3)  ondansetron Injectable 4 milliGRAM(s) IV Push every 6 hours PRN Nausea and/or Vomiting  oxyCODONE    Solution 10 milliGRAM(s) Oral every 4 hours PRN Severe Pain (7 - 10)      Finger Sticks:      Skin per nursing documentation: no pressure injuries documented       Estimated Needs:   [x ] no change since previous assessment: 2599-6085 kcals, 81-92 gm protein  [ ] recalculated:     Previous Nutrition Diagnosis: increased needs  Nutrition Diagnosis is: ongoing, addressed w/ EN    New Nutrition Diagnosis: none  Related to:    As evidenced by:      Interventions:     Recommend  1) Continue Jevity 1.5 at goal rate 75ml/hr x 18 hrs. To provide: (based on  dosing weight 73kg): 2025kcal (28kcal/kg), (upper IBW 57.6kg): 86g protein (1.5g protein/kg IBW).   2) continue Danactive 2 times/day      Monitoring and Evaluation:     Continue to monitor Nutritional intake, Tolerance to diet prescription, weights, labs, skin integrity    RD remains available upon request and will follow up per protocol

## 2019-04-22 NOTE — PROGRESS NOTE ADULT - ASSESSMENT
HTN  stable  cont current meds    Afib  resolved  cont current avn blockers  off a/c due to pontine bleed     resp failrue  s/p trach     Fever overnight   consider ID eval   recheck lower ext venous doppler rule out DVT     awaiting RCU bed

## 2019-04-22 NOTE — PROGRESS NOTE ADULT - SUBJECTIVE AND OBJECTIVE BOX
Subjective: Patient seen and examined. No new events except as noted.     SUBJECTIVE/ROS:  ROS limited       MEDICATIONS:  MEDICATIONS  (STANDING):  chlorhexidine 0.12% Liquid 15 milliLiter(s) Oral Mucosa two times a day  chlorhexidine 4% Liquid 1 Application(s) Topical <User Schedule>  diltiazem    Tablet 60 milliGRAM(s) Oral every 6 hours  docusate sodium Liquid 100 milliGRAM(s) Oral every 8 hours  doxazosin 8 milliGRAM(s) Oral at bedtime  enoxaparin Injectable 40 milliGRAM(s) SubCutaneous <User Schedule>  hydrALAZINE 75 milliGRAM(s) Oral every 8 hours  labetalol 100 milliGRAM(s) Oral two times a day  pantoprazole   Suspension 40 milliGRAM(s) Oral daily  polyethylene glycol 3350 17 Gram(s) Oral two times a day  senna 2 Tablet(s) Oral at bedtime      PHYSICAL EXAM:  T(C): 37.3 (04-22-19 @ 05:00), Max: 38.1 (04-22-19 @ 04:00)  HR: 78 (04-22-19 @ 09:19) (63 - 84)  BP: 117/56 (04-22-19 @ 05:00) (117/56 - 163/73)  RR: 21 (04-22-19 @ 05:00) (13 - 24)  SpO2: 98% (04-22-19 @ 09:19) (96% - 100%)  Wt(kg): --  I&O's Summary    21 Apr 2019 07:01  -  22 Apr 2019 07:00  --------------------------------------------------------  IN: 1750 mL / OUT: 2650 mL / NET: -900 mL          Appearance: on vent 	  Cardiovascular: Normal S1 S2,    Murmur:   Neck: JVP limited to be evaluated   Respiratory: Lungs few rhonchi   Gastrointestinal:  Soft  Skin: normal   Neuro: limited as pt on vent   LABS/DATA:    CARDIAC MARKERS:                                10.5   8.8   )-----------( 250      ( 20 Apr 2019 23:57 )             31.4     04-20    138  |  101  |  15  ----------------------------<  115<H>  4.1   |  29  |  0.57    Ca    9.4      20 Apr 2019 23:57  Phos  5.1     04-20  Mg     2.3     04-20      proBNP:   Lipid Profile:   HgA1c:   TSH:     TELE:  EKG:

## 2019-04-22 NOTE — PROGRESS NOTE ADULT - SUBJECTIVE AND OBJECTIVE BOX
ENT ISSUE/POD:s/p tracheostomy POD # 6    HPI: 64 yo female with Respiratory failure now s/p tracheostomy POD # 6. Pt seen and examined at bedside. No acute events overnight. Pt with #8 cuffed shiley, currently tolerating CPAP          PAST MEDICAL & SURGICAL HISTORY:  High cholesterol  Hypertension, unspecified type  Cerebrovascular accident (CVA), unspecified mechanism  History of appendectomy    Allergies    Allergy Status Unknown    Intolerances      MEDICATIONS  (STANDING):  chlorhexidine 0.12% Liquid 15 milliLiter(s) Oral Mucosa two times a day  chlorhexidine 4% Liquid 1 Application(s) Topical <User Schedule>  diltiazem    Tablet 60 milliGRAM(s) Oral every 6 hours  docusate sodium Liquid 100 milliGRAM(s) Oral every 8 hours  doxazosin 8 milliGRAM(s) Oral at bedtime  enoxaparin Injectable 40 milliGRAM(s) SubCutaneous <User Schedule>  hydrALAZINE 75 milliGRAM(s) Oral every 8 hours  labetalol 100 milliGRAM(s) Oral two times a day  pantoprazole   Suspension 40 milliGRAM(s) Oral daily  polyethylene glycol 3350 17 Gram(s) Oral two times a day  senna 2 Tablet(s) Oral at bedtime    MEDICATIONS  (PRN):  acetaminophen   Tablet .. 650 milliGRAM(s) Oral every 6 hours PRN Temp greater or equal to 38C (100.4F), Mild Pain (1 - 3)  ondansetron Injectable 4 milliGRAM(s) IV Push every 6 hours PRN Nausea and/or Vomiting  oxyCODONE    Solution 10 milliGRAM(s) Oral every 4 hours PRN Severe Pain (7 - 10)          ROS:   ENT: all negative except as noted in HPI   Pulm: denies SOB, cough, hemoptysis  Neuro: denies numbness/tingling, loss of sensation  Endo: denies heat/cold intolerance, excessive sweating      Vital Signs Last 24 Hrs  T(C): 37.3 (22 Apr 2019 05:00), Max: 38.1 (22 Apr 2019 04:00)  T(F): 99.1 (22 Apr 2019 05:00), Max: 100.6 (22 Apr 2019 04:00)  HR: 70 (22 Apr 2019 05:00) (61 - 84)  BP: 117/56 (22 Apr 2019 05:00) (117/56 - 163/73)  BP(mean): 80 (22 Apr 2019 05:00) (80 - 105)  RR: 21 (22 Apr 2019 05:00) (13 - 24)  SpO2: 97% (22 Apr 2019 05:00) (96% - 100%)                          10.5   8.8   )-----------( 250      ( 20 Apr 2019 23:57 )             31.4    04-20    138  |  101  |  15  ----------------------------<  115<H>  4.1   |  29  |  0.57    Ca    9.4      20 Apr 2019 23:57  Phos  5.1     04-20  Mg     2.3     04-20         PHYSICAL EXAM:  Gen: NAD  Skin: No rashes, bruises, or lesions  Head: Normocephalic, Atraumatic  Face: no edema, erythema, or fluctuance. Parotid glands soft without mass  Eyes: no scleral injection  Nose: Nares bilaterally patent, no discharge  Mouth: No Stridor / Drooling / Trismus.  Mucosa moist, tongue/uvula midline, oropharynx clear  Neck:  Shiley 8 LPC in place stoma clean and dry no bleeding or purulence, sutures x 4 c/d/i  neck Flat, supple, no lymphadenopathy, trachea midline, no masses  Lymphatic: No lymphadenopathy  Resp: breathing easily, no stridor  Neuro: facial nerve intact, no facial droop

## 2019-04-22 NOTE — PROGRESS NOTE ADULT - SUBJECTIVE AND OBJECTIVE BOX
Subjective and Objective:   HPI:  64 y/o F w/ PMHx HTN, HLD, ischemic CVA (2004), presents to the ED BIBA from home for evaluation of stroke.  As per family patient was having facial droop and was prescribed Valacyclovir and prednisone for "Bell's palsy. As per EMS, pt was talking to a family member on the phone at 11:00 on 4/11 and then had slurred speech and unresponsive.  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; intubated; on cardene infusion     On admission, the patient was:  GCS: 3 T  ICH score: 4        REVIEW OF SYSTEMS: [x ] Unable to Assess due to neurologic exam   [ ] All ROS addressed below are non-contributory, except:  Neuro: [ ] Headache [ ] Back pain [ ] Numbness [ ] Weakness [ ] Ataxia [ ] Dizziness [ ] Aphasia [ ] Dysarthria [ ] Visual disturbance  Resp: [ ] Shortness of breath/dyspnea, [ ] Orthopnea [ ] Cough  CV: [ ] Chest pain [ ] Palpitation [ ] Lightheadedness [ ] Syncope  Renal: [ ] Thirst [ ] Edema  GI: [ ] Nausea [ ] Emesis [ ] Abdominal pain [ ] Constipation [ ] Diarrhea  Hem: [ ] Hematemesis [ ] bright red blood per rectum  ID: [ ] Fever [ ] Chills [ ] Dysuria  ENT: [ ] Rhinorrhea    PAST MEDICAL & SURGICAL HISTORY:  High cholesterol  Hypertension, unspecified type  Cerebrovascular accident (CVA), unspecified mechanism  History of appendectomy    Allergies    Allergy Status Unknown    Intolerances    ICU Vital Signs Last 24 Hrs  T(C): 37.3 (22 Apr 2019 05:00), Max: 38.1 (22 Apr 2019 04:00)  T(F): 99.1 (22 Apr 2019 05:00), Max: 100.6 (22 Apr 2019 04:00)  HR: 78 (22 Apr 2019 09:19) (63 - 84)  BP: 117/56 (22 Apr 2019 05:00) (117/56 - 163/73)  BP(mean): 80 (22 Apr 2019 05:00) (80 - 105)  RR: 21 (22 Apr 2019 05:00) (13 - 24)  SpO2: 98% (22 Apr 2019 09:19) (96% - 100%)      21 Apr 2019 07:01  -  22 Apr 2019 07:00  --------------------------------------------------------  IN:    Enteral Tube Flush: 250 mL    ns in tub fed  [id number]: 1500 mL  Total IN: 1750 mL    OUT:    Intermittent Catheterization - Urethral: 2650 mL  Total OUT: 2650 mL    Total NET: -900 mL      MEDICATIONS  (STANDING):  chlorhexidine 0.12% Liquid 15 milliLiter(s) Oral Mucosa two times a day  chlorhexidine 4% Liquid 1 Application(s) Topical <User Schedule>  diltiazem    Tablet 60 milliGRAM(s) Oral every 6 hours  docusate sodium Liquid 100 milliGRAM(s) Oral every 8 hours  doxazosin 8 milliGRAM(s) Oral at bedtime  enoxaparin Injectable 40 milliGRAM(s) SubCutaneous <User Schedule>  hydrALAZINE 75 milliGRAM(s) Oral every 8 hours  labetalol 100 milliGRAM(s) Oral two times a day  pantoprazole   Suspension 40 milliGRAM(s) Oral daily  polyethylene glycol 3350 17 Gram(s) Oral two times a day  senna 2 Tablet(s) Oral at bedtime    MEDICATIONS  (PRN):  acetaminophen   Tablet .. 650 milliGRAM(s) Oral every 6 hours PRN Temp greater or equal to 38C (100.4F), Mild Pain (1 - 3)  ondansetron Injectable 4 milliGRAM(s) IV Push every 6 hours PRN Nausea and/or Vomiting  oxyCODONE    Solution 10 milliGRAM(s) Oral every 4 hours PRN Severe Pain (7 - 10)                          10.5   8.8   )-----------( 250      ( 20 Apr 2019 23:57 )             31.4   04-20    138  |  101  |  15  ----------------------------<  115<H>  4.1   |  29  |  0.57    Ca    9.4      20 Apr 2019 23:57  Phos  5.1     04-20  Mg     2.3     04-20     CT Head No Cont (04.12.19   1.4 x 2.5 cm parenchymal hemorrhage in the pradip and   midbrain.    No hydrocephalus or supratentorial midline shift.    Xray Chest 1 View- PORTABLE-Urgent (04.17.19   The heart is normal in size. Poorinspiratory effort. No acute   consolidation could be identified. A tracheostomy tube is in good   position.       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     NEURO:    Neurochecks q4 hrs,    Pontine hemorrhage:  likley HTN  GOC discussion with family - wanting aggressive care  Activity:  [x] Bedrest     PULM:  Respiratory failure 2/2 pontine hemorrhage   Tolerating CPAP - will place on Trach collar  s/p TRACH   RCU consult   Min suctioning requirements     CV:  SBP goal 100 - 160 mmhg   d/c a line  New onset Afib on admission- Labetalol and coreg for rate control       RENAL:  Fluids:  IVL     GI:    Diet: TF via PEG   GI prophylaxis  [x] PPI [] other:  Bowel regimen [x] colace [x] senna [] other: add Miralax     ENDO:   Goal euglycemia (-180)    HEME/ONC:  VTE prophylaxis: [X] SCDs [X] chemoprophylaxis - Lovenox     ID: S/P UTI      SOCIAL/FAMILY:  Awaiting RCU    CODE STATUS:  [x] Full Code [] DNR [] DNI [] Palliative/Comfort Care    DISPOSITION:  [x] ICU [] Stroke Unit [] Floor [] EMU [] RCU [] PCU    Time spent 30 min

## 2019-04-23 DIAGNOSIS — Z29.9 ENCOUNTER FOR PROPHYLACTIC MEASURES, UNSPECIFIED: ICD-10-CM

## 2019-04-23 DIAGNOSIS — I10 ESSENTIAL (PRIMARY) HYPERTENSION: ICD-10-CM

## 2019-04-23 DIAGNOSIS — R50.9 FEVER, UNSPECIFIED: ICD-10-CM

## 2019-04-23 DIAGNOSIS — R13.10 DYSPHAGIA, UNSPECIFIED: ICD-10-CM

## 2019-04-23 DIAGNOSIS — R33.9 RETENTION OF URINE, UNSPECIFIED: ICD-10-CM

## 2019-04-23 DIAGNOSIS — I48.91 UNSPECIFIED ATRIAL FIBRILLATION: ICD-10-CM

## 2019-04-23 LAB
ANION GAP SERPL CALC-SCNC: 12 MMOL/L — SIGNIFICANT CHANGE UP (ref 5–17)
APPEARANCE UR: ABNORMAL
BASE EXCESS BLDA CALC-SCNC: 6.5 MMOL/L — HIGH (ref -2–2)
BILIRUB UR-MCNC: NEGATIVE — SIGNIFICANT CHANGE UP
BUN SERPL-MCNC: 19 MG/DL — SIGNIFICANT CHANGE UP (ref 7–23)
CALCIUM SERPL-MCNC: 9.5 MG/DL — SIGNIFICANT CHANGE UP (ref 8.4–10.5)
CHLORIDE SERPL-SCNC: 99 MMOL/L — SIGNIFICANT CHANGE UP (ref 96–108)
CO2 BLDA-SCNC: 32 MMOL/L — HIGH (ref 22–30)
CO2 SERPL-SCNC: 28 MMOL/L — SIGNIFICANT CHANGE UP (ref 22–31)
COLOR SPEC: SIGNIFICANT CHANGE UP
CREAT SERPL-MCNC: 0.73 MG/DL — SIGNIFICANT CHANGE UP (ref 0.5–1.3)
DIFF PNL FLD: NEGATIVE — SIGNIFICANT CHANGE UP
GAS PNL BLDA: SIGNIFICANT CHANGE UP
GLUCOSE SERPL-MCNC: 125 MG/DL — HIGH (ref 70–99)
GLUCOSE UR QL: NEGATIVE — SIGNIFICANT CHANGE UP
GRAM STN FLD: SIGNIFICANT CHANGE UP
HCO3 BLDA-SCNC: 31 MMOL/L — HIGH (ref 21–29)
HCT VFR BLD CALC: 31.6 % — LOW (ref 34.5–45)
HGB BLD-MCNC: 10.3 G/DL — LOW (ref 11.5–15.5)
HOROWITZ INDEX BLDA+IHG-RTO: 30 — SIGNIFICANT CHANGE UP
KETONES UR-MCNC: NEGATIVE — SIGNIFICANT CHANGE UP
LEUKOCYTE ESTERASE UR-ACNC: NEGATIVE — SIGNIFICANT CHANGE UP
MAGNESIUM SERPL-MCNC: 2.3 MG/DL — SIGNIFICANT CHANGE UP (ref 1.6–2.6)
MCHC RBC-ENTMCNC: 32.1 PG — SIGNIFICANT CHANGE UP (ref 27–34)
MCHC RBC-ENTMCNC: 32.7 GM/DL — SIGNIFICANT CHANGE UP (ref 32–36)
MCV RBC AUTO: 98 FL — SIGNIFICANT CHANGE UP (ref 80–100)
NITRITE UR-MCNC: NEGATIVE — SIGNIFICANT CHANGE UP
PCO2 BLDA: 44 MMHG — SIGNIFICANT CHANGE UP (ref 32–46)
PH BLDA: 7.46 — HIGH (ref 7.35–7.45)
PH UR: 7.5 — SIGNIFICANT CHANGE UP (ref 5–8)
PHOSPHATE SERPL-MCNC: 5 MG/DL — HIGH (ref 2.5–4.5)
PLATELET # BLD AUTO: 360 K/UL — SIGNIFICANT CHANGE UP (ref 150–400)
PO2 BLDA: 93 MMHG — SIGNIFICANT CHANGE UP (ref 74–108)
POTASSIUM SERPL-MCNC: 4.2 MMOL/L — SIGNIFICANT CHANGE UP (ref 3.5–5.3)
POTASSIUM SERPL-SCNC: 4.2 MMOL/L — SIGNIFICANT CHANGE UP (ref 3.5–5.3)
PROT UR-MCNC: NEGATIVE — SIGNIFICANT CHANGE UP
RBC # BLD: 3.22 M/UL — LOW (ref 3.8–5.2)
RBC # FLD: 12.4 % — SIGNIFICANT CHANGE UP (ref 10.3–14.5)
SAO2 % BLDA: 97 % — HIGH (ref 92–96)
SODIUM SERPL-SCNC: 139 MMOL/L — SIGNIFICANT CHANGE UP (ref 135–145)
SP GR SPEC: 1.02 — SIGNIFICANT CHANGE UP (ref 1.01–1.02)
SPECIMEN SOURCE: SIGNIFICANT CHANGE UP
UROBILINOGEN FLD QL: NEGATIVE — SIGNIFICANT CHANGE UP
WBC # BLD: 9.7 K/UL — SIGNIFICANT CHANGE UP (ref 3.8–10.5)
WBC # FLD AUTO: 9.7 K/UL — SIGNIFICANT CHANGE UP (ref 3.8–10.5)

## 2019-04-23 PROCEDURE — 71045 X-RAY EXAM CHEST 1 VIEW: CPT | Mod: 26

## 2019-04-23 PROCEDURE — 99233 SBSQ HOSP IP/OBS HIGH 50: CPT

## 2019-04-23 PROCEDURE — 99231 SBSQ HOSP IP/OBS SF/LOW 25: CPT

## 2019-04-23 PROCEDURE — 74018 RADEX ABDOMEN 1 VIEW: CPT | Mod: 26

## 2019-04-23 RX ORDER — HYDRALAZINE HCL 50 MG
100 TABLET ORAL EVERY 8 HOURS
Qty: 0 | Refills: 0 | Status: DISCONTINUED | OUTPATIENT
Start: 2019-04-23 | End: 2019-05-17

## 2019-04-23 RX ORDER — POLYETHYLENE GLYCOL 3350 17 G/17G
17 POWDER, FOR SOLUTION ORAL
Qty: 0 | Refills: 0 | Status: DISCONTINUED | OUTPATIENT
Start: 2019-04-23 | End: 2019-04-23

## 2019-04-23 RX ORDER — POLYETHYLENE GLYCOL 3350 17 G/17G
17 POWDER, FOR SOLUTION ORAL
Qty: 0 | Refills: 0 | Status: DISCONTINUED | OUTPATIENT
Start: 2019-04-23 | End: 2019-04-28

## 2019-04-23 RX ORDER — IPRATROPIUM/ALBUTEROL SULFATE 18-103MCG
3 AEROSOL WITH ADAPTER (GRAM) INHALATION EVERY 6 HOURS
Qty: 0 | Refills: 0 | Status: DISCONTINUED | OUTPATIENT
Start: 2019-04-23 | End: 2019-05-04

## 2019-04-23 RX ORDER — MEROPENEM 1 G/30ML
1000 INJECTION INTRAVENOUS ONCE
Qty: 0 | Refills: 0 | Status: COMPLETED | OUTPATIENT
Start: 2019-04-23 | End: 2019-04-23

## 2019-04-23 RX ORDER — VANCOMYCIN HCL 1 G
1000 VIAL (EA) INTRAVENOUS ONCE
Qty: 0 | Refills: 0 | Status: COMPLETED | OUTPATIENT
Start: 2019-04-23 | End: 2019-04-23

## 2019-04-23 RX ORDER — SODIUM CHLORIDE 9 MG/ML
3 INJECTION INTRAMUSCULAR; INTRAVENOUS; SUBCUTANEOUS EVERY 6 HOURS
Qty: 0 | Refills: 0 | Status: DISCONTINUED | OUTPATIENT
Start: 2019-04-23 | End: 2019-04-27

## 2019-04-23 RX ADMIN — SODIUM CHLORIDE 3 MILLILITER(S): 9 INJECTION INTRAMUSCULAR; INTRAVENOUS; SUBCUTANEOUS at 15:45

## 2019-04-23 RX ADMIN — Medication 100 MILLIGRAM(S): at 06:39

## 2019-04-23 RX ADMIN — Medication 100 MILLIGRAM(S): at 21:43

## 2019-04-23 RX ADMIN — SODIUM CHLORIDE 3 MILLILITER(S): 9 INJECTION INTRAMUSCULAR; INTRAVENOUS; SUBCUTANEOUS at 23:24

## 2019-04-23 RX ADMIN — Medication 650 MILLIGRAM(S): at 21:43

## 2019-04-23 RX ADMIN — Medication 75 MILLIGRAM(S): at 06:39

## 2019-04-23 RX ADMIN — Medication 250 MILLIGRAM(S): at 23:48

## 2019-04-23 RX ADMIN — POLYETHYLENE GLYCOL 3350 17 GRAM(S): 17 POWDER, FOR SOLUTION ORAL at 06:41

## 2019-04-23 RX ADMIN — POLYETHYLENE GLYCOL 3350 17 GRAM(S): 17 POWDER, FOR SOLUTION ORAL at 21:43

## 2019-04-23 RX ADMIN — MEROPENEM 100 MILLIGRAM(S): 1 INJECTION INTRAVENOUS at 23:48

## 2019-04-23 RX ADMIN — SODIUM CHLORIDE 3 MILLILITER(S): 9 INJECTION INTRAMUSCULAR; INTRAVENOUS; SUBCUTANEOUS at 17:02

## 2019-04-23 RX ADMIN — CHLORHEXIDINE GLUCONATE 15 MILLILITER(S): 213 SOLUTION TOPICAL at 17:35

## 2019-04-23 RX ADMIN — Medication 100 MILLIGRAM(S): at 13:32

## 2019-04-23 RX ADMIN — Medication 650 MILLIGRAM(S): at 22:43

## 2019-04-23 RX ADMIN — Medication 8 MILLIGRAM(S): at 21:43

## 2019-04-23 RX ADMIN — Medication 60 MILLIGRAM(S): at 21:43

## 2019-04-23 RX ADMIN — CHLORHEXIDINE GLUCONATE 15 MILLILITER(S): 213 SOLUTION TOPICAL at 06:40

## 2019-04-23 RX ADMIN — Medication 60 MILLIGRAM(S): at 10:15

## 2019-04-23 RX ADMIN — Medication 650 MILLIGRAM(S): at 06:45

## 2019-04-23 RX ADMIN — Medication 60 MILLIGRAM(S): at 04:21

## 2019-04-23 RX ADMIN — Medication 3 MILLILITER(S): at 12:39

## 2019-04-23 RX ADMIN — Medication 3 MILLILITER(S): at 23:24

## 2019-04-23 RX ADMIN — PANTOPRAZOLE SODIUM 40 MILLIGRAM(S): 20 TABLET, DELAYED RELEASE ORAL at 12:00

## 2019-04-23 RX ADMIN — Medication 100 MILLIGRAM(S): at 17:35

## 2019-04-23 RX ADMIN — POLYETHYLENE GLYCOL 3350 17 GRAM(S): 17 POWDER, FOR SOLUTION ORAL at 14:00

## 2019-04-23 RX ADMIN — Medication 100 MILLIGRAM(S): at 13:33

## 2019-04-23 RX ADMIN — SENNA PLUS 2 TABLET(S): 8.6 TABLET ORAL at 21:43

## 2019-04-23 RX ADMIN — Medication 3 MILLILITER(S): at 17:01

## 2019-04-23 RX ADMIN — CHLORHEXIDINE GLUCONATE 1 APPLICATION(S): 213 SOLUTION TOPICAL at 21:45

## 2019-04-23 RX ADMIN — Medication 60 MILLIGRAM(S): at 15:39

## 2019-04-23 RX ADMIN — ENOXAPARIN SODIUM 40 MILLIGRAM(S): 100 INJECTION SUBCUTANEOUS at 17:35

## 2019-04-23 NOTE — PROGRESS NOTE ADULT - PROBLEM SELECTOR PLAN 5
Patient with elevated BP this morning and yesterday afternoon   Will increase Hydralazine 100 mg q 8 hrs   SBP Goal 100- 160 Per Neuro Sx

## 2019-04-23 NOTE — PROGRESS NOTE ADULT - ASSESSMENT
63 Year old female w/ PMHx HTN, HLD, Ischemic stroke (2004), who was transferred from OSH, Patient initially presented to the OSH from home with AMS as per EMS, pt was talking to a family member on the phone when she suddenly stopped talking. 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 Patient transferred to [facility] for Neuro Surgical assessment. Upon admission NIHSS = ; MRS = 2; ICH =3. Upon admission patient was found to have newly diagnosed A.fib and was initiated on Labetalol. Patient had Serial head CTs performed which remained unchanged, no neuro surgical intervention was performed. 63 Year old female w/ PMHx HTN, HLD, Ischemic stroke (2004), who was transferred from OSH, Patient initially presented to the OSH from home with AMS as per EMS, pt was talking to a family member on the phone when she suddenly stopped talking. 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 Patient transferred to [facility] for Neuro Surgical assessment. Upon admission NIHSS = ; MRS = 2; ICH =3. Patient was given DDAVP and PLTs. Upon admission patient was found to have newly diagnosed A.fib and was initiated on Labetalol. Patient had Serial head CTs performed which remained unchanged, no neuro surgical intervention was performed. Patient was seen by Palliative Care family decided to proceed with Trach and PEG. Patient S/p Tracheostomy on 4/16( # 8 Cuffed Danna) and PEG Placement on 4/18. During hospitalization patient found to have UTI ( 100 K E.coli ) for which she was treated with a course of Keflex ( Completed 4/21).     4/23: Patient transferred from NSCU Overnight, Patient febrile this morning Temp 101.1; CXR Ordered, Blood / Sputum cx sent, UA Pending. RN reports frequent suctioning of thick respiratory secretions overnight, Will add Hyper-Sal / Duoneb and Chest PT. Patient remained on TC ATC for first time AM ABG pending. 63 Year old female w/ PMHx HTN, HLD, Ischemic stroke (2004), who was transferred from OSH, Patient initially presented to the OSH from home with AMS as per EMS, pt was talking to a family member on the phone when she suddenly stopped talking. 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 Patient transferred to [facility] for Neuro Surgical assessment. Upon admission NIHSS = ; MRS = 2; ICH =3. Patient was given DDAVP and PLTs. During admission patient was found to have newly diagnosed A.fib and was initiated on Labetalol. Patient had Serial head CTs performed which remained unchanged, no neuro surgical intervention was performed. Patient was seen by Palliative Care family decided to proceed with Trach and PEG. Patient S/p Tracheostomy on 4/16 ( # 8 Cuffed Danna) and PEG Placement on 4/18. During hospitalization patient found to have UTI ( 100 K E.coli ) for which she was treated with a course of Keflex ( Completed 4/21).     4/23: Patient transferred from NSCU Overnight, Patient febrile this morning Temp 101.1; CXR Ordered, Blood / Sputum cx sent, UA Pending. RN reports frequent suctioning of thick respiratory secretions overnight, Will add Hyper-Sal / Duoneb and Chest PT. Patient remained on TC ATC for first time AM ABG pending.

## 2019-04-23 NOTE — PROGRESS NOTE ADULT - ASSESSMENT
HTN  stable  cont current meds    Afib  resolved  cont current avn blockers  off a/c due to pontine bleed     resp failrue  s/p trach   fu with RCU

## 2019-04-23 NOTE — PROGRESS NOTE ADULT - PROBLEM SELECTOR PLAN 1
-Sutures to be removed today  - HOB elevation to 30 degrees  - Continue trach care  - Care per primary team  - ENT to follow. -Sutures to be removed today  - HOB elevation to 30 degrees  - Continue trach care  ENT will sign off, feel free to call with any new issues  - Care per primary team  - ENT to follow.

## 2019-04-23 NOTE — PROGRESS NOTE ADULT - PROBLEM SELECTOR PLAN 4
Patient with Newly Diagnosed A.FIB   Continue Labetalol 100 mg q 8 hr and Cardizem 60 mg q 6 hrs   Monitor HR, No AC given 2/2 recent ICH

## 2019-04-23 NOTE — PROGRESS NOTE ADULT - SUBJECTIVE AND OBJECTIVE BOX
Patient is a 63y old  Female who presented with a chief complaint of ICH (23 Apr 2019 11:00)      INTERVAL HPI/OVERNIGHT EVENTS:  no BM since 4/18/19 (after aggressive laxatives)  tolerating PEG feeds at goal rate  +fever    MEDICATIONS  (STANDING):  ALBUTerol/ipratropium for Nebulization 3 milliLiter(s) Nebulizer every 6 hours  chlorhexidine 0.12% Liquid 15 milliLiter(s) Oral Mucosa two times a day  chlorhexidine 4% Liquid 1 Application(s) Topical <User Schedule>  diltiazem    Tablet 60 milliGRAM(s) Oral every 6 hours  docusate sodium Liquid 100 milliGRAM(s) Oral every 8 hours  doxazosin 8 milliGRAM(s) Oral at bedtime  enoxaparin Injectable 40 milliGRAM(s) SubCutaneous <User Schedule>  hydrALAZINE 100 milliGRAM(s) Oral every 8 hours  labetalol 100 milliGRAM(s) Oral two times a day  pantoprazole   Suspension 40 milliGRAM(s) Oral daily  polyethylene glycol 3350 17 Gram(s) Oral two times a day  senna 2 Tablet(s) Oral at bedtime  sodium chloride 7% Inhalation 3 milliLiter(s) Inhalation every 6 hours    MEDICATIONS  (PRN):  acetaminophen   Tablet .. 650 milliGRAM(s) Oral every 6 hours PRN Temp greater or equal to 38C (100.4F), Mild Pain (1 - 3)  ondansetron Injectable 4 milliGRAM(s) IV Push every 6 hours PRN Nausea and/or Vomiting  oxyCODONE    Solution 10 milliGRAM(s) Oral every 4 hours PRN Severe Pain (7 - 10)    Allergies  Allergy Status Unknown    Review of Systems:  unable to obtain    Vital Signs Last 24 Hrs  T(C): 38.4 (23 Apr 2019 05:10), Max: 38.4 (23 Apr 2019 05:10)  T(F): 101.1 (23 Apr 2019 05:10), Max: 101.1 (23 Apr 2019 05:10)  HR: 84 (23 Apr 2019 10:15) (73 - 92)  BP: 152/80 (23 Apr 2019 10:15) (142/65 - 176/81)  BP(mean): 98 (22 Apr 2019 20:00) (94 - 117)  RR: 18 (23 Apr 2019 05:10) (17 - 27)  SpO2: 96% (23 Apr 2019 09:28) (95% - 98%)    PHYSICAL EXAM:  Constitutional: appears comfortable orally intubated. not responsive, not following commands  Neck: +trach  Respiratory: b/l air entry, grossly clear  Cardiovascular: S1 and S2, RRR, no M  Gastrointestinal: BS+, soft NT no rebound or rigidity +PEG site clean and dry, bumper at 5cm spins freely 360 degrees  Extremities: No peripheral edema, neg clubbing, cyanosis  Vascular: 2+ peripheral pulses  Neurological: unresponsive, not following commands  Skin: No rashes    LABS:                        10.3   9.7   )-----------( 360      ( 23 Apr 2019 08:06 )             31.6     04-23    139  |  99  |  19  ----------------------------<  125<H>  4.2   |  28  |  0.73    Ca    9.5      23 Apr 2019 07:14  Phos  5.0     04-23  Mg     2.3     04-23      RADIOLOGY & ADDITIONAL TESTS:

## 2019-04-23 NOTE — PROGRESS NOTE ADULT - ASSESSMENT
64 y/o F w/ PMHx HTN, HLD, ischemic CVA (2004), presents to the ED BIBA from home for evaluation of AMS- found to have pontine hemorrhage    Resp failure - s/p trach  Dysphagia-  s/ p PEG 4/18/19    RECS:  -PEG feeds and local care.  -Increase Miralax to TID, add tap water enema x1, continue Senna  -check AXR to evaluate fecal burden    Discussed with RCU team    Amari Unger PA-C    [facility]  [phone] 64 y/o F w/ PMHx HTN, HLD, ischemic CVA (2004), presents to the ED BIBA from home for evaluation of AMS- found to have pontine hemorrhage    Resp failure - s/p trach  Dysphagia-  s/ p PEG 4/18/19    RECS:  -PEG feeds and local care.  -Increase Miralax to TID, add tap water enema x1, continue Senna  -check AXR to evaluate fecal burden  -Fever workup per primary team    Discussed with RCU team    Amari Unger PA-C    [facility]  [phone]

## 2019-04-23 NOTE — PROGRESS NOTE ADULT - ASSESSMENT
62yo male s/p trach POD # 7. Patient with #8 cuffed shiley in place, ventilating well on CPAP 62yo male s/p trach POD # 7. Patient with #8 cuffed shiley in place currently on trach collar for approximately 24 hours and tolerating well.

## 2019-04-23 NOTE — PROGRESS NOTE ADULT - PROBLEM SELECTOR PLAN 1
Patient with Admitted with Pontine Hemorrhage likely 2/2 Uncontrolled HTN   Head CT 4/17: Similar Appearing Hemorrhage in Mid brain, No hydrocephalus   No Neuro Surgical Intervention performed  Patient w/ significant neurological impairment  Continue Supportive Care / OT Consult placed for Splints

## 2019-04-23 NOTE — PROVIDER CONTACT NOTE (OTHER) - ASSESSMENT
Pt remains obtuned, no acute distress noted. Pt had a fever this am and was pancultured, xray also taken. Tylenol was given as ordered for fever.

## 2019-04-23 NOTE — PROGRESS NOTE ADULT - SUBJECTIVE AND OBJECTIVE BOX
Subjective: Patient seen and examined. No new events except as noted.     SUBJECTIVE/ROS:      MEDICATIONS:  MEDICATIONS  (STANDING):  ALBUTerol/ipratropium for Nebulization 3 milliLiter(s) Nebulizer every 6 hours  chlorhexidine 0.12% Liquid 15 milliLiter(s) Oral Mucosa two times a day  chlorhexidine 4% Liquid 1 Application(s) Topical <User Schedule>  diltiazem    Tablet 60 milliGRAM(s) Oral every 6 hours  docusate sodium Liquid 100 milliGRAM(s) Oral every 8 hours  doxazosin 8 milliGRAM(s) Oral at bedtime  enoxaparin Injectable 40 milliGRAM(s) SubCutaneous <User Schedule>  hydrALAZINE 100 milliGRAM(s) Oral every 8 hours  labetalol 100 milliGRAM(s) Oral two times a day  pantoprazole   Suspension 40 milliGRAM(s) Oral daily  polyethylene glycol 3350 17 Gram(s) Oral two times a day  senna 2 Tablet(s) Oral at bedtime  sodium chloride 7% Inhalation 3 milliLiter(s) Inhalation every 6 hours      PHYSICAL EXAM:  T(C): 38.4 (04-23-19 @ 05:10), Max: 38.4 (04-23-19 @ 05:10)  HR: 84 (04-23-19 @ 10:15) (73 - 92)  BP: 152/80 (04-23-19 @ 10:15) (142/65 - 176/81)  RR: 18 (04-23-19 @ 05:10) (17 - 27)  SpO2: 96% (04-23-19 @ 09:28) (95% - 98%)  Wt(kg): --  I&O's Summary    22 Apr 2019 07:01  -  23 Apr 2019 07:00  --------------------------------------------------------  IN: 1525 mL / OUT: 2900 mL / NET: -1375 mL            JVP: Normal  Neck: supple  Lung: clear   CV: S1 S2 , Murmur:  Abd: soft  Ext: No edema  Psych: flat affect  Skin: normal``    LABS/DATA:    CARDIAC MARKERS:                                10.3   9.7   )-----------( 360      ( 23 Apr 2019 08:06 )             31.6     04-23    139  |  99  |  19  ----------------------------<  125<H>  4.2   |  28  |  0.73    Ca    9.5      23 Apr 2019 07:14  Phos  5.0     04-23  Mg     2.3     04-23      proBNP:   Lipid Profile:   HgA1c:   TSH:     TELE:  EKG:

## 2019-04-23 NOTE — PROGRESS NOTE ADULT - PROBLEM SELECTOR PLAN 3
Patient S/p Tracheostomy 4/16  ( # 8 Cuffed Danna )   Patient S/p Trach Collar ATC overnight , Will check AM  ABG   Continue Nebs / Suctioning PRN / Chest PT

## 2019-04-23 NOTE — PROGRESS NOTE ADULT - SUBJECTIVE AND OBJECTIVE BOX
ENT ISSUE/POD:s/p tracheostomy POD # 7        HPI: 62 yo female with Respiratory failure now s/p tracheostomy POD # 7. Pt seen and examined at bedside. No acute events overnight. Pt with #8 cuffed shiley, currently tolerating CPAP            PAST MEDICAL & SURGICAL HISTORY:  High cholesterol  Hypertension, unspecified type  Cerebrovascular accident (CVA), unspecified mechanism  History of appendectomy    Allergies    Allergy Status Unknown    Intolerances      MEDICATIONS  (STANDING):  ALBUTerol/ipratropium for Nebulization 3 milliLiter(s) Nebulizer every 6 hours  chlorhexidine 0.12% Liquid 15 milliLiter(s) Oral Mucosa two times a day  chlorhexidine 4% Liquid 1 Application(s) Topical <User Schedule>  diltiazem    Tablet 60 milliGRAM(s) Oral every 6 hours  docusate sodium Liquid 100 milliGRAM(s) Oral every 8 hours  doxazosin 8 milliGRAM(s) Oral at bedtime  enoxaparin Injectable 40 milliGRAM(s) SubCutaneous <User Schedule>  hydrALAZINE 75 milliGRAM(s) Oral every 8 hours  labetalol 100 milliGRAM(s) Oral two times a day  pantoprazole   Suspension 40 milliGRAM(s) Oral daily  polyethylene glycol 3350 17 Gram(s) Oral two times a day  senna 2 Tablet(s) Oral at bedtime  sodium chloride 7% Inhalation 3 milliLiter(s) Inhalation every 6 hours    MEDICATIONS  (PRN):  acetaminophen   Tablet .. 650 milliGRAM(s) Oral every 6 hours PRN Temp greater or equal to 38C (100.4F), Mild Pain (1 - 3)  ondansetron Injectable 4 milliGRAM(s) IV Push every 6 hours PRN Nausea and/or Vomiting  oxyCODONE    Solution 10 milliGRAM(s) Oral every 4 hours PRN Severe Pain (7 - 10)      Social History: see consult note    Family history: see consult mote    ROS:   ENT: all negative except as noted in HPI   Pulm: denies SOB, cough, hemoptysis  Neuro: denies numbness/tingling, loss of sensation  Endo: denies heat/cold intolerance, excessive sweating      Vital Signs Last 24 Hrs  T(C): 38.4 (23 Apr 2019 05:10), Max: 38.4 (23 Apr 2019 05:10)  T(F): 101.1 (23 Apr 2019 05:10), Max: 101.1 (23 Apr 2019 05:10)  HR: 92 (23 Apr 2019 05:10) (71 - 92)  BP: 168/78 (23 Apr 2019 05:10) (142/65 - 176/81)  BP(mean): 98 (22 Apr 2019 20:00) (94 - 117)  RR: 18 (23 Apr 2019 05:10) (17 - 27)  SpO2: 97% (23 Apr 2019 05:10) (95% - 98%)     04-23    139  |  99  |  19  ----------------------------<  125<H>  4.2   |  28  |  0.73    Ca    9.5      23 Apr 2019 07:14  Phos  5.0     04-23  Mg     2.3     04-23         PHYSICAL EXAM:  Gen: NAD  Skin: No rashes, bruises, or lesions  Head: Normocephalic, Atraumatic  Face: no edema, erythema, or fluctuance. Parotid glands soft without mass  Eyes: no scleral injection  Nose: Nares bilaterally patent, no discharge  Mouth: No Stridor / Drooling / Trismus.  Mucosa moist, tongue/uvula midline, oropharynx clear  Neck: Shiley 8 LPC in place stoma clean and dry no bleeding or purulence, sutures x 4 c/d/i  neck Flat, supple, no lymphadenopathy, trachea midline, no masses  Lymphatic: No lymphadenopathy  Resp: breathing easily, no stridor  Neuro: facial nerve intact, no facial droop ENT ISSUE/POD:s/p tracheostomy POD # 7        HPI: 64 yo female with Respiratory failure now s/p tracheostomy POD # 7. Pt seen and examined at bedside. No acute events overnight. Pt with #8 cuffed shiley in place. Pt. tolerated trach collar for approximately 24 hours.             PAST MEDICAL & SURGICAL HISTORY:  High cholesterol  Hypertension, unspecified type  Cerebrovascular accident (CVA), unspecified mechanism  History of appendectomy    Allergies    Allergy Status Unknown    Intolerances      MEDICATIONS  (STANDING):  ALBUTerol/ipratropium for Nebulization 3 milliLiter(s) Nebulizer every 6 hours  chlorhexidine 0.12% Liquid 15 milliLiter(s) Oral Mucosa two times a day  chlorhexidine 4% Liquid 1 Application(s) Topical <User Schedule>  diltiazem    Tablet 60 milliGRAM(s) Oral every 6 hours  docusate sodium Liquid 100 milliGRAM(s) Oral every 8 hours  doxazosin 8 milliGRAM(s) Oral at bedtime  enoxaparin Injectable 40 milliGRAM(s) SubCutaneous <User Schedule>  hydrALAZINE 75 milliGRAM(s) Oral every 8 hours  labetalol 100 milliGRAM(s) Oral two times a day  pantoprazole   Suspension 40 milliGRAM(s) Oral daily  polyethylene glycol 3350 17 Gram(s) Oral two times a day  senna 2 Tablet(s) Oral at bedtime  sodium chloride 7% Inhalation 3 milliLiter(s) Inhalation every 6 hours    MEDICATIONS  (PRN):  acetaminophen   Tablet .. 650 milliGRAM(s) Oral every 6 hours PRN Temp greater or equal to 38C (100.4F), Mild Pain (1 - 3)  ondansetron Injectable 4 milliGRAM(s) IV Push every 6 hours PRN Nausea and/or Vomiting  oxyCODONE    Solution 10 milliGRAM(s) Oral every 4 hours PRN Severe Pain (7 - 10)      Social History: see consult note    Family history: see consult mote    ROS:   ENT: all negative except as noted in HPI   Pulm: denies SOB, cough, hemoptysis  Neuro: denies numbness/tingling, loss of sensation  Endo: denies heat/cold intolerance, excessive sweating      Vital Signs Last 24 Hrs  T(C): 38.4 (23 Apr 2019 05:10), Max: 38.4 (23 Apr 2019 05:10)  T(F): 101.1 (23 Apr 2019 05:10), Max: 101.1 (23 Apr 2019 05:10)  HR: 92 (23 Apr 2019 05:10) (71 - 92)  BP: 168/78 (23 Apr 2019 05:10) (142/65 - 176/81)  BP(mean): 98 (22 Apr 2019 20:00) (94 - 117)  RR: 18 (23 Apr 2019 05:10) (17 - 27)  SpO2: 97% (23 Apr 2019 05:10) (95% - 98%)     04-23    139  |  99  |  19  ----------------------------<  125<H>  4.2   |  28  |  0.73    Ca    9.5      23 Apr 2019 07:14  Phos  5.0     04-23  Mg     2.3     04-23         PHYSICAL EXAM:  Gen: on trach collar  Skin: No rashes, bruises, or lesions  Head: Normocephalic, Atraumatic  Face: no edema, erythema, or fluctuance. Parotid glands soft without mass  Eyes: no scleral injection  Nose: Nares bilaterally patent, no discharge  Mouth: No Stridor / Drooling / Trismus.  Mucosa moist, tongue/uvula midline, oropharynx clear  Neck: Shiley 8 LPC in place stoma clean and dry no bleeding or purulence, sutures x 4 removed and velcro strap placed,  neck Flat, supple, no lymphadenopathy, trachea midline, no masses  Lymphatic: No lymphadenopathy  Resp: on trach collar  Neuro: facial nerve intact, no facial droop ENT ISSUE/POD:s/p tracheostomy POD # 7        HPI: 64 yo female with Respiratory failure now s/p tracheostomy POD # 7. Pt seen and examined at bedside. No acute events overnight. Pt with #8 cuffed shiley in place. Pt. tolerated trach collar for approximately 24 hours.         PAST MEDICAL & SURGICAL HISTORY:  High cholesterol  Hypertension, unspecified type  Cerebrovascular accident (CVA), unspecified mechanism  History of appendectomy    Allergies    Allergy Status Unknown    Intolerances      MEDICATIONS  (STANDING):  ALBUTerol/ipratropium for Nebulization 3 milliLiter(s) Nebulizer every 6 hours  chlorhexidine 0.12% Liquid 15 milliLiter(s) Oral Mucosa two times a day  chlorhexidine 4% Liquid 1 Application(s) Topical <User Schedule>  diltiazem    Tablet 60 milliGRAM(s) Oral every 6 hours  docusate sodium Liquid 100 milliGRAM(s) Oral every 8 hours  doxazosin 8 milliGRAM(s) Oral at bedtime  enoxaparin Injectable 40 milliGRAM(s) SubCutaneous <User Schedule>  hydrALAZINE 75 milliGRAM(s) Oral every 8 hours  labetalol 100 milliGRAM(s) Oral two times a day  pantoprazole   Suspension 40 milliGRAM(s) Oral daily  polyethylene glycol 3350 17 Gram(s) Oral two times a day  senna 2 Tablet(s) Oral at bedtime  sodium chloride 7% Inhalation 3 milliLiter(s) Inhalation every 6 hours    MEDICATIONS  (PRN):  acetaminophen   Tablet .. 650 milliGRAM(s) Oral every 6 hours PRN Temp greater or equal to 38C (100.4F), Mild Pain (1 - 3)  ondansetron Injectable 4 milliGRAM(s) IV Push every 6 hours PRN Nausea and/or Vomiting  oxyCODONE    Solution 10 milliGRAM(s) Oral every 4 hours PRN Severe Pain (7 - 10)      Social History: see consult note    Family history: see consult mote    ROS:   ENT: all negative except as noted in HPI   Pulm: denies SOB, cough, hemoptysis  Neuro: denies numbness/tingling, loss of sensation  Endo: denies heat/cold intolerance, excessive sweating      Vital Signs Last 24 Hrs  T(C): 38.4 (23 Apr 2019 05:10), Max: 38.4 (23 Apr 2019 05:10)  T(F): 101.1 (23 Apr 2019 05:10), Max: 101.1 (23 Apr 2019 05:10)  HR: 92 (23 Apr 2019 05:10) (71 - 92)  BP: 168/78 (23 Apr 2019 05:10) (142/65 - 176/81)  BP(mean): 98 (22 Apr 2019 20:00) (94 - 117)  RR: 18 (23 Apr 2019 05:10) (17 - 27)  SpO2: 97% (23 Apr 2019 05:10) (95% - 98%)     04-23    139  |  99  |  19  ----------------------------<  125<H>  4.2   |  28  |  0.73    Ca    9.5      23 Apr 2019 07:14  Phos  5.0     04-23  Mg     2.3     04-23         PHYSICAL EXAM:  Gen: on trach collar  Skin: No rashes, bruises, or lesions  Head: Normocephalic, Atraumatic  Face: no edema, erythema, or fluctuance. Parotid glands soft without mass  Eyes: no scleral injection  Nose: Nares bilaterally patent, no discharge  Mouth: No Stridor / Drooling / Trismus.  Mucosa moist, tongue/uvula midline, oropharynx clear  Neck: Shiley 8 LPC in place stoma clean and dry no bleeding or purulence, sutures x 4 removed and velcro strap placed,  neck Flat, supple, no lymphadenopathy, trachea midline, no masses  Lymphatic: No lymphadenopathy  Resp: on trach collar  Neuro: facial nerve intact, no facial droop

## 2019-04-23 NOTE — PROGRESS NOTE ADULT - SUBJECTIVE AND OBJECTIVE BOX
Patient is a 63y old  Female who presents with a chief complaint of ICH (22 Apr 2019 10:23)      Interval Events: Patient transferred from NSCU to RCU Yesterday evening, Patient febrile this morning     REVIEW OF SYSTEMS:  [ ] Positive  [ ] All other systems negative  [x] Unable to assess ROS because patient is Non-Verbal     Vital Signs Last 24 Hrs  T(C): 38.4 (04-23-19 @ 05:10), Max: 38.4 (04-23-19 @ 05:10)  T(F): 101.1 (04-23-19 @ 05:10), Max: 101.1 (04-23-19 @ 05:10)  HR: 92 (04-23-19 @ 05:10) (71 - 92)  BP: 168/78 (04-23-19 @ 05:10) (142/65 - 176/81)  RR: 18 (04-23-19 @ 05:10) (17 - 27)  SpO2: 97% (04-23-19 @ 05:10) (95% - 98%)    PHYSICAL EXAM:  HEENT:   [x]Tracheostomy: # 8 Cuffed Shiley Trach sutures remain in place   [x]Pupils equal  [ ]No oral lesions  [ ]Abnormal    SKIN  [x]No Rash  [ ] Abnormal  [ ] pressure    CARDIAC  [x]Regular  [ ]Abnormal    PULMONARY  [x]Bilateral Clear Breath Sounds  [ ]Normal Excursion  [ ]Abnormal    GI  [x]PEG      [x] +BS		              [x]Soft, nondistended, nontender	  [ ]Abnormal    MUSCULOSKELETAL                                   [ ]Bedbound                 [ ]Abnormal    [x]OOB to chair                           EXTREMITIES                                         [x]Normal  [ ]Edema                           NEUROLOGIC  [ ] Normal, non focal  [x] Focal findings: No following commands, No withdrawal to pain     PSYCHIATRIC  [ ]Alert and appropriate  [x] Sedated	 [ ]Agitated    :  Bedoya: [ ] Yes, if yes: Date of Placement:                   [x] No Straight Cath ATC     LINES: Central Lines [ ] Yes, if yes: Date of Placement                                     [x] No    HOSPITAL MEDICATIONS:  MEDICATIONS  (STANDING):  ALBUTerol/ipratropium for Nebulization 3 milliLiter(s) Nebulizer every 6 hours  chlorhexidine 0.12% Liquid 15 milliLiter(s) Oral Mucosa two times a day  chlorhexidine 4% Liquid 1 Application(s) Topical <User Schedule>  diltiazem    Tablet 60 milliGRAM(s) Oral every 6 hours  docusate sodium Liquid 100 milliGRAM(s) Oral every 8 hours  doxazosin 8 milliGRAM(s) Oral at bedtime  enoxaparin Injectable 40 milliGRAM(s) SubCutaneous <User Schedule>  hydrALAZINE 75 milliGRAM(s) Oral every 8 hours  labetalol 100 milliGRAM(s) Oral two times a day  pantoprazole   Suspension 40 milliGRAM(s) Oral daily  polyethylene glycol 3350 17 Gram(s) Oral two times a day  senna 2 Tablet(s) Oral at bedtime  sodium chloride 7% Inhalation 3 milliLiter(s) Inhalation every 6 hours    MEDICATIONS  (PRN):  acetaminophen   Tablet .. 650 milliGRAM(s) Oral every 6 hours PRN Temp greater or equal to 38C (100.4F), Mild Pain (1 - 3)  ondansetron Injectable 4 milliGRAM(s) IV Push every 6 hours PRN Nausea and/or Vomiting  oxyCODONE    Solution 10 milliGRAM(s) Oral every 4 hours PRN Severe Pain (7 - 10)      LABS:                  CAPILLARY BLOOD GLUCOSE    MICROBIOLOGY:     RADIOLOGY:  [ ] Reviewed and interpreted by me    Mode: VENT OFF

## 2019-04-24 LAB
ANION GAP SERPL CALC-SCNC: 14 MMOL/L — SIGNIFICANT CHANGE UP (ref 5–17)
BUN SERPL-MCNC: 22 MG/DL — SIGNIFICANT CHANGE UP (ref 7–23)
CALCIUM SERPL-MCNC: 9.3 MG/DL — SIGNIFICANT CHANGE UP (ref 8.4–10.5)
CHLORIDE SERPL-SCNC: 98 MMOL/L — SIGNIFICANT CHANGE UP (ref 96–108)
CO2 SERPL-SCNC: 28 MMOL/L — SIGNIFICANT CHANGE UP (ref 22–31)
CREAT SERPL-MCNC: 0.62 MG/DL — SIGNIFICANT CHANGE UP (ref 0.5–1.3)
GLUCOSE SERPL-MCNC: 138 MG/DL — HIGH (ref 70–99)
HCT VFR BLD CALC: 32.4 % — LOW (ref 34.5–45)
HGB BLD-MCNC: 10.6 G/DL — LOW (ref 11.5–15.5)
MAGNESIUM SERPL-MCNC: 2.2 MG/DL — SIGNIFICANT CHANGE UP (ref 1.6–2.6)
MCHC RBC-ENTMCNC: 32.2 PG — SIGNIFICANT CHANGE UP (ref 27–34)
MCHC RBC-ENTMCNC: 32.7 GM/DL — SIGNIFICANT CHANGE UP (ref 32–36)
MCV RBC AUTO: 98.5 FL — SIGNIFICANT CHANGE UP (ref 80–100)
PHOSPHATE SERPL-MCNC: 4.6 MG/DL — HIGH (ref 2.5–4.5)
PLATELET # BLD AUTO: 373 K/UL — SIGNIFICANT CHANGE UP (ref 150–400)
POTASSIUM SERPL-MCNC: 4 MMOL/L — SIGNIFICANT CHANGE UP (ref 3.5–5.3)
POTASSIUM SERPL-SCNC: 4 MMOL/L — SIGNIFICANT CHANGE UP (ref 3.5–5.3)
RBC # BLD: 3.29 M/UL — LOW (ref 3.8–5.2)
RBC # FLD: 12.7 % — SIGNIFICANT CHANGE UP (ref 10.3–14.5)
SODIUM SERPL-SCNC: 140 MMOL/L — SIGNIFICANT CHANGE UP (ref 135–145)
WBC # BLD: 12 K/UL — HIGH (ref 3.8–10.5)
WBC # FLD AUTO: 12 K/UL — HIGH (ref 3.8–10.5)

## 2019-04-24 PROCEDURE — 99232 SBSQ HOSP IP/OBS MODERATE 35: CPT

## 2019-04-24 PROCEDURE — 74018 RADEX ABDOMEN 1 VIEW: CPT | Mod: 26

## 2019-04-24 PROCEDURE — 99291 CRITICAL CARE FIRST HOUR: CPT

## 2019-04-24 RX ORDER — MEROPENEM 1 G/30ML
1000 INJECTION INTRAVENOUS EVERY 8 HOURS
Qty: 0 | Refills: 0 | Status: DISCONTINUED | OUTPATIENT
Start: 2019-04-24 | End: 2019-05-01

## 2019-04-24 RX ORDER — LABETALOL HCL 100 MG
100 TABLET ORAL EVERY 8 HOURS
Qty: 0 | Refills: 0 | Status: DISCONTINUED | OUTPATIENT
Start: 2019-04-24 | End: 2019-04-24

## 2019-04-24 RX ORDER — LABETALOL HCL 100 MG
200 TABLET ORAL EVERY 8 HOURS
Qty: 0 | Refills: 0 | Status: DISCONTINUED | OUTPATIENT
Start: 2019-04-24 | End: 2019-04-25

## 2019-04-24 RX ORDER — VANCOMYCIN HCL 1 G
1000 VIAL (EA) INTRAVENOUS EVERY 12 HOURS
Qty: 0 | Refills: 0 | Status: DISCONTINUED | OUTPATIENT
Start: 2019-04-24 | End: 2019-04-25

## 2019-04-24 RX ADMIN — Medication 60 MILLIGRAM(S): at 09:33

## 2019-04-24 RX ADMIN — Medication 3 MILLILITER(S): at 23:59

## 2019-04-24 RX ADMIN — Medication 100 MILLIGRAM(S): at 11:06

## 2019-04-24 RX ADMIN — Medication 3 MILLILITER(S): at 11:31

## 2019-04-24 RX ADMIN — Medication 200 MILLIGRAM(S): at 22:04

## 2019-04-24 RX ADMIN — Medication 3 MILLILITER(S): at 05:28

## 2019-04-24 RX ADMIN — Medication 100 MILLIGRAM(S): at 13:47

## 2019-04-24 RX ADMIN — CHLORHEXIDINE GLUCONATE 15 MILLILITER(S): 213 SOLUTION TOPICAL at 17:31

## 2019-04-24 RX ADMIN — Medication 100 MILLIGRAM(S): at 22:01

## 2019-04-24 RX ADMIN — PANTOPRAZOLE SODIUM 40 MILLIGRAM(S): 20 TABLET, DELAYED RELEASE ORAL at 11:07

## 2019-04-24 RX ADMIN — Medication 8 MILLIGRAM(S): at 22:04

## 2019-04-24 RX ADMIN — Medication 100 MILLIGRAM(S): at 05:46

## 2019-04-24 RX ADMIN — ENOXAPARIN SODIUM 40 MILLIGRAM(S): 100 INJECTION SUBCUTANEOUS at 17:32

## 2019-04-24 RX ADMIN — POLYETHYLENE GLYCOL 3350 17 GRAM(S): 17 POWDER, FOR SOLUTION ORAL at 05:46

## 2019-04-24 RX ADMIN — SENNA PLUS 2 TABLET(S): 8.6 TABLET ORAL at 22:01

## 2019-04-24 RX ADMIN — Medication 60 MILLIGRAM(S): at 22:02

## 2019-04-24 RX ADMIN — Medication 250 MILLIGRAM(S): at 17:31

## 2019-04-24 RX ADMIN — CHLORHEXIDINE GLUCONATE 15 MILLILITER(S): 213 SOLUTION TOPICAL at 05:41

## 2019-04-24 RX ADMIN — Medication 60 MILLIGRAM(S): at 02:26

## 2019-04-24 RX ADMIN — SODIUM CHLORIDE 3 MILLILITER(S): 9 INJECTION INTRAMUSCULAR; INTRAVENOUS; SUBCUTANEOUS at 05:28

## 2019-04-24 RX ADMIN — Medication 100 MILLIGRAM(S): at 05:44

## 2019-04-24 RX ADMIN — MEROPENEM 100 MILLIGRAM(S): 1 INJECTION INTRAVENOUS at 22:02

## 2019-04-24 RX ADMIN — Medication 3 MILLILITER(S): at 17:42

## 2019-04-24 RX ADMIN — MEROPENEM 100 MILLIGRAM(S): 1 INJECTION INTRAVENOUS at 13:50

## 2019-04-24 RX ADMIN — SODIUM CHLORIDE 3 MILLILITER(S): 9 INJECTION INTRAMUSCULAR; INTRAVENOUS; SUBCUTANEOUS at 11:30

## 2019-04-24 RX ADMIN — POLYETHYLENE GLYCOL 3350 17 GRAM(S): 17 POWDER, FOR SOLUTION ORAL at 22:02

## 2019-04-24 RX ADMIN — SODIUM CHLORIDE 3 MILLILITER(S): 9 INJECTION INTRAMUSCULAR; INTRAVENOUS; SUBCUTANEOUS at 17:42

## 2019-04-24 RX ADMIN — POLYETHYLENE GLYCOL 3350 17 GRAM(S): 17 POWDER, FOR SOLUTION ORAL at 13:48

## 2019-04-24 RX ADMIN — CHLORHEXIDINE GLUCONATE 1 APPLICATION(S): 213 SOLUTION TOPICAL at 22:05

## 2019-04-24 RX ADMIN — Medication 650 MILLIGRAM(S): at 06:47

## 2019-04-24 RX ADMIN — Medication 60 MILLIGRAM(S): at 15:45

## 2019-04-24 RX ADMIN — SODIUM CHLORIDE 3 MILLILITER(S): 9 INJECTION INTRAMUSCULAR; INTRAVENOUS; SUBCUTANEOUS at 23:59

## 2019-04-24 RX ADMIN — Medication 650 MILLIGRAM(S): at 05:47

## 2019-04-24 NOTE — PROGRESS NOTE ADULT - PROBLEM SELECTOR PLAN 4
Patient with Newly Diagnosed A.FIB   Labetalol 100 mg q 8 hr and Cardizem 60 mg q 6 hrs   Monitor HR, No AC given 2/2 recent ICH

## 2019-04-24 NOTE — PROGRESS NOTE ADULT - PROBLEM SELECTOR PLAN 2
Patient remains Febrile, but without Leukocytosis    CXR 4/23: Clear Lungs   Sputum cx 4/23: Gram Neg Rods ( Identification Pending)   Bld Cx 4/23: Pending, UA 4/23: Negative Patient remains Febrile, but without Leukocytosis    CXR 4/23: Clear Lungs   Sputum cx 4/23: Gram Neg Rods ( Identification Pending)   Bld Cx 4/23: Pending, UA 4/23: Negative  Will start Standing Meropenem and IV Vanco   Will continue to monitor cxs Patient remains Febrile  CXR 4/23: Clear Lungs   Sputum cx 4/23: Gram Neg Rods ( Identification Pending)   Bld Cx 4/23: Pending, UA 4/23: Negative  Will start Standing Meropenem and IV Vanco   Will continue to monitor cxs

## 2019-04-24 NOTE — PROGRESS NOTE ADULT - SUBJECTIVE AND OBJECTIVE BOX
Patient is a 63y old  Female who presented with a chief complaint of ICH (2019 07:42)      INTERVAL HPI/OVERNIGHT EVENTS:  +BM overnight   early today had small amount emesis reported after t was turned/positioned  no reported increased PEG residuals  fever yesterday  PEG feeds held this morning and AXR ordered by RCU    MEDICATIONS  (STANDING):  ALBUTerol/ipratropium for Nebulization 3 milliLiter(s) Nebulizer every 6 hours  chlorhexidine 0.12% Liquid 15 milliLiter(s) Oral Mucosa two times a day  chlorhexidine 4% Liquid 1 Application(s) Topical <User Schedule>  diltiazem    Tablet 60 milliGRAM(s) Oral every 6 hours  docusate sodium Liquid 100 milliGRAM(s) Oral every 8 hours  doxazosin 8 milliGRAM(s) Oral at bedtime  enoxaparin Injectable 40 milliGRAM(s) SubCutaneous <User Schedule>  hydrALAZINE 100 milliGRAM(s) Oral every 8 hours  labetalol 100 milliGRAM(s) Oral every 8 hours  meropenem  IVPB 1000 milliGRAM(s) IV Intermittent every 8 hours  pantoprazole   Suspension 40 milliGRAM(s) Oral daily  polyethylene glycol 3350 17 Gram(s) Oral <User Schedule>  senna 2 Tablet(s) Oral at bedtime  sodium chloride 7% Inhalation 3 milliLiter(s) Inhalation every 6 hours  vancomycin  IVPB 1000 milliGRAM(s) IV Intermittent every 12 hours    MEDICATIONS  (PRN):  acetaminophen   Tablet .. 650 milliGRAM(s) Oral every 6 hours PRN Temp greater or equal to 38C (100.4F), Mild Pain (1 - 3)  ondansetron Injectable 4 milliGRAM(s) IV Push every 6 hours PRN Nausea and/or Vomiting  oxyCODONE    Solution 10 milliGRAM(s) Oral every 4 hours PRN Severe Pain (7 - 10)      Allergies  Allergy Status Unknown      Review of Systems:  unable to obtain    Vital Signs Last 24 Hrs  T(C): 37.3 (2019 06:36), Max: 38.6 (2019 21:35)  T(F): 99.2 (2019 06:36), Max: 101.5 (2019 21:35)  HR: 63 (2019 11:32) (63 - 101)  BP: 170/82 (2019 10:54) (148/76 - 172/81)  BP(mean): --  RR: 18 (2019 06:36) (16 - 22)  SpO2: 96% (2019 11:32) (92% - 100%)    PHYSICAL EXAM:  Constitutional: appears comfortable not responsive, not following commands  Neck: +trach  Respiratory: b/l air entry, grossly clear  Cardiovascular: S1 and S2, RRR, no M  Gastrointestinal: BS+, soft NT no rebound or rigidity +PEG site clean and dry, bumper at 5cm spins freely 360 degrees  Extremities: No peripheral edema, neg clubbing, cyanosis  Vascular: 2+ peripheral pulses  Neurological: unresponsive, not following commands  Skin: No rashes    LABS:                        10.6   12.0  )-----------( 373      ( 2019 06:53 )             32.4     04-24    140  |  98  |  22  ----------------------------<  138<H>  4.0   |  28  |  0.62    Ca    9.3      2019 06:51  Phos  4.6     -24  Mg     2.2     -24      Urinalysis Basic - ( 2019 17:46 )    Color: Light Yellow / Appearance: Slightly Turbid / S.016 / pH: x  Gluc: x / Ketone: Negative  / Bili: Negative / Urobili: Negative   Blood: x / Protein: Negative / Nitrite: Negative   Leuk Esterase: Negative / RBC: 8 /hpf / WBC 2 /HPF   Sq Epi: x / Non Sq Epi: 1 /hpf / Bacteria: Negative          RADIOLOGY & ADDITIONAL TESTS:  < from: Xray Abdomen 1 View PORTABLE -Urgent (19 @ 08:41) >    FINDINGS:  Gastrostomy tube partially visualized.  Multiple air-filled loops of bowel. Nonobstructive bowel gas pattern.  No evidence of intraperitoneal free air.  No acute osseous abnormality.    IMPRESSION: Nonobstructive bowel gas pattern.

## 2019-04-24 NOTE — PROVIDER CONTACT NOTE (OTHER) - SITUATION
Pt's blood pressure was elevated at 172/81, HR 76, b/p meds were given and b/p reassessed at 166/82, HR 69.  Pt also vomited at 0645 after being cleaned up.

## 2019-04-24 NOTE — PROGRESS NOTE ADULT - PROBLEM SELECTOR PLAN 5
Patient with elevated BP this morning and yesterday afternoon  Will increase Labetalol 100 mg q 8 hrs    Continue Hydralazine 100 mg q 8 hrs   SBP Goal 100- 160 Per Neuro Sx

## 2019-04-24 NOTE — PROVIDER CONTACT NOTE (OTHER) - ACTION/TREATMENT ORDERED:
B/p values noted/continue to monitor closely. Will pass this on to the day team. Episode of Emesis noted. No interventions at this time.
BRITTANY Ho noted fever and that pt was already pancultured, results are pending and xray taken. Reassess pt in one hour.
advised to continue to monitor.
repeat ct am and continue to monitor.

## 2019-04-24 NOTE — PROGRESS NOTE ADULT - PROBLEM SELECTOR PLAN 3
Patient S/p Tracheostomy 4/16  ( # 8 Cuffed Danna )   Patient tolerating TC Day / Vent QHS   Will continue Vent at night given patients poor mental status   Continue Nebs / Suctioning PRN / Chest PT

## 2019-04-24 NOTE — PROVIDER CONTACT NOTE (OTHER) - ASSESSMENT
Pt does not appear to be in distress. She vomited after being repositioned during hygiene. Am feeds were already in progress.

## 2019-04-24 NOTE — PROGRESS NOTE ADULT - PROBLEM SELECTOR PLAN 6
Patient S/p PEG 4/18   Patient with vomiting overnight  ABD X-ray Ordered ( Pending ) Patient S/p PEG 4/18   Patient with vomiting overnight  Abd X-ray 4/23: Minimal Stool [place], Non- specific bowel gas, cannot r/o Ileus  Will Repeat ABD X-ray Ordered ( Pending ), if remains unchanged will resume feeds

## 2019-04-24 NOTE — PROGRESS NOTE ADULT - ASSESSMENT
63 Year old female w/ PMHx HTN, HLD, Ischemic stroke (2004), who was transferred from OSH, Patient initially presented to the OSH from home with AMS as per EMS, pt was talking to a family member on the phone when she suddenly stopped talking. 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 Patient transferred to [facility] for Neuro Surgical assessment. Upon admission NIHSS = ; MRS = 2; ICH =3. Patient was given DDAVP and PLTs. During admission patient was found to have newly diagnosed A.fib and was initiated on Labetalol. Patient had Serial head CTs performed which remained unchanged, no neuro surgical intervention was performed. Patient was seen by Palliative Care family decided to proceed with Trach and PEG. Patient S/p Tracheostomy on 4/16 ( # 8 Cuffed Shikavin) and PEG Placement on 4/18. During hospitalization patient found to have UTI ( 100 K E.coli ) for which she was treated with a course of Keflex ( Completed 4/21).     4/23: Patient was febrile again Overnight, T.max 101.5, Patient was given one dose of Meropenem and Vanco. Patient with elevated BP overnight and again this morning will increase Labetalol 100 mg q 8 hrs. Patient had one episode of Vomiting This morning after turning. Patient had Abd X-ray performed yesterday: Non-obstructing bowel gas pattern but ileus could not be ruled out. Patient with Moderate BM reported yesterday after bowel regimen. Will temporarily hold feeds and repeat Abd X-ray this morning, Patient without residuals on bedside exam this morning.

## 2019-04-24 NOTE — PROGRESS NOTE ADULT - ASSESSMENT
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  -Continue bowel regimen  -Abx per primary team    Discussed with RCU team    Amari Unger PA-C    [facility]  [phone]

## 2019-04-24 NOTE — CHART NOTE - NSCHARTNOTEFT_GEN_A_CORE
PATTI RODRIGUEZ  63y Female    Notified by RN T 101. Patient seen and examined. ROS not obtained 2/2 patient nonverbal. Resting comfortably in bed. No signs of distress.        Vital Signs Last 24 Hrs  T(C): 38.6 (23 Apr 2019 21:35), Max: 38.6 (23 Apr 2019 21:35)  T(F): 101.5 (23 Apr 2019 21:35), Max: 101.5 (23 Apr 2019 21:35)  HR: 88 (23 Apr 2019 23:25) (81 - 101)  BP: 165/79 (23 Apr 2019 21:35) (148/74 - 168/78)  BP(mean): --  RR: 18 (23 Apr 2019 21:35) (16 - 22)  SpO2: 99% (23 Apr 2019 23:25) (92% - 99%)    Physical Exam:   Constitutional: nonverbal. NAD.  Cardiovascular: +S1 S2. RRR.  No murmurs.  No LE edema.   Respiratory:  Even, unlabored.  rhonchi b/l   Gastrointestinal: +BS X4 active. Soft. Nontender. Nondistended. PEG C/D/I   Extremities/Vascular: +2 pulses bilaterally.   Skin:  warm, dry    < from: Xray Chest 1 View- PORTABLE-Urgent (04.23.19 @ 09:38) >    IMPRESSION:   Clear lungs.      < end of copied text >    < from: Xray Abdomen 1 View PORTABLE -Urgent (04.23.19 @ 13:19) >    IMPRESSION: Minimal stool burden. Nonspecific bowel gas pattern. Ileus   cannot be ruled out.     < end of copied text >    Urinalysis (04.23.19 @ 17:46)    pH Urine: 7.5    Glucose Qualitative, Urine: Negative    Blood, Urine: Negative    Color: Light Yellow    Urine Appearance: Slightly Turbid    Bilirubin: Negative    Ketone - Urine: Negative    Specific Gravity: 1.016    Protein, Urine: Negative    Urobilinogen: Negative    Nitrite: Negative    Leukocyte Esterase Concentration: Negative      HPI:   62 y/o F w/ PMHx HTN, HLD, ischemic CVA (2004), presents to the ED BIBA from home for evaluation of AMS- was reportedly talking. As per EMS, pt was talking to a family member on the phone at 11:00 today. Hx is limited due to pt's current medical condition.  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

## 2019-04-24 NOTE — PROGRESS NOTE ADULT - ASSESSMENT
HTN  stable  add enalapril 5 bid     Afib  resolved  cont current avn blockers  off a/c due to pontine bleed     resp failrue  s/p trach   fu with RCU

## 2019-04-24 NOTE — PROGRESS NOTE ADULT - SUBJECTIVE AND OBJECTIVE BOX
Subjective: Patient seen and examined. No new events except as noted.     SUBJECTIVE/ROS:        MEDICATIONS:  MEDICATIONS  (STANDING):  ALBUTerol/ipratropium for Nebulization 3 milliLiter(s) Nebulizer every 6 hours  chlorhexidine 0.12% Liquid 15 milliLiter(s) Oral Mucosa two times a day  chlorhexidine 4% Liquid 1 Application(s) Topical <User Schedule>  diltiazem    Tablet 60 milliGRAM(s) Oral every 6 hours  docusate sodium Liquid 100 milliGRAM(s) Oral every 8 hours  doxazosin 8 milliGRAM(s) Oral at bedtime  enoxaparin Injectable 40 milliGRAM(s) SubCutaneous <User Schedule>  hydrALAZINE 100 milliGRAM(s) Oral every 8 hours  labetalol 200 milliGRAM(s) Oral every 8 hours  meropenem  IVPB 1000 milliGRAM(s) IV Intermittent every 8 hours  pantoprazole   Suspension 40 milliGRAM(s) Oral daily  polyethylene glycol 3350 17 Gram(s) Oral <User Schedule>  senna 2 Tablet(s) Oral at bedtime  sodium chloride 7% Inhalation 3 milliLiter(s) Inhalation every 6 hours  vancomycin  IVPB 1000 milliGRAM(s) IV Intermittent every 12 hours      PHYSICAL EXAM:  T(C): 37.3 (04-24-19 @ 06:36), Max: 38.6 (04-23-19 @ 21:35)  HR: 72 (04-24-19 @ 16:06) (63 - 101)  BP: 164/82 (04-24-19 @ 15:45) (148/76 - 172/81)  RR: 18 (04-24-19 @ 06:36) (16 - 20)  SpO2: 94% (04-24-19 @ 16:06) (92% - 100%)  Wt(kg): --  I&O's Summary    23 Apr 2019 07:01  -  24 Apr 2019 07:00  --------------------------------------------------------  IN: 1835 mL / OUT: 2400 mL / NET: -565 mL        Weight (kg): 73.2 (04-24 @ 12:12)      JVP: Normal  Neck: supple  Lung: clear   CV: S1 S2 , Murmur:  Abd: soft  Ext: No edema  Psych: flat affect  Skin: normal``    LABS/DATA:    CARDIAC MARKERS:                                10.6   12.0  )-----------( 373      ( 24 Apr 2019 06:53 )             32.4     04-24    140  |  98  |  22  ----------------------------<  138<H>  4.0   |  28  |  0.62    Ca    9.3      24 Apr 2019 06:51  Phos  4.6     04-24  Mg     2.2     04-24      proBNP:   Lipid Profile:   HgA1c:   TSH:     TELE:  EKG:

## 2019-04-25 DIAGNOSIS — B99.9 UNSPECIFIED INFECTIOUS DISEASE: ICD-10-CM

## 2019-04-25 LAB
-  AMIKACIN: SIGNIFICANT CHANGE UP
-  AMPICILLIN/SULBACTAM: SIGNIFICANT CHANGE UP
-  AMPICILLIN: SIGNIFICANT CHANGE UP
-  AZTREONAM: SIGNIFICANT CHANGE UP
-  CEFAZOLIN: SIGNIFICANT CHANGE UP
-  CEFEPIME: SIGNIFICANT CHANGE UP
-  CEFOXITIN: SIGNIFICANT CHANGE UP
-  CEFTRIAXONE: SIGNIFICANT CHANGE UP
-  CIPROFLOXACIN: SIGNIFICANT CHANGE UP
-  ERTAPENEM: SIGNIFICANT CHANGE UP
-  GENTAMICIN: SIGNIFICANT CHANGE UP
-  IMIPENEM: SIGNIFICANT CHANGE UP
-  LEVOFLOXACIN: SIGNIFICANT CHANGE UP
-  MEROPENEM: SIGNIFICANT CHANGE UP
-  PIPERACILLIN/TAZOBACTAM: SIGNIFICANT CHANGE UP
-  TOBRAMYCIN: SIGNIFICANT CHANGE UP
-  TRIMETHOPRIM/SULFAMETHOXAZOLE: SIGNIFICANT CHANGE UP
ANION GAP SERPL CALC-SCNC: 14 MMOL/L — SIGNIFICANT CHANGE UP (ref 5–17)
BUN SERPL-MCNC: 24 MG/DL — HIGH (ref 7–23)
CALCIUM SERPL-MCNC: 9.8 MG/DL — SIGNIFICANT CHANGE UP (ref 8.4–10.5)
CHLORIDE SERPL-SCNC: 99 MMOL/L — SIGNIFICANT CHANGE UP (ref 96–108)
CO2 SERPL-SCNC: 27 MMOL/L — SIGNIFICANT CHANGE UP (ref 22–31)
CREAT SERPL-MCNC: 0.57 MG/DL — SIGNIFICANT CHANGE UP (ref 0.5–1.3)
GLUCOSE SERPL-MCNC: 134 MG/DL — HIGH (ref 70–99)
HCT VFR BLD CALC: 33.6 % — LOW (ref 34.5–45)
HGB BLD-MCNC: 10.8 G/DL — LOW (ref 11.5–15.5)
MAGNESIUM SERPL-MCNC: 2.2 MG/DL — SIGNIFICANT CHANGE UP (ref 1.6–2.6)
MCHC RBC-ENTMCNC: 31.8 PG — SIGNIFICANT CHANGE UP (ref 27–34)
MCHC RBC-ENTMCNC: 32.1 GM/DL — SIGNIFICANT CHANGE UP (ref 32–36)
MCV RBC AUTO: 99 FL — SIGNIFICANT CHANGE UP (ref 80–100)
METHOD TYPE: SIGNIFICANT CHANGE UP
PHOSPHATE SERPL-MCNC: 4.4 MG/DL — SIGNIFICANT CHANGE UP (ref 2.5–4.5)
PLATELET # BLD AUTO: 399 K/UL — SIGNIFICANT CHANGE UP (ref 150–400)
POTASSIUM SERPL-MCNC: 4.1 MMOL/L — SIGNIFICANT CHANGE UP (ref 3.5–5.3)
POTASSIUM SERPL-SCNC: 4.1 MMOL/L — SIGNIFICANT CHANGE UP (ref 3.5–5.3)
RBC # BLD: 3.39 M/UL — LOW (ref 3.8–5.2)
RBC # FLD: 12.8 % — SIGNIFICANT CHANGE UP (ref 10.3–14.5)
SODIUM SERPL-SCNC: 140 MMOL/L — SIGNIFICANT CHANGE UP (ref 135–145)
WBC # BLD: 11.3 K/UL — HIGH (ref 3.8–10.5)
WBC # FLD AUTO: 11.3 K/UL — HIGH (ref 3.8–10.5)

## 2019-04-25 PROCEDURE — 99291 CRITICAL CARE FIRST HOUR: CPT

## 2019-04-25 PROCEDURE — 99231 SBSQ HOSP IP/OBS SF/LOW 25: CPT

## 2019-04-25 RX ORDER — LABETALOL HCL 100 MG
300 TABLET ORAL EVERY 8 HOURS
Qty: 0 | Refills: 0 | Status: DISCONTINUED | OUTPATIENT
Start: 2019-04-25 | End: 2019-04-26

## 2019-04-25 RX ADMIN — SODIUM CHLORIDE 3 MILLILITER(S): 9 INJECTION INTRAMUSCULAR; INTRAVENOUS; SUBCUTANEOUS at 17:44

## 2019-04-25 RX ADMIN — MEROPENEM 100 MILLIGRAM(S): 1 INJECTION INTRAVENOUS at 13:53

## 2019-04-25 RX ADMIN — Medication 3 MILLILITER(S): at 23:24

## 2019-04-25 RX ADMIN — CHLORHEXIDINE GLUCONATE 1 APPLICATION(S): 213 SOLUTION TOPICAL at 22:22

## 2019-04-25 RX ADMIN — MEROPENEM 100 MILLIGRAM(S): 1 INJECTION INTRAVENOUS at 04:14

## 2019-04-25 RX ADMIN — Medication 250 MILLIGRAM(S): at 05:41

## 2019-04-25 RX ADMIN — Medication 100 MILLIGRAM(S): at 05:41

## 2019-04-25 RX ADMIN — Medication 3 MILLILITER(S): at 11:34

## 2019-04-25 RX ADMIN — PANTOPRAZOLE SODIUM 40 MILLIGRAM(S): 20 TABLET, DELAYED RELEASE ORAL at 11:22

## 2019-04-25 RX ADMIN — CHLORHEXIDINE GLUCONATE 15 MILLILITER(S): 213 SOLUTION TOPICAL at 05:42

## 2019-04-25 RX ADMIN — Medication 100 MILLIGRAM(S): at 22:25

## 2019-04-25 RX ADMIN — Medication 60 MILLIGRAM(S): at 10:28

## 2019-04-25 RX ADMIN — CHLORHEXIDINE GLUCONATE 15 MILLILITER(S): 213 SOLUTION TOPICAL at 17:44

## 2019-04-25 RX ADMIN — POLYETHYLENE GLYCOL 3350 17 GRAM(S): 17 POWDER, FOR SOLUTION ORAL at 13:52

## 2019-04-25 RX ADMIN — Medication 100 MILLIGRAM(S): at 22:23

## 2019-04-25 RX ADMIN — MEROPENEM 100 MILLIGRAM(S): 1 INJECTION INTRAVENOUS at 22:22

## 2019-04-25 RX ADMIN — POLYETHYLENE GLYCOL 3350 17 GRAM(S): 17 POWDER, FOR SOLUTION ORAL at 22:25

## 2019-04-25 RX ADMIN — Medication 100 MILLIGRAM(S): at 13:51

## 2019-04-25 RX ADMIN — Medication 8 MILLIGRAM(S): at 22:25

## 2019-04-25 RX ADMIN — ENOXAPARIN SODIUM 40 MILLIGRAM(S): 100 INJECTION SUBCUTANEOUS at 17:44

## 2019-04-25 RX ADMIN — SENNA PLUS 2 TABLET(S): 8.6 TABLET ORAL at 22:25

## 2019-04-25 RX ADMIN — SODIUM CHLORIDE 3 MILLILITER(S): 9 INJECTION INTRAMUSCULAR; INTRAVENOUS; SUBCUTANEOUS at 05:25

## 2019-04-25 RX ADMIN — Medication 60 MILLIGRAM(S): at 04:14

## 2019-04-25 RX ADMIN — SODIUM CHLORIDE 3 MILLILITER(S): 9 INJECTION INTRAMUSCULAR; INTRAVENOUS; SUBCUTANEOUS at 23:49

## 2019-04-25 RX ADMIN — Medication 3 MILLILITER(S): at 05:25

## 2019-04-25 RX ADMIN — Medication 60 MILLIGRAM(S): at 16:12

## 2019-04-25 RX ADMIN — SODIUM CHLORIDE 3 MILLILITER(S): 9 INJECTION INTRAMUSCULAR; INTRAVENOUS; SUBCUTANEOUS at 11:35

## 2019-04-25 RX ADMIN — Medication 300 MILLIGRAM(S): at 22:23

## 2019-04-25 RX ADMIN — POLYETHYLENE GLYCOL 3350 17 GRAM(S): 17 POWDER, FOR SOLUTION ORAL at 05:42

## 2019-04-25 RX ADMIN — Medication 300 MILLIGRAM(S): at 13:52

## 2019-04-25 RX ADMIN — Medication 200 MILLIGRAM(S): at 05:41

## 2019-04-25 RX ADMIN — Medication 60 MILLIGRAM(S): at 22:23

## 2019-04-25 RX ADMIN — Medication 3 MILLILITER(S): at 17:44

## 2019-04-25 NOTE — PROGRESS NOTE ADULT - SUBJECTIVE AND OBJECTIVE BOX
Patient is a 63y old  Female who presents with a chief complaint of ICH (2019 17:04)      Interval Events: No events reported overnight     REVIEW OF SYSTEMS:  [ ] Positive  [ ] All other systems negative  [x] Unable to assess ROS because patient is Non-Verbal     Vital Signs Last 24 Hrs  T(C): 36.4 (19 @ 04:10), Max: 36.8 (19 @ 21:18)  T(F): 97.6 (19 @ 04:10), Max: 98.2 (19 @ 21:18)  HR: 61 (19 @ 05:25) (61 - 83)  BP: 161/79 (19 @ 04:10) (156/80 - 171/82)  RR: 18 (19 @ 04:10) (18 - 18)  SpO2: 99% (19 @ 05:25) (94% - 100%)    PHYSICAL EXAM:  HEENT:   [x]Tracheostomy: # 8 Cuffed Shiley   [x]Pupils equal  [ ]No oral lesions  [ ]Abnormal    SKIN  [x]No Rash  [ ] Abnormal  [ ] pressure    CARDIAC  [x]Regular  [ ]Abnormal    PULMONARY  [x]Bilateral Clear Breath Sounds  [ ]Normal Excursion  [ ]Abnormal    GI  [x]PEG      [x] +BS		              [x]Soft, nondistended, nontender	  [ ]Abnormal    MUSCULOSKELETAL                                   [ ]Bedbound                 [ ]Abnormal    [x]OOB to chair                           EXTREMITIES                                         [x]Normal  [ ]Edema                           NEUROLOGIC  [ ] Normal, non focal  [x] Focal findings: No following commands, No withdrawal to pain     PSYCHIATRIC  [ ]Alert and appropriate  [x] Sedated	 [ ]Agitated    :  Bedoya: [ ] Yes, if yes: Date of Placement:                   [x] No Straight Cath ATC     LINES: Central Lines [ ] Yes, if yes: Date of Placement                                     [x] No    HOSPITAL MEDICATIONS:  MEDICATIONS  (STANDING):  ALBUTerol/ipratropium for Nebulization 3 milliLiter(s) Nebulizer every 6 hours  chlorhexidine 0.12% Liquid 15 milliLiter(s) Oral Mucosa two times a day  chlorhexidine 4% Liquid 1 Application(s) Topical <User Schedule>  diltiazem    Tablet 60 milliGRAM(s) Oral every 6 hours  docusate sodium Liquid 100 milliGRAM(s) Oral every 8 hours  doxazosin 8 milliGRAM(s) Oral at bedtime  enoxaparin Injectable 40 milliGRAM(s) SubCutaneous <User Schedule>  hydrALAZINE 100 milliGRAM(s) Oral every 8 hours  labetalol 200 milliGRAM(s) Oral every 8 hours  meropenem  IVPB 1000 milliGRAM(s) IV Intermittent every 8 hours  pantoprazole   Suspension 40 milliGRAM(s) Oral daily  polyethylene glycol 3350 17 Gram(s) Oral <User Schedule>  senna 2 Tablet(s) Oral at bedtime  sodium chloride 7% Inhalation 3 milliLiter(s) Inhalation every 6 hours  vancomycin  IVPB 1000 milliGRAM(s) IV Intermittent every 12 hours    MEDICATIONS  (PRN):  acetaminophen   Tablet .. 650 milliGRAM(s) Oral every 6 hours PRN Temp greater or equal to 38C (100.4F), Mild Pain (1 - 3)  ondansetron Injectable 4 milliGRAM(s) IV Push every 6 hours PRN Nausea and/or Vomiting  oxyCODONE    Solution 10 milliGRAM(s) Oral every 4 hours PRN Severe Pain (7 - 10)      LABS:                        10.8   11.3  )-----------( 399      ( 2019 06:28 )             33.6     04-25    140  |  99  |  24<H>  ----------------------------<  134<H>  4.1   |  27  |  0.57    Ca    9.8      2019 06:29  Phos  4.4     04-25  Mg     2.2     04-25        Urinalysis Basic - ( 2019 17:46 )    Color: Light Yellow / Appearance: Slightly Turbid / S.016 / pH: x  Gluc: x / Ketone: Negative  / Bili: Negative / Urobili: Negative   Blood: x / Protein: Negative / Nitrite: Negative   Leuk Esterase: Negative / RBC: 8 /hpf / WBC 2 /HPF   Sq Epi: x / Non Sq Epi: 1 /hpf / Bacteria: Negative      Arterial Blood Gas:   @ 10:06  7.46/44/93/31/97/6.5  ABG lactate: --      CAPILLARY BLOOD GLUCOSE    MICROBIOLOGY:     RADIOLOGY:  [ ] Reviewed and interpreted by me    Mode: AC/ CMV (Assist Control/ Continuous Mandatory Ventilation)  RR (machine): 14  TV (machine): 400  FiO2: 30  PEEP: 5  ITime: 1  MAP: 8  PIP: 16

## 2019-04-25 NOTE — PROGRESS NOTE ADULT - ASSESSMENT
63 Year old female w/ PMHx HTN, HLD, Ischemic stroke (2004), who was transferred from OSH, Patient initially presented to the OSH from home with AMS as per EMS, pt was talking to a family member on the phone when she suddenly stopped talking. 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 Patient transferred to [facility] for Neuro Surgical assessment. Upon admission NIHSS = ; MRS = 2; ICH =3. Patient was given DDAVP and PLTs. During admission patient was found to have newly diagnosed A.fib and was initiated on Labetalol. Patient had Serial head CTs performed which remained unchanged, no neuro surgical intervention was performed. Patient was seen by Palliative Care family decided to proceed with Trach and PEG. Patient S/p Tracheostomy on 4/16 ( # 8 Cuffed Danna) and PEG Placement on 4/18. During hospitalization patient found to have UTI ( 100 K E.coli ) for which she was treated with a course of Keflex ( Completed 4/21).     4/24: Patient initiated on Meropenem and Vanco yesterday with improved Temperature curve. Blood Cxs 4/23; No Growth, Sputum cx 4/23: Gram Negative rods  (Identification Pending ), Will dc Vanco as patient without gram positive organisms growing. Patient still remains with elevated bp will increase Labetalol 300 mg q 8 hrs as per  63 Year old female w/ PMHx HTN, HLD, Ischemic stroke (2004), who was transferred from OSH, Patient initially presented to the OSH from home with AMS as per EMS, pt was talking to a family member on the phone when she suddenly stopped talking. 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 Patient transferred to [facility] for Neuro Surgical assessment. Upon admission NIHSS = ; MRS = 2; ICH =3. Patient was given DDAVP and PLTs. During admission patient was found to have newly diagnosed A.fib and was initiated on Labetalol. Patient had Serial head CTs performed which remained unchanged, no neuro surgical intervention was performed. Patient was seen by Palliative Care family decided to proceed with Trach and PEG. Patient S/p Tracheostomy on 4/16 ( # 8 Cuffed Danna) and PEG Placement on 4/18. During hospitalization patient found to have UTI ( 100 K E.coli ) for which she was treated with a course of Keflex ( Completed 4/21).     4/25: Patient initiated on Meropenem and Vanco yesterday with improved Temperature curve. Blood Cxs 4/23; No Growth, Sputum cx 4/23: Gram Negative rods  (Identification Pending ), Will dc Vanco as patient without gram positive organisms growing. Patient still remains with elevated bp will increase Labetalol 300 mg q 8 hrs as per

## 2019-04-25 NOTE — PROGRESS NOTE ADULT - ASSESSMENT
Patient is a 62 y/o F with history of HTN, HLD, ischemic CVA in 2004, who presents to the ED BIBA from home for evaluation of AMS- found to have pontine hemorrhage  had trach placed for resp failure and had PEG placed 4/17/19 for dysphagia    Recommendations:   Monitor labs and trends as ordered   LFTs WNL on 4/11/19   Continue PEG tube feeds at 75cc/hr as tolerated   Continue Miralax, colace and senna   Monitor bowel frequency   Antibiotics per primary team     Ana Rosa Beal, ANP-[facility]

## 2019-04-25 NOTE — PROGRESS NOTE ADULT - SUBJECTIVE AND OBJECTIVE BOX
Subjective: Patient seen and examined. No new events except as noted.     SUBJECTIVE/ROS:        MEDICATIONS:  MEDICATIONS  (STANDING):  ALBUTerol/ipratropium for Nebulization 3 milliLiter(s) Nebulizer every 6 hours  chlorhexidine 0.12% Liquid 15 milliLiter(s) Oral Mucosa two times a day  chlorhexidine 4% Liquid 1 Application(s) Topical <User Schedule>  diltiazem    Tablet 60 milliGRAM(s) Oral every 6 hours  docusate sodium Liquid 100 milliGRAM(s) Oral every 8 hours  doxazosin 8 milliGRAM(s) Oral at bedtime  enoxaparin Injectable 40 milliGRAM(s) SubCutaneous <User Schedule>  hydrALAZINE 100 milliGRAM(s) Oral every 8 hours  labetalol 300 milliGRAM(s) Oral every 8 hours  meropenem  IVPB 1000 milliGRAM(s) IV Intermittent every 8 hours  pantoprazole   Suspension 40 milliGRAM(s) Oral daily  polyethylene glycol 3350 17 Gram(s) Oral <User Schedule>  senna 2 Tablet(s) Oral at bedtime  sodium chloride 7% Inhalation 3 milliLiter(s) Inhalation every 6 hours      PHYSICAL EXAM:  T(C): 36.7 (04-25-19 @ 16:00), Max: 36.8 (04-24-19 @ 21:18)  HR: 71 (04-25-19 @ 16:31) (61 - 83)  BP: 131/74 (04-25-19 @ 16:00) (131/74 - 170/92)  RR: 20 (04-25-19 @ 13:49) (18 - 20)  SpO2: 94% (04-25-19 @ 16:31) (92% - 100%)  Wt(kg): --  I&O's Summary    24 Apr 2019 07:01  -  25 Apr 2019 07:00  --------------------------------------------------------  IN: 1865 mL / OUT: 2850 mL / NET: -985 mL            JVP: Normal  Neck: supple  Lung: clear   CV: S1 S2 , Murmur:  Abd: soft  Ext: No edema  neuro: Awake / alert  Psych: flat affect  Skin: normal``    LABS/DATA:    CARDIAC MARKERS:                                10.8   11.3  )-----------( 399      ( 25 Apr 2019 06:28 )             33.6     04-25    140  |  99  |  24<H>  ----------------------------<  134<H>  4.1   |  27  |  0.57    Ca    9.8      25 Apr 2019 06:29  Phos  4.4     04-25  Mg     2.2     04-25      proBNP:   Lipid Profile:   HgA1c:   TSH:     TELE:  EKG:

## 2019-04-25 NOTE — PROGRESS NOTE ADULT - PROBLEM SELECTOR PLAN 1
Patient with Admitted with Pontine Hemorrhage likely 2/2 Uncontrolled HTN   Head CT 4/17: Similar Appearing Hemorrhage in Mid brain, No hydrocephalus   No Neuro Surgical Intervention performed  Patient w/ significant neurological impairment  Continue Supportive Care

## 2019-04-25 NOTE — PROGRESS NOTE ADULT - PROBLEM SELECTOR PLAN 5
Patient remains with elevated BP this morning  Will increase Labetalol 300 mg q 8 hrs    Continue Hydralazine 100 mg q 8 hrs   SBP Goal 100- 160 Per Neuro Sx

## 2019-04-25 NOTE — CHART NOTE - NSCHARTNOTEFT_GEN_A_CORE
Pt seen for nutrition follow up. PT is a 63 year old F admitted with AMS found to have pontine hemorrhage S/P trach 4/16 and PEG placement 4/18.   Source: Patient [ ]    Family [ ]     other [ ]    Diet : Jevity 1.5 Erik at 75 mL/h over 18 hours via PEG with danactive twice daily and 100 mL free water BID      Per RN pt with no N+V today, noted pt with episode of vomiting after turning 4/23, last BM noted 4/25.        Enteral /Parenteral Nutrition: Per RN Pt tolerating tube feeding well at goal with no signs of GI distress    955 mL 4/24, 1295 mL 4/23, 1025 mL 4/22, 3 day average 1091 mL meeting 81% goal volume      Current Weight: 160.9 lbs (4/17), 168.4 lbs (4/22), weight elevated, noted pt with 2+ generalized edema 4/24  % Weight Change    Pertinent Medications: MEDICATIONS  (STANDING):  ALBUTerol/ipratropium for Nebulization 3 milliLiter(s) Nebulizer every 6 hours  chlorhexidine 0.12% Liquid 15 milliLiter(s) Oral Mucosa two times a day  chlorhexidine 4% Liquid 1 Application(s) Topical <User Schedule>  diltiazem    Tablet 60 milliGRAM(s) Oral every 6 hours  docusate sodium Liquid 100 milliGRAM(s) Oral every 8 hours  doxazosin 8 milliGRAM(s) Oral at bedtime  enoxaparin Injectable 40 milliGRAM(s) SubCutaneous <User Schedule>  hydrALAZINE 100 milliGRAM(s) Oral every 8 hours  labetalol 300 milliGRAM(s) Oral every 8 hours  meropenem  IVPB 1000 milliGRAM(s) IV Intermittent every 8 hours  pantoprazole   Suspension 40 milliGRAM(s) Oral daily  polyethylene glycol 3350 17 Gram(s) Oral <User Schedule>  senna 2 Tablet(s) Oral at bedtime  sodium chloride 7% Inhalation 3 milliLiter(s) Inhalation every 6 hours    MEDICATIONS  (PRN):  acetaminophen   Tablet .. 650 milliGRAM(s) Oral every 6 hours PRN Temp greater or equal to 38C (100.4F), Mild Pain (1 - 3)  ondansetron Injectable 4 milliGRAM(s) IV Push every 6 hours PRN Nausea and/or Vomiting  oxyCODONE    Solution 10 milliGRAM(s) Oral every 4 hours PRN Severe Pain (7 - 10)    Pertinent Labs:  04-25 Na140 mmol/L Glu 134 mg/dL<H> K+ 4.1 mmol/L Cr  0.57 mg/dL BUN 24 mg/dL<H> 04-25 Phos 4.4 mg/dL 04-12 [id number] 5.8 %<H> 04-12 Chol 236 mg/dL<H>  mg/dL<H> HDL 63 mg/dL Trig 90 mg/dL      Skin: free of pressure injury    Estimated Needs:   [ x] no change since previous assessment  [ ] recalculated:       Previous Nutrition Diagnosis:     [x ] Increased nutrient needs       Nutrition Diagnosis is [x ] ongoing, addressed with enteral nutrition          New Nutrition Diagnosis: [ x] not applicable       Interventions:     Recommend    1) Continue Jevity 1.5 at goal rate 75ml/hr x 18 hrs. To provide: (based on 4/17 dosing weight 73kg): 2025kcal (28kcal/kg), (upper IBW 57.6kg): 86g protein (1.5g protein/kg IBW).   2) continue Danactive 2 times/day       Monitoring and Evaluation:     [ ] PO intake [ x] Tolerance to diet prescription [ x] weights [ x] follow up per protocol    [ ] other:

## 2019-04-25 NOTE — PROGRESS NOTE ADULT - PROBLEM SELECTOR PLAN 4
Patient with Newly Diagnosed A.FIB   Labetalol 300 mg q 8 hr and Cardizem 60 mg q 6 hrs   Monitor HR, No AC given 2/2 recent ICH

## 2019-04-25 NOTE — PROGRESS NOTE ADULT - SUBJECTIVE AND OBJECTIVE BOX
INTERVAL HPI/OVERNIGHT EVENTS:  Patient positioned in bed for comfort and safety , tolerating PEG tube feeds at 75cc/hr     MEDICATIONS  (STANDING):  ALBUTerol/ipratropium for Nebulization 3 milliLiter(s) Nebulizer every 6 hours  chlorhexidine 0.12% Liquid 15 milliLiter(s) Oral Mucosa two times a day  chlorhexidine 4% Liquid 1 Application(s) Topical <User Schedule>  diltiazem    Tablet 60 milliGRAM(s) Oral every 6 hours  docusate sodium Liquid 100 milliGRAM(s) Oral every 8 hours  doxazosin 8 milliGRAM(s) Oral at bedtime  enoxaparin Injectable 40 milliGRAM(s) SubCutaneous <User Schedule>  hydrALAZINE 100 milliGRAM(s) Oral every 8 hours  labetalol 300 milliGRAM(s) Oral every 8 hours  meropenem  IVPB 1000 milliGRAM(s) IV Intermittent every 8 hours  pantoprazole   Suspension 40 milliGRAM(s) Oral daily  polyethylene glycol 3350 17 Gram(s) Oral <User Schedule>  senna 2 Tablet(s) Oral at bedtime  sodium chloride 7% Inhalation 3 milliLiter(s) Inhalation every 6 hours    MEDICATIONS  (PRN):  acetaminophen   Tablet .. 650 milliGRAM(s) Oral every 6 hours PRN Temp greater or equal to 38C (100.4F), Mild Pain (1 - 3)  ondansetron Injectable 4 milliGRAM(s) IV Push every 6 hours PRN Nausea and/or Vomiting  oxyCODONE    Solution 10 milliGRAM(s) Oral every 4 hours PRN Severe Pain (7 - 10)      Allergies    Allergy Status Unknown    Intolerances        Review of Systems:  Unable to obtain from patient     General:  No, fevers   ENT:  has trach ,  + dysphagia  Resp: trach , no wheezing  GI tolerating PEG tube feeds at 75cc/hr.        Vital Signs Last 24 Hrs  T(C): 36.5 (2019 10:20), Max: 36.8 (2019 21:18)  T(F): 97.7 (2019 10:20), Max: 98.2 (2019 21:18)  HR: 73 (2019 11:36) (61 - 83)  BP: 170/92 (2019 10:20) (156/80 - 170/92)  BP(mean): --  RR: 20 (2019 10:20) (18 - 20)  SpO2: 97% (2019 11:36) (94% - 100%)    PHYSICAL EXAM:    Constitutional: non toxic and in NAD   Neck: trach site dry   Respiratory: anterior chest wall clear to auscultation  Cardiovascular: S1 and S2  Gastrointestinal: PEG site dry , external Bumper at 5cm skin level , abdomen soft   Extremities: No peripheral edema, or  cyanosis  Skin: No jaundice or visible rashes      LABS:                        10.8   11.3  )-----------( 399      ( 2019 06:28 )             33.6     04-25    140  |  99  |  24<H>  ----------------------------<  134<H>  4.1   |  27  |  0.57    Ca    9.8      2019 06:29  Phos  4.4     04-25  Mg     2.2     04-25    Hepatic Function Panel (19 @ 21:02)      Bilirubin Total, Serum: 0.6 mg/dL      Alkaline Phosphatase, Serum: 58 U/L      Aspartate Aminotransferase (AST/SGOT): 14 U/L       Alanine Aminotransferase (ALT/SGPT): 11 U/L        Urinalysis Basic - ( 2019 17:46 )    Color: Light Yellow / Appearance: Slightly Turbid / S.016 / pH: x  Gluc: x / Ketone: Negative  / Bili: Negative / Urobili: Negative   Blood: x / Protein: Negative / Nitrite: Negative   Leuk Esterase: Negative / RBC: 8 /hpf / WBC 2 /HPF   Sq Epi: x / Non Sq Epi: 1 /hpf / Bacteria: Negative          RADIOLOGY & ADDITIONAL TESTS:

## 2019-04-25 NOTE — PROGRESS NOTE ADULT - PROBLEM SELECTOR PLAN 3
Patient Afebrile since initiation of ABX   CXR 4/23: Clear Lungs   Sputum cx 4/23: Gram Neg Rods ( Identification Pending)   Bld Cx 4/23: No growth, UA 4/23: Negative  Continue Meropenem will treat 5- 7 Day Course  ( Ends 4/28-4/30)

## 2019-04-25 NOTE — PROGRESS NOTE ADULT - ASSESSMENT
HTN  agree with increasing labetalol     Afib  resolved  cont current avn blockers  off a/c due to pontine bleed     resp failrue  s/p trach   fu with RCU

## 2019-04-26 LAB
-  CEFTAZIDIME: SIGNIFICANT CHANGE UP
-  LEVOFLOXACIN: SIGNIFICANT CHANGE UP
-  MEROPENEM: SIGNIFICANT CHANGE UP
-  TRIMETHOPRIM/SULFAMETHOXAZOLE: SIGNIFICANT CHANGE UP
ANION GAP SERPL CALC-SCNC: 12 MMOL/L — SIGNIFICANT CHANGE UP (ref 5–17)
BUN SERPL-MCNC: 24 MG/DL — HIGH (ref 7–23)
CALCIUM SERPL-MCNC: 9.7 MG/DL — SIGNIFICANT CHANGE UP (ref 8.4–10.5)
CHLORIDE SERPL-SCNC: 98 MMOL/L — SIGNIFICANT CHANGE UP (ref 96–108)
CO2 SERPL-SCNC: 27 MMOL/L — SIGNIFICANT CHANGE UP (ref 22–31)
CREAT SERPL-MCNC: 0.62 MG/DL — SIGNIFICANT CHANGE UP (ref 0.5–1.3)
CULTURE RESULTS: SIGNIFICANT CHANGE UP
GLUCOSE SERPL-MCNC: 141 MG/DL — HIGH (ref 70–99)
HCT VFR BLD CALC: 35.9 % — SIGNIFICANT CHANGE UP (ref 34.5–45)
HGB BLD-MCNC: 11.2 G/DL — LOW (ref 11.5–15.5)
MAGNESIUM SERPL-MCNC: 2.3 MG/DL — SIGNIFICANT CHANGE UP (ref 1.6–2.6)
MCHC RBC-ENTMCNC: 30.8 PG — SIGNIFICANT CHANGE UP (ref 27–34)
MCHC RBC-ENTMCNC: 31.1 GM/DL — LOW (ref 32–36)
MCV RBC AUTO: 99 FL — SIGNIFICANT CHANGE UP (ref 80–100)
METHOD TYPE: SIGNIFICANT CHANGE UP
ORGANISM # SPEC MICROSCOPIC CNT: SIGNIFICANT CHANGE UP
PHOSPHATE SERPL-MCNC: 3.8 MG/DL — SIGNIFICANT CHANGE UP (ref 2.5–4.5)
PLATELET # BLD AUTO: 497 K/UL — HIGH (ref 150–400)
POTASSIUM SERPL-MCNC: 4 MMOL/L — SIGNIFICANT CHANGE UP (ref 3.5–5.3)
POTASSIUM SERPL-SCNC: 4 MMOL/L — SIGNIFICANT CHANGE UP (ref 3.5–5.3)
RBC # BLD: 3.63 M/UL — LOW (ref 3.8–5.2)
RBC # FLD: 12.9 % — SIGNIFICANT CHANGE UP (ref 10.3–14.5)
SODIUM SERPL-SCNC: 137 MMOL/L — SIGNIFICANT CHANGE UP (ref 135–145)
SPECIMEN SOURCE: SIGNIFICANT CHANGE UP
WBC # BLD: 10.7 K/UL — HIGH (ref 3.8–10.5)
WBC # FLD AUTO: 10.7 K/UL — HIGH (ref 3.8–10.5)

## 2019-04-26 PROCEDURE — 99231 SBSQ HOSP IP/OBS SF/LOW 25: CPT

## 2019-04-26 PROCEDURE — 99233 SBSQ HOSP IP/OBS HIGH 50: CPT

## 2019-04-26 RX ORDER — LABETALOL HCL 100 MG
400 TABLET ORAL EVERY 8 HOURS
Qty: 0 | Refills: 0 | Status: DISCONTINUED | OUTPATIENT
Start: 2019-04-26 | End: 2019-05-17

## 2019-04-26 RX ADMIN — SODIUM CHLORIDE 3 MILLILITER(S): 9 INJECTION INTRAMUSCULAR; INTRAVENOUS; SUBCUTANEOUS at 06:01

## 2019-04-26 RX ADMIN — ENOXAPARIN SODIUM 40 MILLIGRAM(S): 100 INJECTION SUBCUTANEOUS at 17:36

## 2019-04-26 RX ADMIN — SODIUM CHLORIDE 3 MILLILITER(S): 9 INJECTION INTRAMUSCULAR; INTRAVENOUS; SUBCUTANEOUS at 17:50

## 2019-04-26 RX ADMIN — Medication 8 MILLIGRAM(S): at 22:22

## 2019-04-26 RX ADMIN — POLYETHYLENE GLYCOL 3350 17 GRAM(S): 17 POWDER, FOR SOLUTION ORAL at 22:30

## 2019-04-26 RX ADMIN — CHLORHEXIDINE GLUCONATE 15 MILLILITER(S): 213 SOLUTION TOPICAL at 17:36

## 2019-04-26 RX ADMIN — MEROPENEM 100 MILLIGRAM(S): 1 INJECTION INTRAVENOUS at 05:37

## 2019-04-26 RX ADMIN — Medication 400 MILLIGRAM(S): at 22:24

## 2019-04-26 RX ADMIN — CHLORHEXIDINE GLUCONATE 1 APPLICATION(S): 213 SOLUTION TOPICAL at 22:21

## 2019-04-26 RX ADMIN — Medication 100 MILLIGRAM(S): at 13:07

## 2019-04-26 RX ADMIN — SENNA PLUS 2 TABLET(S): 8.6 TABLET ORAL at 22:25

## 2019-04-26 RX ADMIN — PANTOPRAZOLE SODIUM 40 MILLIGRAM(S): 20 TABLET, DELAYED RELEASE ORAL at 13:07

## 2019-04-26 RX ADMIN — Medication 300 MILLIGRAM(S): at 05:37

## 2019-04-26 RX ADMIN — Medication 60 MILLIGRAM(S): at 16:50

## 2019-04-26 RX ADMIN — MEROPENEM 100 MILLIGRAM(S): 1 INJECTION INTRAVENOUS at 13:06

## 2019-04-26 RX ADMIN — Medication 60 MILLIGRAM(S): at 04:19

## 2019-04-26 RX ADMIN — Medication 60 MILLIGRAM(S): at 09:44

## 2019-04-26 RX ADMIN — MEROPENEM 100 MILLIGRAM(S): 1 INJECTION INTRAVENOUS at 22:25

## 2019-04-26 RX ADMIN — POLYETHYLENE GLYCOL 3350 17 GRAM(S): 17 POWDER, FOR SOLUTION ORAL at 13:07

## 2019-04-26 RX ADMIN — POLYETHYLENE GLYCOL 3350 17 GRAM(S): 17 POWDER, FOR SOLUTION ORAL at 05:37

## 2019-04-26 RX ADMIN — Medication 400 MILLIGRAM(S): at 13:06

## 2019-04-26 RX ADMIN — CHLORHEXIDINE GLUCONATE 15 MILLILITER(S): 213 SOLUTION TOPICAL at 05:36

## 2019-04-26 RX ADMIN — Medication 100 MILLIGRAM(S): at 22:23

## 2019-04-26 RX ADMIN — Medication 60 MILLIGRAM(S): at 22:21

## 2019-04-26 RX ADMIN — Medication 100 MILLIGRAM(S): at 22:22

## 2019-04-26 RX ADMIN — Medication 100 MILLIGRAM(S): at 05:37

## 2019-04-26 RX ADMIN — Medication 3 MILLILITER(S): at 17:49

## 2019-04-26 RX ADMIN — Medication 3 MILLILITER(S): at 11:11

## 2019-04-26 RX ADMIN — Medication 100 MILLIGRAM(S): at 13:06

## 2019-04-26 RX ADMIN — Medication 3 MILLILITER(S): at 06:01

## 2019-04-26 RX ADMIN — Medication 100 MILLIGRAM(S): at 05:36

## 2019-04-26 RX ADMIN — SODIUM CHLORIDE 3 MILLILITER(S): 9 INJECTION INTRAMUSCULAR; INTRAVENOUS; SUBCUTANEOUS at 11:08

## 2019-04-26 NOTE — PROGRESS NOTE ADULT - PROBLEM SELECTOR PLAN 4
Patient with Newly Diagnosed A.FIB   Labetalol increased to 400 mg q 8 hr   Continue Cardizem 60 mg q 6 hrs   Monitor HR, No AC given 2/2 recent ICH

## 2019-04-26 NOTE — PROGRESS NOTE ADULT - SUBJECTIVE AND OBJECTIVE BOX
Patient is a 63y old  Female who presents with a chief complaint of ICH (25 Apr 2019 16:54)      Interval Events: No events reported overnight     REVIEW OF SYSTEMS:  [ ] Positive  [ ] All other systems negative  [x] Unable to assess ROS because patient is Non- verbal     Vital Signs Last 24 Hrs  T(C): 37.1 (04-26-19 @ 05:14), Max: 37.1 (04-26-19 @ 05:14)  T(F): 98.8 (04-26-19 @ 05:14), Max: 98.8 (04-26-19 @ 05:14)  HR: 72 (04-26-19 @ 06:02) (67 - 84)  BP: 164/78 (04-26-19 @ 05:14) (131/74 - 170/92)  RR: 18 (04-26-19 @ 05:14) (18 - 20)  SpO2: 100% (04-26-19 @ 06:02) (92% - 100%)    PHYSICAL EXAM:  HEENT:   [x]Tracheostomy: # 8 Cuffed Shiley   [x]Pupils equal  [ ]No oral lesions  [ ]Abnormal    SKIN  [x]No Rash  [ ] Abnormal  [ ] pressure    CARDIAC  [x]Regular  [ ]Abnormal    PULMONARY  [x]Bilateral Clear Breath Sounds  [ ]Normal Excursion  [ ]Abnormal    GI  [x]PEG      [x] +BS		              [x]Soft, nondistended, nontender	  [ ]Abnormal    MUSCULOSKELETAL                                   [ ]Bedbound                 [ ]Abnormal    [x]OOB to chair                           EXTREMITIES                                         [x]Normal  [ ]Edema                           NEUROLOGIC  [ ] Normal, non focal  [x] Focal findings: No following commands, No withdrawal to pain     PSYCHIATRIC  [ ]Alert and appropriate  [x] Sedated	 [ ]Agitated    :  Bedoya: [ ] Yes, if yes: Date of Placement:                   [x] No Straight Cath ATC     LINES: Central Lines [ ] Yes, if yes: Date of Placement                                     [x] No      HOSPITAL MEDICATIONS:  MEDICATIONS  (STANDING):  ALBUTerol/ipratropium for Nebulization 3 milliLiter(s) Nebulizer every 6 hours  chlorhexidine 0.12% Liquid 15 milliLiter(s) Oral Mucosa two times a day  chlorhexidine 4% Liquid 1 Application(s) Topical <User Schedule>  diltiazem    Tablet 60 milliGRAM(s) Oral every 6 hours  docusate sodium Liquid 100 milliGRAM(s) Oral every 8 hours  doxazosin 8 milliGRAM(s) Oral at bedtime  enoxaparin Injectable 40 milliGRAM(s) SubCutaneous <User Schedule>  hydrALAZINE 100 milliGRAM(s) Oral every 8 hours  labetalol 300 milliGRAM(s) Oral every 8 hours  meropenem  IVPB 1000 milliGRAM(s) IV Intermittent every 8 hours  pantoprazole   Suspension 40 milliGRAM(s) Oral daily  polyethylene glycol 3350 17 Gram(s) Oral <User Schedule>  senna 2 Tablet(s) Oral at bedtime  sodium chloride 7% Inhalation 3 milliLiter(s) Inhalation every 6 hours    MEDICATIONS  (PRN):  acetaminophen   Tablet .. 650 milliGRAM(s) Oral every 6 hours PRN Temp greater or equal to 38C (100.4F), Mild Pain (1 - 3)  ondansetron Injectable 4 milliGRAM(s) IV Push every 6 hours PRN Nausea and/or Vomiting      LABS:                        10.8   11.3  )-----------( 399      ( 25 Apr 2019 06:28 )             33.6     04-25    140  |  99  |  24<H>  ----------------------------<  134<H>  4.1   |  27  |  0.57    Ca    9.8      25 Apr 2019 06:29  Phos  4.4     04-25  Mg     2.2     04-25              CAPILLARY BLOOD GLUCOSE    MICROBIOLOGY:     RADIOLOGY:  [ ] Reviewed and interpreted by me    Mode: AC/ CMV (Assist Control/ Continuous Mandatory Ventilation)  RR (machine): 14  TV (machine): 400  FiO2: 30  PEEP: 5  ITime: 1  MAP: 9  PIP: 21

## 2019-04-26 NOTE — PROGRESS NOTE ADULT - PROBLEM SELECTOR PLAN 5
Patient remains with elevated BP this morning  Labetalol increased to 400 mg q 8 hrs    Continue Hydralazine 100 mg q 8 hrs   SBP Goal 100- 160 Per Neuro Sx

## 2019-04-26 NOTE — PROGRESS NOTE ADULT - PROBLEM SELECTOR PLAN 3
Patient Afebrile since initiation of ABX   CXR 4/23: Clear Lungs   Sputum cx 4/23: Enterobacter Colace / Burkholderia Cepacia   Both organisms Sensitive to Meropenem   Bld Cx 4/23: No growth, UA 4/23: Negative  Continue Meropenem will treat 7 Day Course  ( Ends 4/30)

## 2019-04-26 NOTE — PROGRESS NOTE ADULT - ASSESSMENT
63 Year old female w/ PMHx HTN, HLD, Ischemic stroke (2004), who was transferred from OSH, Patient initially presented to the OSH from home with AMS as per EMS, pt was talking to a family member on the phone when she suddenly stopped talking. 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 Patient transferred to [facility] for Neuro Surgical assessment. Upon admission NIHSS = ; MRS = 2; ICH =3. Patient was given DDAVP and PLTs. During admission patient was found to have newly diagnosed A.fib and was initiated on Labetalol. Patient had Serial head CTs performed which remained unchanged, no neuro surgical intervention was performed. Patient was seen by Palliative Care family decided to proceed with Trach and PEG. Patient S/p Tracheostomy on 4/16 ( # 8 Cuffed Shiley) and PEG Placement on 4/18. During hospitalization patient found to have UTI ( 100 K E.coli ) for which she was treated with a course of Keflex ( Completed 4/21). On 4/23 patient febrile again, Patient found to have : Enterobacter / Burkholderia  growing in sputum     4/26: Patient remains afebrile. Sputum cx 4/23 growing Enterobacter ( Sensitive Meropenem) / Burkholderia ( Sensitivity Pending). Will continue Meropenem. Patient tolerating TC during the day, will attempt TC ATC and obtain AM ABG. Patient remains with elevated BP will increase Labetalol 400 mg q 8 hrs. 63 Year old female w/ PMHx HTN, HLD, Ischemic stroke (2004), who was transferred from OSH, Patient initially presented to the OSH from home with AMS as per EMS, pt was talking to a family member on the phone when she suddenly stopped talking. 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 Patient transferred to [facility] for Neuro Surgical assessment. Upon admission NIHSS = ; MRS = 2; ICH =3. Patient was given DDAVP and PLTs. During admission patient was found to have newly diagnosed A.fib and was initiated on Labetalol. Patient had Serial head CTs performed which remained unchanged, no neuro surgical intervention was performed. Patient was seen by Palliative Care family decided to proceed with Trach and PEG. Patient S/p Tracheostomy on 4/16 ( # 8 Cuffed Shiley) and PEG Placement on 4/18. During hospitalization patient found to have UTI ( 100 K E.coli ) for which she was treated with a course of Keflex ( Completed 4/21). On 4/23 patient febrile again, Patient found to have : Enterobacter / Burkholderia  growing in sputum     4/26: Patient remains afebrile. Sputum cx 4/23 growing Enterobacter ( Sensitive Meropenem) / Burkholderia ( Sensitivity Meropenem). Will continue Meropenem. Patient tolerating TC during the day, will attempt TC ATC and obtain AM ABG. Patient remains with elevated BP will increase Labetalol 400 mg q 8 hrs. 63 Year old female w/ PMHx HTN, HLD, Ischemic stroke (2004), who was transferred from OSH, Patient initially presented to the OSH from home with AMS as per EMS, pt was talking to a family member on the phone when she suddenly stopped talking. 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 Patient transferred to [facility] for Neuro Surgical assessment. Upon admission NIHSS = ; MRS = 2; ICH =3. Patient was given DDAVP and PLTs. During admission patient was found to have newly diagnosed A.fib and was initiated on Labetalol. Patient had Serial head CTs performed which remained unchanged, no neuro surgical intervention was performed. Patient was seen by Palliative Care family decided to proceed with Trach and PEG. Patient S/p Tracheostomy on 4/16 ( # 8 Cuffed Danna) and PEG Placement on 4/18. During hospitalization patient found to have UTI ( 100 K E.coli ) for which she was treated with a course of Keflex ( Completed 4/21). On 4/23 patient febrile again, Patient found to have : Enterobacter / Burkholderia  growing in sputum     4/26: Patient remains afebrile. Sputum cx 4/23 growing Enterobacter/ Burkholderia, Will continue Meropenem. Patient tolerating TC during the day, will attempt TC ATC and obtain AM ABG. Patient remains with elevated BP will increase Labetalol 400 mg q 8 hrs.

## 2019-04-26 NOTE — PROGRESS NOTE ADULT - PROBLEM SELECTOR PLAN 2
Patient S/p Tracheostomy 4/16  ( # 8 Cuffed Danna )   Patient tolerating TC Day, Will attempt TC ATC This evening   Check AM ABG if patient Tolerates   Continue Nebs / Suctioning PRN / Chest PT

## 2019-04-26 NOTE — PROGRESS NOTE ADULT - SUBJECTIVE AND OBJECTIVE BOX
Subjective: Patient seen and examined. No new events except as noted.     SUBJECTIVE/ROS:        MEDICATIONS:  MEDICATIONS  (STANDING):  ALBUTerol/ipratropium for Nebulization 3 milliLiter(s) Nebulizer every 6 hours  chlorhexidine 0.12% Liquid 15 milliLiter(s) Oral Mucosa two times a day  chlorhexidine 4% Liquid 1 Application(s) Topical <User Schedule>  diltiazem    Tablet 60 milliGRAM(s) Oral every 6 hours  docusate sodium Liquid 100 milliGRAM(s) Oral every 8 hours  doxazosin 8 milliGRAM(s) Oral at bedtime  enoxaparin Injectable 40 milliGRAM(s) SubCutaneous <User Schedule>  hydrALAZINE 100 milliGRAM(s) Oral every 8 hours  labetalol 300 milliGRAM(s) Oral every 8 hours  meropenem  IVPB 1000 milliGRAM(s) IV Intermittent every 8 hours  pantoprazole   Suspension 40 milliGRAM(s) Oral daily  polyethylene glycol 3350 17 Gram(s) Oral <User Schedule>  senna 2 Tablet(s) Oral at bedtime  sodium chloride 7% Inhalation 3 milliLiter(s) Inhalation every 6 hours      PHYSICAL EXAM:  T(C): 37.1 (04-26-19 @ 05:14), Max: 37.1 (04-26-19 @ 05:14)  HR: 72 (04-26-19 @ 06:02) (67 - 84)  BP: 164/78 (04-26-19 @ 05:14) (131/74 - 170/92)  RR: 18 (04-26-19 @ 05:14) (18 - 20)  SpO2: 100% (04-26-19 @ 06:02) (92% - 100%)  Wt(kg): --  I&O's Summary    25 Apr 2019 07:01  -  26 Apr 2019 07:00  --------------------------------------------------------  IN: 675 mL / OUT: 800 mL / NET: -125 mL            JVP: Normal  Neck: supple  Lung: clear   CV: S1 S2 , Murmur:  Abd: soft  Ext: No edema  Psych: flat affect  Skin: normal``    LABS/DATA:    CARDIAC MARKERS:                                10.8   11.3  )-----------( 399      ( 25 Apr 2019 06:28 )             33.6     04-26    137  |  98  |  24<H>  ----------------------------<  141<H>  4.0   |  27  |  0.62    Ca    9.7      26 Apr 2019 06:42  Phos  3.8     04-26  Mg     2.3     04-26      proBNP:   Lipid Profile:   HgA1c:   TSH:     TELE:  EKG:

## 2019-04-26 NOTE — PROGRESS NOTE ADULT - SUBJECTIVE AND OBJECTIVE BOX
INTERVAL HPI/OVERNIGHT EVENTS:  Patient OOB to chair, has been tolerating PEG tube feeds, had bowel movements overnight     MEDICATIONS  (STANDING):  ALBUTerol/ipratropium for Nebulization 3 milliLiter(s) Nebulizer every 6 hours  chlorhexidine 0.12% Liquid 15 milliLiter(s) Oral Mucosa two times a day  chlorhexidine 4% Liquid 1 Application(s) Topical <User Schedule>  diltiazem    Tablet 60 milliGRAM(s) Oral every 6 hours  docusate sodium Liquid 100 milliGRAM(s) Oral every 8 hours  doxazosin 8 milliGRAM(s) Oral at bedtime  enoxaparin Injectable 40 milliGRAM(s) SubCutaneous <User Schedule>  hydrALAZINE 100 milliGRAM(s) Oral every 8 hours  labetalol 300 milliGRAM(s) Oral every 8 hours  meropenem  IVPB 1000 milliGRAM(s) IV Intermittent every 8 hours  pantoprazole   Suspension 40 milliGRAM(s) Oral daily  polyethylene glycol 3350 17 Gram(s) Oral <User Schedule>  senna 2 Tablet(s) Oral at bedtime  sodium chloride 7% Inhalation 3 milliLiter(s) Inhalation every 6 hours    MEDICATIONS  (PRN):  acetaminophen   Tablet .. 650 milliGRAM(s) Oral every 6 hours PRN Temp greater or equal to 38C (100.4F), Mild Pain (1 - 3)  ondansetron Injectable 4 milliGRAM(s) IV Push every 6 hours PRN Nausea and/or Vomiting      Allergies    Allergy Status Unknown    Intolerances        Review of Systems:  Unable to obtain from patient   General:  No fevers    ENT: Trach collar , +dysphagia  Resp:  has trach , no  wheezing  GI tolerating PEG tube feeds        Vital Signs Last 24 Hrs  T(C): 37.1 (26 Apr 2019 05:14), Max: 37.1 (26 Apr 2019 05:14)  T(F): 98.8 (26 Apr 2019 05:14), Max: 98.8 (26 Apr 2019 05:14)  HR: 77 (26 Apr 2019 08:16) (70 - 84)  BP: 164/78 (26 Apr 2019 05:14) (131/74 - 170/92)  BP(mean): --  RR: 16 (26 Apr 2019 08:16) (16 - 20)  SpO2: 96% (26 Apr 2019 08:16) (92% - 100%)    PHYSICAL EXAM:    Constitutional: OOB to recliner chair non toxic in NAD  Neck: trach site dry   Respiratory: anterior chest wallclear to auscultation  Cardiovascular: S1 and S2, RRR  Gastrointestinal: soft PEG site dry , + bowel sounds   Extremities: No peripheral edema or cyanosis  Skin: No jaundice or visible rashes      LABS:                        11.2   10.7  )-----------( 497      ( 26 Apr 2019 06:42 )             35.9     04-26    137  |  98  |  24<H>  ----------------------------<  141<H>  4.0   |  27  |  0.62    Ca    9.7      26 Apr 2019 06:42  Phos  3.8     04-26  Mg     2.3     04-26              RADIOLOGY & ADDITIONAL TESTS:  < from: Xray Abdomen 1 View PORTABLE -Urgent (04.24.19 @ 08:41) >  EXAM:  XR ABDOMEN PORTABLE URGENT 1V                            PROCEDURE DATE:  04/24/2019            INTERPRETATION:  CLINICAL INFORMATION: Abdominal pain and vomiting.    TECHNIQUE: Single frontal radiograph of the abdomen 4/24/2019 8:41 AM.    COMPARISON: Abdominal radiograph 4/23/2019.    FINDINGS:  Gastrostomy tube partially visualized.  Multiple air-filled loops of bowel. Nonobstructive bowel gas pattern.  No evidence of intraperitoneal free air.  No acute osseous abnormality.    IMPRESSION: Nonobstructive bowel gas pattern.                  < end of copied text > INTERVAL HPI/OVERNIGHT EVENTS:  Patient OOB to chair, has been tolerating PEG tube feeds, had bowel movements overnight     MEDICATIONS  (STANDING):  ALBUTerol/ipratropium for Nebulization 3 milliLiter(s) Nebulizer every 6 hours  chlorhexidine 0.12% Liquid 15 milliLiter(s) Oral Mucosa two times a day  chlorhexidine 4% Liquid 1 Application(s) Topical <User Schedule>  diltiazem    Tablet 60 milliGRAM(s) Oral every 6 hours  docusate sodium Liquid 100 milliGRAM(s) Oral every 8 hours  doxazosin 8 milliGRAM(s) Oral at bedtime  enoxaparin Injectable 40 milliGRAM(s) SubCutaneous <User Schedule>  hydrALAZINE 100 milliGRAM(s) Oral every 8 hours  labetalol 300 milliGRAM(s) Oral every 8 hours  meropenem  IVPB 1000 milliGRAM(s) IV Intermittent every 8 hours  pantoprazole   Suspension 40 milliGRAM(s) Oral daily  polyethylene glycol 3350 17 Gram(s) Oral <User Schedule>  senna 2 Tablet(s) Oral at bedtime  sodium chloride 7% Inhalation 3 milliLiter(s) Inhalation every 6 hours    MEDICATIONS  (PRN):  acetaminophen   Tablet .. 650 milliGRAM(s) Oral every 6 hours PRN Temp greater or equal to 38C (100.4F), Mild Pain (1 - 3)  ondansetron Injectable 4 milliGRAM(s) IV Push every 6 hours PRN Nausea and/or Vomiting      Allergies    Allergy Status Unknown    Intolerances        Review of Systems:  Unable to obtain from patient   General:  No fevers    ENT: Trach collar , +dysphagia  Resp:  has trach , no  wheezing  GI tolerating PEG tube feeds        Vital Signs Last 24 Hrs  T(C): 37.1 (26 Apr 2019 05:14), Max: 37.1 (26 Apr 2019 05:14)  T(F): 98.8 (26 Apr 2019 05:14), Max: 98.8 (26 Apr 2019 05:14)  HR: 77 (26 Apr 2019 08:16) (70 - 84)  BP: 164/78 (26 Apr 2019 05:14) (131/74 - 170/92)  BP(mean): --  RR: 16 (26 Apr 2019 08:16) (16 - 20)  SpO2: 96% (26 Apr 2019 08:16) (92% - 100%)    PHYSICAL EXAM:    Constitutional: OOB to recliner chair non toxic in NAD  Neck: trach site dry   Respiratory: anterior chest wallclear to auscultation  Cardiovascular: S1 and S2, RRR  Gastrointestinal: soft PEG site dry , + bowel sounds  external PEG bumper at 5 cm skin level   Extremities: No peripheral edema or cyanosis  Skin: No jaundice or visible rashes      LABS:                        11.2   10.7  )-----------( 497      ( 26 Apr 2019 06:42 )             35.9     04-26    137  |  98  |  24<H>  ----------------------------<  141<H>  4.0   |  27  |  0.62    Ca    9.7      26 Apr 2019 06:42  Phos  3.8     04-26  Mg     2.3     04-26              RADIOLOGY & ADDITIONAL TESTS:  < from: Xray Abdomen 1 View PORTABLE -Urgent (04.24.19 @ 08:41) >  EXAM:  XR ABDOMEN PORTABLE URGENT 1V                            PROCEDURE DATE:  04/24/2019            INTERPRETATION:  CLINICAL INFORMATION: Abdominal pain and vomiting.    TECHNIQUE: Single frontal radiograph of the abdomen 4/24/2019 8:41 AM.    COMPARISON: Abdominal radiograph 4/23/2019.    FINDINGS:  Gastrostomy tube partially visualized.  Multiple air-filled loops of bowel. Nonobstructive bowel gas pattern.  No evidence of intraperitoneal free air.  No acute osseous abnormality.    IMPRESSION: Nonobstructive bowel gas pattern.                  < end of copied text >

## 2019-04-26 NOTE — PROGRESS NOTE ADULT - ASSESSMENT
HTN  cont current meds  if no improvement recommend addition of  Ace or arb    Afib  resolved  cont current avn blockers  off a/c due to pontine bleed     resp failrue  s/p trach   fu with RCU

## 2019-04-26 NOTE — PROGRESS NOTE ADULT - ASSESSMENT
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 Patient now has had trach placed for respiratory  failure and had PEG placed 4/17/19 for dysphagia. She is tolerating TF at 75cc/hr and having bowel movements on current bowel regimen     Recommendations:   Monitor labs and trends as ordered   Continue PEG tube feeds at 75cc/hr as tolerated   Monitor bowel frequency on current bowel regimine , hold laxatives for     loose/frequent bowel movements   Duration of antibiotics per primary team      Ana Rosa Beal, ANP-[facility]     Please call GI service [phone] for any questions or concerns over weekend

## 2019-04-27 LAB
ANION GAP SERPL CALC-SCNC: 14 MMOL/L — SIGNIFICANT CHANGE UP (ref 5–17)
BASE EXCESS BLDA CALC-SCNC: 5.4 MMOL/L — HIGH (ref -2–2)
BUN SERPL-MCNC: 28 MG/DL — HIGH (ref 7–23)
CALCIUM SERPL-MCNC: 9.6 MG/DL — SIGNIFICANT CHANGE UP (ref 8.4–10.5)
CHLORIDE SERPL-SCNC: 101 MMOL/L — SIGNIFICANT CHANGE UP (ref 96–108)
CO2 BLDA-SCNC: 29 MMOL/L — SIGNIFICANT CHANGE UP (ref 22–30)
CO2 SERPL-SCNC: 27 MMOL/L — SIGNIFICANT CHANGE UP (ref 22–31)
CREAT SERPL-MCNC: 0.62 MG/DL — SIGNIFICANT CHANGE UP (ref 0.5–1.3)
GAS PNL BLDA: SIGNIFICANT CHANGE UP
GLUCOSE SERPL-MCNC: 200 MG/DL — HIGH (ref 70–99)
HCO3 BLDA-SCNC: 28 MMOL/L — SIGNIFICANT CHANGE UP (ref 21–29)
HCT VFR BLD CALC: 35.4 % — SIGNIFICANT CHANGE UP (ref 34.5–45)
HGB BLD-MCNC: 10.9 G/DL — LOW (ref 11.5–15.5)
HOROWITZ INDEX BLDA+IHG-RTO: SIGNIFICANT CHANGE UP
MAGNESIUM SERPL-MCNC: 2.1 MG/DL — SIGNIFICANT CHANGE UP (ref 1.6–2.6)
MCHC RBC-ENTMCNC: 30.3 PG — SIGNIFICANT CHANGE UP (ref 27–34)
MCHC RBC-ENTMCNC: 30.8 GM/DL — LOW (ref 32–36)
MCV RBC AUTO: 98.2 FL — SIGNIFICANT CHANGE UP (ref 80–100)
PCO2 BLDA: 36 MMHG — SIGNIFICANT CHANGE UP (ref 32–46)
PH BLDA: 7.51 — HIGH (ref 7.35–7.45)
PHOSPHATE SERPL-MCNC: 2.7 MG/DL — SIGNIFICANT CHANGE UP (ref 2.5–4.5)
PLATELET # BLD AUTO: 545 K/UL — HIGH (ref 150–400)
PO2 BLDA: 125 MMHG — HIGH (ref 74–108)
POTASSIUM SERPL-MCNC: 3.6 MMOL/L — SIGNIFICANT CHANGE UP (ref 3.5–5.3)
POTASSIUM SERPL-SCNC: 3.6 MMOL/L — SIGNIFICANT CHANGE UP (ref 3.5–5.3)
RBC # BLD: 3.6 M/UL — LOW (ref 3.8–5.2)
RBC # FLD: 12.7 % — SIGNIFICANT CHANGE UP (ref 10.3–14.5)
SAO2 % BLDA: 99 % — HIGH (ref 92–96)
SODIUM SERPL-SCNC: 142 MMOL/L — SIGNIFICANT CHANGE UP (ref 135–145)
WBC # BLD: 9.6 K/UL — SIGNIFICANT CHANGE UP (ref 3.8–10.5)
WBC # FLD AUTO: 9.6 K/UL — SIGNIFICANT CHANGE UP (ref 3.8–10.5)

## 2019-04-27 PROCEDURE — 99232 SBSQ HOSP IP/OBS MODERATE 35: CPT

## 2019-04-27 RX ADMIN — CHLORHEXIDINE GLUCONATE 15 MILLILITER(S): 213 SOLUTION TOPICAL at 05:40

## 2019-04-27 RX ADMIN — POLYETHYLENE GLYCOL 3350 17 GRAM(S): 17 POWDER, FOR SOLUTION ORAL at 21:40

## 2019-04-27 RX ADMIN — ENOXAPARIN SODIUM 40 MILLIGRAM(S): 100 INJECTION SUBCUTANEOUS at 18:38

## 2019-04-27 RX ADMIN — MEROPENEM 100 MILLIGRAM(S): 1 INJECTION INTRAVENOUS at 21:55

## 2019-04-27 RX ADMIN — Medication 3 MILLILITER(S): at 23:13

## 2019-04-27 RX ADMIN — Medication 100 MILLIGRAM(S): at 21:39

## 2019-04-27 RX ADMIN — MEROPENEM 100 MILLIGRAM(S): 1 INJECTION INTRAVENOUS at 05:42

## 2019-04-27 RX ADMIN — Medication 3 MILLILITER(S): at 11:47

## 2019-04-27 RX ADMIN — Medication 400 MILLIGRAM(S): at 13:14

## 2019-04-27 RX ADMIN — CHLORHEXIDINE GLUCONATE 1 APPLICATION(S): 213 SOLUTION TOPICAL at 21:40

## 2019-04-27 RX ADMIN — Medication 400 MILLIGRAM(S): at 05:42

## 2019-04-27 RX ADMIN — POLYETHYLENE GLYCOL 3350 17 GRAM(S): 17 POWDER, FOR SOLUTION ORAL at 05:40

## 2019-04-27 RX ADMIN — CHLORHEXIDINE GLUCONATE 15 MILLILITER(S): 213 SOLUTION TOPICAL at 18:38

## 2019-04-27 RX ADMIN — Medication 100 MILLIGRAM(S): at 05:41

## 2019-04-27 RX ADMIN — PANTOPRAZOLE SODIUM 40 MILLIGRAM(S): 20 TABLET, DELAYED RELEASE ORAL at 11:57

## 2019-04-27 RX ADMIN — Medication 2.5 MILLIGRAM(S): at 19:49

## 2019-04-27 RX ADMIN — SENNA PLUS 2 TABLET(S): 8.6 TABLET ORAL at 21:41

## 2019-04-27 RX ADMIN — Medication 60 MILLIGRAM(S): at 21:39

## 2019-04-27 RX ADMIN — Medication 100 MILLIGRAM(S): at 21:38

## 2019-04-27 RX ADMIN — Medication 100 MILLIGRAM(S): at 13:14

## 2019-04-27 RX ADMIN — Medication 60 MILLIGRAM(S): at 08:28

## 2019-04-27 RX ADMIN — Medication 400 MILLIGRAM(S): at 23:08

## 2019-04-27 RX ADMIN — Medication 60 MILLIGRAM(S): at 15:52

## 2019-04-27 RX ADMIN — Medication 3 MILLILITER(S): at 05:58

## 2019-04-27 RX ADMIN — POLYETHYLENE GLYCOL 3350 17 GRAM(S): 17 POWDER, FOR SOLUTION ORAL at 13:13

## 2019-04-27 RX ADMIN — Medication 3 MILLILITER(S): at 17:58

## 2019-04-27 RX ADMIN — Medication 8 MILLIGRAM(S): at 21:39

## 2019-04-27 RX ADMIN — SODIUM CHLORIDE 3 MILLILITER(S): 9 INJECTION INTRAMUSCULAR; INTRAVENOUS; SUBCUTANEOUS at 05:58

## 2019-04-27 RX ADMIN — Medication 60 MILLIGRAM(S): at 03:17

## 2019-04-27 RX ADMIN — MEROPENEM 100 MILLIGRAM(S): 1 INJECTION INTRAVENOUS at 13:13

## 2019-04-27 RX ADMIN — Medication 100 MILLIGRAM(S): at 13:13

## 2019-04-27 NOTE — PHYSICAL THERAPY INITIAL EVALUATION ADULT - ADDITIONAL COMMENTS
lives w/ lives w/ in apt, 4 steps to elevator/ was independent w/ ADL;s and amb and did not use an assistive device, use reading glasses, hearing good, and is R handed

## 2019-04-27 NOTE — PROGRESS NOTE ADULT - PROBLEM SELECTOR PLAN 2
Patient S/p Tracheostomy 4/16  ( # 8 Cuffed Shiley )   Patient tolerating TC post 24hrs, mild Resp Alkalosis on ABG, likely to self correct. Will continue  TC ATC. Vent discontinued.  Continue Nebs prn / Suctioning PRN / Chest PT

## 2019-04-27 NOTE — PROGRESS NOTE ADULT - SUBJECTIVE AND OBJECTIVE BOX
Patient is a 63y old  Female who presents with a chief complaint of ICH (26 Apr 2019 09:51)      Interval Events:    REVIEW OF SYSTEMS:  [ ] Positive  [ ] All other systems negative  [ ] Unable to assess ROS because ________    Vital Signs Last 24 Hrs  T(C): 37.3 (04-27-19 @ 03:22), Max: 37.4 (04-27-19 @ 01:14)  T(F): 99.2 (04-27-19 @ 03:22), Max: 99.3 (04-27-19 @ 01:14)  HR: 70 (04-27-19 @ 05:59) (70 - 86)  BP: 146/80 (04-27-19 @ 05:50) (130/74 - 177/97)  RR: 20 (04-27-19 @ 01:14) (16 - 22)  SpO2: 96% (04-27-19 @ 05:59) (88% - 97%)    PHYSICAL EXAM:  HEENT:   [ ]Tracheostomy:  [ ]Pupils equal  [ ]No oral lesions  [ ]Abnormal    SKIN  [ ]No Rash  [ ] Abnormal  [ ] pressure    CARDIAC  [ ]Regular  [ ]Abnormal    PULMONARY  [ ]Bilateral Clear Breath Sounds  [ ]Normal Excursion  [ ]Abnormal    GI  [ ]PEG      [ ] +BS		              [ ]Soft, nondistended, nontender	  [ ]Abnormal    MUSCULOSKELETAL                                   [ ]Bedbound                 [ ]Abnormal    [ ]Ambulatory/OOB to chair                           EXTREMITIES                                         [ ]Normal  [ ]Edema                           NEUROLOGIC  [ ] Normal, non focal  [ ] Focal findings:    PSYCHIATRIC  [ ]Alert and appropriate  [ ] Sedated	 [ ]Agitated    :  Bedoya: [ ] Yes, if yes: Date of Placement:                   [  ] No    LINES: Central Lines [ ] Yes, if yes: Date of Placement                                     [  ] No    HOSPITAL MEDICATIONS:  MEDICATIONS  (STANDING):  ALBUTerol/ipratropium for Nebulization 3 milliLiter(s) Nebulizer every 6 hours  chlorhexidine 0.12% Liquid 15 milliLiter(s) Oral Mucosa two times a day  chlorhexidine 4% Liquid 1 Application(s) Topical <User Schedule>  diltiazem    Tablet 60 milliGRAM(s) Oral every 6 hours  docusate sodium Liquid 100 milliGRAM(s) Oral every 8 hours  doxazosin 8 milliGRAM(s) Oral at bedtime  enoxaparin Injectable 40 milliGRAM(s) SubCutaneous <User Schedule>  hydrALAZINE 100 milliGRAM(s) Oral every 8 hours  labetalol 400 milliGRAM(s) Oral every 8 hours  meropenem  IVPB 1000 milliGRAM(s) IV Intermittent every 8 hours  pantoprazole   Suspension 40 milliGRAM(s) Oral daily  polyethylene glycol 3350 17 Gram(s) Oral <User Schedule>  senna 2 Tablet(s) Oral at bedtime  sodium chloride 7% Inhalation 3 milliLiter(s) Inhalation every 6 hours    MEDICATIONS  (PRN):  acetaminophen   Tablet .. 650 milliGRAM(s) Oral every 6 hours PRN Temp greater or equal to 38C (100.4F), Mild Pain (1 - 3)  ondansetron Injectable 4 milliGRAM(s) IV Push every 6 hours PRN Nausea and/or Vomiting      LABS:                        11.2   10.7  )-----------( 497      ( 26 Apr 2019 06:42 )             35.9     04-26    137  |  98  |  24<H>  ----------------------------<  141<H>  4.0   |  27  |  0.62    Ca    9.7      26 Apr 2019 06:42  Phos  3.8     04-26  Mg     2.3     04-26          Arterial Blood Gas:  04-27 @ 06:15  7.51/36/125/28/99/5.4  ABG lactate: --      CAPILLARY BLOOD GLUCOSE    MICROBIOLOGY:     RADIOLOGY:  [ ] Reviewed and interpreted by me    Mode: vent off Patient is a 63y old  Female who presents with a chief complaint of ICH....f/u respiratory distress    Interval Events: No events reported over night    REVIEW OF SYSTEMS:  [ ] Positive  [ ] All other systems negative  [X] Unable to assess ROS because __non-verbal/communicative______    Vital Signs Last 24 Hrs  T(C): 37.3 (04-27-19 @ 03:22), Max: 37.4 (04-27-19 @ 01:14)  T(F): 99.2 (04-27-19 @ 03:22), Max: 99.3 (04-27-19 @ 01:14)  HR: 70 (04-27-19 @ 05:59) (70 - 86)  BP: 146/80 (04-27-19 @ 05:50) (130/74 - 177/97)  RR: 20 (04-27-19 @ 01:14) (16 - 22)  SpO2: 96% (04-27-19 @ 05:59) (88% - 97%)    PHYSICAL EXAM:  HEENT:   [x]Tracheostomy: # 8 Cuffed Shiley   [x]Pupils equal  [ ]No oral lesions  [ ]Abnormal    SKIN  [x]No Rash  [ ] Abnormal  [ ] pressure    CARDIAC  [x]Regular  [ ]Abnormal    PULMONARY  [x]Bilateral Clear Breath Sounds  [ ]Normal Excursion  [ ]Abnormal    GI  [x]PEG      [x] +BS		              [x]Soft, nondistended, nontender	  [ ]Abnormal    MUSCULOSKELETAL                                   [ ]Bedbound                 [ ]Abnormal    [x]OOB to chair                           EXTREMITIES                                         [x]Normal  [ ]Edema                           NEUROLOGIC  [ ] Normal, non focal  [x] Focal findings: No following commands, No withdrawal to pain     PSYCHIATRIC  [ ]Alert and appropriate  [x] Sedated	 [ ]Agitated    :  Bedoya: [ ] Yes, if yes: Date of Placement:                   [x] No Straight Cath ATC     LINES: Central Lines [ ] Yes, if yes: Date of Placement                                     [x] No      HOSPITAL MEDICATIONS:  MEDICATIONS  (STANDING):  ALBUTerol/ipratropium for Nebulization 3 milliLiter(s) Nebulizer every 6 hours  chlorhexidine 0.12% Liquid 15 milliLiter(s) Oral Mucosa two times a day  chlorhexidine 4% Liquid 1 Application(s) Topical <User Schedule>  diltiazem    Tablet 60 milliGRAM(s) Oral every 6 hours  docusate sodium Liquid 100 milliGRAM(s) Oral every 8 hours  doxazosin 8 milliGRAM(s) Oral at bedtime  enoxaparin Injectable 40 milliGRAM(s) SubCutaneous <User Schedule>  hydrALAZINE 100 milliGRAM(s) Oral every 8 hours  labetalol 400 milliGRAM(s) Oral every 8 hours  meropenem  IVPB 1000 milliGRAM(s) IV Intermittent every 8 hours  pantoprazole   Suspension 40 milliGRAM(s) Oral daily  polyethylene glycol 3350 17 Gram(s) Oral <User Schedule>  senna 2 Tablet(s) Oral at bedtime  sodium chloride 7% Inhalation 3 milliLiter(s) Inhalation every 6 hours    MEDICATIONS  (PRN):  acetaminophen   Tablet .. 650 milliGRAM(s) Oral every 6 hours PRN Temp greater or equal to 38C (100.4F), Mild Pain (1 - 3)  ondansetron Injectable 4 milliGRAM(s) IV Push every 6 hours PRN Nausea and/or Vomiting      LABS:                        11.2   10.7  )-----------( 497      ( 26 Apr 2019 06:42 )             35.9     04-26    137  |  98  |  24<H>  ----------------------------<  141<H>  4.0   |  27  |  0.62    Ca    9.7      26 Apr 2019 06:42  Phos  3.8     04-26  Mg     2.3     04-26          Arterial Blood Gas:  04-27 @ 06:15  7.51/36/125/28/99/5.4  ABG lactate: --      CAPILLARY BLOOD GLUCOSE    MICROBIOLOGY:     RADIOLOGY:  [ ] Reviewed and interpreted by me    Mode: vent off

## 2019-04-27 NOTE — PHYSICAL THERAPY INITIAL EVALUATION ADULT - GENERAL OBSERVATIONS, REHAB EVAL
Rec'd in Rec'd in bed + trach collar, PEG and R dorsum hand IV lock, on continuous pulse Ox, NAD> unresponsive

## 2019-04-27 NOTE — PROGRESS NOTE ADULT - ASSESSMENT
HTN  cont current meds  improving  agree with current meds     Afib  resolved  cont current avn blockers  off a/c due to pontine bleed     resp failrue  s/p trach   fu with RCU

## 2019-04-27 NOTE — PHYSICAL THERAPY INITIAL EVALUATION ADULT - PERTINENT HX OF CURRENT PROBLEM, REHAB EVAL
64 y/o F admitted on 4/11/19  w/ PMHx HTN, HLD, ischemic CVA (2004), presents to the ED BIBA from home for evaluation of AMS-  collapsed while talking on phone,. As per EMS, pt was talking to a family member on the phone at 11:00 today. 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

## 2019-04-27 NOTE — PROGRESS NOTE ADULT - PROBLEM SELECTOR PLAN 4
Patient with Newly Diagnosed A.FIB   Labetalol increased to 400 mg q 8 hr   Continue Cardizem 60 mg q 6 hrs   Added Enalapril 2.5mg QD for better longer acting control, different mechanism of action may help stabilize BP  Monitor HR, No AC given 2/2 recent ICH

## 2019-04-27 NOTE — PROGRESS NOTE ADULT - ASSESSMENT
63 Year old female w/ PMHx HTN, HLD, Ischemic stroke (2004), who was transferred from OSH, Patient initially presented to the OSH from home with AMS as per EMS, pt was talking to a family member on the phone when she suddenly stopped talking. 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 Patient transferred to [facility] for Neuro Surgical assessment. Upon admission NIHSS = ; MRS = 2; ICH =3. Patient was given DDAVP and PLTs. During admission patient was found to have newly diagnosed A.fib and was initiated on Labetalol. Patient had Serial head CTs performed which remained unchanged, no neuro surgical intervention was performed. Patient was seen by Palliative Care family decided to proceed with Trach and PEG. Patient S/p Tracheostomy on 4/16 ( # 8 Cuffed Shiley) and PEG Placement on 4/18. During hospitalization patient found to have UTI ( 100 K E.coli ) for which she was treated with a course of Keflex ( Completed 4/21). On 4/23 patient febrile again, Patient found to have : Enterobacter / Burkholderia  growing in sputum     4/26: Patient remains afebrile. Sputum cx 4/23 growing Enterobacter/ Burkholderia, Will continue Meropenem. Patient tolerating TC during the day, will attempt TC ATC and obtain AM ABG. Patient remains with elevated BP will increase Labetalol 400 mg q 8 hrs.  4/27: Tolerated TC x24hrs. ABG reviewed and adequate to continue TC. Appears comfortable of TC. BP remains labile with elevations into 170s. Will add Enalapril. Spoke to son at bedside. Will finish ABx on 4/30.

## 2019-04-27 NOTE — PROGRESS NOTE ADULT - SUBJECTIVE AND OBJECTIVE BOX
Subjective: Patient seen and examined. No new events except as noted.     SUBJECTIVE/ROS:        MEDICATIONS:  MEDICATIONS  (STANDING):  ALBUTerol/ipratropium for Nebulization 3 milliLiter(s) Nebulizer every 6 hours  chlorhexidine 0.12% Liquid 15 milliLiter(s) Oral Mucosa two times a day  chlorhexidine 4% Liquid 1 Application(s) Topical <User Schedule>  diltiazem    Tablet 60 milliGRAM(s) Oral every 6 hours  docusate sodium Liquid 100 milliGRAM(s) Oral every 8 hours  doxazosin 8 milliGRAM(s) Oral at bedtime  enoxaparin Injectable 40 milliGRAM(s) SubCutaneous <User Schedule>  hydrALAZINE 100 milliGRAM(s) Oral every 8 hours  labetalol 400 milliGRAM(s) Oral every 8 hours  meropenem  IVPB 1000 milliGRAM(s) IV Intermittent every 8 hours  pantoprazole   Suspension 40 milliGRAM(s) Oral daily  polyethylene glycol 3350 17 Gram(s) Oral <User Schedule>  senna 2 Tablet(s) Oral at bedtime      PHYSICAL EXAM:  T(C): 37.5 (04-27-19 @ 08:01), Max: 37.5 (04-27-19 @ 08:01)  HR: 87 (04-27-19 @ 08:34) (70 - 87)  BP: 147/76 (04-27-19 @ 08:01) (130/74 - 177/97)  RR: 20 (04-27-19 @ 08:01) (20 - 22)  SpO2: 96% (04-27-19 @ 08:34) (88% - 97%)  Wt(kg): --  I&O's Summary    26 Apr 2019 07:01  -  27 Apr 2019 07:00  --------------------------------------------------------  IN: 2445 mL / OUT: 1750 mL / NET: 695 mL            JVP: Normal  Neck: supple  Lung: clear   CV: S1 S2 , Murmur:  Abd: soft  Ext: No edema  Psych: flat affect  Skin: normal``    LABS/DATA:    CARDIAC MARKERS:                                10.9   9.6   )-----------( 545      ( 27 Apr 2019 07:20 )             35.4     04-27    142  |  101  |  28<H>  ----------------------------<  200<H>  3.6   |  27  |  0.62    Ca    9.6      27 Apr 2019 07:16  Phos  2.7     04-27  Mg     2.1     04-27      proBNP:   Lipid Profile:   HgA1c:   TSH:     TELE:  EKG:

## 2019-04-28 LAB
ANION GAP SERPL CALC-SCNC: 14 MMOL/L — SIGNIFICANT CHANGE UP (ref 5–17)
APPEARANCE UR: ABNORMAL
BASOPHILS # BLD AUTO: 0.06 K/UL — SIGNIFICANT CHANGE UP (ref 0–0.2)
BASOPHILS NFR BLD AUTO: 0.5 % — SIGNIFICANT CHANGE UP (ref 0–2)
BILIRUB UR-MCNC: NEGATIVE — SIGNIFICANT CHANGE UP
BUN SERPL-MCNC: 24 MG/DL — HIGH (ref 7–23)
CALCIUM SERPL-MCNC: 9.4 MG/DL — SIGNIFICANT CHANGE UP (ref 8.4–10.5)
CHLORIDE SERPL-SCNC: 101 MMOL/L — SIGNIFICANT CHANGE UP (ref 96–108)
CO2 SERPL-SCNC: 26 MMOL/L — SIGNIFICANT CHANGE UP (ref 22–31)
COLOR SPEC: YELLOW — SIGNIFICANT CHANGE UP
CREAT SERPL-MCNC: 0.57 MG/DL — SIGNIFICANT CHANGE UP (ref 0.5–1.3)
CULTURE RESULTS: SIGNIFICANT CHANGE UP
CULTURE RESULTS: SIGNIFICANT CHANGE UP
DIFF PNL FLD: NEGATIVE — SIGNIFICANT CHANGE UP
EOSINOPHIL # BLD AUTO: 0 K/UL — SIGNIFICANT CHANGE UP (ref 0–0.5)
EOSINOPHIL NFR BLD AUTO: 0 % — SIGNIFICANT CHANGE UP (ref 0–6)
GLUCOSE SERPL-MCNC: 147 MG/DL — HIGH (ref 70–99)
GLUCOSE UR QL: NEGATIVE — SIGNIFICANT CHANGE UP
GRAM STN FLD: SIGNIFICANT CHANGE UP
HCT VFR BLD CALC: 35.2 % — SIGNIFICANT CHANGE UP (ref 34.5–45)
HGB BLD-MCNC: 10.9 G/DL — LOW (ref 11.5–15.5)
IMM GRANULOCYTES NFR BLD AUTO: 1 % — SIGNIFICANT CHANGE UP (ref 0–1.5)
KETONES UR-MCNC: NEGATIVE — SIGNIFICANT CHANGE UP
LACTATE SERPL-SCNC: 1.8 MMOL/L — SIGNIFICANT CHANGE UP (ref 0.7–2)
LEUKOCYTE ESTERASE UR-ACNC: NEGATIVE — SIGNIFICANT CHANGE UP
LYMPHOCYTES # BLD AUTO: 17.8 % — SIGNIFICANT CHANGE UP (ref 13–44)
LYMPHOCYTES # BLD AUTO: 2.34 K/UL — SIGNIFICANT CHANGE UP (ref 1–3.3)
MCHC RBC-ENTMCNC: 31 GM/DL — LOW (ref 32–36)
MCHC RBC-ENTMCNC: 31 PG — SIGNIFICANT CHANGE UP (ref 27–34)
MCV RBC AUTO: 100 FL — SIGNIFICANT CHANGE UP (ref 80–100)
MONOCYTES # BLD AUTO: 0.82 K/UL — SIGNIFICANT CHANGE UP (ref 0–0.9)
MONOCYTES NFR BLD AUTO: 6.2 % — SIGNIFICANT CHANGE UP (ref 2–14)
NEUTROPHILS # BLD AUTO: 9.8 K/UL — HIGH (ref 1.8–7.4)
NEUTROPHILS NFR BLD AUTO: 74.5 % — SIGNIFICANT CHANGE UP (ref 43–77)
NITRITE UR-MCNC: NEGATIVE — SIGNIFICANT CHANGE UP
PH UR: 8 — SIGNIFICANT CHANGE UP (ref 5–8)
PLATELET # BLD AUTO: 526 K/UL — HIGH (ref 150–400)
POTASSIUM SERPL-MCNC: 3.8 MMOL/L — SIGNIFICANT CHANGE UP (ref 3.5–5.3)
POTASSIUM SERPL-SCNC: 3.8 MMOL/L — SIGNIFICANT CHANGE UP (ref 3.5–5.3)
PROT UR-MCNC: ABNORMAL
RBC # BLD: 3.52 M/UL — LOW (ref 3.8–5.2)
RBC # FLD: 13.6 % — SIGNIFICANT CHANGE UP (ref 10.3–14.5)
SODIUM SERPL-SCNC: 141 MMOL/L — SIGNIFICANT CHANGE UP (ref 135–145)
SP GR SPEC: 1.02 — SIGNIFICANT CHANGE UP (ref 1.01–1.02)
SPECIMEN SOURCE: SIGNIFICANT CHANGE UP
UROBILINOGEN FLD QL: SIGNIFICANT CHANGE UP
WBC # BLD: 13.15 K/UL — HIGH (ref 3.8–10.5)
WBC # FLD AUTO: 13.15 K/UL — HIGH (ref 3.8–10.5)

## 2019-04-28 PROCEDURE — 99232 SBSQ HOSP IP/OBS MODERATE 35: CPT

## 2019-04-28 RX ADMIN — Medication 5 MILLIGRAM(S): at 18:11

## 2019-04-28 RX ADMIN — Medication 400 MILLIGRAM(S): at 21:47

## 2019-04-28 RX ADMIN — Medication 650 MILLIGRAM(S): at 04:57

## 2019-04-28 RX ADMIN — Medication 2.5 MILLIGRAM(S): at 10:23

## 2019-04-28 RX ADMIN — Medication 3 MILLILITER(S): at 17:49

## 2019-04-28 RX ADMIN — Medication 60 MILLIGRAM(S): at 14:11

## 2019-04-28 RX ADMIN — ENOXAPARIN SODIUM 40 MILLIGRAM(S): 100 INJECTION SUBCUTANEOUS at 18:11

## 2019-04-28 RX ADMIN — Medication 60 MILLIGRAM(S): at 20:25

## 2019-04-28 RX ADMIN — Medication 100 MILLIGRAM(S): at 14:11

## 2019-04-28 RX ADMIN — Medication 100 MILLIGRAM(S): at 05:05

## 2019-04-28 RX ADMIN — MEROPENEM 100 MILLIGRAM(S): 1 INJECTION INTRAVENOUS at 05:03

## 2019-04-28 RX ADMIN — Medication 8 MILLIGRAM(S): at 21:47

## 2019-04-28 RX ADMIN — Medication 3 MILLILITER(S): at 23:28

## 2019-04-28 RX ADMIN — MEROPENEM 100 MILLIGRAM(S): 1 INJECTION INTRAVENOUS at 14:11

## 2019-04-28 RX ADMIN — PANTOPRAZOLE SODIUM 40 MILLIGRAM(S): 20 TABLET, DELAYED RELEASE ORAL at 14:11

## 2019-04-28 RX ADMIN — Medication 100 MILLIGRAM(S): at 21:47

## 2019-04-28 RX ADMIN — MEROPENEM 100 MILLIGRAM(S): 1 INJECTION INTRAVENOUS at 21:47

## 2019-04-28 RX ADMIN — Medication 400 MILLIGRAM(S): at 05:06

## 2019-04-28 RX ADMIN — Medication 3 MILLILITER(S): at 12:35

## 2019-04-28 RX ADMIN — Medication 650 MILLIGRAM(S): at 21:48

## 2019-04-28 RX ADMIN — Medication 400 MILLIGRAM(S): at 14:11

## 2019-04-28 RX ADMIN — CHLORHEXIDINE GLUCONATE 1 APPLICATION(S): 213 SOLUTION TOPICAL at 21:47

## 2019-04-28 RX ADMIN — CHLORHEXIDINE GLUCONATE 15 MILLILITER(S): 213 SOLUTION TOPICAL at 18:11

## 2019-04-28 RX ADMIN — Medication 3 MILLILITER(S): at 05:14

## 2019-04-28 RX ADMIN — Medication 60 MILLIGRAM(S): at 02:01

## 2019-04-28 RX ADMIN — CHLORHEXIDINE GLUCONATE 15 MILLILITER(S): 213 SOLUTION TOPICAL at 05:06

## 2019-04-28 RX ADMIN — Medication 2.5 MILLIGRAM(S): at 05:07

## 2019-04-28 RX ADMIN — Medication 60 MILLIGRAM(S): at 10:10

## 2019-04-28 NOTE — PROGRESS NOTE ADULT - ASSESSMENT
63 Year old female w/ PMHx HTN, HLD, Ischemic stroke (2004), who was transferred from OSH, Patient initially presented to the OSH from home with AMS as per EMS, pt was talking to a family member on the phone when she suddenly stopped talking. 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 Patient transferred to [facility] for Neuro Surgical assessment. Upon admission NIHSS = ; MRS = 2; ICH =3. Patient was given DDAVP and PLTs. During admission patient was found to have newly diagnosed A.fib and was initiated on Labetalol. Patient had Serial head CTs performed which remained unchanged, no neuro surgical intervention was performed. Patient was seen by Palliative Care family decided to proceed with Trach and PEG. Patient S/p Tracheostomy on 4/16 ( # 8 Cuffed Shiley) and PEG Placement on 4/18. During hospitalization patient found to have UTI ( 100 K E.coli ) for which she was treated with a course of Keflex ( Completed 4/21). On 4/23 patient febrile again, Patient found to have : Enterobacter / Burkholderia  growing in sputum     4/26: Patient remains afebrile. Sputum cx 4/23 growing Enterobacter/ Burkholderia, Will continue Meropenem. Patient tolerating TC during the day, will attempt TC ATC and obtain AM ABG. Patient remains with elevated BP will increase Labetalol 400 mg q 8 hrs.  4/27: Tolerated TC x24hrs. ABG reviewed and adequate to continue TC. Appears comfortable of TC. BP remains labile with elevations into 170s. Will add Enalapril. Spoke to son at bedside. Will finish ABx on 4/30.

## 2019-04-28 NOTE — PROGRESS NOTE ADULT - SUBJECTIVE AND OBJECTIVE BOX
Patient is a 63y old  Female who presents with a chief complaint of ICH (27 Apr 2019 10:49)      Interval Events:    REVIEW OF SYSTEMS:  [ ] Positive  [ ] All other systems negative  [ ] Unable to assess ROS because ________    Vital Signs Last 24 Hrs  T(C): 37.6 (04-28-19 @ 05:59), Max: 38.1 (04-28-19 @ 03:20)  T(F): 99.6 (04-28-19 @ 05:59), Max: 100.6 (04-28-19 @ 03:20)  HR: 71 (04-28-19 @ 05:24) (69 - 93)  BP: 173/93 (04-28-19 @ 05:24) (133/82 - 182/83)  RR: 16 (04-28-19 @ 05:24) (16 - 20)  SpO2: 98% (04-28-19 @ 05:24) (93% - 99%)    PHYSICAL EXAM:  HEENT:   [ ]Tracheostomy:  [ ]Pupils equal  [ ]No oral lesions  [ ]Abnormal    SKIN  [ ]No Rash  [ ] Abnormal  [ ] pressure    CARDIAC  [ ]Regular  [ ]Abnormal    PULMONARY  [ ]Bilateral Clear Breath Sounds  [ ]Normal Excursion  [ ]Abnormal    GI  [ ]PEG      [ ] +BS		              [ ]Soft, nondistended, nontender	  [ ]Abnormal    MUSCULOSKELETAL                                   [ ]Bedbound                 [ ]Abnormal    [ ]Ambulatory/OOB to chair                           EXTREMITIES                                         [ ]Normal  [ ]Edema                           NEUROLOGIC  [ ] Normal, non focal  [ ] Focal findings:    PSYCHIATRIC  [ ]Alert and appropriate  [ ] Sedated	 [ ]Agitated    :  Bedoya: [ ] Yes, if yes: Date of Placement:                   [  ] No    LINES: Central Lines [ ] Yes, if yes: Date of Placement                                     [  ] No    HOSPITAL MEDICATIONS:  MEDICATIONS  (STANDING):  ALBUTerol/ipratropium for Nebulization 3 milliLiter(s) Nebulizer every 6 hours  chlorhexidine 0.12% Liquid 15 milliLiter(s) Oral Mucosa two times a day  chlorhexidine 4% Liquid 1 Application(s) Topical <User Schedule>  diltiazem    Tablet 60 milliGRAM(s) Oral every 6 hours  docusate sodium Liquid 100 milliGRAM(s) Oral every 8 hours  doxazosin 8 milliGRAM(s) Oral at bedtime  enalapril 2.5 milliGRAM(s) Oral daily  enoxaparin Injectable 40 milliGRAM(s) SubCutaneous <User Schedule>  hydrALAZINE 100 milliGRAM(s) Oral every 8 hours  labetalol 400 milliGRAM(s) Oral every 8 hours  meropenem  IVPB 1000 milliGRAM(s) IV Intermittent every 8 hours  pantoprazole   Suspension 40 milliGRAM(s) Oral daily  polyethylene glycol 3350 17 Gram(s) Oral <User Schedule>  senna 2 Tablet(s) Oral at bedtime    MEDICATIONS  (PRN):  acetaminophen   Tablet .. 650 milliGRAM(s) Oral every 6 hours PRN Temp greater or equal to 38C (100.4F), Mild Pain (1 - 3)  ondansetron Injectable 4 milliGRAM(s) IV Push every 6 hours PRN Nausea and/or Vomiting      LABS:                        10.9   9.6   )-----------( 545      ( 27 Apr 2019 07:20 )             35.4     04-28    141  |  101  |  24<H>  ----------------------------<  147<H>  3.8   |  26  |  0.57    Ca    9.4      28 Apr 2019 05:13  Phos  2.7     04-27  Mg     2.1     04-27          Arterial Blood Gas:  04-27 @ 06:15  7.51/36/125/28/99/5.4  ABG lactate: --      CAPILLARY BLOOD GLUCOSE    MICROBIOLOGY:     RADIOLOGY:  [ ] Reviewed and interpreted by me    Mode: Vent off Patient is a 63y old  Female who presents with a chief complaint of ICH (27 Apr 2019 10:49)    Interval Events:    REVIEW OF SYSTEMS:  [ ] Positive  [ ] All other systems negative  [X ] Unable to assess ROS because patient does not open eyes, follow commands, non-verbal     Vital Signs Last 24 Hrs  T(C): 37.6 (04-28-19 @ 05:59), Max: 38.1 (04-28-19 @ 03:20)  T(F): 99.6 (04-28-19 @ 05:59), Max: 100.6 (04-28-19 @ 03:20)  HR: 71 (04-28-19 @ 05:24) (69 - 93)  BP: 173/93 (04-28-19 @ 05:24) (133/82 - 182/83)  RR: 16 (04-28-19 @ 05:24) (16 - 20)  SpO2: 98% (04-28-19 @ 05:24) (93% - 99%)    PHYSICAL EXAM:  HEENT:   [ X]Tracheostomy:  #8 Cuffed   [ ]Pupils equal  [ ]No oral lesions  [ ]Abnormal    SKIN  [X ]No Rash  [ ] Abnormal  [ ] pressure    CARDIAC  [X ]Regular  [ ]Abnormal    PULMONARY  [X ]Bilateral Clear Breath Sounds  [ ]Normal Excursion  [ ]Abnormal    GI  [ X]PEG      [X ] +BS		              [ X]Soft, nondistended, nontender	  [ ]Abnormal    MUSCULOSKELETAL                                   [X ]Bedbound                 [ ]Abnormal    [ ]Ambulatory/OOB to chair                           EXTREMITIES                                         [X ]Normal  [ ]Edema                           NEUROLOGIC  [ ] Normal, non focal  [X ] Focal findings:  non-verbal, does not follow commands     PSYCHIATRIC  [ ]Alert and appropriate  [ ] Sedated	 [ ]Agitated    :  Bedoya: [ ] Yes, if yes: Date of Placement:                   [ X ] No    LINES: Central Lines [ ] Yes, if yes: Date of Placement                                     [ X ] No    HOSPITAL MEDICATIONS:  MEDICATIONS  (STANDING):  ALBUTerol/ipratropium for Nebulization 3 milliLiter(s) Nebulizer every 6 hours  chlorhexidine 0.12% Liquid 15 milliLiter(s) Oral Mucosa two times a day  chlorhexidine 4% Liquid 1 Application(s) Topical <User Schedule>  diltiazem    Tablet 60 milliGRAM(s) Oral every 6 hours  docusate sodium Liquid 100 milliGRAM(s) Oral every 8 hours  doxazosin 8 milliGRAM(s) Oral at bedtime  enalapril 2.5 milliGRAM(s) Oral daily  enoxaparin Injectable 40 milliGRAM(s) SubCutaneous <User Schedule>  hydrALAZINE 100 milliGRAM(s) Oral every 8 hours  labetalol 400 milliGRAM(s) Oral every 8 hours  meropenem  IVPB 1000 milliGRAM(s) IV Intermittent every 8 hours  pantoprazole   Suspension 40 milliGRAM(s) Oral daily  polyethylene glycol 3350 17 Gram(s) Oral <User Schedule>  senna 2 Tablet(s) Oral at bedtime    MEDICATIONS  (PRN):  acetaminophen   Tablet .. 650 milliGRAM(s) Oral every 6 hours PRN Temp greater or equal to 38C (100.4F), Mild Pain (1 - 3)  ondansetron Injectable 4 milliGRAM(s) IV Push every 6 hours PRN Nausea and/or Vomiting    LABS:                        10.9   9.6   )-----------( 545      ( 27 Apr 2019 07:20 )             35.4     04-28    141  |  101  |  24<H>  ----------------------------<  147<H>  3.8   |  26  |  0.57    Ca    9.4      28 Apr 2019 05:13  Phos  2.7     04-27  Mg     2.1     04-27      Arterial Blood Gas:  04-27 @ 06:15  7.51/36/125/28/99/5.4  ABG lactate: --      Mode: Vent off

## 2019-04-28 NOTE — PROGRESS NOTE ADULT - PROBLEM SELECTOR PLAN 5
Patient remains with elevated BP this morning  Labetalol increased to 400 mg q 8 hrs    Continue Hydralazine 100 mg q 8 hrs   SBP Goal 100- 160 Per Neuro Sx Patient remains with elevated BP this morning -  's  Labetalol increased to 400 mg q 8 hrs    Continue Hydralazine 100 mg q 8 hrs   Enalapril 5 mg BID started   SBP Goal 100- 160 Per Neuro Sx

## 2019-04-28 NOTE — PROGRESS NOTE ADULT - SUBJECTIVE AND OBJECTIVE BOX
Subjective: Patient seen and examined. No new events except as noted.     SUBJECTIVE/ROS:    fever overnight     MEDICATIONS:  MEDICATIONS  (STANDING):  ALBUTerol/ipratropium for Nebulization 3 milliLiter(s) Nebulizer every 6 hours  chlorhexidine 0.12% Liquid 15 milliLiter(s) Oral Mucosa two times a day  chlorhexidine 4% Liquid 1 Application(s) Topical <User Schedule>  diltiazem    Tablet 60 milliGRAM(s) Oral every 6 hours  docusate sodium Liquid 100 milliGRAM(s) Oral every 8 hours  doxazosin 8 milliGRAM(s) Oral at bedtime  enalapril 2.5 milliGRAM(s) Oral daily  enoxaparin Injectable 40 milliGRAM(s) SubCutaneous <User Schedule>  hydrALAZINE 100 milliGRAM(s) Oral every 8 hours  labetalol 400 milliGRAM(s) Oral every 8 hours  meropenem  IVPB 1000 milliGRAM(s) IV Intermittent every 8 hours  pantoprazole   Suspension 40 milliGRAM(s) Oral daily  polyethylene glycol 3350 17 Gram(s) Oral <User Schedule>  senna 2 Tablet(s) Oral at bedtime      PHYSICAL EXAM:  T(C): 37.6 (04-28-19 @ 05:59), Max: 38.1 (04-28-19 @ 03:20)  HR: 71 (04-28-19 @ 05:24) (69 - 93)  BP: 173/93 (04-28-19 @ 05:24) (133/82 - 182/83)  RR: 16 (04-28-19 @ 05:24) (16 - 18)  SpO2: 98% (04-28-19 @ 05:24) (93% - 99%)  Wt(kg): --  I&O's Summary    27 Apr 2019 07:01  -  28 Apr 2019 07:00  --------------------------------------------------------  IN: 2240 mL / OUT: 2200 mL / NET: 40 mL            JVP: Normal  Neck: supple  Lung: clear   CV: S1 S2 , Murmur:  Abd: soft  Ext: No edema  Psych: flat affect  Skin: normal``    LABS/DATA:    CARDIAC MARKERS:                                10.9   9.6   )-----------( 545      ( 27 Apr 2019 07:20 )             35.4     04-28    141  |  101  |  24<H>  ----------------------------<  147<H>  3.8   |  26  |  0.57    Ca    9.4      28 Apr 2019 05:13  Phos  2.7     04-27  Mg     2.1     04-27      proBNP:   Lipid Profile:   HgA1c:   TSH:     TELE:  EKG:

## 2019-04-28 NOTE — PROGRESS NOTE ADULT - ASSESSMENT
HTN  cont current meds  consider adding Enalapril 5 bid     Afib  resolved  cont current avn blockers  off a/c due to pontine bleed     resp failrue  s/p trach   fu with RCU    Fever  sepsis  on anbx   plan as per primary team

## 2019-04-29 PROCEDURE — 99223 1ST HOSP IP/OBS HIGH 75: CPT

## 2019-04-29 PROCEDURE — 99233 SBSQ HOSP IP/OBS HIGH 50: CPT | Mod: GC

## 2019-04-29 PROCEDURE — 99232 SBSQ HOSP IP/OBS MODERATE 35: CPT

## 2019-04-29 RX ADMIN — Medication 100 MILLIGRAM(S): at 22:17

## 2019-04-29 RX ADMIN — Medication 3 MILLILITER(S): at 17:16

## 2019-04-29 RX ADMIN — Medication 60 MILLIGRAM(S): at 09:20

## 2019-04-29 RX ADMIN — Medication 400 MILLIGRAM(S): at 22:16

## 2019-04-29 RX ADMIN — CHLORHEXIDINE GLUCONATE 15 MILLILITER(S): 213 SOLUTION TOPICAL at 17:27

## 2019-04-29 RX ADMIN — Medication 3 MILLILITER(S): at 12:03

## 2019-04-29 RX ADMIN — Medication 5 MILLIGRAM(S): at 17:26

## 2019-04-29 RX ADMIN — Medication 60 MILLIGRAM(S): at 14:59

## 2019-04-29 RX ADMIN — CHLORHEXIDINE GLUCONATE 15 MILLILITER(S): 213 SOLUTION TOPICAL at 05:00

## 2019-04-29 RX ADMIN — CHLORHEXIDINE GLUCONATE 1 APPLICATION(S): 213 SOLUTION TOPICAL at 22:18

## 2019-04-29 RX ADMIN — Medication 3 MILLILITER(S): at 23:04

## 2019-04-29 RX ADMIN — MEROPENEM 100 MILLIGRAM(S): 1 INJECTION INTRAVENOUS at 05:01

## 2019-04-29 RX ADMIN — ONDANSETRON 4 MILLIGRAM(S): 8 TABLET, FILM COATED ORAL at 09:20

## 2019-04-29 RX ADMIN — Medication 8 MILLIGRAM(S): at 22:17

## 2019-04-29 RX ADMIN — Medication 100 MILLIGRAM(S): at 05:03

## 2019-04-29 RX ADMIN — Medication 400 MILLIGRAM(S): at 13:49

## 2019-04-29 RX ADMIN — MEROPENEM 100 MILLIGRAM(S): 1 INJECTION INTRAVENOUS at 13:52

## 2019-04-29 RX ADMIN — Medication 60 MILLIGRAM(S): at 22:18

## 2019-04-29 RX ADMIN — PANTOPRAZOLE SODIUM 40 MILLIGRAM(S): 20 TABLET, DELAYED RELEASE ORAL at 11:22

## 2019-04-29 RX ADMIN — ENOXAPARIN SODIUM 40 MILLIGRAM(S): 100 INJECTION SUBCUTANEOUS at 17:26

## 2019-04-29 RX ADMIN — MEROPENEM 100 MILLIGRAM(S): 1 INJECTION INTRAVENOUS at 22:15

## 2019-04-29 RX ADMIN — Medication 400 MILLIGRAM(S): at 05:03

## 2019-04-29 RX ADMIN — Medication 60 MILLIGRAM(S): at 02:35

## 2019-04-29 RX ADMIN — Medication 5 MILLIGRAM(S): at 05:01

## 2019-04-29 RX ADMIN — Medication 3 MILLILITER(S): at 05:13

## 2019-04-29 RX ADMIN — Medication 100 MILLIGRAM(S): at 13:49

## 2019-04-29 NOTE — CONSULT NOTE ADULT - PROVIDER SPECIALTY LIST ADULT
Cardiology
ENT
Infectious Disease
Neurology
Palliative Care
Pulmonology
Gastroenterology
Palliative Care

## 2019-04-29 NOTE — PROGRESS NOTE ADULT - SUBJECTIVE AND OBJECTIVE BOX
Patient is a 63y old  Female who presents with a chief complaint of ICH (2019 08:37)      Interval Events:    REVIEW OF SYSTEMS:  [ ] Positive  [ ] All other systems negative  [ ] Unable to assess ROS because ________    Vital Signs Last 24 Hrs  T(C): 37.1 (19 @ 04:22), Max: 38 (19 @ 22:00)  T(F): 98.8 (19 @ 04:22), Max: 100.4 (19 @ 22:00)  HR: 90 (19 @ 05:14) (64 - 92)  BP: 158/76 (19 @ 04:22) (146/75 - 168/75)  RR: 18 (19 @ 06:47) (16 - 21)  SpO2: 97% (19 @ 06:47) (94% - 99%)    PHYSICAL EXAM:  HEENT:   [ ]Tracheostomy:  [ ]Pupils equal  [ ]No oral lesions  [ ]Abnormal    SKIN  [ ]No Rash  [ ] Abnormal  [ ] pressure    CARDIAC  [ ]Regular  [ ]Abnormal    PULMONARY  [ ]Bilateral Clear Breath Sounds  [ ]Normal Excursion  [ ]Abnormal    GI  [ ]PEG      [ ] +BS		              [ ]Soft, nondistended, nontender	  [ ]Abnormal    MUSCULOSKELETAL                                   [ ]Bedbound                 [ ]Abnormal    [ ]Ambulatory/OOB to chair                           EXTREMITIES                                         [ ]Normal  [ ]Edema                           NEUROLOGIC  [ ] Normal, non focal  [ ] Focal findings:    PSYCHIATRIC  [ ]Alert and appropriate  [ ] Sedated	 [ ]Agitated    :  Bedoya: [ ] Yes, if yes: Date of Placement:                   [  ] No    LINES: Central Lines [ ] Yes, if yes: Date of Placement                                     [  ] No    HOSPITAL MEDICATIONS:  MEDICATIONS  (STANDING):  ALBUTerol/ipratropium for Nebulization 3 milliLiter(s) Nebulizer every 6 hours  chlorhexidine 0.12% Liquid 15 milliLiter(s) Oral Mucosa two times a day  chlorhexidine 4% Liquid 1 Application(s) Topical <User Schedule>  diltiazem    Tablet 60 milliGRAM(s) Oral every 6 hours  doxazosin 8 milliGRAM(s) Oral at bedtime  enalapril 5 milliGRAM(s) Oral two times a day  enoxaparin Injectable 40 milliGRAM(s) SubCutaneous <User Schedule>  hydrALAZINE 100 milliGRAM(s) Oral every 8 hours  labetalol 400 milliGRAM(s) Oral every 8 hours  meropenem  IVPB 1000 milliGRAM(s) IV Intermittent every 8 hours  pantoprazole   Suspension 40 milliGRAM(s) Oral daily    MEDICATIONS  (PRN):  acetaminophen   Tablet .. 650 milliGRAM(s) Oral every 6 hours PRN Temp greater or equal to 38C (100.4F), Mild Pain (1 - 3)  ondansetron Injectable 4 milliGRAM(s) IV Push every 6 hours PRN Nausea and/or Vomiting      LABS:                        10.9   13.15 )-----------( 526      ( 2019 08:59 )             35.2     04-28    141  |  101  |  24<H>  ----------------------------<  147<H>  3.8   |  26  |  0.57    Ca    9.4      2019 05:13  Phos  2.7     -  Mg     2.1             Urinalysis Basic - ( 2019 13:59 )    Color: Yellow / Appearance: Turbid / S.019 / pH: x  Gluc: x / Ketone: Negative  / Bili: Negative / Urobili: <2 mg/dL   Blood: x / Protein: 30 mg/dL / Nitrite: Negative   Leuk Esterase: Negative / RBC: 3 /HPF / WBC 1 /HPF   Sq Epi: x / Non Sq Epi: 1 /HPF / Bacteria: Negative          CAPILLARY BLOOD GLUCOSE    MICROBIOLOGY:     RADIOLOGY:  [ ] Reviewed and interpreted by me Patient is a 63y old  Female who presents with a chief complaint of ICH (2019 08:37)    Interval Events:    No events overnight. CPAP trials as tolerated.     REVIEW OF SYSTEMS:  [ ] Positive  [ ] All other systems negative  [X ] Unable to assess ROS because pt is non-verbal, does not open eyes, or follow commands     Vital Signs Last 24 Hrs  T(C): 37.1 (19 @ 04:22), Max: 38 (19 @ 22:00)  T(F): 98.8 (19 @ 04:22), Max: 100.4 (19 @ 22:00)  HR: 90 (19 @ 05:14) (64 - 92)  BP: 158/76 (19 @ 04:22) (146/75 - 168/75)  RR: 18 (19 @ 06:47) (16 - 21)  SpO2: 97% (19 @ 06:47) (94% - 99%)    PHYSICAL EXAM:  HEENT:   [ X]Tracheostomy:  #8 Cuffed   [ ]Pupils equal  [ ]No oral lesions  [ ]Abnormal    SKIN  [X ]No Rash  [ ] Abnormal  [ ] pressure    CARDIAC  [ X]Regular  [ ]Abnormal    PULMONARY  [ X]Bilateral Clear Breath Sounds  [ ]Normal Excursion  [ ]Abnormal    GI  [ X]PEG      [X ] +BS		              [ X]Soft, nondistended, nontender	  [ ]Abnormal    MUSCULOSKELETAL                                   [ X]Bedbound                 [ ]Abnormal    [ ]Ambulatory/OOB to chair                           EXTREMITIES                                         [X ]Normal  [ ]Edema                           NEUROLOGIC  [ ] Normal, non focal  [X ] Focal findings:  does not follow commands, open eyes     PSYCHIATRIC  [ ]Alert and appropriate  [ ] Sedated	 [ ]Agitated    :  Bedoya: [ ] Yes, if yes: Date of Placement:                   [ X ] No    LINES: Central Lines [ ] Yes, if yes: Date of Placement                                     [  X] No    HOSPITAL MEDICATIONS:  MEDICATIONS  (STANDING):  ALBUTerol/ipratropium for Nebulization 3 milliLiter(s) Nebulizer every 6 hours  chlorhexidine 0.12% Liquid 15 milliLiter(s) Oral Mucosa two times a day  chlorhexidine 4% Liquid 1 Application(s) Topical <User Schedule>  diltiazem    Tablet 60 milliGRAM(s) Oral every 6 hours  doxazosin 8 milliGRAM(s) Oral at bedtime  enalapril 5 milliGRAM(s) Oral two times a day  enoxaparin Injectable 40 milliGRAM(s) SubCutaneous <User Schedule>  hydrALAZINE 100 milliGRAM(s) Oral every 8 hours  labetalol 400 milliGRAM(s) Oral every 8 hours  meropenem  IVPB 1000 milliGRAM(s) IV Intermittent every 8 hours  pantoprazole   Suspension 40 milliGRAM(s) Oral daily    MEDICATIONS  (PRN):  acetaminophen   Tablet .. 650 milliGRAM(s) Oral every 6 hours PRN Temp greater or equal to 38C (100.4F), Mild Pain (1 - 3)  ondansetron Injectable 4 milliGRAM(s) IV Push every 6 hours PRN Nausea and/or Vomiting    LABS:                        10.9   13.15 )-----------( 526      ( 2019 08:59 )             35.2     04-28    141  |  101  |  24<H>  ----------------------------<  147<H>  3.8   |  26  |  0.57    Ca    9.4      2019 05:13  Phos  2.7     04-27  Mg     2.1     04-27        Urinalysis Basic - ( 2019 13:59 )    Color: Yellow / Appearance: Turbid / S.019 / pH: x  Gluc: x / Ketone: Negative  / Bili: Negative / Urobili: <2 mg/dL   Blood: x / Protein: 30 mg/dL / Nitrite: Negative   Leuk Esterase: Negative / RBC: 3 /HPF / WBC 1 /HPF   Sq Epi: x / Non Sq Epi: 1 /HPF / Bacteria: Negative

## 2019-04-29 NOTE — CHART NOTE - NSCHARTNOTEFT_GEN_A_CORE
Patient is a 63y old  Female who presents with a chief complaint of ICH (28 Apr 2019 08:37)  Informed by RN of pt having an episode of vomitting post medication administration via PEG  RN reported  clear liquid vomitus, with ?noted meds.  Pt seen & examined  She appeared non verbal in NAD, with TC in place     Vital Signs Last 24 Hrs  T(C): 37.1 (29 Apr 2019 04:22), Max: 38 (28 Apr 2019 22:00)  T(F): 98.8 (29 Apr 2019 04:22), Max: 100.4 (28 Apr 2019 22:00)  HR: 90 (29 Apr 2019 05:14) (64 - 92)  BP: 158/76 (29 Apr 2019 04:22) (146/75 - 168/75)  BP(mean): --  RR: 20 (29 Apr 2019 04:22) (16 - 21)  SpO2: 99% (29 Apr 2019 05:14) (94% - 99%)      Labs:                          10.9   13.15 )-----------( 526      ( 28 Apr 2019 08:59 )             35.2     04-28    141  |  101  |  24<H>  ----------------------------<  147<H>  3.8   |  26  |  0.57    Ca    9.4      28 Apr 2019 05:13  Phos  2.7     04-27  Mg     2.1     04-27          Radiology:    Physical Exam:  General:  NAD, non verbal  Neurology: non verbal  Head:  Normocephalic, atraumatic  Respiratory: CTA B/L  CV: RRR, S1S2, no murmur  Abdominal: Soft, Non tender, non distended, + BS  MSK: No edema, + peripheral pulses     Assessment & Plan:  HPI:  62 y/o F w/ PMHx HTN, HLD, ischemic CVA (2004), presents to the ED BIBA from home for evaluation of AMS- was reportedly talking. As per EMS, pt was talking to a family member on the phone at 11:00 today. Hx is limited due to pt's current medical condition.  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

## 2019-04-29 NOTE — PROGRESS NOTE ADULT - PROBLEM SELECTOR PLAN 3
Patient Afebrile since initiation of ABX   CXR 4/23: Clear Lungs   Sputum cx 4/23: Enterobacter Colace / Burkholderia Cepacia   Both organisms Sensitive to Meropenem   Bld Cx 4/23: No growth, UA 4/23: Negative  Continue Meropenem will treat 7 Day Course  ( Ends 4/30) Patient Afebrile since initiation of ABX   CXR 4/23: Clear Lungs   Sputum cx 4/23: Enterobacter Colace / Burkholderia Cepacia, on contact isolation  Both organisms Sensitive to Meropenem   Bld Cx 4/23: No growth, UA 4/23: Negative  Continue Meropenem will treat 7 Day Course  ( Ends 4/30)

## 2019-04-29 NOTE — PROGRESS NOTE ADULT - PROBLEM SELECTOR PLAN 2
Patient S/p Tracheostomy 4/16  ( # 8 Cuffed Shiley )   Patient tolerating TC post 24hrs, mild Resp Alkalosis on ABG, likely to self correct. Will continue  TC ATC. Vent discontinued.  Continue Nebs prn / Suctioning PRN / Chest PT Patient S/p Tracheostomy 4/16  ( # 8 Cuffed Danna )   Patient tolerating TC post 24hrs  Will continue  TC ATC. Vent discontinued  Continue Nebs prn / Suctioning PRN / Chest PT

## 2019-04-29 NOTE — PROGRESS NOTE ADULT - ASSESSMENT
63 Year old female w/ PMHx HTN, HLD, Ischemic stroke (2004), who was transferred from OSH, Patient initially presented to the OSH from home with AMS as per EMS, pt was talking to a family member on the phone when she suddenly stopped talking. 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 Patient transferred to [facility] for Neuro Surgical assessment. Upon admission NIHSS = ; MRS = 2; ICH =3. Patient was given DDAVP and PLTs. During admission patient was found to have newly diagnosed A.fib and was initiated on Labetalol. Patient had Serial head CTs performed which remained unchanged, no neuro surgical intervention was performed. Patient was seen by Palliative Care family decided to proceed with Trach and PEG. Patient S/p Tracheostomy on 4/16 ( # 8 Cuffed Shiley) and PEG Placement on 4/18. During hospitalization patient found to have UTI ( 100 K E.coli ) for which she was treated with a course of Keflex ( Completed 4/21). On 4/23 patient febrile again, Patient found to have : Enterobacter / Burkholderia  growing in sputum     4/26: Patient remains afebrile. Sputum cx 4/23 growing Enterobacter/ Burkholderia, Will continue Meropenem. Patient tolerating TC during the day, will attempt TC ATC and obtain AM ABG. Patient remains with elevated BP will increase Labetalol 400 mg q 8 hrs.  4/27: Tolerated TC x24hrs. ABG reviewed and adequate to continue TC. Appears comfortable of TC. BP remains labile with elevations into 170s. Will add Enalapril. Spoke to son at bedside. Will finish ABx on 4/30. 63 Year old female w/ PMHx HTN, HLD, Ischemic stroke (2004), who was transferred from OSH, Patient initially presented to the OSH from home with AMS as per EMS, pt was talking to a family member on the phone when she suddenly stopped talking. 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 Patient transferred to [facility] for Neuro Surgical assessment. Upon admission NIHSS = ; MRS = 2; ICH =3. Patient was given DDAVP and PLTs. During admission patient was found to have newly diagnosed A.fib and was initiated on Labetalol. Patient had Serial head CTs performed which remained unchanged, no neuro surgical intervention was performed. Patient was seen by Palliative Care family decided to proceed with Trach and PEG. Patient S/p Tracheostomy on 4/16 ( # 8 Cuffed Shiley) and PEG Placement on 4/18. During hospitalization patient found to have UTI ( 100 K E.coli ) for which she was treated with a course of Keflex ( Completed 4/21). On 4/23 patient febrile again, Patient found to have : Enterobacter / Burkholderia  growing in sputum     4/26: Patient remains afebrile. Sputum cx 4/23 growing Enterobacter/ Burkholderia, Will continue Meropenem. Patient tolerating TC during the day, will attempt TC ATC and obtain AM ABG. Patient remains with elevated BP will increase Labetalol 400 mg q 8 hrs.  4/27: Tolerated TC x24hrs. ABG reviewed and adequate to continue TC. Appears comfortable of TC. BP remains labile with elevations into 170s. Will add Enalapril. Spoke to son at bedside. Will finish ABx on 4/30.  4/29 Stable on TC ATC. Fevers, UA negative, on meropenem until 4/30

## 2019-04-29 NOTE — CHART NOTE - NSCHARTNOTEFT_GEN_A_CORE
Patient is a 63y old  Female who presents with a chief complaint of ICH (28 Apr 2019 08:37)    Informed by RN of noted oral temp 100.4 taken @ 22:00  RN informed provider that Tylenol had been given  Pt remains afebrile at this time; VSS  Pt appears comfortable, in NAD  Chart reviewed, blood cultures sent 4/28, results pending  Blood cultures 4/23 - No growth X 5 days  Sputum cultures 4/23 + Burkolderia cepacia & Enterobacter cloacae                          4/28 + Rare GNR        Vital Signs Last 24 Hrs  T(C): 37.1 (29 Apr 2019 02:09), Max: 38.1 (28 Apr 2019 03:20)  T(F): 98.8 (29 Apr 2019 02:09), Max: 100.6 (28 Apr 2019 03:20)  HR: 71 (29 Apr 2019 02:09) (68 - 89)  BP: 151/78 (29 Apr 2019 02:09) (146/75 - 173/93)  BP(mean): --  RR: 20 (29 Apr 2019 02:09) (16 - 21)  SpO2: 98% (29 Apr 2019 02:09) (94% - 99%)      Labs:                          10.9   13.15 )-----------( 526      ( 28 Apr 2019 08:59 )             35.2     04-28    141  |  101  |  24<H>  ----------------------------<  147<H>  3.8   |  26  |  0.57    Ca    9.4      28 Apr 2019 05:13  Phos  2.7     04-27  Mg     2.1     04-27        ABG - ( 27 Apr 2019 06:15 )  pH, Arterial: 7.51  pH, Blood: x     /  pCO2: 36    /  pO2: 125   / HCO3: 28    / Base Excess: 5.4   /  SaO2: 99            Assessment & Plan:  HPI:  62 y/o F w/ PMHx HTN, HLD, ischemic CVA (2004), presents to the ED BIBA from home for evaluation of AMS- was reportedly talking. As per EMS, pt was talking to a family member on the phone at 11:00 today. Hx is limited due to pt's current medical condition.  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

## 2019-04-29 NOTE — CONSULT NOTE ADULT - ASSESSMENT
63 Year old female w/ PMHx HTN, HLD, Ischemic stroke (2004), who was transferred from OSH, Patient initially presented to the OSH from home with AMS.  Found to have ICH/stroke  Required Trach, PEG  Treated for suspected E coli UTI  4/23 found to have Burkholderia and Enterobacter in   CXR clear  sputum (? tracheitis vs pna)  Still intermittent fevers, still leukocytosis  Approaching day 7 of meropenem  Overall, Burkholderia, fever, leukocytosis, sepsis, tracheitis  - Meropenem 1g q 8  - Monitor for fevers, trend WBCs  - Final antibiotic plan depending on clinical course  - Discussed case with medicine NP    Wilbert Avendaño MD  Pager [phone]  After 5pm and on weekends call [phone]

## 2019-04-29 NOTE — PROGRESS NOTE ADULT - ASSESSMENT
Patient is a 63 female with history of HTN, HLD, ischemic CVA in 2004, who presented to the ED 4/11/19 from home for evaluation of AMS. She was found to have pontine hemorrhage. Patient now has had trach placed for respiratory  failure and had PEG placed 4/17/19 for dysphagia. She is tolerating TF at 75cc/hr. On antibiotics for + sputum culture      Recommendations:   Monitor labs and trends as ordered   Continue PEG tube feeds at 75cc/hr as tolerated   Monitor bowel frequency while on antibiotics      MALINA Meneses-[facility]

## 2019-04-29 NOTE — PROGRESS NOTE ADULT - ASSESSMENT
HTN  cont current meds    Afib  resolved  cont current avn blockers  off a/c due to pontine bleed     resp failrue  s/p trach   fu with RCU

## 2019-04-29 NOTE — CONSULT NOTE ADULT - COMMENTS
[  ] All other systems negative aside from above.  [X] ROS unobtainable because: Not able to provide ROS, nonverbal

## 2019-04-29 NOTE — CONSULT NOTE ADULT - CONSULT REASON
GOALS OF CARE
L pontine hemorrhage
PNA
Resp failure/ vent  management/ RCU evalv
Tracheostomy
afib
Dysphagia
goc

## 2019-04-29 NOTE — CONSULT NOTE ADULT - SUBJECTIVE AND OBJECTIVE BOX
"HPI: 63 Year old female w/ PMHx HTN, HLD, Ischemic stroke (), who was transferred from OSH, Patient initially presented to the OSH from home with AMS as per EMS, pt was talking to a family member on the phone when she suddenly stopped talking. 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 Patient transferred to [facility] for Neuro Surgical assessment. Upon admission NIHSS = ; MRS = 2; ICH =3. Patient was given DDAVP and PLTs. During admission patient was found to have newly diagnosed A.fib and was initiated on Labetalol. Patient had Serial head CTs performed which remained unchanged, no neuro surgical intervention was performed. Patient was seen by Palliative Care family decided to proceed with Trach and PEG. Patient S/p Tracheostomy on  ( # 8 Cuffed Shiley) and PEG Placement on . During hospitalization patient found to have UTI ( 100 K E.coli ) for which she was treated with a course of Keflex ( Completed ). On  patient febrile again, Patient found to have : Enterobacter / Burkholderia  growing in sputum     : Patient remains afebrile. Sputum cx  growing Enterobacter/ Burkholderia, Will continue Meropenem. Patient tolerating TC during the day, will attempt TC ATC and obtain AM ABG. Patient remains with elevated BP will increase Labetalol 400 mg q 8 hrs.  : Tolerated TC x24hrs. ABG reviewed and adequate to continue TC. Appears comfortable of TC. BP remains labile with elevations into 170s. Will add Enalapril. Spoke to son at bedside. Will finish ABx on .   Stable on TC ATC. Fevers, UA negative, on meropenem until "    Above reviewed. I saw and spoke to patient, son was at bedside. Patient hospitalized after found to have stroke. Patient required trach and PEG. Had multiple infections including UTI, as well as multiple bacteria on sputum which were treated. Most recently found to have a Burkholderia in sputum, on treatment. Patient herself is not able to provide further history, but there is concern for intermittent fevers. Patient has generally poor mental status although her son reports she sometimes is able to interact with family. ID called for further eval.    PAST MEDICAL & SURGICAL HISTORY:  High cholesterol  Hypertension, unspecified type  Cerebrovascular accident (CVA), unspecified mechanism  History of appendectomy    Allergies    Allergy Status Unknown    ANTIMICROBIALS:  meropenem  IVPB 1000 every 8 hours    OTHER MEDS:  acetaminophen   Tablet .. 650 milliGRAM(s) Oral every 6 hours PRN  ALBUTerol/ipratropium for Nebulization 3 milliLiter(s) Nebulizer every 6 hours  chlorhexidine 0.12% Liquid 15 milliLiter(s) Oral Mucosa two times a day  chlorhexidine 4% Liquid 1 Application(s) Topical <User Schedule>  diltiazem    Tablet 60 milliGRAM(s) Oral every 6 hours  doxazosin 8 milliGRAM(s) Oral at bedtime  enalapril 5 milliGRAM(s) Oral two times a day  enoxaparin Injectable 40 milliGRAM(s) SubCutaneous <User Schedule>  hydrALAZINE 100 milliGRAM(s) Oral every 8 hours  labetalol 400 milliGRAM(s) Oral every 8 hours  ondansetron Injectable 4 milliGRAM(s) IV Push every 6 hours PRN  pantoprazole   Suspension 40 milliGRAM(s) Oral daily    SOCIAL HISTORY: No tobacco, no alcohol, no illicit drugs    FAMILY HISTORY: No pertinent family history relating to Burkholderia infection    Drug Dosing Weight  Height (cm): 154.9 (2019 06:57)  Weight (kg): 73.2 (2019 12:12)  BMI (kg/m2): 30.5 (2019 12:12)  BSA (m2): 1.72 (2019 12:12)    PE:    Vital Signs Last 24 Hrs  T(C): 37.2 (2019 12:31), Max: 38 (2019 22:00)  T(F): 98.9 (2019 12:31), Max: 100.4 (2019 22:00)  HR: 68 (2019 17:17) (64 - 98)  BP: 139/81 (2019 14:59) (130/74 - 158/76)  RR: 22 (2019 16:09) (16 - 22)  SpO2: 98% (2019 17:17) (94% - 99%)    Gen: AOx0, poorly responsive  CV: S1+S2 normal, nontachycardic  Resp: Clear bilat, no resp distress, no crackles/wheezes, trach  Abd: Soft, nontender, +BS, PEG--clean, no erythema  Ext: No LE edema, no wounds  : No suprapubic tenderness  IV/Skin: No thrombophlebitis, no rash  Msk: No low back pain, no arthralgias, no joint swelling  Neuro: Not able to assess patient cannot obey comands    LABS:                        10.9   13.15 )-----------( 526      ( 2019 08:59 )             35.2         141  |  101  |  24<H>  ----------------------------<  147<H>  3.8   |  26  |  0.57    Ca    9.4      2019 05:13    Urinalysis Basic - ( 2019 13:59 )    Color: Yellow / Appearance: Turbid / S.019 / pH: x  Gluc: x / Ketone: Negative  / Bili: Negative / Urobili: <2 mg/dL   Blood: x / Protein: 30 mg/dL / Nitrite: Negative   Leuk Esterase: Negative / RBC: 3 /HPF / WBC 1 /HPF   Sq Epi: x / Non Sq Epi: 1 /HPF / Bacteria: Negative    MICROBIOLOGY:    .Sputum trap  19   Normal Respiratory Katia present  --    Moderate polymorphonuclear leukocytes per low power field  Few Squamous epithelial cells per low power field  Rare Gram Negative Rods per oil power field    .Blood  19   No growth to date.      .Blood  19   No growth at 5 days.     .Sputum trap received  19   Numerous Enterobacter cloacae  Numerous Burkholderia cepacia  Normal Respiratory Katia present  --  Enterobacter cloacae  Burkholderia cepacia    .Urine  19   >100,000 CFU/ml Escherichia coli  --  Escherichia coli    Bronch Wash trap received  04-15-19   Normal Respiratory Katia present  Numerous Streptococcus agalactiae (Group B)  "Susceptibilities not performed"  --    Numerous polymorphonuclear leukocytes per low power field  No squamous epithelial cells per low power field  Numerous Gram positive cocci in pairs, chains and clusters per oil power  field  Few Gram Negative Rods per oil power field  Few Gram Negative Diplococci per oil power field    .Blood  04-15-19   No growth at 5 days.    (otherwise reviewed)    RADIOLOGY:    4/24 AXR:    FINDINGS:  Gastrostomy tube partially visualized.  Multiple air-filled loops of bowel. Nonobstructive bowel gas pattern.  No evidence of intraperitoneal free air.  No acute osseous abnormality.    IMPRESSION: Nonobstructive bowel gas pattern.     CXR:    FINDINGS:  Tracheostomy tube in place.  The lungs are clear.  There is no evidence of pleural effusion or pneumothorax.  The cardiomediastinal silhouette is unremarkable.  The visualized osseous and soft tissue structures demonstrate no acute   pathology.    IMPRESSION:   Clear lungs.     CTH:    IMPRESSION:    Similar-appearing 1.4 x 2.5 cm parenchymal hemorrhage in the pradip and   midbrain.    No hydrocephalus or supratentorial midline shift.

## 2019-04-29 NOTE — CONSULT NOTE ADULT - CONSULT REQUESTED DATE/TIME
11-Apr-2019 15:08
11-Apr-2019 16:26
15-Apr-2019
15-Apr-2019 18:00
18-Apr-2019 17:00
29-Apr-2019 17:26
15-Apr-2019 12:53
13-Apr-2019 11:22

## 2019-04-29 NOTE — PROGRESS NOTE ADULT - SUBJECTIVE AND OBJECTIVE BOX
INTERVAL HPI/OVERNIGHT EVENTS:  Patient positioned in bed for comfort and safety , trach collar, tolerating PEG tube feeds     MEDICATIONS  (STANDING):  ALBUTerol/ipratropium for Nebulization 3 milliLiter(s) Nebulizer every 6 hours  chlorhexidine 0.12% Liquid 15 milliLiter(s) Oral Mucosa two times a day  chlorhexidine 4% Liquid 1 Application(s) Topical <User Schedule>  diltiazem    Tablet 60 milliGRAM(s) Oral every 6 hours  doxazosin 8 milliGRAM(s) Oral at bedtime  enalapril 5 milliGRAM(s) Oral two times a day  enoxaparin Injectable 40 milliGRAM(s) SubCutaneous <User Schedule>  hydrALAZINE 100 milliGRAM(s) Oral every 8 hours  labetalol 400 milliGRAM(s) Oral every 8 hours  meropenem  IVPB 1000 milliGRAM(s) IV Intermittent every 8 hours  pantoprazole   Suspension 40 milliGRAM(s) Oral daily    MEDICATIONS  (PRN):  acetaminophen   Tablet .. 650 milliGRAM(s) Oral every 6 hours PRN Temp greater or equal to 38C (100.4F), Mild Pain (1 - 3)  ondansetron Injectable 4 milliGRAM(s) IV Push every 6 hours PRN Nausea and/or Vomiting      Allergies    Allergy Status Unknown    Intolerances        Review of Systems:  Unable to obtain from patient   General: max temp 100.4   ENT:  trach collar , + dysphagia  Resp:  trach, no  wheezing  GI: tolerating PEG tube feeds at 75cc/hr         Vital Signs Last 24 Hrs  T(C): 37.2 (2019 12:31), Max: 38 (2019 22:00)  T(F): 98.9 (2019 12:31), Max: 100.4 (2019 22:00)  HR: 82 (2019 12:31) (64 - 98)  BP: 130/74 (2019 12:31) (130/74 - 160/75)  BP(mean): --  RR: 21 (2019 12:31) (16 - 21)  SpO2: 98% (2019 12:31) (94% - 99%)    PHYSICAL EXAM:    Constitutional: non toxic and in NAD  Neck: trach site dry   Respiratory: anterior chest wall clear to auscultation   Cardiovascular: S1 and S2, RRR  Gastrointestinal: Exterior PEG bumper at 5cm skin level , + bowel sounds   Extremities: No peripheral edema   Skin: No jaundice or  visible rash      LABS:                        10.9   13.15 )-----------( 526      ( 2019 08:59 )             35.2     04-    141  |  101  |  24<H>  ----------------------------<  147<H>  3.8   |  26  |  0.57    Ca    9.4      2019 05:13        Urinalysis Basic - ( 2019 13:59 )    Color: Yellow / Appearance: Turbid / S.019 / pH: x  Gluc: x / Ketone: Negative  / Bili: Negative / Urobili: <2 mg/dL   Blood: x / Protein: 30 mg/dL / Nitrite: Negative   Leuk Esterase: Negative / RBC: 3 /HPF / WBC 1 /HPF   Sq Epi: x / Non Sq Epi: 1 /HPF / Bacteria: Negative    Per chart review on 19  patient had fever  found to have: Enterobacter / Burkholderia  growing in sputum . On antibiotics         RADIOLOGY & ADDITIONAL TESTS:

## 2019-04-29 NOTE — PROGRESS NOTE ADULT - SUBJECTIVE AND OBJECTIVE BOX
Subjective: Patient seen and examined. No new events except as noted.     SUBJECTIVE/ROS:        MEDICATIONS:  MEDICATIONS  (STANDING):  ALBUTerol/ipratropium for Nebulization 3 milliLiter(s) Nebulizer every 6 hours  chlorhexidine 0.12% Liquid 15 milliLiter(s) Oral Mucosa two times a day  chlorhexidine 4% Liquid 1 Application(s) Topical <User Schedule>  diltiazem    Tablet 60 milliGRAM(s) Oral every 6 hours  doxazosin 8 milliGRAM(s) Oral at bedtime  enalapril 5 milliGRAM(s) Oral two times a day  enoxaparin Injectable 40 milliGRAM(s) SubCutaneous <User Schedule>  hydrALAZINE 100 milliGRAM(s) Oral every 8 hours  labetalol 400 milliGRAM(s) Oral every 8 hours  meropenem  IVPB 1000 milliGRAM(s) IV Intermittent every 8 hours  pantoprazole   Suspension 40 milliGRAM(s) Oral daily      PHYSICAL EXAM:  T(C): 36.9 (04-29-19 @ 08:07), Max: 38 (04-28-19 @ 22:00)  HR: 70 (04-29-19 @ 08:53) (64 - 92)  BP: 144/94 (04-29-19 @ 08:07) (144/94 - 168/75)  RR: 20 (04-29-19 @ 08:53) (16 - 21)  SpO2: 97% (04-29-19 @ 08:53) (94% - 99%)  Wt(kg): --  I&O's Summary    28 Apr 2019 07:01  -  29 Apr 2019 07:00  --------------------------------------------------------  IN: 2590 mL / OUT: 2300 mL / NET: 290 mL            JVP: Normal  Neck: supple  Lung: clear   CV: S1 S2 , Murmur:  Abd: soft  Ext: No edema  Psych: flat affect  Skin: normal``    LABS/DATA:    CARDIAC MARKERS:                                10.9   13.15 )-----------( 526      ( 28 Apr 2019 08:59 )             35.2     04-28    141  |  101  |  24<H>  ----------------------------<  147<H>  3.8   |  26  |  0.57    Ca    9.4      28 Apr 2019 05:13      proBNP:   Lipid Profile:   HgA1c:   TSH:     TELE:  EKG:

## 2019-04-29 NOTE — PROGRESS NOTE ADULT - PROBLEM SELECTOR PLAN 5
Patient remains with elevated BP this morning -  's  Labetalol increased to 400 mg q 8 hrs    Continue Hydralazine 100 mg q 8 hrs   Enalapril 5 mg BID started   SBP Goal 100- 160 Per Neuro Sx Patient remains with elevated BP this morning -  SBP improved 150's   SBP Goal 100- 160 Per Neuro Sx  Labetalol increased to 400 mg q 8 hrs    Continue Hydralazine 100 mg q 8 hrs   4/29 Enalapril 5 mg BID started

## 2019-04-29 NOTE — PROVIDER CONTACT NOTE (CHANGE IN STATUS NOTIFICATION) - ACTION/TREATMENT ORDERED:
Keep HOB elevated. Continue to monitor.
May give cardizem now.
NP to floor to assess patient.
No new orders.
pt had already completed antibx. tylenol given as per order.. Will continue to monitor pt.

## 2019-04-30 LAB
ANION GAP SERPL CALC-SCNC: 13 MMOL/L — SIGNIFICANT CHANGE UP (ref 5–17)
BUN SERPL-MCNC: 27 MG/DL — HIGH (ref 7–23)
CALCIUM SERPL-MCNC: 9.4 MG/DL — SIGNIFICANT CHANGE UP (ref 8.4–10.5)
CHLORIDE SERPL-SCNC: 100 MMOL/L — SIGNIFICANT CHANGE UP (ref 96–108)
CO2 SERPL-SCNC: 26 MMOL/L — SIGNIFICANT CHANGE UP (ref 22–31)
CREAT SERPL-MCNC: 0.59 MG/DL — SIGNIFICANT CHANGE UP (ref 0.5–1.3)
CULTURE RESULTS: SIGNIFICANT CHANGE UP
GLUCOSE SERPL-MCNC: 144 MG/DL — HIGH (ref 70–99)
HCT VFR BLD CALC: 34.3 % — LOW (ref 34.5–45)
HGB BLD-MCNC: 11.6 G/DL — SIGNIFICANT CHANGE UP (ref 11.5–15.5)
MAGNESIUM SERPL-MCNC: 2.2 MG/DL — SIGNIFICANT CHANGE UP (ref 1.6–2.6)
MCHC RBC-ENTMCNC: 33.5 PG — SIGNIFICANT CHANGE UP (ref 27–34)
MCHC RBC-ENTMCNC: 34 GM/DL — SIGNIFICANT CHANGE UP (ref 32–36)
MCV RBC AUTO: 98.7 FL — SIGNIFICANT CHANGE UP (ref 80–100)
PHOSPHATE SERPL-MCNC: 2.9 MG/DL — SIGNIFICANT CHANGE UP (ref 2.5–4.5)
PLATELET # BLD AUTO: 503 K/UL — HIGH (ref 150–400)
POTASSIUM SERPL-MCNC: 3.4 MMOL/L — LOW (ref 3.5–5.3)
POTASSIUM SERPL-SCNC: 3.4 MMOL/L — LOW (ref 3.5–5.3)
RBC # BLD: 3.47 M/UL — LOW (ref 3.8–5.2)
RBC # FLD: 12.8 % — SIGNIFICANT CHANGE UP (ref 10.3–14.5)
SODIUM SERPL-SCNC: 139 MMOL/L — SIGNIFICANT CHANGE UP (ref 135–145)
SPECIMEN SOURCE: SIGNIFICANT CHANGE UP
WBC # BLD: 10.6 K/UL — HIGH (ref 3.8–10.5)
WBC # FLD AUTO: 10.6 K/UL — HIGH (ref 3.8–10.5)

## 2019-04-30 PROCEDURE — 99233 SBSQ HOSP IP/OBS HIGH 50: CPT | Mod: GC

## 2019-04-30 PROCEDURE — 99232 SBSQ HOSP IP/OBS MODERATE 35: CPT

## 2019-04-30 RX ORDER — ENOXAPARIN SODIUM 100 MG/ML
40 INJECTION SUBCUTANEOUS
Qty: 0 | Refills: 0 | Status: DISCONTINUED | OUTPATIENT
Start: 2019-04-30 | End: 2019-05-07

## 2019-04-30 RX ORDER — POTASSIUM CHLORIDE 20 MEQ
20 PACKET (EA) ORAL
Qty: 0 | Refills: 0 | Status: COMPLETED | OUTPATIENT
Start: 2019-04-30 | End: 2019-05-01

## 2019-04-30 RX ORDER — CHLORHEXIDINE GLUCONATE 213 G/1000ML
15 SOLUTION TOPICAL
Qty: 0 | Refills: 0 | Status: DISCONTINUED | OUTPATIENT
Start: 2019-04-30 | End: 2019-05-07

## 2019-04-30 RX ORDER — LACTOBACILLUS ACIDOPHILUS 100MM CELL
1 CAPSULE ORAL
Qty: 0 | Refills: 0 | Status: DISCONTINUED | OUTPATIENT
Start: 2019-04-30 | End: 2019-05-17

## 2019-04-30 RX ORDER — CHLORHEXIDINE GLUCONATE 213 G/1000ML
1 SOLUTION TOPICAL
Qty: 0 | Refills: 0 | Status: DISCONTINUED | OUTPATIENT
Start: 2019-04-30 | End: 2019-05-07

## 2019-04-30 RX ADMIN — Medication 100 MILLIGRAM(S): at 05:28

## 2019-04-30 RX ADMIN — Medication 100 MILLIGRAM(S): at 14:10

## 2019-04-30 RX ADMIN — MEROPENEM 100 MILLIGRAM(S): 1 INJECTION INTRAVENOUS at 22:08

## 2019-04-30 RX ADMIN — Medication 8 MILLIGRAM(S): at 22:05

## 2019-04-30 RX ADMIN — Medication 3 MILLILITER(S): at 17:26

## 2019-04-30 RX ADMIN — Medication 100 MILLIGRAM(S): at 22:05

## 2019-04-30 RX ADMIN — Medication 60 MILLIGRAM(S): at 09:13

## 2019-04-30 RX ADMIN — ENOXAPARIN SODIUM 40 MILLIGRAM(S): 100 INJECTION SUBCUTANEOUS at 18:15

## 2019-04-30 RX ADMIN — Medication 400 MILLIGRAM(S): at 22:06

## 2019-04-30 RX ADMIN — MEROPENEM 100 MILLIGRAM(S): 1 INJECTION INTRAVENOUS at 05:29

## 2019-04-30 RX ADMIN — MEROPENEM 100 MILLIGRAM(S): 1 INJECTION INTRAVENOUS at 14:10

## 2019-04-30 RX ADMIN — Medication 5 MILLIGRAM(S): at 05:28

## 2019-04-30 RX ADMIN — Medication 20 MILLIEQUIVALENT(S): at 18:16

## 2019-04-30 RX ADMIN — Medication 1 TABLET(S): at 18:17

## 2019-04-30 RX ADMIN — Medication 400 MILLIGRAM(S): at 05:29

## 2019-04-30 RX ADMIN — PANTOPRAZOLE SODIUM 40 MILLIGRAM(S): 20 TABLET, DELAYED RELEASE ORAL at 12:24

## 2019-04-30 RX ADMIN — Medication 3 MILLILITER(S): at 11:32

## 2019-04-30 RX ADMIN — Medication 60 MILLIGRAM(S): at 18:14

## 2019-04-30 RX ADMIN — CHLORHEXIDINE GLUCONATE 15 MILLILITER(S): 213 SOLUTION TOPICAL at 18:13

## 2019-04-30 RX ADMIN — Medication 5 MILLIGRAM(S): at 18:13

## 2019-04-30 RX ADMIN — Medication 3 MILLILITER(S): at 05:57

## 2019-04-30 RX ADMIN — CHLORHEXIDINE GLUCONATE 1 APPLICATION(S): 213 SOLUTION TOPICAL at 22:06

## 2019-04-30 RX ADMIN — Medication 400 MILLIGRAM(S): at 14:10

## 2019-04-30 RX ADMIN — CHLORHEXIDINE GLUCONATE 15 MILLILITER(S): 213 SOLUTION TOPICAL at 05:28

## 2019-04-30 RX ADMIN — Medication 60 MILLIGRAM(S): at 02:00

## 2019-04-30 RX ADMIN — Medication 3 MILLILITER(S): at 23:12

## 2019-04-30 NOTE — PROGRESS NOTE ADULT - PROBLEM SELECTOR PLAN 5
Patient remains with elevated BP this morning -  SBP improved 150's   SBP Goal 100- 160 Per Neuro Sx  Labetalol increased to 400 mg q 8 hrs    Continue Hydralazine 100 mg q 8 hrs   4/29 Enalapril 5 mg BID started

## 2019-04-30 NOTE — CHART NOTE - NSCHARTNOTEFT_GEN_A_CORE
trach downsized at bedside   from 8 shiley cuffed to 7 portex uncuffed trach with resp on standby    ofelia procedure well,  maintained  02 sat 95-98%    J Rula SOTO

## 2019-04-30 NOTE — PROGRESS NOTE ADULT - ASSESSMENT
63 Year old female w/ PMHx HTN, HLD, Ischemic stroke (2004), who was transferred from OSH, Patient initially presented to the OSH from home with AMS as per EMS, pt was talking to a family member on the phone when she suddenly stopped talking. 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 Patient transferred to [facility] for Neuro Surgical assessment. Upon admission NIHSS = ; MRS = 2; ICH =3. Patient was given DDAVP and PLTs. During admission patient was found to have newly diagnosed A.fib and was initiated on Labetalol. Patient had Serial head CTs performed which remained unchanged, no neuro surgical intervention was performed. Patient was seen by Palliative Care family decided to proceed with Trach and PEG. Patient S/p Tracheostomy on 4/16 ( # 8 Cuffed Shiley) and PEG Placement on 4/18. During hospitalization patient found to have UTI ( 100 K E.coli ) for which she was treated with a course of Keflex ( Completed 4/21). On 4/23 patient febrile again, Patient found to have : Enterobacter / Burkholderia  growing in sputum     4/26: Patient remains afebrile. Sputum cx 4/23 growing Enterobacter/ Burkholderia, Will continue Meropenem. Patient tolerating TC during the day, will attempt TC ATC and obtain AM ABG. Patient remains with elevated BP will increase Labetalol 400 mg q 8 hrs.  4/27: Tolerated TC x24hrs. ABG reviewed and adequate to continue TC. Appears comfortable of TC. BP remains labile with elevations into 170s. Will add Enalapril. Spoke to son at bedside. Will finish ABx on 4/30.  4/29 Stable on TC ATC. Fevers, UA negative, on meropenem until 4/30

## 2019-04-30 NOTE — PROGRESS NOTE ADULT - SUBJECTIVE AND OBJECTIVE BOX
Patient is a 63y old  Female who presented with a chief complaint of ICH (2019 08:44)      INTERVAL HPI/OVERNIGHT EVENTS:  tolerating PEG feeds per report  2 stools overnight     MEDICATIONS  (STANDING):  ALBUTerol/ipratropium for Nebulization 3 milliLiter(s) Nebulizer every 6 hours  chlorhexidine 0.12% Liquid 15 milliLiter(s) Oral Mucosa two times a day  chlorhexidine 4% Liquid 1 Application(s) Topical <User Schedule>  diltiazem    Tablet 60 milliGRAM(s) Oral every 6 hours  doxazosin 8 milliGRAM(s) Oral at bedtime  enalapril 5 milliGRAM(s) Oral two times a day  enoxaparin Injectable 40 milliGRAM(s) SubCutaneous <User Schedule>  hydrALAZINE 100 milliGRAM(s) Oral every 8 hours  labetalol 400 milliGRAM(s) Oral every 8 hours  meropenem  IVPB 1000 milliGRAM(s) IV Intermittent every 8 hours  pantoprazole   Suspension 40 milliGRAM(s) Oral daily  potassium chloride   Solution 20 milliEquivalent(s) Oral two times a day    MEDICATIONS  (PRN):  acetaminophen   Tablet .. 650 milliGRAM(s) Oral every 6 hours PRN Temp greater or equal to 38C (100.4F), Mild Pain (1 - 3)  ondansetron Injectable 4 milliGRAM(s) IV Push every 6 hours PRN Nausea and/or Vomiting      Allergies  Allergy Status Unknown      Review of Systems:  unable to obtain    Vital Signs Last 24 Hrs  T(C): 37.3 (2019 04:48), Max: 37.3 (2019 20:52)  T(F): 99.1 (2019 04:48), Max: 99.2 (2019 20:52)  HR: 83 (2019 09:13) (68 - 98)  BP: 153/84 (2019 09:13) (130/74 - 178/94)  BP(mean): --  RR: 18 (2019 04:48) (18 - 22)  SpO2: 98% (2019 05:58) (97% - 99%)    PHYSICAL EXAM:  Constitutional: non toxic and in NAD not responsive to verbal/tactile stimuli  Neck: trach site dry   Respiratory: anterior chest wall clear to auscultation   Cardiovascular: S1 and S2, RRR  Gastrointestinal: soft ND NT PEG site clean and dry , + bowel sounds   Extremities: No peripheral edema   Skin: No jaundice or  visible rash    LABS:                        11.6   10.6  )-----------( 503      ( 2019 06:52 )             34.3     04-30    139  |  100  |  27<H>  ----------------------------<  144<H>  3.4<L>   |  26  |  0.59    Ca    9.4      2019 06:50  Phos  2.9     04-30  Mg     2.2     04-30      Urinalysis Basic - ( 2019 13:59 )    Color: Yellow / Appearance: Turbid / S.019 / pH: x  Gluc: x / Ketone: Negative  / Bili: Negative / Urobili: <2 mg/dL   Blood: x / Protein: 30 mg/dL / Nitrite: Negative   Leuk Esterase: Negative / RBC: 3 /HPF / WBC 1 /HPF   Sq Epi: x / Non Sq Epi: 1 /HPF / Bacteria: Negative          RADIOLOGY & ADDITIONAL TESTS:

## 2019-04-30 NOTE — PROGRESS NOTE ADULT - ASSESSMENT
63 Year old female w/ PMHx HTN, HLD, Ischemic stroke (2004), who was transferred from OSH, Patient initially presented to the OSH from home with AMS.  Found to have ICH/stroke  Required Trach, PEG  Treated for suspected E coli UTI  4/23 found to have Burkholderia and Enterobacter in   CXR clear  sputum (? 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

## 2019-04-30 NOTE — PROGRESS NOTE ADULT - PROBLEM SELECTOR PLAN 2
Patient S/p Tracheostomy 4/16  ( # 8 Cuffed Danna )   Patient tolerating TC post 24hrs  Will continue  TC ATC. Vent discontinued  Continue Nebs prn / Suctioning PRN / Chest PT Patient S/p Tracheostomy 4/16  ( # 8 Cuffed Danna )   Patient tolerating TC post 24hrs  Will continue  TC ATC. since 4/28- consider downsize trach  Continue Nebs prn / Suctioning PRN / Chest PT

## 2019-04-30 NOTE — PROGRESS NOTE ADULT - SUBJECTIVE AND OBJECTIVE BOX
Subjective: Patient seen and examined. No new events except as noted.     SUBJECTIVE/ROS:        MEDICATIONS:  MEDICATIONS  (STANDING):  ALBUTerol/ipratropium for Nebulization 3 milliLiter(s) Nebulizer every 6 hours  chlorhexidine 0.12% Liquid 15 milliLiter(s) Oral Mucosa two times a day  chlorhexidine 4% Liquid 1 Application(s) Topical <User Schedule>  diltiazem    Tablet 60 milliGRAM(s) Oral every 6 hours  doxazosin 8 milliGRAM(s) Oral at bedtime  enalapril 5 milliGRAM(s) Oral two times a day  enoxaparin Injectable 40 milliGRAM(s) SubCutaneous <User Schedule>  hydrALAZINE 100 milliGRAM(s) Oral every 8 hours  labetalol 400 milliGRAM(s) Oral every 8 hours  meropenem  IVPB 1000 milliGRAM(s) IV Intermittent every 8 hours  pantoprazole   Suspension 40 milliGRAM(s) Oral daily  potassium chloride   Solution 20 milliEquivalent(s) Oral two times a day      PHYSICAL EXAM:  T(C): 37.3 (04-30-19 @ 04:48), Max: 37.3 (04-29-19 @ 20:52)  HR: 76 (04-30-19 @ 05:58) (68 - 98)  BP: 162/84 (04-30-19 @ 05:29) (130/74 - 178/94)  RR: 18 (04-30-19 @ 04:48) (18 - 22)  SpO2: 98% (04-30-19 @ 05:58) (97% - 99%)  Wt(kg): --  I&O's Summary    29 Apr 2019 07:01  -  30 Apr 2019 07:00  --------------------------------------------------------  IN: 2738 mL / OUT: 1250 mL / NET: 1488 mL            JVP: Normal  Neck: supple  Lung: clear   CV: S1 S2 , Murmur:  Abd: soft  Ext: No edema  Psych: flat affect  Skin: normal``    LABS/DATA:    CARDIAC MARKERS:                                11.6   10.6  )-----------( 503      ( 30 Apr 2019 06:52 )             34.3     04-30    139  |  100  |  27<H>  ----------------------------<  144<H>  3.4<L>   |  26  |  0.59    Ca    9.4      30 Apr 2019 06:50  Phos  2.9     04-30  Mg     2.2     04-30      proBNP:   Lipid Profile:   HgA1c:   TSH:     TELE:  EKG:

## 2019-04-30 NOTE — PROGRESS NOTE ADULT - PROBLEM SELECTOR PLAN 3
Patient Afebrile since initiation of ABX   CXR 4/23: Clear Lungs   Sputum cx 4/23: Enterobacter Colace / Burkholderia Cepacia, on contact isolation  Both organisms Sensitive to Meropenem   Bld Cx 4/23: No growth, UA 4/23: Negative  Continue Meropenem will treat 7 Day Course  ( Ends 4/30) Patient Afebrile since initiation of ABX   CXR 4/23: Clear Lungs   Sputum cx 4/23: Enterobacter Colace / Burkholderia Cepacia, on contact isolation  Both organisms Sensitive to Meropenem   Bld Cx 4/23: No growth, UA 4/23: Negative  Continue Meropenem will treat 7 Day Course  ( Ends 4/30)  seen by ID 4/29 monitor for fever, wbc final abx plan depending on clinical course

## 2019-04-30 NOTE — PROGRESS NOTE ADULT - ASSESSMENT
HTN  cont current meds  labile BP     Afib  resolved  cont current avn blockers  off a/c due to pontine bleed     resp failrue  s/p trach   fu with RCU

## 2019-04-30 NOTE — PROGRESS NOTE ADULT - SUBJECTIVE AND OBJECTIVE BOX
Patient is a 63y old  Female who presents with a chief complaint of ICH (2019 08:37)    Interval Events:    No events overnight. CPAP trials as tolerated.     REVIEW OF SYSTEMS:  [ ] Positive  [ ] All other systems negative  [X ] Unable to assess ROS because pt is non-verbal, does not open eyes, or follow commands     Vital Signs Last 24 Hrs  T(C): 37.1 (19 @ 04:22), Max: 38 (19 @ 22:00)  T(F): 98.8 (19 @ 04:22), Max: 100.4 (19 @ 22:00)  HR: 90 (19 @ 05:14) (64 - 92)  BP: 158/76 (19 @ 04:22) (146/75 - 168/75)  RR: 18 (19 @ 06:47) (16 - 21)  SpO2: 97% (19 @ 06:47) (94% - 99%)    PHYSICAL EXAM:  HEENT:   [ X]Tracheostomy:  #8 Cuffed   [ ]Pupils equal  [ ]No oral lesions  [ ]Abnormal    SKIN  [X ]No Rash  [ ] Abnormal  [ ] pressure    CARDIAC  [ X]Regular  [ ]Abnormal    PULMONARY  [ X]Bilateral Clear Breath Sounds  [ ]Normal Excursion  [ ]Abnormal    GI  [ X]PEG      [X ] +BS		              [ X]Soft, nondistended, nontender	  [ ]Abnormal    MUSCULOSKELETAL                                   [ X]Bedbound                 [ ]Abnormal    [ ]Ambulatory/OOB to chair                           EXTREMITIES                                         [X ]Normal  [ ]Edema                           NEUROLOGIC  [ ] Normal, non focal  [X ] Focal findings:  does not follow commands, open eyes     PSYCHIATRIC  [ ]Alert and appropriate  [ ] Sedated	 [ ]Agitated    :  Bedoya: [ ] Yes, if yes: Date of Placement:                   [ X ] No    LINES: Central Lines [ ] Yes, if yes: Date of Placement                                     [  X] No    HOSPITAL MEDICATIONS:  MEDICATIONS  (STANDING):  ALBUTerol/ipratropium for Nebulization 3 milliLiter(s) Nebulizer every 6 hours  chlorhexidine 0.12% Liquid 15 milliLiter(s) Oral Mucosa two times a day  chlorhexidine 4% Liquid 1 Application(s) Topical <User Schedule>  diltiazem    Tablet 60 milliGRAM(s) Oral every 6 hours  doxazosin 8 milliGRAM(s) Oral at bedtime  enalapril 5 milliGRAM(s) Oral two times a day  enoxaparin Injectable 40 milliGRAM(s) SubCutaneous <User Schedule>  hydrALAZINE 100 milliGRAM(s) Oral every 8 hours  labetalol 400 milliGRAM(s) Oral every 8 hours  meropenem  IVPB 1000 milliGRAM(s) IV Intermittent every 8 hours  pantoprazole   Suspension 40 milliGRAM(s) Oral daily    MEDICATIONS  (PRN):  acetaminophen   Tablet .. 650 milliGRAM(s) Oral every 6 hours PRN Temp greater or equal to 38C (100.4F), Mild Pain (1 - 3)  ondansetron Injectable 4 milliGRAM(s) IV Push every 6 hours PRN Nausea and/or Vomiting    LABS:                        10.9   13.15 )-----------( 526      ( 2019 08:59 )             35.2     04-28    141  |  101  |  24<H>  ----------------------------<  147<H>  3.8   |  26  |  0.57    Ca    9.4      2019 05:13  Phos  2.7     04-27  Mg     2.1     04-27        Urinalysis Basic - ( 2019 13:59 )    Color: Yellow / Appearance: Turbid / S.019 / pH: x  Gluc: x / Ketone: Negative  / Bili: Negative / Urobili: <2 mg/dL   Blood: x / Protein: 30 mg/dL / Nitrite: Negative   Leuk Esterase: Negative / RBC: 3 /HPF / WBC 1 /HPF   Sq Epi: x / Non Sq Epi: 1 /HPF / Bacteria: Negative Patient is a 63y old  Female who presents with a chief complaint of ICH (2019 08:37)    Interval Events:    No events overnight.  TC 30 ATC since       REVIEW OF SYSTEMS:  [ ] Positive  [ ] All other systems negative  [X ] Unable to assess ROS because pt is non-verbal, does not open eyes, or follow commands     Vital Signs Last 24 Hrs  T(C): 37.1 (19 @ 04:22), Max: 38 (19 @ 22:00)  T(F): 98.8 (19 @ 04:22), Max: 100.4 (19 @ 22:00)  HR: 90 (19 @ 05:14) (64 - 92)  BP: 158/76 (19 @ 04:22) (146/75 - 168/75)  RR: 18 (19 @ 06:47) (16 - 21)  SpO2: 97% (19 @ 06:47) (94% - 99%)    PHYSICAL EXAM:  HEENT:   [ X]Tracheostomy:  #8 Cuffed   [ ]Pupils equal  [ ]No oral lesions  [ ]Abnormal    SKIN  [X ]No Rash  [ ] Abnormal  [ ] pressure    CARDIAC  [ X]Regular  [ ]Abnormal    PULMONARY  [ X]Bilateral Clear Breath Sounds  [ ]Normal Excursion  [ ]Abnormal    GI  [ X]PEG      [X ] +BS		              [ X]Soft, nondistended, nontender	  [ ]Abnormal    MUSCULOSKELETAL                                   [ X]Bedbound                 [ ]Abnormal    [ ]Ambulatory/OOB to chair                           EXTREMITIES                                         [X ]Normal  [ ]Edema                           NEUROLOGIC  [ ] Normal, non focal  [X ] Focal findings:  does not follow commands, open eyes     PSYCHIATRIC  [ ]Alert and appropriate  [ ] Sedated	 [ ]Agitated    :  Bedoya: [ ] Yes, if yes: Date of Placement:                   [ X ] No    LINES: Central Lines [ ] Yes, if yes: Date of Placement                                     [  X] No    HOSPITAL MEDICATIONS:  MEDICATIONS  (STANDING):  ALBUTerol/ipratropium for Nebulization 3 milliLiter(s) Nebulizer every 6 hours  chlorhexidine 0.12% Liquid 15 milliLiter(s) Oral Mucosa two times a day  chlorhexidine 4% Liquid 1 Application(s) Topical <User Schedule>  diltiazem    Tablet 60 milliGRAM(s) Oral every 6 hours  doxazosin 8 milliGRAM(s) Oral at bedtime  enalapril 5 milliGRAM(s) Oral two times a day  enoxaparin Injectable 40 milliGRAM(s) SubCutaneous <User Schedule>  hydrALAZINE 100 milliGRAM(s) Oral every 8 hours  labetalol 400 milliGRAM(s) Oral every 8 hours  meropenem  IVPB 1000 milliGRAM(s) IV Intermittent every 8 hours  pantoprazole   Suspension 40 milliGRAM(s) Oral daily    MEDICATIONS  (PRN):  acetaminophen   Tablet .. 650 milliGRAM(s) Oral every 6 hours PRN Temp greater or equal to 38C (100.4F), Mild Pain (1 - 3)  ondansetron Injectable 4 milliGRAM(s) IV Push every 6 hours PRN Nausea and/or Vomiting    LABS:                        10.9   13.15 )-----------( 526      ( 2019 08:59 )             35.2     04-28    141  |  101  |  24<H>  ----------------------------<  147<H>  3.8   |  26  |  0.57    Ca    9.4      2019 05:13  Phos  2.7     04-27  Mg     2.1     04-27        Urinalysis Basic - ( 2019 13:59 )    Color: Yellow / Appearance: Turbid / S.019 / pH: x  Gluc: x / Ketone: Negative  / Bili: Negative / Urobili: <2 mg/dL   Blood: x / Protein: 30 mg/dL / Nitrite: Negative   Leuk Esterase: Negative / RBC: 3 /HPF / WBC 1 /HPF   Sq Epi: x / Non Sq Epi: 1 /HPF / Bacteria: Negative

## 2019-04-30 NOTE — PROGRESS NOTE ADULT - SUBJECTIVE AND OBJECTIVE BOX
CC: Baljinder    Saw/spoke to patient. No fevers, no chills. Overall well. No new complaints. Unchanged.  at bedside.    Allergies  Allergy Status Unknown    ANTIMICROBIALS:  meropenem  IVPB 1000 every 8 hours    PE:    Vital Signs Last 24 Hrs  T(C): 37.7 (2019 10:00), Max: 37.7 (2019 10:00)  T(F): 99.9 (2019 10:00), Max: 99.9 (2019 10:00)  HR: 86 (2019 12:53) (68 - 89)  BP: 131/- (2019 12:00) (131/- - 178/94)  RR: 20 (2019 12:53) (18 - 22)  SpO2: 97% (2019 12:53) (97% - 99%)    Gen: AOx3, NAD, non-toxic, pleasant  CV: S1+S2 normal, nontachycardic  Resp: Clear bilat, no resp distress, no crackles/wheezes, trach  Abd: Soft, nontender, +BS, PEG  Ext: No LE edema, no wounds    LABS:                        11.6   10.6  )-----------( 503      ( 2019 06:52 )             34.3     04-30    139  |  100  |  27<H>  ----------------------------<  144<H>  3.4<L>   |  26  |  0.59    Ca    9.4      2019 06:50  Phos  2.9     04-30  Mg     2.2     04-30    Urinalysis Basic - ( 2019 13:59 )    Color: Yellow / Appearance: Turbid / S.019 / pH: x  Gluc: x / Ketone: Negative  / Bili: Negative / Urobili: <2 mg/dL   Blood: x / Protein: 30 mg/dL / Nitrite: Negative   Leuk Esterase: Negative / RBC: 3 /HPF / WBC 1 /HPF   Sq Epi: x / Non Sq Epi: 1 /HPF / Bacteria: Negative    MICROBIOLOGY:    .Sputum trap  19   Normal Respiratory Katia present  --    Moderate polymorphonuclear leukocytes per low power field  Few Squamous epithelial cells per low power field  Rare Gram Negative Rods per oil power field    .Blood  19   No growth to date.    .Blood  19   No growth at 5 days.    .Sputum trap received  19   Numerous Enterobacter cloacae  Numerous Burkholderia cepacia  Normal Respiratory Katia present  --  Enterobacter cloacae  Burkholderia cepacia    .Urine  19   >100,000 CFU/ml Escherichia coli  --  Escherichia coli    (otherwise reviewed)    RADIOLOGY:     XR:    FINDINGS:  Gastrostomy tube partially visualized.  Multiple air-filled loops of bowel. Nonobstructive bowel gas pattern.  No evidence of intraperitoneal free air.  No acute osseous abnormality.    IMPRESSION: Nonobstructive bowel gas pattern.

## 2019-05-01 LAB
ANION GAP SERPL CALC-SCNC: 14 MMOL/L — SIGNIFICANT CHANGE UP (ref 5–17)
BUN SERPL-MCNC: 27 MG/DL — HIGH (ref 7–23)
CALCIUM SERPL-MCNC: 9.3 MG/DL — SIGNIFICANT CHANGE UP (ref 8.4–10.5)
CHLORIDE SERPL-SCNC: 102 MMOL/L — SIGNIFICANT CHANGE UP (ref 96–108)
CO2 SERPL-SCNC: 26 MMOL/L — SIGNIFICANT CHANGE UP (ref 22–31)
CREAT SERPL-MCNC: 0.54 MG/DL — SIGNIFICANT CHANGE UP (ref 0.5–1.3)
GLUCOSE SERPL-MCNC: 151 MG/DL — HIGH (ref 70–99)
HCT VFR BLD CALC: 34.7 % — SIGNIFICANT CHANGE UP (ref 34.5–45)
HGB BLD-MCNC: 11.7 G/DL — SIGNIFICANT CHANGE UP (ref 11.5–15.5)
MAGNESIUM SERPL-MCNC: 2.2 MG/DL — SIGNIFICANT CHANGE UP (ref 1.6–2.6)
MCHC RBC-ENTMCNC: 33.5 PG — SIGNIFICANT CHANGE UP (ref 27–34)
MCHC RBC-ENTMCNC: 33.6 GM/DL — SIGNIFICANT CHANGE UP (ref 32–36)
MCV RBC AUTO: 99.6 FL — SIGNIFICANT CHANGE UP (ref 80–100)
PHOSPHATE SERPL-MCNC: 3.1 MG/DL — SIGNIFICANT CHANGE UP (ref 2.5–4.5)
PLATELET # BLD AUTO: 459 K/UL — HIGH (ref 150–400)
POTASSIUM SERPL-MCNC: 3.7 MMOL/L — SIGNIFICANT CHANGE UP (ref 3.5–5.3)
POTASSIUM SERPL-SCNC: 3.7 MMOL/L — SIGNIFICANT CHANGE UP (ref 3.5–5.3)
RBC # BLD: 3.48 M/UL — LOW (ref 3.8–5.2)
RBC # FLD: 13 % — SIGNIFICANT CHANGE UP (ref 10.3–14.5)
SODIUM SERPL-SCNC: 142 MMOL/L — SIGNIFICANT CHANGE UP (ref 135–145)
WBC # BLD: 9.8 K/UL — SIGNIFICANT CHANGE UP (ref 3.8–10.5)
WBC # FLD AUTO: 9.8 K/UL — SIGNIFICANT CHANGE UP (ref 3.8–10.5)

## 2019-05-01 PROCEDURE — 99233 SBSQ HOSP IP/OBS HIGH 50: CPT | Mod: GC

## 2019-05-01 PROCEDURE — 99232 SBSQ HOSP IP/OBS MODERATE 35: CPT

## 2019-05-01 RX ORDER — MEROPENEM 1 G/30ML
1000 INJECTION INTRAVENOUS EVERY 8 HOURS
Qty: 0 | Refills: 0 | Status: COMPLETED | OUTPATIENT
Start: 2019-05-01 | End: 2019-05-04

## 2019-05-01 RX ADMIN — Medication 20 MILLIEQUIVALENT(S): at 05:13

## 2019-05-01 RX ADMIN — Medication 5 MILLIGRAM(S): at 17:11

## 2019-05-01 RX ADMIN — PANTOPRAZOLE SODIUM 40 MILLIGRAM(S): 20 TABLET, DELAYED RELEASE ORAL at 12:23

## 2019-05-01 RX ADMIN — Medication 60 MILLIGRAM(S): at 05:13

## 2019-05-01 RX ADMIN — Medication 60 MILLIGRAM(S): at 17:12

## 2019-05-01 RX ADMIN — Medication 100 MILLIGRAM(S): at 21:42

## 2019-05-01 RX ADMIN — Medication 3 MILLILITER(S): at 11:31

## 2019-05-01 RX ADMIN — Medication 1 TABLET(S): at 05:12

## 2019-05-01 RX ADMIN — ENOXAPARIN SODIUM 40 MILLIGRAM(S): 100 INJECTION SUBCUTANEOUS at 17:12

## 2019-05-01 RX ADMIN — Medication 5 MILLIGRAM(S): at 05:13

## 2019-05-01 RX ADMIN — Medication 60 MILLIGRAM(S): at 12:23

## 2019-05-01 RX ADMIN — Medication 100 MILLIGRAM(S): at 14:24

## 2019-05-01 RX ADMIN — CHLORHEXIDINE GLUCONATE 15 MILLILITER(S): 213 SOLUTION TOPICAL at 05:13

## 2019-05-01 RX ADMIN — Medication 400 MILLIGRAM(S): at 21:42

## 2019-05-01 RX ADMIN — CHLORHEXIDINE GLUCONATE 15 MILLILITER(S): 213 SOLUTION TOPICAL at 17:12

## 2019-05-01 RX ADMIN — Medication 3 MILLILITER(S): at 05:11

## 2019-05-01 RX ADMIN — MEROPENEM 100 MILLIGRAM(S): 1 INJECTION INTRAVENOUS at 21:42

## 2019-05-01 RX ADMIN — CHLORHEXIDINE GLUCONATE 1 APPLICATION(S): 213 SOLUTION TOPICAL at 21:42

## 2019-05-01 RX ADMIN — Medication 3 MILLILITER(S): at 17:14

## 2019-05-01 RX ADMIN — Medication 100 MILLIGRAM(S): at 05:13

## 2019-05-01 RX ADMIN — Medication 1 TABLET(S): at 17:13

## 2019-05-01 RX ADMIN — Medication 3 MILLILITER(S): at 23:50

## 2019-05-01 RX ADMIN — Medication 400 MILLIGRAM(S): at 14:25

## 2019-05-01 RX ADMIN — MEROPENEM 100 MILLIGRAM(S): 1 INJECTION INTRAVENOUS at 12:24

## 2019-05-01 RX ADMIN — Medication 60 MILLIGRAM(S): at 00:15

## 2019-05-01 RX ADMIN — Medication 400 MILLIGRAM(S): at 05:13

## 2019-05-01 RX ADMIN — Medication 8 MILLIGRAM(S): at 21:42

## 2019-05-01 RX ADMIN — MEROPENEM 100 MILLIGRAM(S): 1 INJECTION INTRAVENOUS at 05:13

## 2019-05-01 NOTE — PROGRESS NOTE ADULT - ASSESSMENT
63 Year old female w/ PMHx HTN, HLD, Ischemic stroke (2004), who was transferred from OSH, Patient initially presented to the OSH from home with AMS.  Found to have ICH/stroke  Required Trach, PEG  Treated for suspected E coli UTI  4/23 found to have Burkholderia and Enterobacter in   CXR clear  sputum (? 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

## 2019-05-01 NOTE — PROGRESS NOTE ADULT - PROBLEM SELECTOR PLAN 2
Patient S/p Tracheostomy 4/16  ( # 8 Cuffed Danna )   Patient tolerating TC post 24hrs  Will continue  TC ATC. since 4/28- consider downsize trach  Continue Nebs prn / Suctioning PRN / Chest PT

## 2019-05-01 NOTE — PROGRESS NOTE ADULT - SUBJECTIVE AND OBJECTIVE BOX
Patient is a 63y old  Female who presents with a chief complaint of ICH (30 Apr 2019 10:28)      Interval Events:    REVIEW OF SYSTEMS:  [ ] Positive  [ ] All other systems negative  [ ] Unable to assess ROS because ________    Vital Signs Last 24 Hrs  T(C): 36.8 (05-01-19 @ 03:33), Max: 37.9 (04-30-19 @ 11:00)  T(F): 98.2 (05-01-19 @ 03:33), Max: 100.3 (04-30-19 @ 16:35)  HR: 59 (05-01-19 @ 05:11) (59 - 89)  BP: 128/94 (05-01-19 @ 03:33) (116/65 - 153/84)  RR: 18 (05-01-19 @ 04:28) (18 - 20)  SpO2: 95% (05-01-19 @ 05:11) (93% - 98%)    PHYSICAL EXAM:  HEENT:   [ ]Tracheostomy:  [ ]Pupils equal  [ ]No oral lesions  [ ]Abnormal    SKIN  [ ]No Rash  [ ] Abnormal  [ ] pressure    CARDIAC  [ ]Regular  [ ]Abnormal    PULMONARY  [ ]Bilateral Clear Breath Sounds  [ ]Normal Excursion  [ ]Abnormal    GI  [ ]PEG      [ ] +BS		              [ ]Soft, nondistended, nontender	  [ ]Abnormal    MUSCULOSKELETAL                                   [ ]Bedbound                 [ ]Abnormal    [ ]Ambulatory/OOB to chair                           EXTREMITIES                                         [ ]Normal  [ ]Edema                           NEUROLOGIC  [ ] Normal, non focal  [ ] Focal findings:    PSYCHIATRIC  [ ]Alert and appropriate  [ ] Sedated	 [ ]Agitated    :  Bedoya: [ ] Yes, if yes: Date of Placement:                   [  ] No    LINES: Central Lines [ ] Yes, if yes: Date of Placement                                     [  ] No    HOSPITAL MEDICATIONS:  MEDICATIONS  (STANDING):  ALBUTerol/ipratropium for Nebulization 3 milliLiter(s) Nebulizer every 6 hours  chlorhexidine 0.12% Liquid 15 milliLiter(s) Oral Mucosa two times a day  chlorhexidine 4% Liquid 1 Application(s) Topical <User Schedule>  diltiazem    Tablet 60 milliGRAM(s) Oral every 6 hours  doxazosin 8 milliGRAM(s) Oral at bedtime  enalapril 5 milliGRAM(s) Oral two times a day  enoxaparin Injectable 40 milliGRAM(s) SubCutaneous <User Schedule>  hydrALAZINE 100 milliGRAM(s) Oral every 8 hours  labetalol 400 milliGRAM(s) Oral every 8 hours  lactobacillus acidophilus 1 Tablet(s) Oral two times a day  meropenem  IVPB 1000 milliGRAM(s) IV Intermittent every 8 hours  pantoprazole   Suspension 40 milliGRAM(s) Oral daily    MEDICATIONS  (PRN):  acetaminophen   Tablet .. 650 milliGRAM(s) Oral every 6 hours PRN Temp greater or equal to 38C (100.4F), Mild Pain (1 - 3)  ondansetron Injectable 4 milliGRAM(s) IV Push every 6 hours PRN Nausea and/or Vomiting      LABS:                        11.7   9.8   )-----------( 459      ( 01 May 2019 07:08 )             34.7     05-01    142  |  102  |  27<H>  ----------------------------<  151<H>  3.7   |  26  |  0.54    Ca    9.3      01 May 2019 07:08  Phos  3.1     05-01  Mg     2.2     05-01 Patient is a 63y old  Female who presents with a chief complaint of ICH (30 Apr 2019 10:28)    Interval Events:    No events overnight. Tolerating TC ATC.    REVIEW OF SYSTEMS:  [ ] Positive  [ ] All other systems negative  [ ] Unable to assess ROS because   [X} pt following some commands in own language w/ at bedside.     Vital Signs Last 24 Hrs  T(C): 36.8 (05-01-19 @ 03:33), Max: 37.9 (04-30-19 @ 11:00)  T(F): 98.2 (05-01-19 @ 03:33), Max: 100.3 (04-30-19 @ 16:35)  HR: 59 (05-01-19 @ 05:11) (59 - 89)  BP: 128/94 (05-01-19 @ 03:33) (116/65 - 153/84)  RR: 18 (05-01-19 @ 04:28) (18 - 20)  SpO2: 95% (05-01-19 @ 05:11) (93% - 98%)    PHYSICAL EXAM:  HEENT:   [X ]Tracheostomy:  #7 Uncuffed Portex   [ ]Pupils equal  [ ]No oral lesions  [ ]Abnormal    SKIN  [X ]No Rash  [ ] Abnormal  [ ] pressure    CARDIAC  [X ]Regular  [ ]Abnormal    PULMONARY  [X ]Bilateral Clear Breath Sounds  [ ]Normal Excursion  [ ]Abnormal    GI  [X ]PEG      [X ] +BS		              [ X]Soft, nondistended, nontender	  [ ]Abnormal    MUSCULOSKELETAL                                   [ ]Bedbound                 [ ]Abnormal    [ ]Ambulatory/OOB to chair                           EXTREMITIES                                         [X ]Normal  [ ]Edema                           NEUROLOGIC  [ ] Normal, non focal  [ X] Focal findings:  lethargic, wax and wanes, following commands with help of   (speaks to her in their language)     PSYCHIATRIC  [ ]Alert and appropriate  [ ] Sedated	 [ ]Agitated    :  Bedoya: [ ] Yes, if yes: Date of Placement:                   [  X] No    LINES: Central Lines [ ] Yes, if yes: Date of Placement                                     [  X] No    HOSPITAL MEDICATIONS:  MEDICATIONS  (STANDING):  ALBUTerol/ipratropium for Nebulization 3 milliLiter(s) Nebulizer every 6 hours  chlorhexidine 0.12% Liquid 15 milliLiter(s) Oral Mucosa two times a day  chlorhexidine 4% Liquid 1 Application(s) Topical <User Schedule>  diltiazem    Tablet 60 milliGRAM(s) Oral every 6 hours  doxazosin 8 milliGRAM(s) Oral at bedtime  enalapril 5 milliGRAM(s) Oral two times a day  enoxaparin Injectable 40 milliGRAM(s) SubCutaneous <User Schedule>  hydrALAZINE 100 milliGRAM(s) Oral every 8 hours  labetalol 400 milliGRAM(s) Oral every 8 hours  lactobacillus acidophilus 1 Tablet(s) Oral two times a day  meropenem  IVPB 1000 milliGRAM(s) IV Intermittent every 8 hours  pantoprazole   Suspension 40 milliGRAM(s) Oral daily    MEDICATIONS  (PRN):  acetaminophen   Tablet .. 650 milliGRAM(s) Oral every 6 hours PRN Temp greater or equal to 38C (100.4F), Mild Pain (1 - 3)  ondansetron Injectable 4 milliGRAM(s) IV Push every 6 hours PRN Nausea and/or Vomiting      LABS:                        11.7   9.8   )-----------( 459      ( 01 May 2019 07:08 )             34.7     05-01    142  |  102  |  27<H>  ----------------------------<  151<H>  3.7   |  26  |  0.54    Ca    9.3      01 May 2019 07:08  Phos  3.1     05-01  Mg     2.2     05-01

## 2019-05-01 NOTE — PROGRESS NOTE ADULT - SUBJECTIVE AND OBJECTIVE BOX
Subjective: Patient seen and examined. No new events except as noted.     SUBJECTIVE/ROS:        MEDICATIONS:  MEDICATIONS  (STANDING):  ALBUTerol/ipratropium for Nebulization 3 milliLiter(s) Nebulizer every 6 hours  chlorhexidine 0.12% Liquid 15 milliLiter(s) Oral Mucosa two times a day  chlorhexidine 4% Liquid 1 Application(s) Topical <User Schedule>  diltiazem    Tablet 60 milliGRAM(s) Oral every 6 hours  doxazosin 8 milliGRAM(s) Oral at bedtime  enalapril 5 milliGRAM(s) Oral two times a day  enoxaparin Injectable 40 milliGRAM(s) SubCutaneous <User Schedule>  hydrALAZINE 100 milliGRAM(s) Oral every 8 hours  labetalol 400 milliGRAM(s) Oral every 8 hours  lactobacillus acidophilus 1 Tablet(s) Oral two times a day  meropenem  IVPB 1000 milliGRAM(s) IV Intermittent every 8 hours  pantoprazole   Suspension 40 milliGRAM(s) Oral daily      PHYSICAL EXAM:  T(C): 36.9 (05-01-19 @ 09:38), Max: 37.9 (04-30-19 @ 16:35)  HR: 73 (05-01-19 @ 12:28) (58 - 89)  BP: 128/82 (05-01-19 @ 12:28) (116/65 - 142/77)  RR: 20 (05-01-19 @ 11:34) (18 - 20)  SpO2: 98% (05-01-19 @ 11:35) (93% - 99%)  Wt(kg): --  I&O's Summary    30 Apr 2019 07:01  -  01 May 2019 07:00  --------------------------------------------------------  IN: 2075 mL / OUT: 1025 mL / NET: 1050 mL            JVP: Normal  Neck: supple  Lung: clear   CV: S1 S2 , Murmur:  Abd: soft  Ext: No edema  neuro: Awake   Psych: flat affect  Skin: normal``    LABS/DATA:    CARDIAC MARKERS:                                11.7   9.8   )-----------( 459      ( 01 May 2019 07:08 )             34.7     05-01    142  |  102  |  27<H>  ----------------------------<  151<H>  3.7   |  26  |  0.54    Ca    9.3      01 May 2019 07:08  Phos  3.1     05-01  Mg     2.2     05-01      proBNP:   Lipid Profile:   HgA1c:   TSH:     TELE:  EKG:

## 2019-05-01 NOTE — PROGRESS NOTE ADULT - SUBJECTIVE AND OBJECTIVE BOX
CC: F/U for Burkholderia    Saw/spoke to patient. No fevers, no chills. No new complaints. Unchanged. Mental status improved today.    Allergies  Allergy Status Unknown    ANTIMICROBIALS:  meropenem  IVPB 1000 every 8 hours    PE:    Vital Signs Last 24 Hrs  T(C): 36.9 (01 May 2019 09:38), Max: 37.9 (30 Apr 2019 16:35)  T(F): 98.4 (01 May 2019 09:38), Max: 100.3 (30 Apr 2019 16:35)  HR: 73 (01 May 2019 12:28) (58 - 89)  BP: 128/82 (01 May 2019 12:28) (116/65 - 142/77)  RR: 20 (01 May 2019 11:34) (18 - 20)  SpO2: 98% (01 May 2019 11:35) (93% - 99%)    Gen: AOx0-1, minimally responsive  CV: S1+S2 normal, nontachycardic  Resp: Clear bilat, no resp distress, no crackles/wheezes, trach  Abd: Soft, nontender, +BS, PEG  Ext: No LE edema, no wounds    LABS:                        11.7   9.8   )-----------( 459      ( 01 May 2019 07:08 )             34.7     05-01    142  |  102  |  27<H>  ----------------------------<  151<H>  3.7   |  26  |  0.54    Ca    9.3      01 May 2019 07:08  Phos  3.1     05-01  Mg     2.2     05-01    MICROBIOLOGY:    .Sputum trap  04-28-19   Normal Respiratory Katia present    .Sputum trap received  04-23-19   Numerous Enterobacter cloacae  Numerous Burkholderia cepacia  Normal Respiratory Katia present  --  Enterobacter cloacae  Burkholderia cepacia    (otherwise reviewed)    RADIOLOGY:    4/24 AXR:    FINDINGS:  Gastrostomy tube partially visualized.  Multiple air-filled loops of bowel. Nonobstructive bowel gas pattern.  No evidence of intraperitoneal free air.  No acute osseous abnormality.    IMPRESSION: Nonobstructive bowel gas pattern.

## 2019-05-01 NOTE — PROGRESS NOTE ADULT - PROBLEM SELECTOR PLAN 5
Patient remains with elevated BP this morning -  SBP improved 150's   SBP Goal 100- 160 Per Neuro Sx  Labetalol increased to 400 mg q 8 hrs    Continue Hydralazine 100 mg q 8 hrs   4/29 Enalapril 5 mg BID started Patient remains with elevated BP this morning -  SBP improved 120 - 130's  SBP Goal 100- 160 Per Neuro Sx  Labetalol increased to 400 mg q 8 hrs    Continue Hydralazine 100 mg q 8 hrs   4/29 Enalapril 5 mg BID started

## 2019-05-01 NOTE — PROGRESS NOTE ADULT - ASSESSMENT
63 Year old female w/ PMHx HTN, HLD, Ischemic stroke (2004), who was transferred from OSH, Patient initially presented to the OSH from home with AMS as per EMS, pt was talking to a family member on the phone when she suddenly stopped talking. 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 Patient transferred to [facility] for Neuro Surgical assessment. Upon admission NIHSS = ; MRS = 2; ICH =3. Patient was given DDAVP and PLTs. During admission patient was found to have newly diagnosed A.fib and was initiated on Labetalol. Patient had Serial head CTs performed which remained unchanged, no neuro surgical intervention was performed. Patient was seen by Palliative Care family decided to proceed with Trach and PEG. Patient S/p Tracheostomy on 4/16 ( # 8 Cuffed Shiley) and PEG Placement on 4/18. During hospitalization patient found to have UTI ( 100 K E.coli ) for which she was treated with a course of Keflex ( Completed 4/21). On 4/23 patient febrile again, Patient found to have : Enterobacter / Burkholderia  growing in sputum     4/26: Patient remains afebrile. Sputum cx 4/23 growing Enterobacter/ Burkholderia, Will continue Meropenem. Patient tolerating TC during the day, will attempt TC ATC and obtain AM ABG. Patient remains with elevated BP will increase Labetalol 400 mg q 8 hrs.  4/27: Tolerated TC x24hrs. ABG reviewed and adequate to continue TC. Appears comfortable of TC. BP remains labile with elevations into 170s. Will add Enalapril. Spoke to son at bedside. Will finish ABx on 4/30.  4/29 Stable on TC ATC. Fevers, UA negative, on meropenem until 4/30 63 Year old female w/ PMHx HTN, HLD, Ischemic stroke (2004), who was transferred from OSH, Patient initially presented to the OSH from home with AMS as per EMS, pt was talking to a family member on the phone when she suddenly stopped talking. 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 Patient transferred to [facility] for Neuro Surgical assessment. Upon admission NIHSS = ; MRS = 2; ICH =3. Patient was given DDAVP and PLTs. During admission patient was found to have newly diagnosed A.fib and was initiated on Labetalol. Patient had Serial head CTs performed which remained unchanged, no neuro surgical intervention was performed. Patient was seen by Palliative Care family decided to proceed with Trach and PEG. Patient S/p Tracheostomy on 4/16 ( # 8 Cuffed Shiley) and PEG Placement on 4/18. During hospitalization patient found to have UTI ( 100 K E.coli ) for which she was treated with a course of Keflex ( Completed 4/21). On 4/23 patient febrile again, Patient found to have : Enterobacter / Burkholderia  growing in sputum     4/26: Patient remains afebrile. Sputum cx 4/23 growing Enterobacter/ Burkholderia, Will continue Meropenem. Patient tolerating TC during the day, will attempt TC ATC and obtain AM ABG. Patient remains with elevated BP will increase Labetalol 400 mg q 8 hrs.  4/27: Tolerated TC x24hrs. ABG reviewed and adequate to continue TC. Appears comfortable of TC. BP remains labile with elevations into 170s. Will add Enalapril. Spoke to son at bedside. Will finish ABx on 4/30.  4/29 Stable on TC ATC. Fevers, UA negative, on meropenem until 4/30 5/1 Stable, tolerating TC ATC. Following commands, continue on abx until 5/3 per ID.

## 2019-05-02 LAB
ANION GAP SERPL CALC-SCNC: 12 MMOL/L — SIGNIFICANT CHANGE UP (ref 5–17)
BUN SERPL-MCNC: 27 MG/DL — HIGH (ref 7–23)
CALCIUM SERPL-MCNC: 9.3 MG/DL — SIGNIFICANT CHANGE UP (ref 8.4–10.5)
CHLORIDE SERPL-SCNC: 103 MMOL/L — SIGNIFICANT CHANGE UP (ref 96–108)
CO2 SERPL-SCNC: 27 MMOL/L — SIGNIFICANT CHANGE UP (ref 22–31)
CREAT SERPL-MCNC: 0.51 MG/DL — SIGNIFICANT CHANGE UP (ref 0.5–1.3)
GLUCOSE SERPL-MCNC: 130 MG/DL — HIGH (ref 70–99)
HCT VFR BLD CALC: 33.8 % — LOW (ref 34.5–45)
HGB BLD-MCNC: 11.3 G/DL — LOW (ref 11.5–15.5)
MAGNESIUM SERPL-MCNC: 2.2 MG/DL — SIGNIFICANT CHANGE UP (ref 1.6–2.6)
MCHC RBC-ENTMCNC: 33.5 GM/DL — SIGNIFICANT CHANGE UP (ref 32–36)
MCHC RBC-ENTMCNC: 33.5 PG — SIGNIFICANT CHANGE UP (ref 27–34)
MCV RBC AUTO: 100 FL — SIGNIFICANT CHANGE UP (ref 80–100)
PHOSPHATE SERPL-MCNC: 3.3 MG/DL — SIGNIFICANT CHANGE UP (ref 2.5–4.5)
PLATELET # BLD AUTO: 429 K/UL — HIGH (ref 150–400)
POTASSIUM SERPL-MCNC: 3.7 MMOL/L — SIGNIFICANT CHANGE UP (ref 3.5–5.3)
POTASSIUM SERPL-SCNC: 3.7 MMOL/L — SIGNIFICANT CHANGE UP (ref 3.5–5.3)
RBC # BLD: 3.38 M/UL — LOW (ref 3.8–5.2)
RBC # FLD: 13 % — SIGNIFICANT CHANGE UP (ref 10.3–14.5)
SODIUM SERPL-SCNC: 142 MMOL/L — SIGNIFICANT CHANGE UP (ref 135–145)
WBC # BLD: 9.6 K/UL — SIGNIFICANT CHANGE UP (ref 3.8–10.5)
WBC # FLD AUTO: 9.6 K/UL — SIGNIFICANT CHANGE UP (ref 3.8–10.5)

## 2019-05-02 PROCEDURE — 99232 SBSQ HOSP IP/OBS MODERATE 35: CPT

## 2019-05-02 PROCEDURE — 99233 SBSQ HOSP IP/OBS HIGH 50: CPT | Mod: GC

## 2019-05-02 RX ADMIN — Medication 1 TABLET(S): at 17:27

## 2019-05-02 RX ADMIN — Medication 3 MILLILITER(S): at 11:06

## 2019-05-02 RX ADMIN — Medication 3 MILLILITER(S): at 05:14

## 2019-05-02 RX ADMIN — CHLORHEXIDINE GLUCONATE 1 APPLICATION(S): 213 SOLUTION TOPICAL at 21:36

## 2019-05-02 RX ADMIN — CHLORHEXIDINE GLUCONATE 15 MILLILITER(S): 213 SOLUTION TOPICAL at 06:03

## 2019-05-02 RX ADMIN — Medication 8 MILLIGRAM(S): at 21:36

## 2019-05-02 RX ADMIN — Medication 60 MILLIGRAM(S): at 17:26

## 2019-05-02 RX ADMIN — Medication 100 MILLIGRAM(S): at 21:36

## 2019-05-02 RX ADMIN — Medication 1 TABLET(S): at 06:03

## 2019-05-02 RX ADMIN — CHLORHEXIDINE GLUCONATE 15 MILLILITER(S): 213 SOLUTION TOPICAL at 17:26

## 2019-05-02 RX ADMIN — Medication 60 MILLIGRAM(S): at 00:53

## 2019-05-02 RX ADMIN — Medication 100 MILLIGRAM(S): at 13:31

## 2019-05-02 RX ADMIN — ENOXAPARIN SODIUM 40 MILLIGRAM(S): 100 INJECTION SUBCUTANEOUS at 17:26

## 2019-05-02 RX ADMIN — Medication 60 MILLIGRAM(S): at 11:53

## 2019-05-02 RX ADMIN — PANTOPRAZOLE SODIUM 40 MILLIGRAM(S): 20 TABLET, DELAYED RELEASE ORAL at 11:54

## 2019-05-02 RX ADMIN — Medication 5 MILLIGRAM(S): at 06:03

## 2019-05-02 RX ADMIN — MEROPENEM 100 MILLIGRAM(S): 1 INJECTION INTRAVENOUS at 15:16

## 2019-05-02 RX ADMIN — Medication 400 MILLIGRAM(S): at 06:03

## 2019-05-02 RX ADMIN — Medication 100 MILLIGRAM(S): at 06:03

## 2019-05-02 RX ADMIN — MEROPENEM 100 MILLIGRAM(S): 1 INJECTION INTRAVENOUS at 06:03

## 2019-05-02 RX ADMIN — Medication 5 MILLIGRAM(S): at 17:26

## 2019-05-02 RX ADMIN — Medication 3 MILLILITER(S): at 17:32

## 2019-05-02 RX ADMIN — Medication 400 MILLIGRAM(S): at 21:36

## 2019-05-02 RX ADMIN — Medication 60 MILLIGRAM(S): at 06:03

## 2019-05-02 RX ADMIN — Medication 400 MILLIGRAM(S): at 13:31

## 2019-05-02 RX ADMIN — MEROPENEM 100 MILLIGRAM(S): 1 INJECTION INTRAVENOUS at 22:39

## 2019-05-02 NOTE — PROGRESS NOTE ADULT - SUBJECTIVE AND OBJECTIVE BOX
Patient is a 63y old  Female who presents with a chief complaint of ICH (01 May 2019 13:58)      Interval Events:    REVIEW OF SYSTEMS:  [ ] Positive  [ ] All other systems negative  [ ] Unable to assess ROS because ________    Vital Signs Last 24 Hrs  T(C): 36.7 (05-02-19 @ 04:20), Max: 37.2 (05-01-19 @ 21:02)  T(F): 98.1 (05-02-19 @ 04:20), Max: 99 (05-01-19 @ 21:02)  HR: 66 (05-02-19 @ 05:16) (57 - 73)  BP: 146/76 (05-02-19 @ 04:20) (113/67 - 146/76)  RR: 18 (05-02-19 @ 04:26) (18 - 20)  SpO2: 100% (05-02-19 @ 05:16) (92% - 100%)    PHYSICAL EXAM:  HEENT:   [ ]Tracheostomy:  [ ]Pupils equal  [ ]No oral lesions  [ ]Abnormal    SKIN  [ ]No Rash  [ ] Abnormal  [ ] pressure    CARDIAC  [ ]Regular  [ ]Abnormal    PULMONARY  [ ]Bilateral Clear Breath Sounds  [ ]Normal Excursion  [ ]Abnormal    GI  [ ]PEG      [ ] +BS		              [ ]Soft, nondistended, nontender	  [ ]Abnormal    MUSCULOSKELETAL                                   [ ]Bedbound                 [ ]Abnormal    [ ]Ambulatory/OOB to chair                           EXTREMITIES                                         [ ]Normal  [ ]Edema                           NEUROLOGIC  [ ] Normal, non focal  [ ] Focal findings:    PSYCHIATRIC  [ ]Alert and appropriate  [ ] Sedated	 [ ]Agitated    :  Bedoya: [ ] Yes, if yes: Date of Placement:                   [  ] No    LINES: Central Lines [ ] Yes, if yes: Date of Placement                                     [  ] No    HOSPITAL MEDICATIONS:  MEDICATIONS  (STANDING):  ALBUTerol/ipratropium for Nebulization 3 milliLiter(s) Nebulizer every 6 hours  chlorhexidine 0.12% Liquid 15 milliLiter(s) Oral Mucosa two times a day  chlorhexidine 4% Liquid 1 Application(s) Topical <User Schedule>  diltiazem    Tablet 60 milliGRAM(s) Oral every 6 hours  doxazosin 8 milliGRAM(s) Oral at bedtime  enalapril 5 milliGRAM(s) Oral two times a day  enoxaparin Injectable 40 milliGRAM(s) SubCutaneous <User Schedule>  hydrALAZINE 100 milliGRAM(s) Oral every 8 hours  labetalol 400 milliGRAM(s) Oral every 8 hours  lactobacillus acidophilus 1 Tablet(s) Oral two times a day  meropenem  IVPB 1000 milliGRAM(s) IV Intermittent every 8 hours  pantoprazole   Suspension 40 milliGRAM(s) Oral daily    MEDICATIONS  (PRN):  acetaminophen   Tablet .. 650 milliGRAM(s) Oral every 6 hours PRN Temp greater or equal to 38C (100.4F), Mild Pain (1 - 3)  ondansetron Injectable 4 milliGRAM(s) IV Push every 6 hours PRN Nausea and/or Vomiting      LABS:                        11.3   9.6   )-----------( 429      ( 02 May 2019 06:54 )             33.8     05-02    142  |  103  |  27<H>  ----------------------------<  130<H>  3.7   |  27  |  0.51    Ca    9.3      02 May 2019 06:54  Phos  3.3     05-02  Mg     2.2     05-02              CAPILLARY BLOOD GLUCOSE    MICROBIOLOGY:     RADIOLOGY:  [ ] Reviewed and interpreted by me Patient is a 63y old  Female who presents with a chief complaint of ICH...........f/u respiratory failure      Interval Events: no events reported over night    REVIEW OF SYSTEMS:  [ ] Positive  [ ] All other systems negative  [X] Unable to assess ROS because ___non-verbal/communicative    Vital Signs Last 24 Hrs  T(C): 36.7 (05-02-19 @ 04:20), Max: 37.2 (05-01-19 @ 21:02)  T(F): 98.1 (05-02-19 @ 04:20), Max: 99 (05-01-19 @ 21:02)  HR: 66 (05-02-19 @ 05:16) (57 - 73)  BP: 146/76 (05-02-19 @ 04:20) (113/67 - 146/76)  RR: 18 (05-02-19 @ 04:26) (18 - 20)  SpO2: 100% (05-02-19 @ 05:16) (92% - 100%)      PHYSICAL EXAM:  HEENT:   [x]Tracheostomy: #7 Cuffless Portex  [x]Pupils equal  [ ]No oral lesions  [ ]Abnormal    SKIN  [x]No Rash  [ ] Abnormal  [ ] pressure    CARDIAC  [x]Regular  [ ]Abnormal    PULMONARY  [x]Bilateral Clear Breath Sounds  [ ]Normal Excursion  [ ]Abnormal    GI  [x]PEG      [x] +BS		              [x]Soft, nondistended, nontender	  [ ]Abnormal    MUSCULOSKELETAL                                   [ ]Bedbound                 [ ]Abnormal    [x]OOB to chair                           EXTREMITIES                                         [x]Normal  [ ]Edema                           NEUROLOGIC  [ ] Normal, non focal  [x] Focal findings: arousable with persistence; appears to respond more when  speaks to her in original language but unable to fully follow commands.    PSYCHIATRIC  [ ]Alert and appropriate  [x] Sedated	 [ ]Agitated    :  Bedoya: [ ] Yes, if yes: Date of Placement:                   [x] No Straight Cath ATC     LINES: Central Lines [ ] Yes, if yes: Date of Placement                                     [x] No      HOSPITAL MEDICATIONS:  MEDICATIONS  (STANDING):  ALBUTerol/ipratropium for Nebulization 3 milliLiter(s) Nebulizer every 6 hours  chlorhexidine 0.12% Liquid 15 milliLiter(s) Oral Mucosa two times a day  chlorhexidine 4% Liquid 1 Application(s) Topical <User Schedule>  diltiazem    Tablet 60 milliGRAM(s) Oral every 6 hours  doxazosin 8 milliGRAM(s) Oral at bedtime  enalapril 5 milliGRAM(s) Oral two times a day  enoxaparin Injectable 40 milliGRAM(s) SubCutaneous <User Schedule>  hydrALAZINE 100 milliGRAM(s) Oral every 8 hours  labetalol 400 milliGRAM(s) Oral every 8 hours  lactobacillus acidophilus 1 Tablet(s) Oral two times a day  meropenem  IVPB 1000 milliGRAM(s) IV Intermittent every 8 hours  pantoprazole   Suspension 40 milliGRAM(s) Oral daily    MEDICATIONS  (PRN):  acetaminophen   Tablet .. 650 milliGRAM(s) Oral every 6 hours PRN Temp greater or equal to 38C (100.4F), Mild Pain (1 - 3)  ondansetron Injectable 4 milliGRAM(s) IV Push every 6 hours PRN Nausea and/or Vomiting      LABS:                        11.3   9.6   )-----------( 429      ( 02 May 2019 06:54 )             33.8     05-02    142  |  103  |  27<H>  ----------------------------<  130<H>  3.7   |  27  |  0.51    Ca    9.3      02 May 2019 06:54  Phos  3.3     05-02  Mg     2.2     05-02              CAPILLARY BLOOD GLUCOSE    MICROBIOLOGY:     RADIOLOGY:  [ ] Reviewed and interpreted by me

## 2019-05-02 NOTE — PROGRESS NOTE ADULT - ASSESSMENT
63 Year old female w/ PMHx HTN, HLD, Ischemic stroke (2004), who was transferred from OSH, Patient initially presented to the OSH from home with AMS as per EMS, pt was talking to a family member on the phone when she suddenly stopped talking. 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 Patient transferred to [facility] for Neuro Surgical assessment. Upon admission NIHSS = ; MRS = 2; ICH =3. Patient was given DDAVP and PLTs. During admission patient was found to have newly diagnosed A.fib and was initiated on Labetalol. Patient had Serial head CTs performed which remained unchanged, no neuro surgical intervention was performed. Patient was seen by Palliative Care family decided to proceed with Trach and PEG. Patient S/p Tracheostomy on 4/16 ( # 8 Cuffed Shiley) and PEG Placement on 4/18. During hospitalization patient found to have UTI ( 100 K E.coli ) for which she was treated with a course of Keflex ( Completed 4/21). On 4/23 patient febrile again, Patient found to have : Enterobacter / Burkholderia  growing in sputum     4/26: Patient remains afebrile. Sputum cx 4/23 growing Enterobacter/ Burkholderia, Will continue Meropenem. Patient tolerating TC during the day, will attempt TC ATC and obtain AM ABG. Patient remains with elevated BP will increase Labetalol 400 mg q 8 hrs.  4/27: Tolerated TC x24hrs. ABG reviewed and adequate to continue TC. Appears comfortable of TC. BP remains labile with elevations into 170s. Will add Enalapril. Spoke to son at bedside. Will finish ABx on 4/30.  4/29 Stable on TC ATC. Fevers, UA negative, on meropenem until 4/30 5/1 Stable, tolerating TC ATC. Following commands, continue on abx until 5/3 per ID.   5/2: DC planning in progress, likely to occur post antibiotics. Otherwise no new events. Patient doing well and medically stable.

## 2019-05-02 NOTE — PROGRESS NOTE ADULT - ASSESSMENT
63 Year old female w/ PMHx HTN, HLD, Ischemic stroke (2004), who was transferred from OSH, Patient initially presented to the OSH from home with AMS.  Found to have ICH/stroke  Required Trach, PEG  Treated for suspected E coli UTI  4/23 found to have Burkholderia and Enterobacter in sputum culture (? 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

## 2019-05-02 NOTE — PROGRESS NOTE ADULT - SUBJECTIVE AND OBJECTIVE BOX
Subjective: Patient seen and examined. No new events except as noted.     SUBJECTIVE/ROS:        MEDICATIONS:  MEDICATIONS  (STANDING):  ALBUTerol/ipratropium for Nebulization 3 milliLiter(s) Nebulizer every 6 hours  chlorhexidine 0.12% Liquid 15 milliLiter(s) Oral Mucosa two times a day  chlorhexidine 4% Liquid 1 Application(s) Topical <User Schedule>  diltiazem    Tablet 60 milliGRAM(s) Oral every 6 hours  doxazosin 8 milliGRAM(s) Oral at bedtime  enalapril 5 milliGRAM(s) Oral two times a day  enoxaparin Injectable 40 milliGRAM(s) SubCutaneous <User Schedule>  hydrALAZINE 100 milliGRAM(s) Oral every 8 hours  labetalol 400 milliGRAM(s) Oral every 8 hours  lactobacillus acidophilus 1 Tablet(s) Oral two times a day  meropenem  IVPB 1000 milliGRAM(s) IV Intermittent every 8 hours  pantoprazole   Suspension 40 milliGRAM(s) Oral daily      PHYSICAL EXAM:  T(C): 36.3 (05-02-19 @ 13:26), Max: 37.2 (05-01-19 @ 21:02)  HR: 56 (05-02-19 @ 13:26) (55 - 68)  BP: 120/75 (05-02-19 @ 13:26) (113/67 - 146/76)  RR: 18 (05-02-19 @ 13:26) (18 - 20)  SpO2: 97% (05-02-19 @ 13:26) (95% - 100%)  Wt(kg): --  I&O's Summary    01 May 2019 07:01  -  02 May 2019 07:00  --------------------------------------------------------  IN: 2195 mL / OUT: 1475 mL / NET: 720 mL            JVP: Normal  Neck: supple  Lung: clear   CV: S1 S2 , Murmur:  Abd: soft  Ext: No edema  neuro: somnolent   Psych: flat affect  Skin: normal``    LABS/DATA:    CARDIAC MARKERS:                                11.3   9.6   )-----------( 429      ( 02 May 2019 06:54 )             33.8     05-02    142  |  103  |  27<H>  ----------------------------<  130<H>  3.7   |  27  |  0.51    Ca    9.3      02 May 2019 06:54  Phos  3.3     05-02  Mg     2.2     05-02      proBNP:   Lipid Profile:   HgA1c:   TSH:     TELE:  EKG:

## 2019-05-02 NOTE — PROGRESS NOTE ADULT - PROBLEM SELECTOR PLAN 3
Patient Afebrile since initiation of ABX   CXR 4/23: Clear Lungs   Sputum cx 4/23: Enterobacter Colace / Burkholderia Cepacia, on contact isolation  Both organisms Sensitive to Meropenem   Bld Cx 4/23: No growth, UA 4/23: Negative  Continue Meropenem will treat 10 Day Course  ( Ends 5/3)

## 2019-05-02 NOTE — PROGRESS NOTE ADULT - PROBLEM SELECTOR PLAN 2
Patient S/p Tracheostomy 4/16  ( # 8 Cuffed Shiley )   TC ATC since 4/28, trahc down-sized to #7 Cuffless Portex on 5/1  Continue Nebs prn / Suctioning PRN / Chest PT

## 2019-05-02 NOTE — PROGRESS NOTE ADULT - SUBJECTIVE AND OBJECTIVE BOX
CC: F/U for Tracheitis    Saw/spoke to patient. No fevers, no chills. No new complaints. Unchanged. Poorly arousable today.    Allergies  Allergy Status Unknown    ANTIMICROBIALS:  meropenem  IVPB 1000 every 8 hours    PE:    Vital Signs Last 24 Hrs  T(C): 36.7 (02 May 2019 04:20), Max: 37.2 (01 May 2019 21:02)  T(F): 98.1 (02 May 2019 04:20), Max: 99 (01 May 2019 21:02)  HR: 55 (02 May 2019 08:58) (55 - 73)  BP: 146/76 (02 May 2019 04:20) (113/67 - 146/76)  RR: 18 (02 May 2019 08:58) (18 - 20)  SpO2: 98% (02 May 2019 08:58) (92% - 100%)    Gen: AOx3, NAD, non-toxic  CV: S1+S2 normal, nontachycardic  Resp: Clear bilat, no resp distress, no crackles/wheezes  Abd: Soft, nontender, +BS  Ext: No LE edema, no wounds    LABS:                        11.3   9.6   )-----------( 429      ( 02 May 2019 06:54 )             33.8     05-02    142  |  103  |  27<H>  ----------------------------<  130<H>  3.7   |  27  |  0.51    Ca    9.3      02 May 2019 06:54  Phos  3.3     05-02  Mg     2.2     05-02    MICROBIOLOGY:    .Sputum trap  04-28-19   Normal Respiratory Katia present  --    Moderate polymorphonuclear leukocytes per low power field  Few Squamous epithelial cells per low power field  Rare Gram Negative Rods per oil power field    .Sputum trap received  04-23-19   Numerous Enterobacter cloacae  Numerous Burkholderia cepacia  Normal Respiratory Katia present  --  Enterobacter cloacae  Burkholderia cepacia    .Urine  04-16-19   >100,000 CFU/ml Escherichia coli  --  Escherichia coli    Bronch Wash trap received  04-15-19   Normal Respiratory Katia present  Numerous Streptococcus agalactiae (Group B)    .Blood  04-15-19   No growth at 5 days.     (otherwise reviewed)    RADIOLOGY:    4/24 AXR:    FINDINGS:  Gastrostomy tube partially visualized.  Multiple air-filled loops of bowel. Nonobstructive bowel gas pattern.  No evidence of intraperitoneal free air.  No acute osseous abnormality.    IMPRESSION: Nonobstructive bowel gas pattern.

## 2019-05-02 NOTE — PROGRESS NOTE ADULT - PROBLEM SELECTOR PLAN 5
Patient remains with elevated BP this morning -  SBP improved 120 - 130's  SBP Goal 100- 160 Per Neuro Sx  Labetalol increased to 400 mg q 8 hrs    Continue Hydralazine 100 mg q 8 hrs   4/29 Enalapril 5 mg BID started

## 2019-05-03 LAB
ANION GAP SERPL CALC-SCNC: 12 MMOL/L — SIGNIFICANT CHANGE UP (ref 5–17)
BUN SERPL-MCNC: 26 MG/DL — HIGH (ref 7–23)
CALCIUM SERPL-MCNC: 9 MG/DL — SIGNIFICANT CHANGE UP (ref 8.4–10.5)
CHLORIDE SERPL-SCNC: 101 MMOL/L — SIGNIFICANT CHANGE UP (ref 96–108)
CO2 SERPL-SCNC: 27 MMOL/L — SIGNIFICANT CHANGE UP (ref 22–31)
CREAT SERPL-MCNC: 0.49 MG/DL — LOW (ref 0.5–1.3)
CULTURE RESULTS: SIGNIFICANT CHANGE UP
CULTURE RESULTS: SIGNIFICANT CHANGE UP
GLUCOSE SERPL-MCNC: 117 MG/DL — HIGH (ref 70–99)
HCT VFR BLD CALC: 34.9 % — SIGNIFICANT CHANGE UP (ref 34.5–45)
HGB BLD-MCNC: 10.8 G/DL — LOW (ref 11.5–15.5)
MAGNESIUM SERPL-MCNC: 2.1 MG/DL — SIGNIFICANT CHANGE UP (ref 1.6–2.6)
MCHC RBC-ENTMCNC: 30.7 PG — SIGNIFICANT CHANGE UP (ref 27–34)
MCHC RBC-ENTMCNC: 30.9 GM/DL — LOW (ref 32–36)
MCV RBC AUTO: 99.5 FL — SIGNIFICANT CHANGE UP (ref 80–100)
PHOSPHATE SERPL-MCNC: 3.2 MG/DL — SIGNIFICANT CHANGE UP (ref 2.5–4.5)
PLATELET # BLD AUTO: 432 K/UL — HIGH (ref 150–400)
POTASSIUM SERPL-MCNC: 3.8 MMOL/L — SIGNIFICANT CHANGE UP (ref 3.5–5.3)
POTASSIUM SERPL-SCNC: 3.8 MMOL/L — SIGNIFICANT CHANGE UP (ref 3.5–5.3)
RBC # BLD: 3.5 M/UL — LOW (ref 3.8–5.2)
RBC # FLD: 12.9 % — SIGNIFICANT CHANGE UP (ref 10.3–14.5)
SODIUM SERPL-SCNC: 140 MMOL/L — SIGNIFICANT CHANGE UP (ref 135–145)
SPECIMEN SOURCE: SIGNIFICANT CHANGE UP
SPECIMEN SOURCE: SIGNIFICANT CHANGE UP
WBC # BLD: 11 K/UL — HIGH (ref 3.8–10.5)
WBC # FLD AUTO: 11 K/UL — HIGH (ref 3.8–10.5)

## 2019-05-03 PROCEDURE — 99232 SBSQ HOSP IP/OBS MODERATE 35: CPT

## 2019-05-03 PROCEDURE — 99233 SBSQ HOSP IP/OBS HIGH 50: CPT | Mod: GC

## 2019-05-03 RX ADMIN — Medication 100 MILLIGRAM(S): at 22:29

## 2019-05-03 RX ADMIN — Medication 5 MILLIGRAM(S): at 05:07

## 2019-05-03 RX ADMIN — Medication 100 MILLIGRAM(S): at 05:07

## 2019-05-03 RX ADMIN — Medication 60 MILLIGRAM(S): at 05:07

## 2019-05-03 RX ADMIN — Medication 3 MILLILITER(S): at 18:02

## 2019-05-03 RX ADMIN — Medication 400 MILLIGRAM(S): at 05:07

## 2019-05-03 RX ADMIN — Medication 400 MILLIGRAM(S): at 14:12

## 2019-05-03 RX ADMIN — ENOXAPARIN SODIUM 40 MILLIGRAM(S): 100 INJECTION SUBCUTANEOUS at 18:04

## 2019-05-03 RX ADMIN — Medication 400 MILLIGRAM(S): at 22:29

## 2019-05-03 RX ADMIN — Medication 100 MILLIGRAM(S): at 14:13

## 2019-05-03 RX ADMIN — Medication 3 MILLILITER(S): at 00:16

## 2019-05-03 RX ADMIN — MEROPENEM 100 MILLIGRAM(S): 1 INJECTION INTRAVENOUS at 14:20

## 2019-05-03 RX ADMIN — Medication 3 MILLILITER(S): at 12:30

## 2019-05-03 RX ADMIN — Medication 60 MILLIGRAM(S): at 18:04

## 2019-05-03 RX ADMIN — Medication 3 MILLILITER(S): at 23:16

## 2019-05-03 RX ADMIN — MEROPENEM 100 MILLIGRAM(S): 1 INJECTION INTRAVENOUS at 05:06

## 2019-05-03 RX ADMIN — Medication 60 MILLIGRAM(S): at 11:32

## 2019-05-03 RX ADMIN — Medication 1 TABLET(S): at 05:07

## 2019-05-03 RX ADMIN — PANTOPRAZOLE SODIUM 40 MILLIGRAM(S): 20 TABLET, DELAYED RELEASE ORAL at 14:13

## 2019-05-03 RX ADMIN — CHLORHEXIDINE GLUCONATE 1 APPLICATION(S): 213 SOLUTION TOPICAL at 22:28

## 2019-05-03 RX ADMIN — Medication 1 TABLET(S): at 18:04

## 2019-05-03 RX ADMIN — Medication 8 MILLIGRAM(S): at 22:30

## 2019-05-03 RX ADMIN — MEROPENEM 100 MILLIGRAM(S): 1 INJECTION INTRAVENOUS at 22:30

## 2019-05-03 RX ADMIN — CHLORHEXIDINE GLUCONATE 15 MILLILITER(S): 213 SOLUTION TOPICAL at 05:06

## 2019-05-03 RX ADMIN — Medication 3 MILLILITER(S): at 05:04

## 2019-05-03 RX ADMIN — Medication 5 MILLIGRAM(S): at 18:04

## 2019-05-03 RX ADMIN — CHLORHEXIDINE GLUCONATE 15 MILLILITER(S): 213 SOLUTION TOPICAL at 18:04

## 2019-05-03 NOTE — PROGRESS NOTE ADULT - SUBJECTIVE AND OBJECTIVE BOX
Subjective: Patient seen and examined. No new events except as noted.     SUBJECTIVE/ROS:        MEDICATIONS:  MEDICATIONS  (STANDING):  ALBUTerol/ipratropium for Nebulization 3 milliLiter(s) Nebulizer every 6 hours  chlorhexidine 0.12% Liquid 15 milliLiter(s) Oral Mucosa two times a day  chlorhexidine 4% Liquid 1 Application(s) Topical <User Schedule>  diltiazem    Tablet 60 milliGRAM(s) Oral every 6 hours  doxazosin 8 milliGRAM(s) Oral at bedtime  enalapril 5 milliGRAM(s) Oral two times a day  enoxaparin Injectable 40 milliGRAM(s) SubCutaneous <User Schedule>  hydrALAZINE 100 milliGRAM(s) Oral every 8 hours  labetalol 400 milliGRAM(s) Oral every 8 hours  lactobacillus acidophilus 1 Tablet(s) Oral two times a day  meropenem  IVPB 1000 milliGRAM(s) IV Intermittent every 8 hours  pantoprazole   Suspension 40 milliGRAM(s) Oral daily      PHYSICAL EXAM:  T(C): 36.6 (05-03-19 @ 04:16), Max: 37.3 (05-02-19 @ 20:37)  HR: 62 (05-03-19 @ 14:11) (52 - 65)  BP: 129/75 (05-03-19 @ 14:11) (100/59 - 164/53)  RR: 19 (05-03-19 @ 14:11) (18 - 20)  SpO2: 100% (05-03-19 @ 14:11) (95% - 100%)  Wt(kg): --  I&O's Summary    02 May 2019 07:01  -  03 May 2019 07:00  --------------------------------------------------------  IN: 2255 mL / OUT: 600 mL / NET: 1655 mL            JVP: Normal  Neck: supple  Lung: clear   CV: S1 S2 , Murmur:  Abd: soft  Ext: No edema  neuro: Awake   Psych: flat affect  Skin: normal``    LABS/DATA:    CARDIAC MARKERS:                                10.8   11.0  )-----------( 432      ( 03 May 2019 06:50 )             34.9     05-03    140  |  101  |  26<H>  ----------------------------<  117<H>  3.8   |  27  |  0.49<L>    Ca    9.0      03 May 2019 06:50  Phos  3.2     05-03  Mg     2.1     05-03      proBNP:   Lipid Profile:   HgA1c:   TSH:     TELE:  EKG:

## 2019-05-03 NOTE — PROGRESS NOTE ADULT - ASSESSMENT
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

## 2019-05-03 NOTE — PROGRESS NOTE ADULT - PROBLEM SELECTOR PLAN 5
SBP Goal 100- 160 Per Neuro Sx  Labetalol increased to 400 mg q 8 hrs    Continue Hydralazine 100 mg q 8 hrs   Enalapril 5 mg BID

## 2019-05-03 NOTE — PROGRESS NOTE ADULT - SUBJECTIVE AND OBJECTIVE BOX
Patient is a 63y old  Female who presents with a chief complaint of ICH (02 May 2019 15:29)      Interval Events:    REVIEW OF SYSTEMS:  [ ] Positive  [ ] All other systems negative  [ ] Unable to assess ROS because ________    Vital Signs Last 24 Hrs  T(C): 36.6 (05-03-19 @ 04:16), Max: 37.3 (05-02-19 @ 20:37)  T(F): 97.8 (05-03-19 @ 04:16), Max: 99.1 (05-02-19 @ 20:37)  HR: 60 (05-03-19 @ 05:06) (55 - 65)  BP: 164/53 (05-03-19 @ 04:16) (100/59 - 164/53)  RR: 18 (05-03-19 @ 05:05) (18 - 18)  SpO2: 100% (05-03-19 @ 05:06) (96% - 100%)    PHYSICAL EXAM:  HEENT:   [ ]Tracheostomy:  [ ]Pupils equal  [ ]No oral lesions  [ ]Abnormal    SKIN  [ ]No Rash  [ ] Abnormal  [ ] pressure    CARDIAC  [ ]Regular  [ ]Abnormal    PULMONARY  [ ]Bilateral Clear Breath Sounds  [ ]Normal Excursion  [ ]Abnormal    GI  [ ]PEG      [ ] +BS		              [ ]Soft, nondistended, nontender	  [ ]Abnormal    MUSCULOSKELETAL                                   [ ]Bedbound                 [ ]Abnormal    [ ]Ambulatory/OOB to chair                           EXTREMITIES                                         [ ]Normal  [ ]Edema                           NEUROLOGIC  [ ] Normal, non focal  [ ] Focal findings:    PSYCHIATRIC  [ ]Alert and appropriate  [ ] Sedated	 [ ]Agitated    :  Bedoya: [ ] Yes, if yes: Date of Placement:                   [  ] No    LINES: Central Lines [ ] Yes, if yes: Date of Placement                                     [  ] No    HOSPITAL MEDICATIONS:  MEDICATIONS  (STANDING):  ALBUTerol/ipratropium for Nebulization 3 milliLiter(s) Nebulizer every 6 hours  chlorhexidine 0.12% Liquid 15 milliLiter(s) Oral Mucosa two times a day  chlorhexidine 4% Liquid 1 Application(s) Topical <User Schedule>  diltiazem    Tablet 60 milliGRAM(s) Oral every 6 hours  doxazosin 8 milliGRAM(s) Oral at bedtime  enalapril 5 milliGRAM(s) Oral two times a day  enoxaparin Injectable 40 milliGRAM(s) SubCutaneous <User Schedule>  hydrALAZINE 100 milliGRAM(s) Oral every 8 hours  labetalol 400 milliGRAM(s) Oral every 8 hours  lactobacillus acidophilus 1 Tablet(s) Oral two times a day  meropenem  IVPB 1000 milliGRAM(s) IV Intermittent every 8 hours  pantoprazole   Suspension 40 milliGRAM(s) Oral daily    MEDICATIONS  (PRN):  acetaminophen   Tablet .. 650 milliGRAM(s) Oral every 6 hours PRN Temp greater or equal to 38C (100.4F), Mild Pain (1 - 3)  ondansetron Injectable 4 milliGRAM(s) IV Push every 6 hours PRN Nausea and/or Vomiting      LABS:                        10.8   11.0  )-----------( 432      ( 03 May 2019 06:50 )             34.9     05-03    140  |  101  |  26<H>  ----------------------------<  117<H>  3.8   |  27  |  0.49<L>    Ca    9.0      03 May 2019 06:50  Phos  3.2     05-03  Mg     2.1     05-03              CAPILLARY BLOOD GLUCOSE    MICROBIOLOGY:     RADIOLOGY:  [ ] Reviewed and interpreted by me Patient is a 63y old  Female who presents with a chief complaint of ICH.......f/u respiratory failure      Interval Events: No events reported over night    REVIEW OF SYSTEMS:  [ ] Positive  [ ] All other systems negative  [ X] Unable to assess ROS because __non-verbal/communicative    Vital Signs Last 24 Hrs  T(C): 36.6 (05-03-19 @ 04:16), Max: 37.3 (05-02-19 @ 20:37)  T(F): 97.8 (05-03-19 @ 04:16), Max: 99.1 (05-02-19 @ 20:37)  HR: 60 (05-03-19 @ 05:06) (55 - 65)  BP: 164/53 (05-03-19 @ 04:16) (100/59 - 164/53)  RR: 18 (05-03-19 @ 05:05) (18 - 18)  SpO2: 100% (05-03-19 @ 05:06) (96% - 100%)        PHYSICAL EXAM:  HEENT:   [x]Tracheostomy: #7 Cuffless Portex  [x]Pupils equal  [ ]No oral lesions  [ ]Abnormal    SKIN  [x]No Rash  [ ] Abnormal  [ ] pressure    CARDIAC  [x]Regular  [ ]Abnormal    PULMONARY  [x]Bilateral Clear Breath Sounds  [ ]Normal Excursion  [ ]Abnormal    GI  [x]PEG      [x] +BS		              [x]Soft, nondistended, nontender	  [ ]Abnormal    MUSCULOSKELETAL                                   [ ]Bedbound                 [ ]Abnormal    [x]OOB to chair                           EXTREMITIES                                         [x]Normal  [ ]Edema                           NEUROLOGIC  [ ] Normal, non focal  [x] Focal findings: arousable with persistence; appears to respond more when  speaks to her in original language but unable to fully follow commands.    PSYCHIATRIC  [ ]Alert and appropriate  [x] Sedated	 [ ]Agitated    :  Bedoya: [ ] Yes, if yes: Date of Placement:                   [x] No Straight Cath ATC     LINES: Central Lines [ ] Yes, if yes: Date of Placement                                     [x] No          HOSPITAL MEDICATIONS:  MEDICATIONS  (STANDING):  ALBUTerol/ipratropium for Nebulization 3 milliLiter(s) Nebulizer every 6 hours  chlorhexidine 0.12% Liquid 15 milliLiter(s) Oral Mucosa two times a day  chlorhexidine 4% Liquid 1 Application(s) Topical <User Schedule>  diltiazem    Tablet 60 milliGRAM(s) Oral every 6 hours  doxazosin 8 milliGRAM(s) Oral at bedtime  enalapril 5 milliGRAM(s) Oral two times a day  enoxaparin Injectable 40 milliGRAM(s) SubCutaneous <User Schedule>  hydrALAZINE 100 milliGRAM(s) Oral every 8 hours  labetalol 400 milliGRAM(s) Oral every 8 hours  lactobacillus acidophilus 1 Tablet(s) Oral two times a day  meropenem  IVPB 1000 milliGRAM(s) IV Intermittent every 8 hours  pantoprazole   Suspension 40 milliGRAM(s) Oral daily    MEDICATIONS  (PRN):  acetaminophen   Tablet .. 650 milliGRAM(s) Oral every 6 hours PRN Temp greater or equal to 38C (100.4F), Mild Pain (1 - 3)  ondansetron Injectable 4 milliGRAM(s) IV Push every 6 hours PRN Nausea and/or Vomiting      LABS:                        10.8   11.0  )-----------( 432      ( 03 May 2019 06:50 )             34.9     05-03    140  |  101  |  26<H>  ----------------------------<  117<H>  3.8   |  27  |  0.49<L>    Ca    9.0      03 May 2019 06:50  Phos  3.2     05-03  Mg     2.1     05-03              CAPILLARY BLOOD GLUCOSE    MICROBIOLOGY:     RADIOLOGY:  [ ] Reviewed and interpreted by me

## 2019-05-03 NOTE — PROGRESS NOTE ADULT - SUBJECTIVE AND OBJECTIVE BOX
CC: F/U for Burkholderia    Saw/spoke to patient. No fevers, no chills. No new complaints. Unchanged.    Allergies  Allergy Status Unknown    ANTIMICROBIALS:  meropenem  IVPB 1000 every 8 hours    PE:    Vital Signs Last 24 Hrs  T(C): 36.6 (03 May 2019 04:16), Max: 37.3 (02 May 2019 20:37)  T(F): 97.8 (03 May 2019 04:16), Max: 99.1 (02 May 2019 20:37)  HR: 65 (03 May 2019 11:30) (52 - 65)  BP: 117/72 (03 May 2019 11:30) (100/59 - 164/53)  RR: 19 (03 May 2019 11:30) (18 - 19)  SpO2: 98% (03 May 2019 11:30) (95% - 100%)     Gen: AOx0-1, NAD, poorly responsive to verbal  CV: S1+S2 normal, nontachycardic  Resp: Clear bilat, no resp distress, no crackles/wheezes, trach  Abd: Soft, nontender, +BS  Ext: No LE edema, no wounds    LABS:                        10.8   11.0  )-----------( 432      ( 03 May 2019 06:50 )             34.9     05-03    140  |  101  |  26<H>  ----------------------------<  117<H>  3.8   |  27  |  0.49<L>    Ca    9.0      03 May 2019 06:50  Phos  3.2     05-03  Mg     2.1     05-03    MICROBIOLOGY:    .Sputum trap  04-28-19   Normal Respiratory Katia present  --    Moderate polymorphonuclear leukocytes per low power field  Few Squamous epithelial cells per low power field  Rare Gram Negative Rods per oil power field    .Blood  04-28-19   No growth at 5 days.    .Sputum trap received  04-23-19   Numerous Enterobacter cloacae  Numerous Burkholderia cepacia  Normal Respiratory Katia present  --  Enterobacter cloacae  Burkholderia cepacia    .Urine  04-16-19   >100,000 CFU/ml Escherichia coli  --  Escherichia coli    Bronch Wash trap received  04-15-19   Normal Respiratory Katia present  Numerous Streptococcus agalactiae (Group B)  "Susceptibilities not performed"  --    Numerous polymorphonuclear leukocytes per low power field  No squamous epithelial cells per low power field  Numerous Gram positive cocci in pairs, chains and clusters per oil power  field  Few Gram Negative Rods per oil power field  Few Gram Negative Diplococci per oil power field    (otherwise reviewed)    RADIOLOGY:    4/24 AXR:    FINDINGS:  Gastrostomy tube partially visualized.  Multiple air-filled loops of bowel. Nonobstructive bowel gas pattern.  No evidence of intraperitoneal free air.  No acute osseous abnormality.    IMPRESSION: Nonobstructive bowel gas pattern.

## 2019-05-03 NOTE — PROGRESS NOTE ADULT - PROBLEM SELECTOR PLAN 4
Newly Diagnosed A.FIB   Labetalol increased to 400 mg q 8 hr   Continue Cardizem 60 mg q 6 hrs   Monitor HR, No AC given 2/2 recent ICH

## 2019-05-03 NOTE — PROGRESS NOTE ADULT - ASSESSMENT
63 Year old female w/ PMHx HTN, HLD, Ischemic stroke (2004), who was transferred from OSH, Patient initially presented to the OSH from home with AMS.  Found to have ICH/stroke  Required Trach, PEG  Treated for suspected E coli UTI  4/23 found to have Burkholderia and Enterobacter in sputum culture (? 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

## 2019-05-03 NOTE — PROGRESS NOTE ADULT - ASSESSMENT
63 Year old female w/ PMHx HTN, HLD, Ischemic stroke (2004), who was transferred from OSH, Patient initially presented to the OSH from home with AMS as per EMS, pt was talking to a family member on the phone when she suddenly stopped talking. 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 Patient transferred to [facility] for Neuro Surgical assessment. Upon admission NIHSS = ; MRS = 2; ICH =3. Patient was given DDAVP and PLTs. During admission patient was found to have newly diagnosed A.fib and was initiated on Labetalol. Patient had Serial head CTs performed which remained unchanged, no neuro surgical intervention was performed. Patient was seen by Palliative Care family decided to proceed with Trach and PEG. Patient S/p Tracheostomy on 4/16 ( # 8 Cuffed Shiley) and PEG Placement on 4/18. During hospitalization patient found to have UTI ( 100 K E.coli ) for which she was treated with a course of Keflex ( Completed 4/21). On 4/23 patient febrile again, Patient found to have : Enterobacter / Burkholderia  growing in sputum     4/26: Patient remains afebrile. Sputum cx 4/23 growing Enterobacter/ Burkholderia, Will continue Meropenem. Patient tolerating TC during the day, will attempt TC ATC and obtain AM ABG. Patient remains with elevated BP will increase Labetalol 400 mg q 8 hrs.  4/27: Tolerated TC x24hrs. ABG reviewed and adequate to continue TC. Appears comfortable of TC. BP remains labile with elevations into 170s. Will add Enalapril. Spoke to son at bedside. Will finish ABx on 4/30.  4/29 Stable on TC ATC. Fevers, UA negative, on meropenem until 4/30 5/1 Stable, tolerating TC ATC. Following commands, continue on abx until 5/3 per ID.   5/2: DC planning in progress, likely to occur post antibiotics. Otherwise no new events. Patient doing well and medically stable.  5/3: Will complete antibiotics today. No other issues. Family reviewing rehab facilities.

## 2019-05-04 PROCEDURE — 99233 SBSQ HOSP IP/OBS HIGH 50: CPT | Mod: GC

## 2019-05-04 RX ADMIN — MEROPENEM 100 MILLIGRAM(S): 1 INJECTION INTRAVENOUS at 05:18

## 2019-05-04 RX ADMIN — Medication 1 TABLET(S): at 17:05

## 2019-05-04 RX ADMIN — Medication 400 MILLIGRAM(S): at 14:19

## 2019-05-04 RX ADMIN — CHLORHEXIDINE GLUCONATE 15 MILLILITER(S): 213 SOLUTION TOPICAL at 17:05

## 2019-05-04 RX ADMIN — Medication 100 MILLIGRAM(S): at 22:18

## 2019-05-04 RX ADMIN — Medication 3 MILLILITER(S): at 05:17

## 2019-05-04 RX ADMIN — Medication 1 TABLET(S): at 05:18

## 2019-05-04 RX ADMIN — CHLORHEXIDINE GLUCONATE 1 APPLICATION(S): 213 SOLUTION TOPICAL at 22:19

## 2019-05-04 RX ADMIN — ENOXAPARIN SODIUM 40 MILLIGRAM(S): 100 INJECTION SUBCUTANEOUS at 17:06

## 2019-05-04 RX ADMIN — Medication 5 MILLIGRAM(S): at 17:06

## 2019-05-04 RX ADMIN — Medication 100 MILLIGRAM(S): at 14:19

## 2019-05-04 RX ADMIN — PANTOPRAZOLE SODIUM 40 MILLIGRAM(S): 20 TABLET, DELAYED RELEASE ORAL at 12:10

## 2019-05-04 RX ADMIN — Medication 400 MILLIGRAM(S): at 05:17

## 2019-05-04 RX ADMIN — Medication 60 MILLIGRAM(S): at 12:10

## 2019-05-04 RX ADMIN — Medication 100 MILLIGRAM(S): at 05:17

## 2019-05-04 RX ADMIN — CHLORHEXIDINE GLUCONATE 15 MILLILITER(S): 213 SOLUTION TOPICAL at 05:17

## 2019-05-04 RX ADMIN — Medication 60 MILLIGRAM(S): at 05:17

## 2019-05-04 RX ADMIN — Medication 5 MILLIGRAM(S): at 05:17

## 2019-05-04 NOTE — PROGRESS NOTE ADULT - SUBJECTIVE AND OBJECTIVE BOX
Patient is a 63y old  Female who presents with a chief complaint of ICH (03 May 2019 14:19)      Interval Events:    REVIEW OF SYSTEMS:  [ ] Positive  [ ] All other systems negative  [ ] Unable to assess ROS because ________    Vital Signs Last 24 Hrs  T(C): 36.9 (05-04-19 @ 04:23), Max: 37.4 (05-04-19 @ 00:00)  T(F): 98.4 (05-04-19 @ 04:23), Max: 99.3 (05-04-19 @ 00:00)  HR: 68 (05-04-19 @ 05:18) (52 - 86)  BP: 155/82 (05-04-19 @ 04:23) (98/64 - 155/82)  RR: 18 (05-04-19 @ 04:23) (18 - 21)  SpO2: 94% (05-04-19 @ 05:18) (94% - 100%)    PHYSICAL EXAM:  HEENT:   [ ]Tracheostomy:  [ ]Pupils equal  [ ]No oral lesions  [ ]Abnormal    SKIN  [ ]No Rash  [ ] Abnormal  [ ] pressure    CARDIAC  [ ]Regular  [ ]Abnormal    PULMONARY  [ ]Bilateral Clear Breath Sounds  [ ]Normal Excursion  [ ]Abnormal    GI  [ ]PEG      [ ] +BS		              [ ]Soft, nondistended, nontender	  [ ]Abnormal    MUSCULOSKELETAL                                   [ ]Bedbound                 [ ]Abnormal    [ ]Ambulatory/OOB to chair                           EXTREMITIES                                         [ ]Normal  [ ]Edema                           NEUROLOGIC  [ ] Normal, non focal  [ ] Focal findings:    PSYCHIATRIC  [ ]Alert and appropriate  [ ] Sedated	 [ ]Agitated    :  Bedoya: [ ] Yes, if yes: Date of Placement:                   [  ] No    LINES: Central Lines [ ] Yes, if yes: Date of Placement                                     [  ] No    HOSPITAL MEDICATIONS:  MEDICATIONS  (STANDING):  ALBUTerol/ipratropium for Nebulization 3 milliLiter(s) Nebulizer every 6 hours  chlorhexidine 0.12% Liquid 15 milliLiter(s) Oral Mucosa two times a day  chlorhexidine 4% Liquid 1 Application(s) Topical <User Schedule>  diltiazem    Tablet 60 milliGRAM(s) Oral every 6 hours  doxazosin 8 milliGRAM(s) Oral at bedtime  enalapril 5 milliGRAM(s) Oral two times a day  enoxaparin Injectable 40 milliGRAM(s) SubCutaneous <User Schedule>  hydrALAZINE 100 milliGRAM(s) Oral every 8 hours  labetalol 400 milliGRAM(s) Oral every 8 hours  lactobacillus acidophilus 1 Tablet(s) Oral two times a day  pantoprazole   Suspension 40 milliGRAM(s) Oral daily    MEDICATIONS  (PRN):  acetaminophen   Tablet .. 650 milliGRAM(s) Oral every 6 hours PRN Temp greater or equal to 38C (100.4F), Mild Pain (1 - 3)  ondansetron Injectable 4 milliGRAM(s) IV Push every 6 hours PRN Nausea and/or Vomiting      LABS:                        10.8   11.0  )-----------( 432      ( 03 May 2019 06:50 )             34.9     05-03    140  |  101  |  26<H>  ----------------------------<  117<H>  3.8   |  27  |  0.49<L>    Ca    9.0      03 May 2019 06:50  Phos  3.2     05-03  Mg     2.1     05-03              CAPILLARY BLOOD GLUCOSE    MICROBIOLOGY:     RADIOLOGY:  [ ] Reviewed and interpreted by me Patient is a 63y old  Female who presents with a chief complaint of ICH........f/u respiratory distress      Interval Events: No events reported over night    REVIEW OF SYSTEMS:  [ ] Positive  [ ] All other systems negative  [X] Unable to assess ROS because _non-verbal/communicative    Vital Signs Last 24 Hrs  T(C): 36.9 (05-04-19 @ 04:23), Max: 37.4 (05-04-19 @ 00:00)  T(F): 98.4 (05-04-19 @ 04:23), Max: 99.3 (05-04-19 @ 00:00)  HR: 68 (05-04-19 @ 05:18) (52 - 86)  BP: 155/82 (05-04-19 @ 04:23) (98/64 - 155/82)  RR: 18 (05-04-19 @ 04:23) (18 - 21)  SpO2: 94% (05-04-19 @ 05:18) (94% - 100%)      PHYSICAL EXAM:  HEENT:   [x]Tracheostomy: #7 Cuffless Portex  [x] Pupils equal  [ ]No oral lesions  [ ]Abnormal    SKIN  [x]No Rash  [ ] Abnormal  [ ] pressure    CARDIAC  [x]Regular  [ ]Abnormal    PULMONARY  [x]Bilateral Clear Breath Sounds  [ ]Normal Excursion  [ ]Abnormal    GI  [x]PEG      [x] +BS		              [x]Soft, nondistended, nontender	  [ ]Abnormal    MUSCULOSKELETAL                                   [ ]Bedbound                 [ ]Abnormal    [x]OOB to chair                           EXTREMITIES                                         [x]Normal  [ ]Edema                           NEUROLOGIC  [ ] Normal, non focal  [x] Focal findings: arousable with persistence; appears to respond more when  speaks to her in original language but unable to fully follow commands.    PSYCHIATRIC  [ ]Alert and appropriate  [x] Sedated	 [ ]Agitated    :  Bedoya: [ ] Yes, if yes: Date of Placement:                   [x] No Straight Cath ATC     LINES: Central Lines [ ] Yes, if yes: Date of Placement                                     [x] No        HOSPITAL MEDICATIONS:  MEDICATIONS  (STANDING):  ALBUTerol/ipratropium for Nebulization 3 milliLiter(s) Nebulizer every 6 hours  chlorhexidine 0.12% Liquid 15 milliLiter(s) Oral Mucosa two times a day  chlorhexidine 4% Liquid 1 Application(s) Topical <User Schedule>  diltiazem    Tablet 60 milliGRAM(s) Oral every 6 hours  doxazosin 8 milliGRAM(s) Oral at bedtime  enalapril 5 milliGRAM(s) Oral two times a day  enoxaparin Injectable 40 milliGRAM(s) SubCutaneous <User Schedule>  hydrALAZINE 100 milliGRAM(s) Oral every 8 hours  labetalol 400 milliGRAM(s) Oral every 8 hours  lactobacillus acidophilus 1 Tablet(s) Oral two times a day  pantoprazole   Suspension 40 milliGRAM(s) Oral daily    MEDICATIONS  (PRN):  acetaminophen   Tablet .. 650 milliGRAM(s) Oral every 6 hours PRN Temp greater or equal to 38C (100.4F), Mild Pain (1 - 3)  ondansetron Injectable 4 milliGRAM(s) IV Push every 6 hours PRN Nausea and/or Vomiting      LABS:                             CAPILLARY BLOOD GLUCOSE    MICROBIOLOGY:     RADIOLOGY:  [ ] Reviewed and interpreted by me

## 2019-05-04 NOTE — PROGRESS NOTE ADULT - ASSESSMENT
63 Year old female w/ PMHx HTN, HLD, Ischemic stroke (2004), who was transferred from OSH, Patient initially presented to the OSH from home with AMS as per EMS, pt was talking to a family member on the phone when she suddenly stopped talking. 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 Patient transferred to [facility] for Neuro Surgical assessment. Upon admission NIHSS = ; MRS = 2; ICH =3. Patient was given DDAVP and PLTs. During admission patient was found to have newly diagnosed A.fib and was initiated on Labetalol. Patient had Serial head CTs performed which remained unchanged, no neuro surgical intervention was performed. Patient was seen by Palliative Care family decided to proceed with Trach and PEG. Patient S/p Tracheostomy on 4/16 ( # 8 Cuffed Shiley) and PEG Placement on 4/18. During hospitalization patient found to have UTI ( 100 K E.coli ) for which she was treated with a course of Keflex ( Completed 4/21). On 4/23 patient febrile again, Patient found to have : Enterobacter / Burkholderia  growing in sputum     4/26: Patient remains afebrile. Sputum cx 4/23 growing Enterobacter/ Burkholderia, Will continue Meropenem. Patient tolerating TC during the day, will attempt TC ATC and obtain AM ABG. Patient remains with elevated BP will increase Labetalol 400 mg q 8 hrs.  4/27: Tolerated TC x24hrs. ABG reviewed and adequate to continue TC. Appears comfortable of TC. BP remains labile with elevations into 170s. Will add Enalapril. Spoke to son at bedside. Will finish ABx on 4/30.  4/29 Stable on TC ATC. Fevers, UA negative, on meropenem until 4/30 5/1 Stable, tolerating TC ATC. Following commands, continue on abx until 5/3 per ID.   5/2: DC planning in progress, likely to occur post antibiotics. Otherwise no new events. Patient doing well and medically stable.  5/3: Will complete antibiotics today. No other issues. Family reviewing rehab facilities.  5/4: No new events. Requested Sputum Culture to assess for bacterial clearance. Spoke with  and daughter at bedside who are researching rehab facilities.

## 2019-05-04 NOTE — PROGRESS NOTE ADULT - PROBLEM SELECTOR PLAN 3
Patient Afebrile since initiation of ABX   CXR 4/23: Clear Lungs   Sputum cx 4/23: Enterobacter Colace / Burkholderia Cepacia, on contact isolation  Both organisms Sensitive to Meropenem   Bld Cx 4/23: No growth, UA 4/23: Negative  S/p 10 day course of Meropenem, ended 5/3.   Awaiting repeat Sputum Cx to assess for clearance.

## 2019-05-05 LAB
GRAM STN FLD: SIGNIFICANT CHANGE UP
HCT VFR BLD CALC: 34.7 % — SIGNIFICANT CHANGE UP (ref 34.5–45)
HGB BLD-MCNC: 11.4 G/DL — LOW (ref 11.5–15.5)
MCHC RBC-ENTMCNC: 32.6 PG — SIGNIFICANT CHANGE UP (ref 27–34)
MCHC RBC-ENTMCNC: 33 GM/DL — SIGNIFICANT CHANGE UP (ref 32–36)
MCV RBC AUTO: 98.9 FL — SIGNIFICANT CHANGE UP (ref 80–100)
PLATELET # BLD AUTO: 407 K/UL — HIGH (ref 150–400)
RBC # BLD: 3.51 M/UL — LOW (ref 3.8–5.2)
RBC # FLD: 13 % — SIGNIFICANT CHANGE UP (ref 10.3–14.5)
SPECIMEN SOURCE: SIGNIFICANT CHANGE UP
WBC # BLD: 14 K/UL — HIGH (ref 3.8–10.5)
WBC # FLD AUTO: 14 K/UL — HIGH (ref 3.8–10.5)

## 2019-05-05 PROCEDURE — 99233 SBSQ HOSP IP/OBS HIGH 50: CPT | Mod: GC

## 2019-05-05 RX ADMIN — Medication 60 MILLIGRAM(S): at 06:58

## 2019-05-05 RX ADMIN — CHLORHEXIDINE GLUCONATE 1 APPLICATION(S): 213 SOLUTION TOPICAL at 22:29

## 2019-05-05 RX ADMIN — Medication 400 MILLIGRAM(S): at 15:30

## 2019-05-05 RX ADMIN — Medication 60 MILLIGRAM(S): at 00:39

## 2019-05-05 RX ADMIN — ENOXAPARIN SODIUM 40 MILLIGRAM(S): 100 INJECTION SUBCUTANEOUS at 18:09

## 2019-05-05 RX ADMIN — CHLORHEXIDINE GLUCONATE 15 MILLILITER(S): 213 SOLUTION TOPICAL at 18:09

## 2019-05-05 RX ADMIN — Medication 100 MILLIGRAM(S): at 06:58

## 2019-05-05 RX ADMIN — Medication 5 MILLIGRAM(S): at 18:10

## 2019-05-05 RX ADMIN — CHLORHEXIDINE GLUCONATE 15 MILLILITER(S): 213 SOLUTION TOPICAL at 06:57

## 2019-05-05 RX ADMIN — PANTOPRAZOLE SODIUM 40 MILLIGRAM(S): 20 TABLET, DELAYED RELEASE ORAL at 12:21

## 2019-05-05 RX ADMIN — Medication 5 MILLIGRAM(S): at 06:58

## 2019-05-05 RX ADMIN — Medication 100 MILLIGRAM(S): at 22:30

## 2019-05-05 RX ADMIN — Medication 400 MILLIGRAM(S): at 22:29

## 2019-05-05 RX ADMIN — Medication 8 MILLIGRAM(S): at 22:30

## 2019-05-05 RX ADMIN — Medication 400 MILLIGRAM(S): at 06:58

## 2019-05-05 RX ADMIN — Medication 100 MILLIGRAM(S): at 15:30

## 2019-05-05 RX ADMIN — Medication 1 TABLET(S): at 18:10

## 2019-05-05 RX ADMIN — Medication 1 TABLET(S): at 06:57

## 2019-05-05 NOTE — PROGRESS NOTE ADULT - SUBJECTIVE AND OBJECTIVE BOX
Patient is a 63y old  Female who presents with a chief complaint of ICH........f/u respiratory distress      Interval Events: No events reported over night    REVIEW OF SYSTEMS:  [ ] Positive  [ ] All other systems negative  [X] Unable to assess ROS because _non-verbal/communicative    Vital Signs Last 24 Hrs  T(C): 36.9 (05-04-19 @ 04:23), Max: 37.4 (05-04-19 @ 00:00)  T(F): 98.4 (05-04-19 @ 04:23), Max: 99.3 (05-04-19 @ 00:00)  HR: 68 (05-04-19 @ 05:18) (52 - 86)  BP: 155/82 (05-04-19 @ 04:23) (98/64 - 155/82)  RR: 18 (05-04-19 @ 04:23) (18 - 21)  SpO2: 94% (05-04-19 @ 05:18) (94% - 100%)      PHYSICAL EXAM:  HEENT:   [x]Tracheostomy: #7 Cuffless Portex   TC 30%  [x] Pupils equal  [ ]No oral lesions  [ ]Abnormal    SKIN  [x]No Rash  [ ] Abnormal  [ ] pressure    CARDIAC  [x]Regular  [ ]Abnormal    PULMONARY  [x]Bilateral Clear Breath Sounds  [ ]Normal Excursion  [ ]Abnormal    GI  [x]PEG      [x] +BS		              [x]Soft, nondistended, nontender	  [ ]Abnormal    MUSCULOSKELETAL                                   [ ]Bedbound                 [ ]Abnormal    [x]OOB to chair                           EXTREMITIES                                         [x]Normal  [ ]Edema                           NEUROLOGIC  [ ] Normal, non focal  [x] Focal findings: arousable with persistence; appears to respond more when  speaks to her in original language but unable to fully follow commands.    PSYCHIATRIC  [ ]Alert and appropriate  [x] Sedated	 [ ]Agitated    :  Bedoya: [ ] Yes, if yes: Date of Placement:                   [x] No Straight Cath ATC     LINES: Central Lines [ ] Yes, if yes: Date of Placement                                     [x] No        HOSPITAL MEDICATIONS:  MEDICATIONS  (STANDING):  ALBUTerol/ipratropium for Nebulization 3 milliLiter(s) Nebulizer every 6 hours  chlorhexidine 0.12% Liquid 15 milliLiter(s) Oral Mucosa two times a day  chlorhexidine 4% Liquid 1 Application(s) Topical <User Schedule>  diltiazem    Tablet 60 milliGRAM(s) Oral every 6 hours  doxazosin 8 milliGRAM(s) Oral at bedtime  enalapril 5 milliGRAM(s) Oral two times a day  enoxaparin Injectable 40 milliGRAM(s) SubCutaneous <User Schedule>  hydrALAZINE 100 milliGRAM(s) Oral every 8 hours  labetalol 400 milliGRAM(s) Oral every 8 hours  lactobacillus acidophilus 1 Tablet(s) Oral two times a day  pantoprazole   Suspension 40 milliGRAM(s) Oral daily    MEDICATIONS  (PRN):  acetaminophen   Tablet .. 650 milliGRAM(s) Oral every 6 hours PRN Temp greater or equal to 38C (100.4F), Mild Pain (1 - 3)  ondansetron Injectable 4 milliGRAM(s) IV Push every 6 hours PRN Nausea and/or Vomiting      LABS:                             CAPILLARY BLOOD GLUCOSE    MICROBIOLOGY:     RADIOLOGY:  [ ] Reviewed and interpreted by me

## 2019-05-05 NOTE — PROGRESS NOTE ADULT - SUBJECTIVE AND OBJECTIVE BOX
Subjective: Patient seen and examined. No new events except as noted.     SUBJECTIVE/ROS:    ROS limited     MEDICATIONS:  MEDICATIONS  (STANDING):  chlorhexidine 0.12% Liquid 15 milliLiter(s) Oral Mucosa two times a day  chlorhexidine 4% Liquid 1 Application(s) Topical <User Schedule>  diltiazem    Tablet 60 milliGRAM(s) Oral every 6 hours  doxazosin 8 milliGRAM(s) Oral at bedtime  enalapril 5 milliGRAM(s) Oral two times a day  enoxaparin Injectable 40 milliGRAM(s) SubCutaneous <User Schedule>  hydrALAZINE 100 milliGRAM(s) Oral every 8 hours  labetalol 400 milliGRAM(s) Oral every 8 hours  lactobacillus acidophilus 1 Tablet(s) Oral two times a day  pantoprazole   Suspension 40 milliGRAM(s) Oral daily      PHYSICAL EXAM:  T(C): 36.8 (05-05-19 @ 04:55), Max: 37.4 (05-04-19 @ 21:18)  HR: 67 (05-05-19 @ 04:55) (60 - 70)  BP: 154/81 (05-05-19 @ 04:55) (99/64 - 154/81)  RR: 20 (05-05-19 @ 04:55) (16 - 20)  SpO2: 96% (05-05-19 @ 04:55) (95% - 100%)  Wt(kg): --  I&O's Summary    04 May 2019 07:01  -  05 May 2019 07:00  --------------------------------------------------------  IN: 1935 mL / OUT: 2700 mL / NET: -765 mL            JVP: Normal  Neck: supple  Lung: clear   CV: S1 S2 , Murmur:  Abd: soft  Ext: No edema  neuro: Awake   Psych: flat affect  Skin: normal``    LABS/DATA:    CARDIAC MARKERS:                                11.4   14.0  )-----------( 407      ( 05 May 2019 07:23 )             34.7           proBNP:   Lipid Profile:   HgA1c:   TSH:     TELE:  EKG:

## 2019-05-05 NOTE — PROGRESS NOTE ADULT - ASSESSMENT
63 Year old female w/ PMHx HTN, HLD, Ischemic stroke (2004), who was transferred from OSH, Patient initially presented to the OSH from home with AMS as per EMS, pt was talking to a family member on the phone when she suddenly stopped talking. 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 Patient transferred to [facility] for Neuro Surgical assessment. Upon admission NIHSS = ; MRS = 2; ICH =3. Patient was given DDAVP and PLTs. During admission patient was found to have newly diagnosed A.fib and was initiated on Labetalol. Patient had Serial head CTs performed which remained unchanged, no neuro surgical intervention was performed. Patient was seen by Palliative Care family decided to proceed with Trach and PEG. Patient S/p Tracheostomy on 4/16 ( # 8 Cuffed Shiley) and PEG Placement on 4/18. During hospitalization patient found to have UTI ( 100 K E.coli ) for which she was treated with a course of Keflex ( Completed 4/21). On 4/23 patient febrile again, Patient found to have : Enterobacter / Burkholderia  growing in sputum     4/26: Patient remains afebrile. Sputum cx 4/23 growing Enterobacter/ Burkholderia, Will continue Meropenem. Patient tolerating TC during the day, will attempt TC ATC and obtain AM ABG. Patient remains with elevated BP will increase Labetalol 400 mg q 8 hrs.  4/27: Tolerated TC x24hrs. ABG reviewed and adequate to continue TC. Appears comfortable of TC. BP remains labile with elevations into 170s. Will add Enalapril. Spoke to son at bedside. Will finish ABx on 4/30.  4/29 Stable on TC ATC. Fevers, UA negative, on meropenem until 4/30 5/1 Stable, tolerating TC ATC. Following commands, continue on abx until 5/3 per ID.   5/2: DC planning in progress, likely to occur post antibiotics. Otherwise no new events. Patient doing well and medically stable.  5/3: Will complete antibiotics today. No other issues. Family reviewing rehab facilities.  5/4: No new events. Requested Sputum Culture to assess for bacterial clearance. Spoke with  and daughter at bedside who are researching rehab facilities.

## 2019-05-06 LAB
-  CEFTAZIDIME: SIGNIFICANT CHANGE UP
-  LEVOFLOXACIN: SIGNIFICANT CHANGE UP
-  MEROPENEM: SIGNIFICANT CHANGE UP
-  TRIMETHOPRIM/SULFAMETHOXAZOLE: SIGNIFICANT CHANGE UP
CULTURE RESULTS: SIGNIFICANT CHANGE UP
GRAM STN FLD: SIGNIFICANT CHANGE UP
METHOD TYPE: SIGNIFICANT CHANGE UP
ORGANISM # SPEC MICROSCOPIC CNT: SIGNIFICANT CHANGE UP
ORGANISM # SPEC MICROSCOPIC CNT: SIGNIFICANT CHANGE UP
SPECIMEN SOURCE: SIGNIFICANT CHANGE UP
SPECIMEN SOURCE: SIGNIFICANT CHANGE UP

## 2019-05-06 PROCEDURE — 99232 SBSQ HOSP IP/OBS MODERATE 35: CPT

## 2019-05-06 RX ORDER — NYSTATIN 500MM UNIT
500000 POWDER (EA) MISCELLANEOUS EVERY 6 HOURS
Qty: 0 | Refills: 0 | Status: DISCONTINUED | OUTPATIENT
Start: 2019-05-06 | End: 2019-05-13

## 2019-05-06 RX ADMIN — Medication 100 MILLIGRAM(S): at 06:49

## 2019-05-06 RX ADMIN — CHLORHEXIDINE GLUCONATE 15 MILLILITER(S): 213 SOLUTION TOPICAL at 18:54

## 2019-05-06 RX ADMIN — Medication 60 MILLIGRAM(S): at 11:21

## 2019-05-06 RX ADMIN — Medication 400 MILLIGRAM(S): at 06:49

## 2019-05-06 RX ADMIN — Medication 100 MILLIGRAM(S): at 14:14

## 2019-05-06 RX ADMIN — CHLORHEXIDINE GLUCONATE 1 APPLICATION(S): 213 SOLUTION TOPICAL at 22:30

## 2019-05-06 RX ADMIN — Medication 1 TABLET(S): at 06:49

## 2019-05-06 RX ADMIN — CHLORHEXIDINE GLUCONATE 15 MILLILITER(S): 213 SOLUTION TOPICAL at 06:49

## 2019-05-06 RX ADMIN — Medication 400 MILLIGRAM(S): at 14:08

## 2019-05-06 RX ADMIN — Medication 5 MILLIGRAM(S): at 18:54

## 2019-05-06 RX ADMIN — Medication 60 MILLIGRAM(S): at 06:49

## 2019-05-06 RX ADMIN — Medication 400 MILLIGRAM(S): at 22:20

## 2019-05-06 RX ADMIN — Medication 8 MILLIGRAM(S): at 22:20

## 2019-05-06 RX ADMIN — ENOXAPARIN SODIUM 40 MILLIGRAM(S): 100 INJECTION SUBCUTANEOUS at 18:52

## 2019-05-06 RX ADMIN — Medication 1 TABLET(S): at 18:51

## 2019-05-06 RX ADMIN — Medication 100 MILLIGRAM(S): at 22:20

## 2019-05-06 RX ADMIN — Medication 5 MILLIGRAM(S): at 06:49

## 2019-05-06 RX ADMIN — PANTOPRAZOLE SODIUM 40 MILLIGRAM(S): 20 TABLET, DELAYED RELEASE ORAL at 11:21

## 2019-05-06 RX ADMIN — Medication 500000 UNIT(S): at 18:51

## 2019-05-06 NOTE — PROGRESS NOTE ADULT - PROBLEM SELECTOR PLAN 3
Patient Afebrile since initiation of ABX   CXR 4/23: Clear Lungs   Sputum cx 4/23: Enterobacter Colace / Burkholderia Cepacia, on contact isolation  Both organisms Sensitive to Meropenem   Bld Cx 4/23: No growth, UA 4/23: Negative  S/p 10 day course of Meropenem, ended 5/3.   Awaiting repeat Sputum Cx to assess for clearance. Patient Afebrile since initiation of ABX   CXR 4/23: Clear Lungs   Sputum cx 4/23: Enterobacter Colace / Burkholderia Cepacia, on contact isolation  Both organisms Sensitive to Meropenem   Bld Cx 4/23: No growth, UA 4/23: Negative  S/p 10 day course of Meropenem, ended 5/3.   5/6 Repeat sputum cx 5/5 and 5/6 GNR - ID following

## 2019-05-06 NOTE — PROGRESS NOTE ADULT - SUBJECTIVE AND OBJECTIVE BOX
Subjective: Patient seen and examined. No new events except as noted.     SUBJECTIVE/ROS:        MEDICATIONS:  MEDICATIONS  (STANDING):  chlorhexidine 0.12% Liquid 15 milliLiter(s) Oral Mucosa two times a day  chlorhexidine 4% Liquid 1 Application(s) Topical <User Schedule>  diltiazem    Tablet 60 milliGRAM(s) Oral every 6 hours  doxazosin 8 milliGRAM(s) Oral at bedtime  enalapril 5 milliGRAM(s) Oral two times a day  enoxaparin Injectable 40 milliGRAM(s) SubCutaneous <User Schedule>  hydrALAZINE 100 milliGRAM(s) Oral every 8 hours  labetalol 400 milliGRAM(s) Oral every 8 hours  lactobacillus acidophilus 1 Tablet(s) Oral two times a day  pantoprazole   Suspension 40 milliGRAM(s) Oral daily      PHYSICAL EXAM:  T(C): 36.8 (05-06-19 @ 04:33), Max: 37.2 (05-05-19 @ 18:34)  HR: 65 (05-06-19 @ 04:39) (59 - 72)  BP: 149/82 (05-06-19 @ 04:33) (91/58 - 149/82)  RR: 17 (05-06-19 @ 08:21) (17 - 24)  SpO2: 99% (05-06-19 @ 08:21) (96% - 100%)  Wt(kg): --  I&O's Summary    05 May 2019 07:01  -  06 May 2019 07:00  --------------------------------------------------------  IN: 1735 mL / OUT: 2400 mL / NET: -665 mL            JVP: Normal  Neck: supple  Lung: clear   CV: S1 S2 , Murmur:  Abd: soft  Ext: No edema  Psych: flat affect  Skin: normal``    LABS/DATA:    CARDIAC MARKERS:                                11.4   14.0  )-----------( 407      ( 05 May 2019 07:23 )             34.7           proBNP:   Lipid Profile:   HgA1c:   TSH:     TELE:  EKG: Alert-The patient is alert, awake and responds to voice. The patient is oriented to time, place, and person. The triage nurse is able to obtain subjective information.

## 2019-05-06 NOTE — PROGRESS NOTE ADULT - SUBJECTIVE AND OBJECTIVE BOX
CC: F/U sputum culture    Saw/spoke to patient. No fevers, no chills. Doing well. Mental status improved. Family at bedside.    Allergies  Allergy Status Unknown    ANTIMICROBIALS:  Off    PE:    Vital Signs Last 24 Hrs  T(C): 36.8 (06 May 2019 10:58), Max: 37.2 (05 May 2019 18:34)  T(F): 98.2 (06 May 2019 10:58), Max: 98.9 (05 May 2019 18:34)  HR: 66 (06 May 2019 12:27) (61 - 72)  BP: 110/70 (06 May 2019 12:27) (91/58 - 149/82)  RR: 21 (06 May 2019 11:44) (17 - 24)  SpO2: 97% (06 May 2019 11:44) (96% - 100%)    Gen: AOx1-2, opens eyes  CV: S1+S2 normal, nontachycardic  Resp: Clear bilat, no resp distress, no crackles/wheezes; trach  Abd: Soft, nontender, +BS  Ext: No LE edema, no wounds    LABS:                        11.4   14.0  )-----------( 407      ( 05 May 2019 07:23 )             34.7     MICROBIOLOGY:    .Sputum trap  05-06-19 --  --    Few polymorphonuclear leukocytes per low power field  No Squamous epithelial cells per low power field  Few Gram Negative Rods per oil power field    .Sputum  05-05-19   Moderate Gram Negative Rods  --    Numerous polymorphonuclear leukocytes per low power field  Numerous Squamous epithelial cells per low power field  Numerous Gram Negative Rods seen per oil power field    .Sputum trap received  04-23-19   Numerous Enterobacter cloacae  Numerous Burkholderia cepacia  Normal Respiratory Katia present  --  Enterobacter cloacae  Burkholderia cepacia    (otherwise reviewed)    RADIOLOGY:    4/24 XR:    FINDINGS:  Gastrostomy tube partially visualized.  Multiple air-filled loops of bowel. Nonobstructive bowel gas pattern.  No evidence of intraperitoneal free air.  No acute osseous abnormality.    IMPRESSION: Nonobstructive bowel gas pattern.

## 2019-05-06 NOTE — PROGRESS NOTE ADULT - ASSESSMENT
63 Year old female w/ PMHx HTN, HLD, Ischemic stroke (2004), who was transferred from OSH, Patient initially presented to the OSH from home with AMS as per EMS, pt was talking to a family member on the phone when she suddenly stopped talking. 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 Patient transferred to [facility] for Neuro Surgical assessment. Upon admission NIHSS = ; MRS = 2; ICH =3. Patient was given DDAVP and PLTs. During admission patient was found to have newly diagnosed A.fib and was initiated on Labetalol. Patient had Serial head CTs performed which remained unchanged, no neuro surgical intervention was performed. Patient was seen by Palliative Care family decided to proceed with Trach and PEG. Patient S/p Tracheostomy on 4/16 ( # 8 Cuffed Shiley) and PEG Placement on 4/18. During hospitalization patient found to have UTI ( 100 K E.coli ) for which she was treated with a course of Keflex ( Completed 4/21). On 4/23 patient febrile again, Patient found to have : Enterobacter / Burkholderia  growing in sputum     4/26: Patient remains afebrile. Sputum cx 4/23 growing Enterobacter/ Burkholderia, Will continue Meropenem. Patient tolerating TC during the day, will attempt TC ATC and obtain AM ABG. Patient remains with elevated BP will increase Labetalol 400 mg q 8 hrs.  4/27: Tolerated TC x24hrs. ABG reviewed and adequate to continue TC. Appears comfortable of TC. BP remains labile with elevations into 170s. Will add Enalapril. Spoke to son at bedside. Will finish ABx on 4/30.  4/29 Stable on TC ATC. Fevers, UA negative, on meropenem until 4/30 5/1 Stable, tolerating TC ATC. Following commands, continue on abx until 5/3 per ID.   5/2: DC planning in progress, likely to occur post antibiotics. Otherwise no new events. Patient doing well and medically stable.  5/3: Will complete antibiotics today. No other issues. Family reviewing rehab facilities.  5/4: No new events. Requested Sputum Culture to assess for bacterial clearance. Spoke with  and daughter at bedside who are researching rehab facilities. 63 Year old female w/ PMHx HTN, HLD, Ischemic stroke (2004), who was transferred from OSH, Patient initially presented to the OSH from home with AMS as per EMS, pt was talking to a family member on the phone when she suddenly stopped talking. 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 Patient transferred to [facility] for Neuro Surgical assessment. Upon admission NIHSS = ; MRS = 2; ICH =3. Patient was given DDAVP and PLTs. During admission patient was found to have newly diagnosed A.fib and was initiated on Labetalol. Patient had Serial head CTs performed which remained unchanged, no neuro surgical intervention was performed. Patient was seen by Palliative Care family decided to proceed with Trach and PEG. Patient S/p Tracheostomy on 4/16 ( # 8 Cuffed Shiley) and PEG Placement on 4/18. During hospitalization patient found to have UTI ( 100 K E.coli ) for which she was treated with a course of Keflex ( Completed 4/21). On 4/23 patient febrile again, Patient found to have : Enterobacter / Burkholderia  growing in sputum     4/26: Patient remains afebrile. Sputum cx 4/23 growing Enterobacter/ Burkholderia, Will continue Meropenem. Patient tolerating TC during the day, will attempt TC ATC and obtain AM ABG. Patient remains with elevated BP will increase Labetalol 400 mg q 8 hrs.  4/27: Tolerated TC x24hrs. ABG reviewed and adequate to continue TC. Appears comfortable of TC. BP remains labile with elevations into 170s. Will add Enalapril. Spoke to son at bedside. Will finish ABx on 4/30.  4/29 Stable on TC ATC. Fevers, UA negative, on meropenem until 4/30 5/1 Stable, tolerating TC ATC. Following commands, continue on abx until 5/3 per ID.   5/2: DC planning in progress, likely to occur post antibiotics. Otherwise no new events. Patient doing well and medically stable.  5/3: Will complete antibiotics today. No other issues. Family reviewing rehab facilities.  5/4: No new events. Requested Sputum Culture to assess for bacterial clearance. Spoke with  and daughter at bedside who are researching rehab facilities.   5/7 Stable tolerating TC ATC. Repeat sputum cx 5/5 prelim w/GNR, ID following, will wait for final before deciding on further abx course if any, per Dr. Guevara. BP imporved, SBP 90 - 140. Per cardio, no AC 2/2 bleed.

## 2019-05-06 NOTE — CHART NOTE - NSCHARTNOTEFT_GEN_A_CORE
Nutrition Follow Up Note    History and hospital course: chart review  63 Year old female w/ PMHx HTN, HLD, Ischemic stroke (), who was transferred from OSH, Patient initially presented to the OSH from home with AMS, found to have ICH/stroke, S/P trach, PEG  Treated for suspected E coli UTI, new AFib monitored  Patient stable as noted    Diet : NPO with tube feeding         Enteral  Nutrition: Jevity 1.5 at goal rate 75ml/hr x 18 hrs tolerated well, plus continue Danactive 2x daily        Daily Weight in k.4 (-)  Dosing Weight   73.2kg Change not significant    Pertinent Medications: MEDICATIONS  (STANDING):  chlorhexidine 0.12% Liquid 15 milliLiter(s) Oral Mucosa two times a day  chlorhexidine 4% Liquid 1 Application(s) Topical <User Schedule>  diltiazem    Tablet 60 milliGRAM(s) Oral every 6 hours  doxazosin 8 milliGRAM(s) Oral at bedtime  enalapril 5 milliGRAM(s) Oral two times a day  enoxaparin Injectable 40 milliGRAM(s) SubCutaneous <User Schedule>  hydrALAZINE 100 milliGRAM(s) Oral every 8 hours  labetalol 400 milliGRAM(s) Oral every 8 hours  lactobacillus acidophilus 1 Tablet(s) Oral two times a day  pantoprazole   Suspension 40 milliGRAM(s) Oral daily    MEDICATIONS  (PRN):  acetaminophen   Tablet .. 650 milliGRAM(s) Oral every 6 hours PRN Temp greater or equal to 38C (100.4F), Mild Pain (1 - 3)  ondansetron Injectable 4 milliGRAM(s) IV Push every 6 hours PRN Nausea and/or Vomiting    Pertinent Labs: glucose 117      GI BM x1  Skin per nursing documentation: no pressure breakdown  Edema: R arm, L arm 2+    Estimated Needs:   [X ] no change since previous assessment  [ ] recalculated:     Previous Nutrition Diagnosis: Increased nutrient needs  Nutrition Diagnosis is: ongoing , addressed    New Nutrition Diagnosis: NA    Recommend  1) Continue Jevity 1.5 at goal rate 75ml/hr x 18 , to provide total 1350ml, 1539ml water, 2025 calories, 28/kg, protein 86gm, ~1.25gm/kg, based on dosing weight 73kg.   Added free water per team  currently [id number], total free water 1539ml  2) continue Danactive 2x daily   3) add prosource x1 daily    Monitoring and Evaluation:     Continue to Monitor tube feed tolerance , weight, skin, labs.     RD remains available upon request and will follow up per protocol

## 2019-05-06 NOTE — PROGRESS NOTE ADULT - ASSESSMENT
63 Year old female w/ PMHx HTN, HLD, Ischemic stroke (2004), who was transferred from OSH, Patient initially presented to the OSH from home with AMS.  Found to have ICH/stroke  Required Trach, PEG  Treated for suspected E coli UTI  4/23 found to have Burkholderia and Enterobacter in sputum culture (? 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

## 2019-05-06 NOTE — PROGRESS NOTE ADULT - SUBJECTIVE AND OBJECTIVE BOX
Patient is a 63y old  Female who presents with a chief complaint of ICH (06 May 2019 09:51)      Interval Events:    REVIEW OF SYSTEMS:  [ ] Positive  [ ] All other systems negative  [ ] Unable to assess ROS because ________    Vital Signs Last 24 Hrs  T(C): 36.8 (05-06-19 @ 10:58), Max: 37.2 (05-05-19 @ 18:34)  T(F): 98.2 (05-06-19 @ 10:58), Max: 98.9 (05-05-19 @ 18:34)  HR: 69 (05-06-19 @ 11:44) (61 - 72)  BP: 100/64 (05-06-19 @ 10:58) (91/58 - 149/82)  RR: 21 (05-06-19 @ 11:44) (17 - 24)  SpO2: 97% (05-06-19 @ 11:44) (96% - 100%)    PHYSICAL EXAM:  HEENT:   [ ]Tracheostomy:  [ ]Pupils equal  [ ]No oral lesions  [ ]Abnormal    SKIN  [ ]No Rash  [ ] Abnormal  [ ] pressure    CARDIAC  [ ]Regular  [ ]Abnormal    PULMONARY  [ ]Bilateral Clear Breath Sounds  [ ]Normal Excursion  [ ]Abnormal    GI  [ ]PEG      [ ] +BS		              [ ]Soft, nondistended, nontender	  [ ]Abnormal    MUSCULOSKELETAL                                   [ ]Bedbound                 [ ]Abnormal    [ ]Ambulatory/OOB to chair                           EXTREMITIES                                         [ ]Normal  [ ]Edema                           NEUROLOGIC  [ ] Normal, non focal  [ ] Focal findings:    PSYCHIATRIC  [ ]Alert and appropriate  [ ] Sedated	 [ ]Agitated    :  Bedoya: [ ] Yes, if yes: Date of Placement:                   [  ] No    LINES: Central Lines [ ] Yes, if yes: Date of Placement                                     [  ] No    HOSPITAL MEDICATIONS:  MEDICATIONS  (STANDING):  chlorhexidine 0.12% Liquid 15 milliLiter(s) Oral Mucosa two times a day  chlorhexidine 4% Liquid 1 Application(s) Topical <User Schedule>  diltiazem    Tablet 60 milliGRAM(s) Oral every 6 hours  doxazosin 8 milliGRAM(s) Oral at bedtime  enalapril 5 milliGRAM(s) Oral two times a day  enoxaparin Injectable 40 milliGRAM(s) SubCutaneous <User Schedule>  hydrALAZINE 100 milliGRAM(s) Oral every 8 hours  labetalol 400 milliGRAM(s) Oral every 8 hours  lactobacillus acidophilus 1 Tablet(s) Oral two times a day  pantoprazole   Suspension 40 milliGRAM(s) Oral daily    MEDICATIONS  (PRN):  acetaminophen   Tablet .. 650 milliGRAM(s) Oral every 6 hours PRN Temp greater or equal to 38C (100.4F), Mild Pain (1 - 3)  ondansetron Injectable 4 milliGRAM(s) IV Push every 6 hours PRN Nausea and/or Vomiting      LABS:                        11.4   14.0  )-----------( 407      ( 05 May 2019 07:23 )             34.7 Patient is a 63y old  Female who presents with a chief complaint of ICH (06 May 2019 09:51)    Interval Events:    No events overnight. Continues to tolerate TC ATC.     REVIEW OF SYSTEMS:  [ ] Positive  [X ] All other systems negative  [ ] Unable to assess ROS because ________    Vital Signs Last 24 Hrs  T(C): 36.8 (05-06-19 @ 10:58), Max: 37.2 (05-05-19 @ 18:34)  T(F): 98.2 (05-06-19 @ 10:58), Max: 98.9 (05-05-19 @ 18:34)  HR: 69 (05-06-19 @ 11:44) (61 - 72)  BP: 100/64 (05-06-19 @ 10:58) (91/58 - 149/82)  RR: 21 (05-06-19 @ 11:44) (17 - 24)  SpO2: 97% (05-06-19 @ 11:44) (96% - 100%)    PHYSICAL EXAM:  HEENT:   [ X]Tracheostomy:  #7 Cuffless Portex   [ X]Pupils equal  [ ]No oral lesions  [ ]Abnormal    SKIN  [X ]No Rash  [ ] Abnormal  [ ] pressure    CARDIAC  [ X]Regular  [ ]Abnormal    PULMONARY  [X ]Bilateral Clear Breath Sounds  [ ]Normal Excursion  [ ]Abnormal    GI  [ X]PEG      [X ] +BS		              [X ]Soft, nondistended, nontender	  [ ]Abnormal    MUSCULOSKELETAL                                   [ ]Bedbound                 [ ]Abnormal    [X ]Ambulatory/OOB to chair                           EXTREMITIES                                         [ X]Normal  [ ]Edema                           NEUROLOGIC  [ ] Normal, non focal  [ X] Focal findings:  more awake, following some commands     PSYCHIATRIC  [ ]Alert and appropriate  [ ] Sedated	 [ ]Agitated    :  Bedoya: [ ] Yes, if yes: Date of Placement:                   [X  ] No    LINES: Central Lines [ ] Yes, if yes: Date of Placement                                     [  X] No    HOSPITAL MEDICATIONS:  MEDICATIONS  (STANDING):  chlorhexidine 0.12% Liquid 15 milliLiter(s) Oral Mucosa two times a day  chlorhexidine 4% Liquid 1 Application(s) Topical <User Schedule>  diltiazem    Tablet 60 milliGRAM(s) Oral every 6 hours  doxazosin 8 milliGRAM(s) Oral at bedtime  enalapril 5 milliGRAM(s) Oral two times a day  enoxaparin Injectable 40 milliGRAM(s) SubCutaneous <User Schedule>  hydrALAZINE 100 milliGRAM(s) Oral every 8 hours  labetalol 400 milliGRAM(s) Oral every 8 hours  lactobacillus acidophilus 1 Tablet(s) Oral two times a day  pantoprazole   Suspension 40 milliGRAM(s) Oral daily    MEDICATIONS  (PRN):  acetaminophen   Tablet .. 650 milliGRAM(s) Oral every 6 hours PRN Temp greater or equal to 38C (100.4F), Mild Pain (1 - 3)  ondansetron Injectable 4 milliGRAM(s) IV Push every 6 hours PRN Nausea and/or Vomiting      LABS:                        11.4   14.0  )-----------( 407      ( 05 May 2019 07:23 )             34.7

## 2019-05-07 PROCEDURE — 99232 SBSQ HOSP IP/OBS MODERATE 35: CPT

## 2019-05-07 RX ORDER — ENOXAPARIN SODIUM 100 MG/ML
40 INJECTION SUBCUTANEOUS
Qty: 0 | Refills: 0 | Status: DISCONTINUED | OUTPATIENT
Start: 2019-05-07 | End: 2019-05-14

## 2019-05-07 RX ORDER — CHLORHEXIDINE GLUCONATE 213 G/1000ML
15 SOLUTION TOPICAL
Qty: 0 | Refills: 0 | Status: DISCONTINUED | OUTPATIENT
Start: 2019-05-07 | End: 2019-05-14

## 2019-05-07 RX ORDER — CHLORHEXIDINE GLUCONATE 213 G/1000ML
1 SOLUTION TOPICAL
Qty: 0 | Refills: 0 | Status: DISCONTINUED | OUTPATIENT
Start: 2019-05-07 | End: 2019-05-14

## 2019-05-07 RX ORDER — DILTIAZEM HCL 120 MG
30 CAPSULE, EXT RELEASE 24 HR ORAL EVERY 6 HOURS
Qty: 0 | Refills: 0 | Status: DISCONTINUED | OUTPATIENT
Start: 2019-05-07 | End: 2019-05-08

## 2019-05-07 RX ADMIN — Medication 400 MILLIGRAM(S): at 06:21

## 2019-05-07 RX ADMIN — Medication 500000 UNIT(S): at 01:27

## 2019-05-07 RX ADMIN — CHLORHEXIDINE GLUCONATE 15 MILLILITER(S): 213 SOLUTION TOPICAL at 06:23

## 2019-05-07 RX ADMIN — Medication 1 TABLET(S): at 06:21

## 2019-05-07 RX ADMIN — Medication 60 MILLIGRAM(S): at 06:20

## 2019-05-07 RX ADMIN — CHLORHEXIDINE GLUCONATE 1 APPLICATION(S): 213 SOLUTION TOPICAL at 22:30

## 2019-05-07 RX ADMIN — CHLORHEXIDINE GLUCONATE 15 MILLILITER(S): 213 SOLUTION TOPICAL at 18:00

## 2019-05-07 RX ADMIN — Medication 1 TABLET(S): at 17:45

## 2019-05-07 RX ADMIN — Medication 100 MILLIGRAM(S): at 22:39

## 2019-05-07 RX ADMIN — Medication 400 MILLIGRAM(S): at 22:38

## 2019-05-07 RX ADMIN — Medication 60 MILLIGRAM(S): at 01:24

## 2019-05-07 RX ADMIN — PANTOPRAZOLE SODIUM 40 MILLIGRAM(S): 20 TABLET, DELAYED RELEASE ORAL at 12:14

## 2019-05-07 RX ADMIN — Medication 5 MILLIGRAM(S): at 17:45

## 2019-05-07 RX ADMIN — Medication 500000 UNIT(S): at 12:18

## 2019-05-07 RX ADMIN — Medication 100 MILLIGRAM(S): at 14:40

## 2019-05-07 RX ADMIN — Medication 500000 UNIT(S): at 06:23

## 2019-05-07 RX ADMIN — ENOXAPARIN SODIUM 40 MILLIGRAM(S): 100 INJECTION SUBCUTANEOUS at 17:46

## 2019-05-07 RX ADMIN — Medication 5 MILLIGRAM(S): at 06:20

## 2019-05-07 RX ADMIN — Medication 400 MILLIGRAM(S): at 14:40

## 2019-05-07 RX ADMIN — Medication 500000 UNIT(S): at 17:44

## 2019-05-07 RX ADMIN — Medication 100 MILLIGRAM(S): at 06:20

## 2019-05-07 RX ADMIN — Medication 8 MILLIGRAM(S): at 22:38

## 2019-05-07 NOTE — PROGRESS NOTE ADULT - ASSESSMENT
HTN  labile   IF BP keeps droping then lower hydralazine     Afib  resolved  off a/c due to high bleed risk

## 2019-05-07 NOTE — PROGRESS NOTE ADULT - SUBJECTIVE AND OBJECTIVE BOX
Patient is a 63y old  Female who presents with a chief complaint of ICH (06 May 2019 09:51)    Interval Events:    No events overnight. Continues to tolerate TC ATC.     REVIEW OF SYSTEMS:  [ ] Positive  [X ] All other systems negative  [ ] Unable to assess ROS because ________    Vital Signs Last 24 Hrs  T(C): 36.8 (05-06-19 @ 10:58), Max: 37.2 (05-05-19 @ 18:34)  T(F): 98.2 (05-06-19 @ 10:58), Max: 98.9 (05-05-19 @ 18:34)  HR: 69 (05-06-19 @ 11:44) (61 - 72)  BP: 100/64 (05-06-19 @ 10:58) (91/58 - 149/82)  RR: 21 (05-06-19 @ 11:44) (17 - 24)  SpO2: 97% (05-06-19 @ 11:44) (96% - 100%)    PHYSICAL EXAM:  HEENT:   [ X]Tracheostomy:  #7 Cuffless Portex   TC 30%  [ X]Pupils equal  [ ]No oral lesions  [ ]Abnormal    SKIN  [X ]No Rash  [ ] Abnormal  [ ] pressure    CARDIAC  [ X]Regular  [ ]Abnormal    PULMONARY  [X ]Bilateral Clear Breath Sounds  [ ]Normal Excursion  [ ]Abnormal    GI  [ X]PEG      [X ] +BS		              [X ]Soft, nondistended, nontender	  [ ]Abnormal    MUSCULOSKELETAL                                   [ ]Bedbound                 [ ]Abnormal    [X ]Ambulatory/OOB to chair                           EXTREMITIES                                         [ X]Normal  [ ]Edema                           NEUROLOGIC  [ ] Normal, non focal  [ X] Focal findings:  more awake, following some commands  better in her language    PSYCHIATRIC  [ ]Alert and appropriate  [ ] Sedated	 [ ]Agitated    :  Bedoya: [ ] Yes, if yes: Date of Placement:                   [X  ] No    LINES: Central Lines [ ] Yes, if yes: Date of Placement                                     [  X] No    HOSPITAL MEDICATIONS:  MEDICATIONS  (STANDING):  chlorhexidine 0.12% Liquid 15 milliLiter(s) Oral Mucosa two times a day  chlorhexidine 4% Liquid 1 Application(s) Topical <User Schedule>  diltiazem    Tablet 60 milliGRAM(s) Oral every 6 hours  doxazosin 8 milliGRAM(s) Oral at bedtime  enalapril 5 milliGRAM(s) Oral two times a day  enoxaparin Injectable 40 milliGRAM(s) SubCutaneous <User Schedule>  hydrALAZINE 100 milliGRAM(s) Oral every 8 hours  labetalol 400 milliGRAM(s) Oral every 8 hours  lactobacillus acidophilus 1 Tablet(s) Oral two times a day  pantoprazole   Suspension 40 milliGRAM(s) Oral daily    MEDICATIONS  (PRN):  acetaminophen   Tablet .. 650 milliGRAM(s) Oral every 6 hours PRN Temp greater or equal to 38C (100.4F), Mild Pain (1 - 3)  ondansetron Injectable 4 milliGRAM(s) IV Push every 6 hours PRN Nausea and/or Vomiting      LABS:                        11.4   14.0  )-----------( 407      ( 05 May 2019 07:23 )             34.7

## 2019-05-07 NOTE — PROGRESS NOTE ADULT - ASSESSMENT
63 Year old female w/ PMHx HTN, HLD, Ischemic stroke (2004), who was transferred from OSH, Patient initially presented to the OSH from home with AMS as per EMS, pt was talking to a family member on the phone when she suddenly stopped talking. 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 Patient transferred to [facility] for Neuro Surgical assessment. Upon admission NIHSS = ; MRS = 2; ICH =3. Patient was given DDAVP and PLTs. During admission patient was found to have newly diagnosed A.fib and was initiated on Labetalol. Patient had Serial head CTs performed which remained unchanged, no neuro surgical intervention was performed. Patient was seen by Palliative Care family decided to proceed with Trach and PEG. Patient S/p Tracheostomy on 4/16 ( # 8 Cuffed Shiley) and PEG Placement on 4/18. During hospitalization patient found to have UTI ( 100 K E.coli ) for which she was treated with a course of Keflex ( Completed 4/21). On 4/23 patient febrile again, Patient found to have : Enterobacter / Burkholderia  growing in sputum     4/26: Patient remains afebrile. Sputum cx 4/23 growing Enterobacter/ Burkholderia, Will continue Meropenem. Patient tolerating TC during the day, will attempt TC ATC and obtain AM ABG. Patient remains with elevated BP will increase Labetalol 400 mg q 8 hrs.  4/27: Tolerated TC x24hrs. ABG reviewed and adequate to continue TC. Appears comfortable of TC. BP remains labile with elevations into 170s. Will add Enalapril. Spoke to son at bedside. Will finish ABx on 4/30.  4/29 Stable on TC ATC. Fevers, UA negative, on meropenem until 4/30 5/1 Stable, tolerating TC ATC. Following commands, continue on abx until 5/3 per ID.   5/2: DC planning in progress, likely to occur post antibiotics. Otherwise no new events. Patient doing well and medically stable.  5/3: Will complete antibiotics today. No other issues. Family reviewing rehab facilities.  5/4: No new events. Requested Sputum Culture to assess for bacterial clearance. Spoke with  and daughter at bedside who are researching rehab facilities.   5/7 Stable tolerating TC ATC. Repeat sputum cx 5/5 prelim w/GNR, ID following, will wait for final before deciding on further abx course if any, per Dr. Guevara. BP imporved, SBP 90 - 140. Per cardio, no AC 2/2 bleed.

## 2019-05-07 NOTE — PROGRESS NOTE ADULT - SUBJECTIVE AND OBJECTIVE BOX
Subjective: Patient seen and examined. No new events except as noted.     SUBJECTIVE/ROS:      MEDICATIONS:  MEDICATIONS  (STANDING):  chlorhexidine 0.12% Liquid 15 milliLiter(s) Oral Mucosa two times a day  chlorhexidine 4% Liquid 1 Application(s) Topical <User Schedule>  diltiazem    Tablet 60 milliGRAM(s) Oral every 6 hours  doxazosin 8 milliGRAM(s) Oral at bedtime  enalapril 5 milliGRAM(s) Oral two times a day  enoxaparin Injectable 40 milliGRAM(s) SubCutaneous <User Schedule>  hydrALAZINE 100 milliGRAM(s) Oral every 8 hours  labetalol 400 milliGRAM(s) Oral every 8 hours  lactobacillus acidophilus 1 Tablet(s) Oral two times a day  nystatin    Suspension [id number] Unit(s) Oral every 6 hours  pantoprazole   Suspension 40 milliGRAM(s) Oral daily      PHYSICAL EXAM:  T(C): 37.1 (05-07-19 @ 10:51), Max: 37.1 (05-07-19 @ 10:51)  HR: 59 (05-07-19 @ 10:51) (59 - 75)  BP: 98/60 (05-07-19 @ 10:51) (98/60 - 136/79)  RR: 18 (05-07-19 @ 10:51) (18 - 21)  SpO2: 96% (05-07-19 @ 10:51) (95% - 99%)  Wt(kg): --  I&O's Summary    06 May 2019 07:01  -  07 May 2019 07:00  --------------------------------------------------------  IN: 2325 mL / OUT: 1850 mL / NET: 475 mL            JVP: Normal  Neck: supple  Lung: clear   CV: S1 S2 , Murmur:  Abd: soft  Ext: No edema  neuro: Awake / alert  Psych: flat affect  Skin: normal``    LABS/DATA:    CARDIAC MARKERS:                  proBNP:   Lipid Profile:   HgA1c:   TSH:     TELE:  EKG:

## 2019-05-07 NOTE — PROGRESS NOTE ADULT - PROBLEM SELECTOR PLAN 3
Patient Afebrile since initiation of ABX   CXR 4/23: Clear Lungs   Sputum cx 4/23: Enterobacter Colace / Burkholderia Cepacia, on contact isolation  Both organisms Sensitive to Meropenem   Bld Cx 4/23: No growth, UA 4/23: Negative  S/p 10 day course of Meropenem, ended 5/3.   5/6 Repeat sputum cx 5/5 and 5/6 GNR - ID following

## 2019-05-07 NOTE — PROGRESS NOTE ADULT - ASSESSMENT
63 Year old female w/ PMHx HTN, HLD, Ischemic stroke (2004), who was transferred from OSH, Patient initially presented to the OSH from home with AMS.  Found to have ICH/stroke  Required Trach, PEG  Treated for suspected E coli UTI  4/23 found to have Burkholderia and Enterobacter in sputum culture (? tracheitis vs pna)  CXR clear  Burkholderia s/p treatment course (10 days farida)  Most recent CBC still with leukocytosis  Sputum cultures mixed, but most recently negative--mental status somewhat improved, no fevers  Overall, Burkholderia, fever, leukocytosis, sepsis, tracheitis  - Continue off antibiotics  - Monitor mental status/for fevers  - Trend WBCs  - Will sign off. Please call with further questions or change in status.    Wilbert Avendaño MD  Pager [phone]  After 5pm and on weekends call [phone]

## 2019-05-07 NOTE — PROGRESS NOTE ADULT - SUBJECTIVE AND OBJECTIVE BOX
CC: F/U for Tracheitis    Saw/spoke to patient. No fevers, no chills. Unchanged. Somewhat responsive to verbal.    Allergies  Allergy Status Unknown    ANTIMICROBIALS:  nystatin    Suspension [id number] every 6 hours    PE:    Vital Signs Last 24 Hrs  T(C): 37.1 (07 May 2019 10:51), Max: 37.1 (07 May 2019 10:51)  T(F): 98.7 (07 May 2019 10:51), Max: 98.7 (07 May 2019 10:51)  HR: 59 (07 May 2019 10:51) (59 - 75)  BP: 98/60 (07 May 2019 10:51) (98/60 - 136/79)  RR: 18 (07 May 2019 10:51) (18 - 20)  SpO2: 96% (07 May 2019 10:51) (95% - 99%)    Gen: AOx0-1, chronically ill appearing  CV: S1+S2 normal, nontachycardic  Resp: Clear bilat, no resp distress, no crackles/wheezes, trach  Abd: Soft, nontender, +BS, PEG  Ext: No LE edema, no wounds    LABS:    No new labs avail    MICROBIOLOGY:    .Sputum trap  05-06-19   Normal Respiratory Katia present   Few polymorphonuclear leukocytes per low power field  No Squamous epithelial cells per low power field  Few Gram Negative Rods per oil power field    .Sputum  05-05-19   Moderate Burkholderia cepacia complex    .Sputum trap received  04-23-19   Numerous Enterobacter cloacae  Numerous Burkholderia cepacia  Normal Respiratory Katia present  --  Enterobacter cloacae  Burkholderia cepacia    (otherwise reviewed)    RADIOLOGY:    4/24 AXR:    FINDINGS:  Gastrostomy tube partially visualized.  Multiple air-filled loops of bowel. Nonobstructive bowel gas pattern.  No evidence of intraperitoneal free air.  No acute osseous abnormality.    IMPRESSION: Nonobstructive bowel gas pattern.

## 2019-05-08 LAB
-  CEFTAZIDIME: SIGNIFICANT CHANGE UP
-  LEVOFLOXACIN: SIGNIFICANT CHANGE UP
-  MEROPENEM: SIGNIFICANT CHANGE UP
-  TRIMETHOPRIM/SULFAMETHOXAZOLE: SIGNIFICANT CHANGE UP
ANION GAP SERPL CALC-SCNC: 11 MMOL/L — SIGNIFICANT CHANGE UP (ref 5–17)
BUN SERPL-MCNC: 19 MG/DL — SIGNIFICANT CHANGE UP (ref 7–23)
CALCIUM SERPL-MCNC: 9 MG/DL — SIGNIFICANT CHANGE UP (ref 8.4–10.5)
CHLORIDE SERPL-SCNC: 97 MMOL/L — SIGNIFICANT CHANGE UP (ref 96–108)
CO2 SERPL-SCNC: 26 MMOL/L — SIGNIFICANT CHANGE UP (ref 22–31)
CREAT SERPL-MCNC: 0.45 MG/DL — LOW (ref 0.5–1.3)
CULTURE RESULTS: SIGNIFICANT CHANGE UP
GLUCOSE SERPL-MCNC: 106 MG/DL — HIGH (ref 70–99)
HCT VFR BLD CALC: 32.1 % — LOW (ref 34.5–45)
HGB BLD-MCNC: 10.5 G/DL — LOW (ref 11.5–15.5)
MAGNESIUM SERPL-MCNC: 2 MG/DL — SIGNIFICANT CHANGE UP (ref 1.6–2.6)
MCHC RBC-ENTMCNC: 32.5 PG — SIGNIFICANT CHANGE UP (ref 27–34)
MCHC RBC-ENTMCNC: 32.6 GM/DL — SIGNIFICANT CHANGE UP (ref 32–36)
MCV RBC AUTO: 99.7 FL — SIGNIFICANT CHANGE UP (ref 80–100)
METHOD TYPE: SIGNIFICANT CHANGE UP
ORGANISM # SPEC MICROSCOPIC CNT: SIGNIFICANT CHANGE UP
ORGANISM # SPEC MICROSCOPIC CNT: SIGNIFICANT CHANGE UP
PHOSPHATE SERPL-MCNC: 3.6 MG/DL — SIGNIFICANT CHANGE UP (ref 2.5–4.5)
PLATELET # BLD AUTO: 342 K/UL — SIGNIFICANT CHANGE UP (ref 150–400)
POTASSIUM SERPL-MCNC: 4.3 MMOL/L — SIGNIFICANT CHANGE UP (ref 3.5–5.3)
POTASSIUM SERPL-SCNC: 4.3 MMOL/L — SIGNIFICANT CHANGE UP (ref 3.5–5.3)
RBC # BLD: 3.22 M/UL — LOW (ref 3.8–5.2)
RBC # FLD: 13.1 % — SIGNIFICANT CHANGE UP (ref 10.3–14.5)
SODIUM SERPL-SCNC: 134 MMOL/L — LOW (ref 135–145)
SPECIMEN SOURCE: SIGNIFICANT CHANGE UP
WBC # BLD: 9.8 K/UL — SIGNIFICANT CHANGE UP (ref 3.8–10.5)
WBC # FLD AUTO: 9.8 K/UL — SIGNIFICANT CHANGE UP (ref 3.8–10.5)

## 2019-05-08 PROCEDURE — 99232 SBSQ HOSP IP/OBS MODERATE 35: CPT

## 2019-05-08 RX ADMIN — Medication 100 MILLIGRAM(S): at 23:13

## 2019-05-08 RX ADMIN — Medication 500000 UNIT(S): at 06:22

## 2019-05-08 RX ADMIN — Medication 500000 UNIT(S): at 23:13

## 2019-05-08 RX ADMIN — Medication 400 MILLIGRAM(S): at 23:13

## 2019-05-08 RX ADMIN — Medication 1 TABLET(S): at 06:22

## 2019-05-08 RX ADMIN — Medication 8 MILLIGRAM(S): at 23:13

## 2019-05-08 RX ADMIN — Medication 1 TABLET(S): at 17:31

## 2019-05-08 RX ADMIN — PANTOPRAZOLE SODIUM 40 MILLIGRAM(S): 20 TABLET, DELAYED RELEASE ORAL at 13:21

## 2019-05-08 RX ADMIN — Medication 30 MILLIGRAM(S): at 01:16

## 2019-05-08 RX ADMIN — Medication 30 MILLIGRAM(S): at 06:22

## 2019-05-08 RX ADMIN — CHLORHEXIDINE GLUCONATE 15 MILLILITER(S): 213 SOLUTION TOPICAL at 17:31

## 2019-05-08 RX ADMIN — ENOXAPARIN SODIUM 40 MILLIGRAM(S): 100 INJECTION SUBCUTANEOUS at 17:31

## 2019-05-08 RX ADMIN — Medication 5 MILLIGRAM(S): at 06:23

## 2019-05-08 RX ADMIN — Medication 500000 UNIT(S): at 01:13

## 2019-05-08 RX ADMIN — Medication 500000 UNIT(S): at 17:31

## 2019-05-08 RX ADMIN — Medication 100 MILLIGRAM(S): at 06:23

## 2019-05-08 RX ADMIN — Medication 400 MILLIGRAM(S): at 06:23

## 2019-05-08 RX ADMIN — Medication 5 MILLIGRAM(S): at 17:31

## 2019-05-08 RX ADMIN — Medication 500000 UNIT(S): at 13:21

## 2019-05-08 RX ADMIN — CHLORHEXIDINE GLUCONATE 1 APPLICATION(S): 213 SOLUTION TOPICAL at 23:13

## 2019-05-08 RX ADMIN — CHLORHEXIDINE GLUCONATE 15 MILLILITER(S): 213 SOLUTION TOPICAL at 06:22

## 2019-05-08 RX ADMIN — Medication 30 MILLIGRAM(S): at 01:14

## 2019-05-08 NOTE — PROGRESS NOTE ADULT - SUBJECTIVE AND OBJECTIVE BOX
Subjective: Patient seen and examined. No new events except as noted.     SUBJECTIVE/ROS:        MEDICATIONS:  MEDICATIONS  (STANDING):  chlorhexidine 0.12% Liquid 15 milliLiter(s) Oral Mucosa two times a day  chlorhexidine 4% Liquid 1 Application(s) Topical <User Schedule>  diltiazem    Tablet 30 milliGRAM(s) Oral every 6 hours  doxazosin 8 milliGRAM(s) Oral at bedtime  enalapril 5 milliGRAM(s) Oral two times a day  enoxaparin Injectable 40 milliGRAM(s) SubCutaneous <User Schedule>  hydrALAZINE 100 milliGRAM(s) Oral every 8 hours  labetalol 400 milliGRAM(s) Oral every 8 hours  lactobacillus acidophilus 1 Tablet(s) Oral two times a day  nystatin    Suspension [id number] Unit(s) Oral every 6 hours  pantoprazole   Suspension 40 milliGRAM(s) Oral daily      PHYSICAL EXAM:  T(C): 37.3 (05-08-19 @ 04:15), Max: 37.3 (05-08-19 @ 04:15)  HR: 62 (05-08-19 @ 04:41) (59 - 73)  BP: 112/67 (05-08-19 @ 04:15) (95/58 - 145/70)  RR: 19 (05-08-19 @ 04:41) (18 - 20)  SpO2: 96% (05-08-19 @ 04:41) (94% - 98%)  Wt(kg): --  I&O's Summary    07 May 2019 07:01  -  08 May 2019 07:00  --------------------------------------------------------  IN: 2225 mL / OUT: 1900 mL / NET: 325 mL            JVP: Normal  Neck: supple  Lung: clear   CV: S1 S2 , Murmur:  Abd: soft  Ext: No edema  neuro: Awake  Psych: flat affect  Skin: normal``    LABS/DATA:    CARDIAC MARKERS:                                10.5   9.8   )-----------( 342      ( 08 May 2019 07:00 )             32.1     05-08    134<L>  |  97  |  19  ----------------------------<  106<H>  4.3   |  26  |  0.45<L>    Ca    9.0      08 May 2019 06:58  Phos  3.6     05-08  Mg     2.0     05-08      proBNP:   Lipid Profile:   HgA1c:   TSH:     TELE:  EKG:

## 2019-05-08 NOTE — PROGRESS NOTE ADULT - SUBJECTIVE AND OBJECTIVE BOX
Patient is a 63y old  Female who presents with a chief complaint of ICH (07 May 2019 11:13)      Interval Events:    REVIEW OF SYSTEMS:  [ ] Positive  [ ] All other systems negative  [ ] Unable to assess ROS because ________    Vital Signs Last 24 Hrs  T(C): 37.3 (05-08-19 @ 04:15), Max: 37.3 (05-08-19 @ 04:15)  T(F): 99.1 (05-08-19 @ 04:15), Max: 99.1 (05-08-19 @ 04:15)  HR: 62 (05-08-19 @ 04:41) (59 - 73)  BP: 112/67 (05-08-19 @ 04:15) (95/58 - 145/70)  RR: 19 (05-08-19 @ 04:41) (18 - 20)  SpO2: 96% (05-08-19 @ 04:41) (94% - 98%)    PHYSICAL EXAM:  HEENT:   [ ]Tracheostomy:  [ ]Pupils equal  [ ]No oral lesions  [ ]Abnormal    SKIN  [ ]No Rash  [ ] Abnormal  [ ] pressure    CARDIAC  [ ]Regular  [ ]Abnormal    PULMONARY  [ ]Bilateral Clear Breath Sounds  [ ]Normal Excursion  [ ]Abnormal    GI  [ ]PEG      [ ] +BS		              [ ]Soft, nondistended, nontender	  [ ]Abnormal    MUSCULOSKELETAL                                   [ ]Bedbound                 [ ]Abnormal    [ ]Ambulatory/OOB to chair                           EXTREMITIES                                         [ ]Normal  [ ]Edema                           NEUROLOGIC  [ ] Normal, non focal  [ ] Focal findings:    PSYCHIATRIC  [ ]Alert and appropriate  [ ] Sedated	 [ ]Agitated    :  Bedoya: [ ] Yes, if yes: Date of Placement:                   [  ] No    LINES: Central Lines [ ] Yes, if yes: Date of Placement                                     [  ] No    HOSPITAL MEDICATIONS:  MEDICATIONS  (STANDING):  chlorhexidine 0.12% Liquid 15 milliLiter(s) Oral Mucosa two times a day  chlorhexidine 4% Liquid 1 Application(s) Topical <User Schedule>  diltiazem    Tablet 30 milliGRAM(s) Oral every 6 hours  doxazosin 8 milliGRAM(s) Oral at bedtime  enalapril 5 milliGRAM(s) Oral two times a day  enoxaparin Injectable 40 milliGRAM(s) SubCutaneous <User Schedule>  hydrALAZINE 100 milliGRAM(s) Oral every 8 hours  labetalol 400 milliGRAM(s) Oral every 8 hours  lactobacillus acidophilus 1 Tablet(s) Oral two times a day  nystatin    Suspension [id number] Unit(s) Oral every 6 hours  pantoprazole   Suspension 40 milliGRAM(s) Oral daily    MEDICATIONS  (PRN):  acetaminophen   Tablet .. 650 milliGRAM(s) Oral every 6 hours PRN Temp greater or equal to 38C (100.4F), Mild Pain (1 - 3)  ondansetron Injectable 4 milliGRAM(s) IV Push every 6 hours PRN Nausea and/or Vomiting      LABS: Patient is a 63y old  Female who presents with a chief complaint of ICH (07 May 2019 11:13)    Interval Events:    No events overnight.  Tolerating TC ATC.     REVIEW OF SYSTEMS:  [ ] Positive  [ ] All other systems negative  [X ] Unable to assess ROS because non-verbal, but follows some commands     Vital Signs Last 24 Hrs  T(C): 37.3 (05-08-19 @ 04:15), Max: 37.3 (05-08-19 @ 04:15)  T(F): 99.1 (05-08-19 @ 04:15), Max: 99.1 (05-08-19 @ 04:15)  HR: 62 (05-08-19 @ 04:41) (59 - 73)  BP: 112/67 (05-08-19 @ 04:15) (95/58 - 145/70)  RR: 19 (05-08-19 @ 04:41) (18 - 20)  SpO2: 96% (05-08-19 @ 04:41) (94% - 98%)    PHYSICAL EXAM:  HEENT:   [ X]Tracheostomy:  #7 Cuffless Portex  [ ]Pupils equal  [ ]No oral lesions  [ ]Abnormal    SKIN  [X ]No Rash  [ ] Abnormal  [ ] pressure    CARDIAC  [ X]Regular  [ ]Abnormal    PULMONARY  [ X]Bilateral Clear Breath Sounds  [ ]Normal Excursion  [ ]Abnormal    GI  [X ]PEG      [ X] +BS		              [X ]Soft, nondistended, nontender	  [ ]Abnormal    MUSCULOSKELETAL                                   [ X]Bedbound                 [ ]Abnormal    [ ]Ambulatory/OOB to chair                           EXTREMITIES                                         [X ]Normal  [ ]Edema                           NEUROLOGIC  [X ] Normal, non focal  [ ] Focal findings:    PSYCHIATRIC  [ ]Alert and appropriate  [ ] Sedated	 [ ]Agitated    :  Bedoya: [ ] Yes, if yes: Date of Placement:                   [ X ] No    LINES: Central Lines [ ] Yes, if yes: Date of Placement                                     [  X] No    HOSPITAL MEDICATIONS:  MEDICATIONS  (STANDING):  chlorhexidine 0.12% Liquid 15 milliLiter(s) Oral Mucosa two times a day  chlorhexidine 4% Liquid 1 Application(s) Topical <User Schedule>  diltiazem    Tablet 30 milliGRAM(s) Oral every 6 hours  doxazosin 8 milliGRAM(s) Oral at bedtime  enalapril 5 milliGRAM(s) Oral two times a day  enoxaparin Injectable 40 milliGRAM(s) SubCutaneous <User Schedule>  hydrALAZINE 100 milliGRAM(s) Oral every 8 hours  labetalol 400 milliGRAM(s) Oral every 8 hours  lactobacillus acidophilus 1 Tablet(s) Oral two times a day  nystatin    Suspension 185922 Unit(s) Oral every 6 hours  pantoprazole   Suspension 40 milliGRAM(s) Oral daily    MEDICATIONS  (PRN):  acetaminophen   Tablet .. 650 milliGRAM(s) Oral every 6 hours PRN Temp greater or equal to 38C (100.4F), Mild Pain (1 - 3)  ondansetron Injectable 4 milliGRAM(s) IV Push every 6 hours PRN Nausea and/or Vomiting      LABS:

## 2019-05-08 NOTE — PROGRESS NOTE ADULT - ASSESSMENT
63 Year old female w/ PMHx HTN, HLD, Ischemic stroke (2004), who was transferred from OSH, Patient initially presented to the OSH from home with AMS as per EMS, pt was talking to a family member on the phone when she suddenly stopped talking. 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 Patient transferred to [facility] for Neuro Surgical assessment. Upon admission NIHSS = ; MRS = 2; ICH =3. Patient was given DDAVP and PLTs. During admission patient was found to have newly diagnosed A.fib and was initiated on Labetalol. Patient had Serial head CTs performed which remained unchanged, no neuro surgical intervention was performed. Patient was seen by Palliative Care family decided to proceed with Trach and PEG. Patient S/p Tracheostomy on 4/16 ( # 8 Cuffed Shiley) and PEG Placement on 4/18. During hospitalization patient found to have UTI ( 100 K E.coli ) for which she was treated with a course of Keflex ( Completed 4/21). On 4/23 patient febrile again, Patient found to have : Enterobacter / Burkholderia  growing in sputum     4/26: Patient remains afebrile. Sputum cx 4/23 growing Enterobacter/ Burkholderia, Will continue Meropenem. Patient tolerating TC during the day, will attempt TC ATC and obtain AM ABG. Patient remains with elevated BP will increase Labetalol 400 mg q 8 hrs.  4/27: Tolerated TC x24hrs. ABG reviewed and adequate to continue TC. Appears comfortable of TC. BP remains labile with elevations into 170s. Will add Enalapril. Spoke to son at bedside. Will finish ABx on 4/30.  4/29 Stable on TC ATC. Fevers, UA negative, on meropenem until 4/30 5/1 Stable, tolerating TC ATC. Following commands, continue on abx until 5/3 per ID.   5/2: DC planning in progress, likely to occur post antibiotics. Otherwise no new events. Patient doing well and medically stable.  5/3: Will complete antibiotics today. No other issues. Family reviewing rehab facilities.  5/4: No new events. Requested Sputum Culture to assess for bacterial clearance. Spoke with  and daughter at bedside who are researching rehab facilities.   5/7 Stable tolerating TC ATC. Repeat sputum cx 5/5 prelim w/GNR, ID following, will wait for final before deciding on further abx course if any, per Dr. Guevara. BP imporved, SBP 90 - 140. Per cardio, no AC 2/2 bleed. 63 Year old female w/ PMHx HTN, HLD, Ischemic stroke (2004), who was transferred from OSH, Patient initially presented to the OSH from home with AMS as per EMS, pt was talking to a family member on the phone when she suddenly stopped talking. 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 Patient transferred to [facility] for Neuro Surgical assessment. Upon admission NIHSS = ; MRS = 2; ICH =3. Patient was given DDAVP and PLTs. During admission patient was found to have newly diagnosed A.fib and was initiated on Labetalol. Patient had Serial head CTs performed which remained unchanged, no neuro surgical intervention was performed. Patient was seen by Palliative Care family decided to proceed with Trach and PEG. Patient S/p Tracheostomy on 4/16 ( # 8 Cuffed Shiley) and PEG Placement on 4/18. During hospitalization patient found to have UTI ( 100 K E.coli ) for which she was treated with a course of Keflex ( Completed 4/21). On 4/23 patient febrile again, Patient found to have : Enterobacter / Burkholderia  growing in sputum     4/26: Patient remains afebrile. Sputum cx 4/23 growing Enterobacter/ Burkholderia, Will continue Meropenem. Patient tolerating TC during the day, will attempt TC ATC and obtain AM ABG. Patient remains with elevated BP will increase Labetalol 400 mg q 8 hrs.  4/27: Tolerated TC x24hrs. ABG reviewed and adequate to continue TC. Appears comfortable of TC. BP remains labile with elevations into 170s. Will add Enalapril. Spoke to son at bedside. Will finish ABx on 4/30.  4/29 Stable on TC ATC. Fevers, UA negative, on meropenem until 4/30 5/1 Stable, tolerating TC ATC. Following commands, continue on abx until 5/3 per ID.   5/2: DC planning in progress, likely to occur post antibiotics. Otherwise no new events. Patient doing well and medically stable.  5/3: Will complete antibiotics today. No other issues. Family reviewing rehab facilities.  5/4: No new events. Requested Sputum Culture to assess for bacterial clearance. Spoke with  and daughter at bedside who are researching rehab facilities.   5/7 Stable tolerating TC ATC. Repeat sputum cx 5/5 prelim w/GNR, ID following, will wait for final before deciding on further abx course if any, per Dr. Guevara. BP imporved, SBP 90 - 140. Per cardio, no AC 2/2 bleed.   5/8 Stable, TC ATC. Cardizem d/c 2/2 low BP, HR. More lethargic today. IF does not improve, will send for [facility] r/o acute process.

## 2019-05-08 NOTE — PROGRESS NOTE ADULT - PROBLEM SELECTOR PLAN 4
Newly Diagnosed A.FIB   Labetalol increased to 400 mg q 8 hr   Continue Cardizem 60 mg q 6 hrs   Monitor HR, No AC given 2/2 recent ICH Newly Diagnosed A.FIB   Labetalol increased to 400 mg q 8 hr   Cardizem d/c 2/2 low HR and BP   Not on AC for new Afib given recent ICH

## 2019-05-09 PROCEDURE — 99232 SBSQ HOSP IP/OBS MODERATE 35: CPT

## 2019-05-09 RX ADMIN — Medication 5 MILLIGRAM(S): at 18:04

## 2019-05-09 RX ADMIN — ENOXAPARIN SODIUM 40 MILLIGRAM(S): 100 INJECTION SUBCUTANEOUS at 18:03

## 2019-05-09 RX ADMIN — Medication 1 TABLET(S): at 18:04

## 2019-05-09 RX ADMIN — Medication 400 MILLIGRAM(S): at 12:57

## 2019-05-09 RX ADMIN — Medication 500000 UNIT(S): at 12:56

## 2019-05-09 RX ADMIN — PANTOPRAZOLE SODIUM 40 MILLIGRAM(S): 20 TABLET, DELAYED RELEASE ORAL at 12:58

## 2019-05-09 RX ADMIN — CHLORHEXIDINE GLUCONATE 15 MILLILITER(S): 213 SOLUTION TOPICAL at 18:03

## 2019-05-09 RX ADMIN — Medication 500000 UNIT(S): at 18:03

## 2019-05-09 RX ADMIN — Medication 1 TABLET(S): at 06:11

## 2019-05-09 RX ADMIN — Medication 400 MILLIGRAM(S): at 06:11

## 2019-05-09 RX ADMIN — Medication 100 MILLIGRAM(S): at 12:57

## 2019-05-09 RX ADMIN — Medication 500000 UNIT(S): at 06:11

## 2019-05-09 RX ADMIN — CHLORHEXIDINE GLUCONATE 15 MILLILITER(S): 213 SOLUTION TOPICAL at 06:11

## 2019-05-09 RX ADMIN — Medication 5 MILLIGRAM(S): at 06:11

## 2019-05-09 RX ADMIN — Medication 100 MILLIGRAM(S): at 06:11

## 2019-05-09 NOTE — PROGRESS NOTE ADULT - ASSESSMENT
63 Year old female w/ PMHx HTN, HLD, Ischemic stroke (2004), who was transferred from OSH, Patient initially presented to the OSH from home with AMS as per EMS, pt was talking to a family member on the phone when she suddenly stopped talking. 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 Patient transferred to [facility] for Neuro Surgical assessment. Upon admission NIHSS = ; MRS = 2; ICH =3. Patient was given DDAVP and PLTs. During admission patient was found to have newly diagnosed A.fib and was initiated on Labetalol. Patient had Serial head CTs performed which remained unchanged, no neuro surgical intervention was performed. Patient was seen by Palliative Care family decided to proceed with Trach and PEG. Patient S/p Tracheostomy on 4/16 ( # 8 Cuffed Shiley) and PEG Placement on 4/18. During hospitalization patient found to have UTI ( 100 K E.coli ) for which she was treated with a course of Keflex ( Completed 4/21). On 4/23 patient febrile again, Patient found to have : Enterobacter / Burkholderia  growing in sputum     4/26: Patient remains afebrile. Sputum cx 4/23 growing Enterobacter/ Burkholderia, Will continue Meropenem. Patient tolerating TC during the day, will attempt TC ATC and obtain AM ABG. Patient remains with elevated BP will increase Labetalol 400 mg q 8 hrs.  4/27: Tolerated TC x24hrs. ABG reviewed and adequate to continue TC. Appears comfortable of TC. BP remains labile with elevations into 170s. Will add Enalapril. Spoke to son at bedside. Will finish ABx on 4/30.  4/29 Stable on TC ATC. Fevers, UA negative, on meropenem until 4/30 5/1 Stable, tolerating TC ATC. Following commands, continue on abx until 5/3 per ID.   5/2: DC planning in progress, likely to occur post antibiotics. Otherwise no new events. Patient doing well and medically stable.  5/3: Will complete antibiotics today. No other issues. Family reviewing rehab facilities.  5/4: No new events. Requested Sputum Culture to assess for bacterial clearance. Spoke with  and daughter at bedside who are researching rehab facilities.   5/7 Stable tolerating TC ATC. Repeat sputum cx 5/5 prelim w/GNR, ID following, will wait for final before deciding on further abx course if any, per Dr. Guevara. BP imporved, SBP 90 - 140. Per cardio, no AC 2/2 bleed.   5/8 Stable, TC ATC. Cardizem d/c 2/2 low BP, HR. More lethargic today. IF does not improve, will send for [facility] r/o acute process.  5/9: Sputum culture x 2 still growing Burkholderia. Spoke with ID and infection control, as growth appears to be colonization without other signs of infection no further treatment is recommended. However patient will still need to maintain contact isolation status. Patient observed awake and following commands upon husbands request. DC planning in progress.

## 2019-05-09 NOTE — PROGRESS NOTE ADULT - SUBJECTIVE AND OBJECTIVE BOX
Patient is a 63y old  Female who presents with a chief complaint of ICH (09 May 2019 07:44)      Interval Events:    REVIEW OF SYSTEMS:  [ ] Positive  [ ] All other systems negative  [ ] Unable to assess ROS because ________    Vital Signs Last 24 Hrs  T(C): 37.1 (05-09-19 @ 05:08), Max: 37.4 (05-08-19 @ 20:54)  T(F): 98.8 (05-09-19 @ 05:08), Max: 99.3 (05-08-19 @ 20:54)  HR: 67 (05-09-19 @ 05:08) (60 - 74)  BP: 151/78 (05-09-19 @ 05:08) (92/56 - 151/78)  RR: 19 (05-09-19 @ 05:08) (18 - 21)  SpO2: 97% (05-09-19 @ 05:08) (94% - 98%)    PHYSICAL EXAM:  HEENT:   [ ]Tracheostomy:  [ ]Pupils equal  [ ]No oral lesions  [ ]Abnormal    SKIN  [ ]No Rash  [ ] Abnormal  [ ] pressure    CARDIAC  [ ]Regular  [ ]Abnormal    PULMONARY  [ ]Bilateral Clear Breath Sounds  [ ]Normal Excursion  [ ]Abnormal    GI  [ ]PEG      [ ] +BS		              [ ]Soft, nondistended, nontender	  [ ]Abnormal    MUSCULOSKELETAL                                   [ ]Bedbound                 [ ]Abnormal    [ ]Ambulatory/OOB to chair                           EXTREMITIES                                         [ ]Normal  [ ]Edema                           NEUROLOGIC  [ ] Normal, non focal  [ ] Focal findings:    PSYCHIATRIC  [ ]Alert and appropriate  [ ] Sedated	 [ ]Agitated    :  Bedoya: [ ] Yes, if yes: Date of Placement:                   [  ] No    LINES: Central Lines [ ] Yes, if yes: Date of Placement                                     [  ] No    HOSPITAL MEDICATIONS:  MEDICATIONS  (STANDING):  chlorhexidine 0.12% Liquid 15 milliLiter(s) Oral Mucosa two times a day  chlorhexidine 4% Liquid 1 Application(s) Topical <User Schedule>  doxazosin 8 milliGRAM(s) Oral at bedtime  enalapril 5 milliGRAM(s) Oral two times a day  enoxaparin Injectable 40 milliGRAM(s) SubCutaneous <User Schedule>  hydrALAZINE 100 milliGRAM(s) Oral every 8 hours  labetalol 400 milliGRAM(s) Oral every 8 hours  lactobacillus acidophilus 1 Tablet(s) Oral two times a day  nystatin    Suspension 942209 Unit(s) Oral every 6 hours  pantoprazole   Suspension 40 milliGRAM(s) Oral daily    MEDICATIONS  (PRN):  acetaminophen   Tablet .. 650 milliGRAM(s) Oral every 6 hours PRN Temp greater or equal to 38C (100.4F), Mild Pain (1 - 3)  ondansetron Injectable 4 milliGRAM(s) IV Push every 6 hours PRN Nausea and/or Vomiting      LABS:                        10.5   9.8   )-----------( 342      ( 08 May 2019 07:00 )             32.1     05-08    134<L>  |  97  |  19  ----------------------------<  106<H>  4.3   |  26  |  0.45<L>    Ca    9.0      08 May 2019 06:58  Phos  3.6     05-08  Mg     2.0     05-08              CAPILLARY BLOOD GLUCOSE    MICROBIOLOGY:     RADIOLOGY:  [ ] Reviewed and interpreted by me Patient is a 63y old  Female who presents with a chief complaint of ICH.........F/u respiratory failure      Interval Events: No events reported over night    REVIEW OF SYSTEMS:  [ ] Positive  [ ] All other systems negative  [X ] Unable to assess ROS because ___non-verbal    Vital Signs Last 24 Hrs  T(C): 37.1 (05-09-19 @ 05:08), Max: 37.4 (05-08-19 @ 20:54)  T(F): 98.8 (05-09-19 @ 05:08), Max: 99.3 (05-08-19 @ 20:54)  HR: 67 (05-09-19 @ 05:08) (60 - 74)  BP: 151/78 (05-09-19 @ 05:08) (92/56 - 151/78)  RR: 19 (05-09-19 @ 05:08) (18 - 21)  SpO2: 97% (05-09-19 @ 05:08) (94% - 98%)      PHYSICAL EXAM:  HEENT:   [x]Tracheostomy: #7 Cuffless Portex  [x] Pupils equal  [ ]No oral lesions  [ ]Abnormal    SKIN  [x]No Rash  [ ] Abnormal  [ ] pressure    CARDIAC  [x]Regular  [ ]Abnormal    PULMONARY  [x]Bilateral Clear Breath Sounds  [ ]Normal Excursion  [ ]Abnormal    GI  [x]PEG      [x] +BS		              [x]Soft, nondistended, nontender	  [ ]Abnormal    MUSCULOSKELETAL                                   [ ]Bedbound                 [ ]Abnormal    [x]OOB to chair                           EXTREMITIES                                         [x]Normal  [ ]Edema                           NEUROLOGIC  [ ] Normal, non focal  [x] Focal findings: arousable with persistence; appears to respond more when  speaks to her in original language; follows commands occasionally with RUE/RLL; +Left hemiplegia.    PSYCHIATRIC  [X]Alert  [ ] Sedated	 [ ]Agitated    :  Bedoya: [ ] Yes, if yes: Date of Placement:                   [x] No Straight Cath ATC     LINES: Central Lines [ ] Yes, if yes: Date of Placement                                     [x] No      HOSPITAL MEDICATIONS:  MEDICATIONS  (STANDING):  chlorhexidine 0.12% Liquid 15 milliLiter(s) Oral Mucosa two times a day  chlorhexidine 4% Liquid 1 Application(s) Topical <User Schedule>  doxazosin 8 milliGRAM(s) Oral at bedtime  enalapril 5 milliGRAM(s) Oral two times a day  enoxaparin Injectable 40 milliGRAM(s) SubCutaneous <User Schedule>  hydrALAZINE 100 milliGRAM(s) Oral every 8 hours  labetalol 400 milliGRAM(s) Oral every 8 hours  lactobacillus acidophilus 1 Tablet(s) Oral two times a day  nystatin    Suspension 830037 Unit(s) Oral every 6 hours  pantoprazole   Suspension 40 milliGRAM(s) Oral daily    MEDICATIONS  (PRN):  acetaminophen   Tablet .. 650 milliGRAM(s) Oral every 6 hours PRN Temp greater or equal to 38C (100.4F), Mild Pain (1 - 3)  ondansetron Injectable 4 milliGRAM(s) IV Push every 6 hours PRN Nausea and/or Vomiting      LABS:                        10.5   9.8   )-----------( 342      ( 08 May 2019 07:00 )             32.1     05-08    134<L>  |  97  |  19  ----------------------------<  106<H>  4.3   |  26  |  0.45<L>    Ca    9.0      08 May 2019 06:58  Phos  3.6     05-08  Mg     2.0     05-08              CAPILLARY BLOOD GLUCOSE    MICROBIOLOGY:     RADIOLOGY:  [ ] Reviewed and interpreted by me

## 2019-05-09 NOTE — PROGRESS NOTE ADULT - PROBLEM SELECTOR PLAN 2
Patient S/p Tracheostomy 4/16  ( # 8 Cuffed Shiley )   TC ATC since 4/28, trahc down-sized to #7 Cuffless Portex on 5/1  Continue Nebs prn / Suctioning PRN / Chest PT Patient S/p Tracheostomy 4/16  ( # 8 Cuffed Danna )   TC ATC since 4/28, down-sized to #7 Cuffless Portex on 5/1  Continue Nebs prn / Suctioning PRN

## 2019-05-09 NOTE — PROGRESS NOTE ADULT - SUBJECTIVE AND OBJECTIVE BOX
Subjective: Patient seen and examined. No new events except as noted.     SUBJECTIVE/ROS:      MEDICATIONS:  MEDICATIONS  (STANDING):  chlorhexidine 0.12% Liquid 15 milliLiter(s) Oral Mucosa two times a day  chlorhexidine 4% Liquid 1 Application(s) Topical <User Schedule>  doxazosin 8 milliGRAM(s) Oral at bedtime  enalapril 5 milliGRAM(s) Oral two times a day  enoxaparin Injectable 40 milliGRAM(s) SubCutaneous <User Schedule>  hydrALAZINE 100 milliGRAM(s) Oral every 8 hours  labetalol 400 milliGRAM(s) Oral every 8 hours  lactobacillus acidophilus 1 Tablet(s) Oral two times a day  nystatin    Suspension 112909 Unit(s) Oral every 6 hours  pantoprazole   Suspension 40 milliGRAM(s) Oral daily      PHYSICAL EXAM:  T(C): 37.1 (05-09-19 @ 05:08), Max: 37.4 (05-08-19 @ 20:54)  HR: 67 (05-09-19 @ 05:08) (60 - 74)  BP: 151/78 (05-09-19 @ 05:08) (92/56 - 151/78)  RR: 19 (05-09-19 @ 05:08) (18 - 21)  SpO2: 97% (05-09-19 @ 05:08) (94% - 98%)  Wt(kg): --  I&O's Summary    08 May 2019 07:01  -  09 May 2019 07:00  --------------------------------------------------------  IN: 1775 mL / OUT: 1800 mL / NET: -25 mL            JVP: Normal  Neck: supple  Lung: clear   CV: S1 S2 , Murmur:  Abd: soft  Ext: No edema  neuro: Awake / alert  Psych: flat affect  Skin: normal``    LABS/DATA:    CARDIAC MARKERS:                                10.5   9.8   )-----------( 342      ( 08 May 2019 07:00 )             32.1     05-08    134<L>  |  97  |  19  ----------------------------<  106<H>  4.3   |  26  |  0.45<L>    Ca    9.0      08 May 2019 06:58  Phos  3.6     05-08  Mg     2.0     05-08      proBNP:   Lipid Profile:   HgA1c:   TSH:     TELE:  EKG:

## 2019-05-09 NOTE — PROGRESS NOTE ADULT - PROBLEM SELECTOR PLAN 3
Patient Afebrile since initiation of ABX   CXR 4/23: Clear Lungs   Sputum cx 4/23: Enterobacter Colace / Burkholderia Cepacia, on contact isolation  Both organisms Sensitive to Meropenem   Bld Cx 4/23: No growth, UA 4/23: Negative  S/p 10 day course of Meropenem, ended 5/3.   5/6 Repeat sputum cx 5/5 and 5/6 GNR - ID following CXR 4/23: Clear Lungs   Sputum cx 4/23: Enterobacter Colace / Burkholderia Cepacia, on contact isolation  Both organisms Sensitive to Meropenem   Bld Cx 4/23: No growth, UA 4/23: Negative  S/p 10 day course of Meropenem, ended 5/3.   Repeat sputum cx 5/5 and 5/6 remain positive for Burkholderia. Per ID and infection control, likely a colonizer which does not need further treatment but will need to remain on contact isolation.

## 2019-05-09 NOTE — PROGRESS NOTE ADULT - PROBLEM SELECTOR PLAN 4
Newly Diagnosed A.FIB   Labetalol increased to 400 mg q 8 hr   Cardizem d/c 2/2 low HR and BP   Not on AC for new Afib given recent ICH

## 2019-05-10 LAB
HCT VFR BLD CALC: 32.2 % — LOW (ref 34.5–45)
HGB BLD-MCNC: 10.9 G/DL — LOW (ref 11.5–15.5)
MCHC RBC-ENTMCNC: 33.5 PG — SIGNIFICANT CHANGE UP (ref 27–34)
MCHC RBC-ENTMCNC: 33.9 GM/DL — SIGNIFICANT CHANGE UP (ref 32–36)
MCV RBC AUTO: 99 FL — SIGNIFICANT CHANGE UP (ref 80–100)
PHOSPHATE SERPL-MCNC: 3.5 MG/DL — SIGNIFICANT CHANGE UP (ref 2.5–4.5)
PLATELET # BLD AUTO: 303 K/UL — SIGNIFICANT CHANGE UP (ref 150–400)
RBC # BLD: 3.25 M/UL — LOW (ref 3.8–5.2)
RBC # FLD: 13 % — SIGNIFICANT CHANGE UP (ref 10.3–14.5)
WBC # BLD: 10.7 K/UL — HIGH (ref 3.8–10.5)
WBC # FLD AUTO: 10.7 K/UL — HIGH (ref 3.8–10.5)

## 2019-05-10 PROCEDURE — 99232 SBSQ HOSP IP/OBS MODERATE 35: CPT

## 2019-05-10 RX ADMIN — Medication 500000 UNIT(S): at 00:51

## 2019-05-10 RX ADMIN — Medication 500000 UNIT(S): at 07:00

## 2019-05-10 RX ADMIN — Medication 400 MILLIGRAM(S): at 07:00

## 2019-05-10 RX ADMIN — Medication 100 MILLIGRAM(S): at 22:08

## 2019-05-10 RX ADMIN — Medication 400 MILLIGRAM(S): at 12:29

## 2019-05-10 RX ADMIN — Medication 100 MILLIGRAM(S): at 00:51

## 2019-05-10 RX ADMIN — Medication 8 MILLIGRAM(S): at 22:07

## 2019-05-10 RX ADMIN — Medication 400 MILLIGRAM(S): at 00:51

## 2019-05-10 RX ADMIN — CHLORHEXIDINE GLUCONATE 1 APPLICATION(S): 213 SOLUTION TOPICAL at 00:52

## 2019-05-10 RX ADMIN — Medication 8 MILLIGRAM(S): at 00:51

## 2019-05-10 RX ADMIN — CHLORHEXIDINE GLUCONATE 1 APPLICATION(S): 213 SOLUTION TOPICAL at 22:07

## 2019-05-10 RX ADMIN — Medication 500000 UNIT(S): at 12:26

## 2019-05-10 RX ADMIN — Medication 100 MILLIGRAM(S): at 07:00

## 2019-05-10 RX ADMIN — Medication 1 TABLET(S): at 17:25

## 2019-05-10 RX ADMIN — ENOXAPARIN SODIUM 40 MILLIGRAM(S): 100 INJECTION SUBCUTANEOUS at 17:25

## 2019-05-10 RX ADMIN — Medication 400 MILLIGRAM(S): at 22:09

## 2019-05-10 RX ADMIN — PANTOPRAZOLE SODIUM 40 MILLIGRAM(S): 20 TABLET, DELAYED RELEASE ORAL at 12:26

## 2019-05-10 RX ADMIN — Medication 5 MILLIGRAM(S): at 07:00

## 2019-05-10 RX ADMIN — Medication 5 MILLIGRAM(S): at 17:28

## 2019-05-10 RX ADMIN — Medication 100 MILLIGRAM(S): at 12:29

## 2019-05-10 RX ADMIN — CHLORHEXIDINE GLUCONATE 15 MILLILITER(S): 213 SOLUTION TOPICAL at 07:00

## 2019-05-10 RX ADMIN — Medication 500000 UNIT(S): at 17:25

## 2019-05-10 RX ADMIN — Medication 1 TABLET(S): at 07:00

## 2019-05-10 RX ADMIN — CHLORHEXIDINE GLUCONATE 15 MILLILITER(S): 213 SOLUTION TOPICAL at 17:25

## 2019-05-10 NOTE — PROGRESS NOTE ADULT - SUBJECTIVE AND OBJECTIVE BOX
Patient is a 63y old  Female who presents with a chief complaint of ICH (09 May 2019 07:48)      Interval Events:    REVIEW OF SYSTEMS:  [ ] Positive  [ ] All other systems negative  [ ] Unable to assess ROS because ________    Vital Signs Last 24 Hrs  T(C): 37 (05-10-19 @ 04:31), Max: 37.2 (05-09-19 @ 21:30)  T(F): 98.6 (05-10-19 @ 04:31), Max: 99 (05-09-19 @ 21:30)  HR: 66 (05-10-19 @ 09:20) (65 - 82)  BP: 148/81 (05-10-19 @ 04:31) (112/66 - 148/81)  RR: 20 (05-10-19 @ 09:20) (17 - 21)  SpO2: 98% (05-10-19 @ 09:20) (92% - 98%)    PHYSICAL EXAM:  HEENT:   [ ]Tracheostomy:  [ ]Pupils equal  [ ]No oral lesions  [ ]Abnormal    SKIN  [ ]No Rash  [ ] Abnormal  [ ] pressure    CARDIAC  [ ]Regular  [ ]Abnormal    PULMONARY  [ ]Bilateral Clear Breath Sounds  [ ]Normal Excursion  [ ]Abnormal    GI  [ ]PEG      [ ] +BS		              [ ]Soft, nondistended, nontender	  [ ]Abnormal    MUSCULOSKELETAL                                   [ ]Bedbound                 [ ]Abnormal    [ ]Ambulatory/OOB to chair                           EXTREMITIES                                         [ ]Normal  [ ]Edema                           NEUROLOGIC  [ ] Normal, non focal  [ ] Focal findings:    PSYCHIATRIC  [ ]Alert and appropriate  [ ] Sedated	 [ ]Agitated    :  Bedoya: [ ] Yes, if yes: Date of Placement:                   [  ] No    LINES: Central Lines [ ] Yes, if yes: Date of Placement                                     [  ] No    HOSPITAL MEDICATIONS:  MEDICATIONS  (STANDING):  chlorhexidine 0.12% Liquid 15 milliLiter(s) Oral Mucosa two times a day  chlorhexidine 4% Liquid 1 Application(s) Topical <User Schedule>  doxazosin 8 milliGRAM(s) Oral at bedtime  enalapril 5 milliGRAM(s) Oral two times a day  enoxaparin Injectable 40 milliGRAM(s) SubCutaneous <User Schedule>  hydrALAZINE 100 milliGRAM(s) Oral every 8 hours  labetalol 400 milliGRAM(s) Oral every 8 hours  lactobacillus acidophilus 1 Tablet(s) Oral two times a day  nystatin    Suspension 948588 Unit(s) Oral every 6 hours  pantoprazole   Suspension 40 milliGRAM(s) Oral daily    MEDICATIONS  (PRN):  acetaminophen   Tablet .. 650 milliGRAM(s) Oral every 6 hours PRN Temp greater or equal to 38C (100.4F), Mild Pain (1 - 3)  ondansetron Injectable 4 milliGRAM(s) IV Push every 6 hours PRN Nausea and/or Vomiting      LABS:                        10.9   10.7  )-----------( 303      ( 10 May 2019 07:22 )             32.2       Phos  3.5     05-10              CAPILLARY BLOOD GLUCOSE    MICROBIOLOGY:     RADIOLOGY:  [ ] Reviewed and interpreted by me Patient is a 63y old  Female who presents with a chief complaint of ICH........f/u respiratory failure      Interval Events: No events reported over night.    REVIEW OF SYSTEMS:  [ ] Positive  [ ] All other systems negative  [X ] Unable to assess ROS because ___non-verbal    Vital Signs Last 24 Hrs  T(C): 37 (05-10-19 @ 04:31), Max: 37.2 (05-09-19 @ 21:30)  T(F): 98.6 (05-10-19 @ 04:31), Max: 99 (05-09-19 @ 21:30)  HR: 66 (05-10-19 @ 09:20) (65 - 82)  BP: 148/81 (05-10-19 @ 04:31) (112/66 - 148/81)  RR: 20 (05-10-19 @ 09:20) (17 - 21)  SpO2: 98% (05-10-19 @ 09:20) (92% - 98%)      PHYSICAL EXAM:  HEENT:   [x]Tracheostomy: #7 Cuffless Portex  [x] Pupils equal  [ ]No oral lesions  [ ]Abnormal    SKIN  [x]No Rash  [ ] Abnormal  [ ] pressure    CARDIAC  [x]Regular  [ ]Abnormal    PULMONARY  [x]Bilateral Clear Breath Sounds  [ ]Normal Excursion  [ ]Abnormal    GI  [x]PEG      [x] +BS		              [x]Soft, nondistended, nontender	  [ ]Abnormal    MUSCULOSKELETAL                                   [ ]Bedbound                 [ ]Abnormal    [x]OOB to chair                           EXTREMITIES                                         [x]Normal  [ ]Edema                           NEUROLOGIC  [ ] Normal, non focal  [x] Focal findings: arousable with persistence; appears to respond more when  speaks to her in original language; follows commands occasionally with RUE/RLL; +Left hemiplegia.    PSYCHIATRIC  [X]Alert  [ ] Sedated	 [ ]Agitated    :  Bedoya: [ ] Yes, if yes: Date of Placement:                   [x] No Straight Cath ATC     LINES: Central Lines [ ] Yes, if yes: Date of Placement                                     [x] No                HOSPITAL MEDICATIONS:  MEDICATIONS  (STANDING):  chlorhexidine 0.12% Liquid 15 milliLiter(s) Oral Mucosa two times a day  chlorhexidine 4% Liquid 1 Application(s) Topical <User Schedule>  doxazosin 8 milliGRAM(s) Oral at bedtime  enalapril 5 milliGRAM(s) Oral two times a day  enoxaparin Injectable 40 milliGRAM(s) SubCutaneous <User Schedule>  hydrALAZINE 100 milliGRAM(s) Oral every 8 hours  labetalol 400 milliGRAM(s) Oral every 8 hours  lactobacillus acidophilus 1 Tablet(s) Oral two times a day  nystatin    Suspension 634362 Unit(s) Oral every 6 hours  pantoprazole   Suspension 40 milliGRAM(s) Oral daily    MEDICATIONS  (PRN):  acetaminophen   Tablet .. 650 milliGRAM(s) Oral every 6 hours PRN Temp greater or equal to 38C (100.4F), Mild Pain (1 - 3)  ondansetron Injectable 4 milliGRAM(s) IV Push every 6 hours PRN Nausea and/or Vomiting      LABS:                        10.9   10.7  )-----------( 303      ( 10 May 2019 07:22 )             32.2       Phos  3.5     05-10            CAPILLARY BLOOD GLUCOSE    MICROBIOLOGY:     RADIOLOGY:  [ ] Reviewed and interpreted by me

## 2019-05-10 NOTE — PROGRESS NOTE ADULT - SUBJECTIVE AND OBJECTIVE BOX
Subjective: Patient seen and examined. No new events except as noted.     SUBJECTIVE/ROS:        MEDICATIONS:  MEDICATIONS  (STANDING):  chlorhexidine 0.12% Liquid 15 milliLiter(s) Oral Mucosa two times a day  chlorhexidine 4% Liquid 1 Application(s) Topical <User Schedule>  doxazosin 8 milliGRAM(s) Oral at bedtime  enalapril 5 milliGRAM(s) Oral two times a day  enoxaparin Injectable 40 milliGRAM(s) SubCutaneous <User Schedule>  hydrALAZINE 100 milliGRAM(s) Oral every 8 hours  labetalol 400 milliGRAM(s) Oral every 8 hours  lactobacillus acidophilus 1 Tablet(s) Oral two times a day  nystatin    Suspension 368943 Unit(s) Oral every 6 hours  pantoprazole   Suspension 40 milliGRAM(s) Oral daily      PHYSICAL EXAM:  T(C): 37 (05-10-19 @ 04:31), Max: 37.2 (05-09-19 @ 21:30)  HR: 65 (05-10-19 @ 05:01) (65 - 82)  BP: 148/81 (05-10-19 @ 04:31) (112/66 - 148/81)  RR: 20 (05-10-19 @ 05:01) (17 - 21)  SpO2: 97% (05-10-19 @ 05:01) (92% - 98%)  Wt(kg): --  I&O's Summary    09 May 2019 07:01  -  10 May 2019 07:00  --------------------------------------------------------  IN: 2130 mL / OUT: 2300 mL / NET: -170 mL            JVP: Normal  Neck: supple  Lung: clear   CV: S1 S2 , Murmur:  Abd: soft  Ext: No edema  neuro: Awake   Psych: flat affect  Skin: normal``    LABS/DATA:    CARDIAC MARKERS:                                10.9   10.7  )-----------( 303      ( 10 May 2019 07:22 )             32.2       Phos  3.5     05-10      proBNP:   Lipid Profile:   HgA1c:   TSH:     TELE:  EKG:

## 2019-05-10 NOTE — PROGRESS NOTE ADULT - ASSESSMENT
63 Year old female w/ PMHx HTN, HLD, Ischemic stroke (2004), who was transferred from OSH, Patient initially presented to the OSH from home with AMS as per EMS, pt was talking to a family member on the phone when she suddenly stopped talking. 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 Patient transferred to [facility] for Neuro Surgical assessment. Upon admission NIHSS = ; MRS = 2; ICH =3. Patient was given DDAVP and PLTs. During admission patient was found to have newly diagnosed A.fib and was initiated on Labetalol. Patient had Serial head CTs performed which remained unchanged, no neuro surgical intervention was performed. Patient was seen by Palliative Care family decided to proceed with Trach and PEG. Patient S/p Tracheostomy on 4/16 ( # 8 Cuffed Shiley) and PEG Placement on 4/18. During hospitalization patient found to have UTI ( 100 K E.coli ) for which she was treated with a course of Keflex ( Completed 4/21). On 4/23 patient febrile again, Patient found to have : Enterobacter / Burkholderia  growing in sputum     4/26: Patient remains afebrile. Sputum cx 4/23 growing Enterobacter/ Burkholderia, Will continue Meropenem. Patient tolerating TC during the day, will attempt TC ATC and obtain AM ABG. Patient remains with elevated BP will increase Labetalol 400 mg q 8 hrs.  4/27: Tolerated TC x24hrs. ABG reviewed and adequate to continue TC. Appears comfortable of TC. BP remains labile with elevations into 170s. Will add Enalapril. Spoke to son at bedside. Will finish ABx on 4/30.  4/29 Stable on TC ATC. Fevers, UA negative, on meropenem until 4/30 5/1 Stable, tolerating TC ATC. Following commands, continue on abx until 5/3 per ID.   5/2: DC planning in progress, likely to occur post antibiotics. Otherwise no new events. Patient doing well and medically stable.  5/3: Will complete antibiotics today. No other issues. Family reviewing rehab facilities.  5/4: No new events. Requested Sputum Culture to assess for bacterial clearance. Spoke with  and daughter at bedside who are researching rehab facilities.   5/7 Stable tolerating TC ATC. Repeat sputum cx 5/5 prelim w/GNR, ID following, will wait for final before deciding on further abx course if any, per Dr. Guevara. BP imporved, SBP 90 - 140. Per cardio, no AC 2/2 bleed.   5/8 Stable, TC ATC. Cardizem d/c 2/2 low BP, HR. More lethargic today. IF does not improve, will send for [facility] r/o acute process.  5/9: Sputum culture x 2 still growing Burkholderia. Spoke with ID and infection control, as growth appears to be colonization without other signs of infection no further treatment is recommended. However patient will still need to maintain contact isolation status. Patient observed awake and following commands upon husbands request. DC planning in progress.  5/10: No new events. Spoke with patient's  at bedside and son via phone in regards to DC planning and facility options.

## 2019-05-10 NOTE — PROGRESS NOTE ADULT - PROBLEM SELECTOR PLAN 2
Patient S/p Tracheostomy 4/16  ( # 8 Cuffed Danna )   TC ATC since 4/28, down-sized to #7 Cuffless Portex on 5/1  Continue Nebs prn / Suctioning PRN

## 2019-05-10 NOTE — CHART NOTE - NSCHARTNOTEFT_GEN_A_CORE
Nutrition Follow Up Note  Patient seen for: Nutrition follow up    Pt is a 63 year old F admitted with AMS found to have pontine hemorrhage S/P trach  and PEG placement . Pt currently off antibiotics, discharge planning in progress. No reports of GI intolerance noted.     Source: RN, comprehensive chart review    Diet: Jevity 1.5 at 75ml/hr x 18 hrs + No Carb Prosource 1x/day. Provides: 2085kcal, 101g protein. 1350ml    EN provision: (per nursing flow sheet):   (5/10): 150ml (thus far)  (): 1200ml (89% of goal)  (): 1725ml (? 128% of goal)  (): 1125ml (83% of goal)  (): 1275ml (94% of goal)     Stool Count:   (5/10) x 2  () x 1  () x 3  () x 3  () x 1      Daily Weight in k.9 () 72.4 ()   % Weight Change: Wt appears stable.     Pertinent Medications: MEDICATIONS  (STANDING):  chlorhexidine 0.12% Liquid 15 milliLiter(s) Oral Mucosa two times a day  chlorhexidine 4% Liquid 1 Application(s) Topical <User Schedule>  doxazosin 8 milliGRAM(s) Oral at bedtime  enalapril 5 milliGRAM(s) Oral two times a day  enoxaparin Injectable 40 milliGRAM(s) SubCutaneous <User Schedule>  hydrALAZINE 100 milliGRAM(s) Oral every 8 hours  labetalol 400 milliGRAM(s) Oral every 8 hours  lactobacillus acidophilus 1 Tablet(s) Oral two times a day  nystatin    Suspension 106940 Unit(s) Oral every 6 hours  pantoprazole   Suspension 40 milliGRAM(s) Oral daily    MEDICATIONS  (PRN):  acetaminophen   Tablet .. 650 milliGRAM(s) Oral every 6 hours PRN Temp greater or equal to 38C (100.4F), Mild Pain (1 - 3)  ondansetron Injectable 4 milliGRAM(s) IV Push every 6 hours PRN Nausea and/or Vomiting    Pertinent Labs: 05-10 @ 07:19: Na --, BUN --, Cr --, BG --, K+ --, Phos 3.5, Mg --, Alk Phos --, ALT/SGPT --, AST/SGOT --, HbA1c --    Finger Sticks:      Skin per nursing documentation: No pressure ulcers noted  Edema: 2+ left arm, right arm    Estimated Needs:   [x] no change since previous assessment  [ ] recalculated:     Previous Nutrition Diagnosis: Increased nutrient needs  Nutrition Diagnosis is: Ongoing, addressed     Interventions:     Recommend  1) Continue Jevity 1.5 at goal rate 75ml/hr x 18 hrs + no carb prosource 1x daily.  To provide: 1350ml, 2085kcal, 101g protein. Provides: (29kcal/kg, based on dosing wt 73) and (1.8g protein/kg, based on upper IBW 57.6kg).   2) Continue Tolu Active twice daily as orderd  3) Monitor GI tolerance, BM's. RD to adjust EN formulary, volume/rate PRN.     Monitoring and Evaluation:     Continue to monitor Nutritional intake, Tolerance to diet prescription, weights, labs, skin integrity    RD remains available upon request and will follow up per protocol

## 2019-05-10 NOTE — PROGRESS NOTE ADULT - PROBLEM SELECTOR PLAN 3
CXR 4/23: Clear Lungs   Sputum cx 4/23: Enterobacter Colace / Burkholderia Cepacia, on contact isolation  Both organisms Sensitive to Meropenem   Bld Cx 4/23: No growth, UA 4/23: Negative  S/p 10 day course of Meropenem, ended 5/3.   Repeat sputum cx 5/5 and 5/6 remain positive for Burkholderia. Per ID and infection control, likely a colonizer which does not need further treatment but will need to remain on contact isolation.

## 2019-05-11 LAB
ANION GAP SERPL CALC-SCNC: 8 MMOL/L — SIGNIFICANT CHANGE UP (ref 5–17)
BUN SERPL-MCNC: 23 MG/DL — SIGNIFICANT CHANGE UP (ref 7–23)
CALCIUM SERPL-MCNC: 9.3 MG/DL — SIGNIFICANT CHANGE UP (ref 8.4–10.5)
CHLORIDE SERPL-SCNC: 105 MMOL/L — SIGNIFICANT CHANGE UP (ref 96–108)
CO2 SERPL-SCNC: 28 MMOL/L — SIGNIFICANT CHANGE UP (ref 22–31)
CREAT SERPL-MCNC: 0.44 MG/DL — LOW (ref 0.5–1.3)
GLUCOSE SERPL-MCNC: 104 MG/DL — HIGH (ref 70–99)
HCT VFR BLD CALC: 32.3 % — LOW (ref 34.5–45)
HGB BLD-MCNC: 10.8 G/DL — LOW (ref 11.5–15.5)
MAGNESIUM SERPL-MCNC: 2.1 MG/DL — SIGNIFICANT CHANGE UP (ref 1.6–2.6)
MCHC RBC-ENTMCNC: 33.1 PG — SIGNIFICANT CHANGE UP (ref 27–34)
MCHC RBC-ENTMCNC: 33.3 GM/DL — SIGNIFICANT CHANGE UP (ref 32–36)
MCV RBC AUTO: 99.4 FL — SIGNIFICANT CHANGE UP (ref 80–100)
PHOSPHATE SERPL-MCNC: 3.8 MG/DL — SIGNIFICANT CHANGE UP (ref 2.5–4.5)
PLATELET # BLD AUTO: 280 K/UL — SIGNIFICANT CHANGE UP (ref 150–400)
POTASSIUM SERPL-MCNC: 4.6 MMOL/L — SIGNIFICANT CHANGE UP (ref 3.5–5.3)
POTASSIUM SERPL-SCNC: 4.6 MMOL/L — SIGNIFICANT CHANGE UP (ref 3.5–5.3)
RBC # BLD: 3.25 M/UL — LOW (ref 3.8–5.2)
RBC # FLD: 12.8 % — SIGNIFICANT CHANGE UP (ref 10.3–14.5)
SODIUM SERPL-SCNC: 141 MMOL/L — SIGNIFICANT CHANGE UP (ref 135–145)
WBC # BLD: 10.5 K/UL — SIGNIFICANT CHANGE UP (ref 3.8–10.5)
WBC # FLD AUTO: 10.5 K/UL — SIGNIFICANT CHANGE UP (ref 3.8–10.5)

## 2019-05-11 PROCEDURE — 99233 SBSQ HOSP IP/OBS HIGH 50: CPT | Mod: GC

## 2019-05-11 RX ADMIN — Medication 500000 UNIT(S): at 05:31

## 2019-05-11 RX ADMIN — Medication 8 MILLIGRAM(S): at 22:32

## 2019-05-11 RX ADMIN — CHLORHEXIDINE GLUCONATE 1 APPLICATION(S): 213 SOLUTION TOPICAL at 22:35

## 2019-05-11 RX ADMIN — Medication 400 MILLIGRAM(S): at 05:31

## 2019-05-11 RX ADMIN — CHLORHEXIDINE GLUCONATE 15 MILLILITER(S): 213 SOLUTION TOPICAL at 05:31

## 2019-05-11 RX ADMIN — Medication 500000 UNIT(S): at 11:37

## 2019-05-11 RX ADMIN — CHLORHEXIDINE GLUCONATE 15 MILLILITER(S): 213 SOLUTION TOPICAL at 17:54

## 2019-05-11 RX ADMIN — Medication 5 MILLIGRAM(S): at 05:32

## 2019-05-11 RX ADMIN — Medication 500000 UNIT(S): at 02:14

## 2019-05-11 RX ADMIN — Medication 500000 UNIT(S): at 17:54

## 2019-05-11 RX ADMIN — PANTOPRAZOLE SODIUM 40 MILLIGRAM(S): 20 TABLET, DELAYED RELEASE ORAL at 11:37

## 2019-05-11 RX ADMIN — Medication 1 TABLET(S): at 17:54

## 2019-05-11 RX ADMIN — ENOXAPARIN SODIUM 40 MILLIGRAM(S): 100 INJECTION SUBCUTANEOUS at 17:54

## 2019-05-11 RX ADMIN — Medication 100 MILLIGRAM(S): at 22:33

## 2019-05-11 RX ADMIN — Medication 400 MILLIGRAM(S): at 22:33

## 2019-05-11 RX ADMIN — Medication 100 MILLIGRAM(S): at 13:47

## 2019-05-11 RX ADMIN — Medication 400 MILLIGRAM(S): at 13:47

## 2019-05-11 RX ADMIN — Medication 1 TABLET(S): at 05:32

## 2019-05-11 RX ADMIN — Medication 5 MILLIGRAM(S): at 17:54

## 2019-05-11 RX ADMIN — Medication 100 MILLIGRAM(S): at 05:32

## 2019-05-11 NOTE — PROGRESS NOTE ADULT - SUBJECTIVE AND OBJECTIVE BOX
Subjective: Patient seen and examined. No new events except as noted.     SUBJECTIVE/ROS:        MEDICATIONS:  MEDICATIONS  (STANDING):  chlorhexidine 0.12% Liquid 15 milliLiter(s) Oral Mucosa two times a day  chlorhexidine 4% Liquid 1 Application(s) Topical <User Schedule>  doxazosin 8 milliGRAM(s) Oral at bedtime  enalapril 5 milliGRAM(s) Oral two times a day  enoxaparin Injectable 40 milliGRAM(s) SubCutaneous <User Schedule>  hydrALAZINE 100 milliGRAM(s) Oral every 8 hours  labetalol 400 milliGRAM(s) Oral every 8 hours  lactobacillus acidophilus 1 Tablet(s) Oral two times a day  nystatin    Suspension 388512 Unit(s) Oral every 6 hours  pantoprazole   Suspension 40 milliGRAM(s) Oral daily      PHYSICAL EXAM:  T(C): 37 (05-11-19 @ 04:52), Max: 37 (05-10-19 @ 17:00)  HR: 66 (05-11-19 @ 09:35) (63 - 73)  BP: 156/75 (05-11-19 @ 04:52) (122/63 - 156/75)  RR: 20 (05-11-19 @ 09:35) (14 - 21)  SpO2: 98% (05-11-19 @ 09:35) (94% - 98%)  Wt(kg): --  I&O's Summary    10 May 2019 07:01  -  11 May 2019 07:00  --------------------------------------------------------  IN: 2180 mL / OUT: 2000 mL / NET: 180 mL            JVP: Normal  Neck: supple  Lung: clear   CV: S1 S2 , Murmur:  Abd: soft  Ext: No edema  neuro: Awake /  Psych: flat affect  Skin: normal``    LABS/DATA:    CARDIAC MARKERS:                                10.8   10.5  )-----------( 280      ( 11 May 2019 07:14 )             32.3       Phos  3.8     05-11      proBNP:   Lipid Profile:   HgA1c:   TSH:     TELE:  EKG:

## 2019-05-11 NOTE — PROGRESS NOTE ADULT - ASSESSMENT
63 Year old female w/ PMHx HTN, HLD, Ischemic stroke (2004), who was transferred from OSH, Patient initially presented to the OSH from home with AMS as per EMS, pt was talking to a family member on the phone when she suddenly stopped talking. 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 Patient transferred to [facility] for Neuro Surgical assessment. Upon admission NIHSS = ; MRS = 2; ICH =3. Patient was given DDAVP and PLTs. During admission patient was found to have newly diagnosed A.fib and was initiated on Labetalol. Patient had Serial head CTs performed which remained unchanged, no neuro surgical intervention was performed. Patient was seen by Palliative Care family decided to proceed with Trach and PEG. Patient S/p Tracheostomy on 4/16 ( # 8 Cuffed Shiley) and PEG Placement on 4/18. During hospitalization patient found to have UTI ( 100 K E.coli ) for which she was treated with a course of Keflex ( Completed 4/21). On 4/23 patient febrile again, Patient found to have : Enterobacter / Burkholderia  growing in sputum     4/26: Patient remains afebrile. Sputum cx 4/23 growing Enterobacter/ Burkholderia, Will continue Meropenem. Patient tolerating TC during the day, will attempt TC ATC and obtain AM ABG. Patient remains with elevated BP will increase Labetalol 400 mg q 8 hrs.  4/27: Tolerated TC x24hrs. ABG reviewed and adequate to continue TC. Appears comfortable of TC. BP remains labile with elevations into 170s. Will add Enalapril. Spoke to son at bedside. Will finish ABx on 4/30.  4/29 Stable on TC ATC. Fevers, UA negative, on meropenem until 4/30 5/1 Stable, tolerating TC ATC. Following commands, continue on abx until 5/3 per ID.   5/2: DC planning in progress, likely to occur post antibiotics. Otherwise no new events. Patient doing well and medically stable.  5/3: Will complete antibiotics today. No other issues. Family reviewing rehab facilities.  5/4: No new events. Requested Sputum Culture to assess for bacterial clearance. Spoke with  and daughter at bedside who are researching rehab facilities.   5/7 Stable tolerating TC ATC. Repeat sputum cx 5/5 prelim w/GNR, ID following, will wait for final before deciding on further abx course if any, per Dr. Guevara. BP imporved, SBP 90 - 140. Per cardio, no AC 2/2 bleed.   5/8 Stable, TC ATC. Cardizem d/c 2/2 low BP, HR. More lethargic today. IF does not improve, will send for [facility] r/o acute process.  5/9: Sputum culture x 2 still growing Burkholderia. Spoke with ID and infection control, as growth appears to be colonization without other signs of infection no further treatment is recommended. However patient will still need to maintain contact isolation status. Patient observed awake and following commands upon husbands request. DC planning in progress.  5/10: No new events. Spoke with patient's  at bedside and son via phone in regards to DC planning and facility options.  5/11: Medically stable. Awaiting family choices for Rehab placement.

## 2019-05-11 NOTE — PROGRESS NOTE ADULT - PROBLEM SELECTOR PLAN 5
SBP Goal 100- 160 Per Neuro Sx  Labetalol 400 mg q 8 hrs    Continue Hydralazine 100 mg q 8 hrs   Enalapril 5 mg BID

## 2019-05-11 NOTE — PROGRESS NOTE ADULT - SUBJECTIVE AND OBJECTIVE BOX
Patient is a 63y old  Female who presents with a chief complaint of ICH (10 May 2019 09:40)      Interval Events:    REVIEW OF SYSTEMS:  [ ] Positive  [ ] All other systems negative  [ ] Unable to assess ROS because ________    Vital Signs Last 24 Hrs  T(C): 37 (05-11-19 @ 04:52), Max: 37 (05-10-19 @ 17:00)  T(F): 98.6 (05-11-19 @ 04:52), Max: 98.6 (05-10-19 @ 17:00)  HR: 63 (05-11-19 @ 04:52) (63 - 73)  BP: 156/75 (05-11-19 @ 04:52) (122/63 - 156/75)  RR: 18 (05-11-19 @ 04:52) (14 - 21)  SpO2: 96% (05-11-19 @ 04:52) (94% - 98%)    PHYSICAL EXAM:  HEENT:   [ ]Tracheostomy:  [ ]Pupils equal  [ ]No oral lesions  [ ]Abnormal    SKIN  [ ]No Rash  [ ] Abnormal  [ ] pressure    CARDIAC  [ ]Regular  [ ]Abnormal    PULMONARY  [ ]Bilateral Clear Breath Sounds  [ ]Normal Excursion  [ ]Abnormal    GI  [ ]PEG      [ ] +BS		              [ ]Soft, nondistended, nontender	  [ ]Abnormal    MUSCULOSKELETAL                                   [ ]Bedbound                 [ ]Abnormal    [ ]Ambulatory/OOB to chair                           EXTREMITIES                                         [ ]Normal  [ ]Edema                           NEUROLOGIC  [ ] Normal, non focal  [ ] Focal findings:    PSYCHIATRIC  [ ]Alert and appropriate  [ ] Sedated	 [ ]Agitated    :  Bedoya: [ ] Yes, if yes: Date of Placement:                   [  ] No    LINES: Central Lines [ ] Yes, if yes: Date of Placement                                     [  ] No    HOSPITAL MEDICATIONS:  MEDICATIONS  (STANDING):  chlorhexidine 0.12% Liquid 15 milliLiter(s) Oral Mucosa two times a day  chlorhexidine 4% Liquid 1 Application(s) Topical <User Schedule>  doxazosin 8 milliGRAM(s) Oral at bedtime  enalapril 5 milliGRAM(s) Oral two times a day  enoxaparin Injectable 40 milliGRAM(s) SubCutaneous <User Schedule>  hydrALAZINE 100 milliGRAM(s) Oral every 8 hours  labetalol 400 milliGRAM(s) Oral every 8 hours  lactobacillus acidophilus 1 Tablet(s) Oral two times a day  nystatin    Suspension 039213 Unit(s) Oral every 6 hours  pantoprazole   Suspension 40 milliGRAM(s) Oral daily    MEDICATIONS  (PRN):  acetaminophen   Tablet .. 650 milliGRAM(s) Oral every 6 hours PRN Temp greater or equal to 38C (100.4F), Mild Pain (1 - 3)  ondansetron Injectable 4 milliGRAM(s) IV Push every 6 hours PRN Nausea and/or Vomiting      LABS:                        10.9   10.7  )-----------( 303      ( 10 May 2019 07:22 )             32.2       Phos  3.5     05-10              CAPILLARY BLOOD GLUCOSE    MICROBIOLOGY:     RADIOLOGY:  [ ] Reviewed and interpreted by me Patient is a 63y old  Female who presents with a chief complaint of ICH........f/u respiratory failure      Interval Events: No events reported over night    REVIEW OF SYSTEMS:  [ ] Positive  [ ] All other systems negative  [X ] Unable to assess ROS because ____non-verbal    Vital Signs Last 24 Hrs  T(C): 37 (05-11-19 @ 04:52), Max: 37 (05-10-19 @ 17:00)  T(F): 98.6 (05-11-19 @ 04:52), Max: 98.6 (05-10-19 @ 17:00)  HR: 63 (05-11-19 @ 04:52) (63 - 73)  BP: 156/75 (05-11-19 @ 04:52) (122/63 - 156/75)  RR: 18 (05-11-19 @ 04:52) (14 - 21)  SpO2: 96% (05-11-19 @ 04:52) (94% - 98%)        PHYSICAL EXAM:  HEENT:   [x]Tracheostomy: #7 Cuffless Portex  [x] Pupils equal  [ ]No oral lesions  [ ]Abnormal    SKIN  [x]No Rash  [ ] Abnormal  [ ] pressure    CARDIAC  [x]Regular  [ ]Abnormal    PULMONARY  [x]Bilateral Clear Breath Sounds  [ ]Normal Excursion  [ ]Abnormal    GI  [x]PEG      [x] +BS		              [x]Soft, nondistended, nontender	  [ ]Abnormal    MUSCULOSKELETAL                                   [ ]Bedbound                 [ ]Abnormal    [x]OOB to chair                           EXTREMITIES                                         [x]Normal  [ ]Edema                           NEUROLOGIC  [ ] Normal, non focal  [x] Focal findings: Arousable; appears to respond more when  speaks to her in original language; follows commands occasionally with RUE/RLL; +Left hemiplegia.    PSYCHIATRIC  [X]Alert  [ ] Sedated	 [ ]Agitated    :  Bedoya: [ ] Yes, if yes: Date of Placement:                   [x] No Straight Cath ATC     LINES: Central Lines [ ] Yes, if yes: Date of Placement                                     [x] No            HOSPITAL MEDICATIONS:  MEDICATIONS  (STANDING):  chlorhexidine 0.12% Liquid 15 milliLiter(s) Oral Mucosa two times a day  chlorhexidine 4% Liquid 1 Application(s) Topical <User Schedule>  doxazosin 8 milliGRAM(s) Oral at bedtime  enalapril 5 milliGRAM(s) Oral two times a day  enoxaparin Injectable 40 milliGRAM(s) SubCutaneous <User Schedule>  hydrALAZINE 100 milliGRAM(s) Oral every 8 hours  labetalol 400 milliGRAM(s) Oral every 8 hours  lactobacillus acidophilus 1 Tablet(s) Oral two times a day  nystatin    Suspension 584221 Unit(s) Oral every 6 hours  pantoprazole   Suspension 40 milliGRAM(s) Oral daily    MEDICATIONS  (PRN):  acetaminophen   Tablet .. 650 milliGRAM(s) Oral every 6 hours PRN Temp greater or equal to 38C (100.4F), Mild Pain (1 - 3)  ondansetron Injectable 4 milliGRAM(s) IV Push every 6 hours PRN Nausea and/or Vomiting      LABS:                        10.9   10.7  )-----------( 303      ( 10 May 2019 07:22 )             32.2       Phos  3.5     05-10              CAPILLARY BLOOD GLUCOSE    MICROBIOLOGY:     RADIOLOGY:  [ ] Reviewed and interpreted by me

## 2019-05-12 PROCEDURE — 71045 X-RAY EXAM CHEST 1 VIEW: CPT | Mod: 26

## 2019-05-12 PROCEDURE — 99233 SBSQ HOSP IP/OBS HIGH 50: CPT | Mod: GC

## 2019-05-12 RX ORDER — SODIUM CHLORIDE 9 MG/ML
3 INJECTION INTRAMUSCULAR; INTRAVENOUS; SUBCUTANEOUS EVERY 6 HOURS
Refills: 0 | Status: DISCONTINUED | OUTPATIENT
Start: 2019-05-12 | End: 2019-05-17

## 2019-05-12 RX ORDER — IPRATROPIUM/ALBUTEROL SULFATE 18-103MCG
3 AEROSOL WITH ADAPTER (GRAM) INHALATION EVERY 6 HOURS
Refills: 0 | Status: DISCONTINUED | OUTPATIENT
Start: 2019-05-12 | End: 2019-05-17

## 2019-05-12 RX ADMIN — Medication 400 MILLIGRAM(S): at 22:47

## 2019-05-12 RX ADMIN — CHLORHEXIDINE GLUCONATE 15 MILLILITER(S): 213 SOLUTION TOPICAL at 06:48

## 2019-05-12 RX ADMIN — Medication 8 MILLIGRAM(S): at 22:46

## 2019-05-12 RX ADMIN — Medication 1 TABLET(S): at 17:37

## 2019-05-12 RX ADMIN — Medication 500000 UNIT(S): at 19:08

## 2019-05-12 RX ADMIN — Medication 100 MILLIGRAM(S): at 06:50

## 2019-05-12 RX ADMIN — Medication 1 TABLET(S): at 06:48

## 2019-05-12 RX ADMIN — PANTOPRAZOLE SODIUM 40 MILLIGRAM(S): 20 TABLET, DELAYED RELEASE ORAL at 13:06

## 2019-05-12 RX ADMIN — Medication 500000 UNIT(S): at 06:48

## 2019-05-12 RX ADMIN — ENOXAPARIN SODIUM 40 MILLIGRAM(S): 100 INJECTION SUBCUTANEOUS at 17:37

## 2019-05-12 RX ADMIN — CHLORHEXIDINE GLUCONATE 15 MILLILITER(S): 213 SOLUTION TOPICAL at 17:37

## 2019-05-12 RX ADMIN — Medication 3 MILLILITER(S): at 17:14

## 2019-05-12 RX ADMIN — Medication 400 MILLIGRAM(S): at 06:50

## 2019-05-12 RX ADMIN — Medication 100 MILLIGRAM(S): at 22:47

## 2019-05-12 RX ADMIN — Medication 500000 UNIT(S): at 23:55

## 2019-05-12 RX ADMIN — CHLORHEXIDINE GLUCONATE 1 APPLICATION(S): 213 SOLUTION TOPICAL at 22:45

## 2019-05-12 RX ADMIN — Medication 500000 UNIT(S): at 13:06

## 2019-05-12 RX ADMIN — Medication 400 MILLIGRAM(S): at 14:40

## 2019-05-12 RX ADMIN — SODIUM CHLORIDE 3 MILLILITER(S): 9 INJECTION INTRAMUSCULAR; INTRAVENOUS; SUBCUTANEOUS at 17:16

## 2019-05-12 RX ADMIN — Medication 100 MILLIGRAM(S): at 13:07

## 2019-05-12 RX ADMIN — Medication 5 MILLIGRAM(S): at 17:39

## 2019-05-12 RX ADMIN — Medication 500000 UNIT(S): at 05:19

## 2019-05-12 RX ADMIN — Medication 5 MILLIGRAM(S): at 06:49

## 2019-05-12 NOTE — PROGRESS NOTE ADULT - SUBJECTIVE AND OBJECTIVE BOX
Subjective: Patient seen and examined. No new events except as noted.     SUBJECTIVE/ROS:  pt seen earlier       MEDICATIONS:  MEDICATIONS  (STANDING):  chlorhexidine 0.12% Liquid 15 milliLiter(s) Oral Mucosa two times a day  chlorhexidine 4% Liquid 1 Application(s) Topical <User Schedule>  doxazosin 8 milliGRAM(s) Oral at bedtime  enalapril 5 milliGRAM(s) Oral two times a day  enoxaparin Injectable 40 milliGRAM(s) SubCutaneous <User Schedule>  hydrALAZINE 100 milliGRAM(s) Oral every 8 hours  labetalol 400 milliGRAM(s) Oral every 8 hours  lactobacillus acidophilus 1 Tablet(s) Oral two times a day  nystatin    Suspension 072698 Unit(s) Oral every 6 hours  pantoprazole   Suspension 40 milliGRAM(s) Oral daily      PHYSICAL EXAM:  T(C): 37 (05-12-19 @ 10:38), Max: 37 (05-12-19 @ 10:38)  HR: 61 (05-12-19 @ 10:38) (60 - 69)  BP: 130/71 (05-12-19 @ 10:38) (100/63 - 163/84)  RR: 20 (05-12-19 @ 10:38) (18 - 22)  SpO2: 96% (05-12-19 @ 10:38) (94% - 98%)  Wt(kg): --  I&O's Summary    11 May 2019 07:01  -  12 May 2019 07:00  --------------------------------------------------------  IN: 1805 mL / OUT: 1700 mL / NET: 105 mL            JVP: Normal  Neck: supple  Lung: clear   CV: S1 S2 , Murmur:  Abd: soft  Ext: No edema  Psych: flat affect  Skin: normal``    LABS/DATA:    CARDIAC MARKERS:                                10.8   10.5  )-----------( 280      ( 11 May 2019 07:14 )             32.3     05-11    141  |  105  |  23  ----------------------------<  104<H>  4.6   |  28  |  0.44<L>    Ca    9.3      11 May 2019 07:14  Phos  3.8     05-11  Mg     2.1     05-11      proBNP:   Lipid Profile:   HgA1c:   TSH:     TELE:  EKG:

## 2019-05-12 NOTE — PROGRESS NOTE ADULT - PROBLEM SELECTOR PLAN 4
Newly Diagnosed A.FIB   Continue Labetalol 400 mg q 8 hr   Cardizem d/c 2/2 low HR and BP   Not on AC for new Afib given recent ICH

## 2019-05-12 NOTE — PROGRESS NOTE ADULT - PROBLEM SELECTOR PLAN 5
SBP Goal 100- 160 Per Neuro Sx  Labetalol 400 mg q 8 hrs    Continue Hydralazine 100 mg q 8 hrs and Enalapril 5 mg BID

## 2019-05-12 NOTE — PROGRESS NOTE ADULT - PROBLEM SELECTOR PLAN 3
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 Per ID and infection control, likely a colonizer which does not need further treatment but will need to remain on contact isolation.

## 2019-05-12 NOTE — PROGRESS NOTE ADULT - SUBJECTIVE AND OBJECTIVE BOX
Patient is a 63y old  Female who presents with a chief complaint of ICH (11 May 2019 10:18)      Interval Events: No events reported overnight    REVIEW OF SYSTEMS:  [ ] Positive  [ ] All other systems negative  [x] Unable to assess ROS because patient is Non-Verbal     Vital Signs Last 24 Hrs  T(C): 36.9 (05-12-19 @ 06:45), Max: 36.9 (05-11-19 @ 13:11)  T(F): 98.4 (05-12-19 @ 06:45), Max: 98.4 (05-11-19 @ 13:11)  HR: 62 (05-12-19 @ 06:45) (60 - 69)  BP: 124/74 (05-12-19 @ 06:45) (100/63 - 163/84)  RR: 18 (05-12-19 @ 06:45) (18 - 21)  SpO2: 95% (05-12-19 @ 06:45) (94% - 98%)    PHYSICAL EXAM:  HEENT:   [x]Tracheostomy: # 7 Cuffless Portex   [ ]Pupils equal  [ ]No oral lesions  [ ]Abnormal    SKIN  [x]No Rash  [ ] Abnormal  [ ] pressure    CARDIAC  [x]Regular  [ ]Abnormal    PULMONARY  [x]Bilateral Coarse Breath Sounds  [ ]Normal Excursion  [ ]Abnormal    GI  [x]PEG      [x] +BS		              [x]Soft, nondistended, nontender	  [ ]Abnormal    MUSCULOSKELETAL                                   [ ]Bedbound                 [ ]Abnormal    [x]OOB to chair                           EXTREMITIES                                         [x]Normal  [ ]Edema                           NEUROLOGIC  [ ] Normal, non focal  [x] Focal findings: Arousable when spoken to in her language, Moves RT Upper and Rt lower extremity on command / Left Hemplegia     PSYCHIATRIC  [ ]Alert and appropriate  [x] Sedated	 [ ]Agitated    :  Bedoya: [ ] Yes, if yes: Date of Placement:                   [x] No / Straight Cath ATC     LINES: Central Lines [ ] Yes, if yes: Date of Placement                                     [x] No    HOSPITAL MEDICATIONS:  MEDICATIONS  (STANDING):  chlorhexidine 0.12% Liquid 15 milliLiter(s) Oral Mucosa two times a day  chlorhexidine 4% Liquid 1 Application(s) Topical <User Schedule>  doxazosin 8 milliGRAM(s) Oral at bedtime  enalapril 5 milliGRAM(s) Oral two times a day  enoxaparin Injectable 40 milliGRAM(s) SubCutaneous <User Schedule>  hydrALAZINE 100 milliGRAM(s) Oral every 8 hours  labetalol 400 milliGRAM(s) Oral every 8 hours  lactobacillus acidophilus 1 Tablet(s) Oral two times a day  nystatin    Suspension 942083 Unit(s) Oral every 6 hours  pantoprazole   Suspension 40 milliGRAM(s) Oral daily    MEDICATIONS  (PRN):  acetaminophen   Tablet .. 650 milliGRAM(s) Oral every 6 hours PRN Temp greater or equal to 38C (100.4F), Mild Pain (1 - 3)  ondansetron Injectable 4 milliGRAM(s) IV Push every 6 hours PRN Nausea and/or Vomiting      LABS:                        10.8   10.5  )-----------( 280      ( 11 May 2019 07:14 )             32.3     05-11    141  |  105  |  23  ----------------------------<  104<H>  4.6   |  28  |  0.44<L>    Ca    9.3      11 May 2019 07:14  Phos  3.8     05-11  Mg     2.1     05-11              CAPILLARY BLOOD GLUCOSE    MICROBIOLOGY:     RADIOLOGY:  [ ] Reviewed and interpreted by me

## 2019-05-12 NOTE — PROGRESS NOTE ADULT - ASSESSMENT
63 Year old female w/ PMHx HTN, HLD, Ischemic stroke (2004), who was transferred from OSH, Patient initially presented to the OSH from home with AMS as per EMS, pt was talking to a family member on the phone when she suddenly stopped talking. 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 Patient transferred to [facility] for Neuro Surgical assessment. Upon admission NIHSS = ; MRS = 2; ICH =3. Patient was given DDAVP and PLTs. During admission patient was found to have newly diagnosed A.fib and was initiated on Labetalol. Patient had Serial head CTs performed which remained unchanged, no neuro surgical intervention was performed. Patient was seen by Palliative Care family decided to proceed with Trach and PEG. Patient S/p Tracheostomy on 4/16 ( # 8 Cuffed Shiley) and PEG Placement on 4/18. During hospitalization patient found to have UTI ( 100 K E.coli ) for which she was treated with a course of Keflex ( Completed 4/21). On 4/23 patient febrile again, Patient found to have : Enterobacter / Burkholderia  growing in sputum     4/26: Patient remains afebrile. Sputum cx 4/23 growing Enterobacter/ Burkholderia, Will continue Meropenem. Patient tolerating TC during the day, will attempt TC ATC and obtain AM ABG. Patient remains with elevated BP will increase Labetalol 400 mg q 8 hrs.  4/27: Tolerated TC x24hrs. ABG reviewed and adequate to continue TC. Appears comfortable of TC. BP remains labile with elevations into 170s. Will add Enalapril. Spoke to son at bedside. Will finish ABx on 4/30.  4/29 Stable on TC ATC. Fevers, UA negative, on meropenem until 4/30 5/1 Stable, tolerating TC ATC. Following commands, continue on abx until 5/3 per ID.   5/2: DC planning in progress, likely to occur post antibiotics. Otherwise no new events. Patient doing well and medically stable.  5/3: Will complete antibiotics today. No other issues. Family reviewing rehab facilities.  5/4: No new events. Requested Sputum Culture to assess for bacterial clearance. Spoke with  and daughter at bedside who are researching rehab facilities.   5/7 Stable tolerating TC ATC. Repeat sputum cx 5/5 prelim w/GNR, ID following, will wait for final before deciding on further abx course if any, per Dr. Guevara. BP imporved, SBP 90 - 140. Per cardio, no AC 2/2 bleed.   5/8 Stable, TC ATC. Cardizem d/c 2/2 low BP, HR. More lethargic today. IF does not improve, will send for [facility] r/o acute process.  5/9: Sputum culture x 2 still growing Burkholderia. Spoke with ID and infection control, as growth appears to be colonization without other signs of infection no further treatment is recommended. However patient will still need to maintain contact isolation status. Patient observed awake and following commands upon husbands request. DC planning in progress.  5/10: No new events. Spoke with patient's  at bedside and son via phone in regards to DC planning and facility options.  5/11: Medically stable. Awaiting family choices for Rehab placement.  5/12: Patient saturating 90-91% on pulse ox on Physical exam this morning, patient suctioned with minimal but thick respiratory secretions suctioned. Spo2 increased to 95-96% S/p Lavage. Will add hypersal and duoneb to mobilize secretions.  requesting CXR ( Ordered ).

## 2019-05-12 NOTE — PROGRESS NOTE ADULT - PROBLEM SELECTOR PLAN 2
Patient S/p Tracheostomy 4/16  ( # 8 Cuffed Danna )   TC ATC since 4/28, down-sized to #7 Cuffless Portex on 5/1  Will add Hyper-Sal and Duoneb to mobilize secretions  Check Repeat CXR As per

## 2019-05-13 LAB
APPEARANCE UR: ABNORMAL
BACTERIA # UR AUTO: ABNORMAL
BILIRUB UR-MCNC: NEGATIVE — SIGNIFICANT CHANGE UP
COLOR SPEC: YELLOW — SIGNIFICANT CHANGE UP
DIFF PNL FLD: NEGATIVE — SIGNIFICANT CHANGE UP
EPI CELLS # UR: 0 — SIGNIFICANT CHANGE UP
GLUCOSE UR QL: NEGATIVE — SIGNIFICANT CHANGE UP
HYALINE CASTS # UR AUTO: 2 /LPF — SIGNIFICANT CHANGE UP (ref 0–7)
KETONES UR-MCNC: NEGATIVE — SIGNIFICANT CHANGE UP
LEUKOCYTE ESTERASE UR-ACNC: ABNORMAL
NITRITE UR-MCNC: NEGATIVE — SIGNIFICANT CHANGE UP
PH UR: 6.5 — SIGNIFICANT CHANGE UP (ref 5–8)
PROT UR-MCNC: SIGNIFICANT CHANGE UP
RBC CASTS # UR COMP ASSIST: 6 /HPF — HIGH (ref 0–4)
SP GR SPEC: 1.02 — SIGNIFICANT CHANGE UP (ref 1.01–1.02)
UROBILINOGEN FLD QL: NEGATIVE — SIGNIFICANT CHANGE UP
WBC UR QL: 36 /HPF — HIGH (ref 0–5)

## 2019-05-13 PROCEDURE — 99233 SBSQ HOSP IP/OBS HIGH 50: CPT | Mod: GC

## 2019-05-13 RX ORDER — NYSTATIN 500MM UNIT
500000 POWDER (EA) MISCELLANEOUS EVERY 6 HOURS
Refills: 0 | Status: DISCONTINUED | OUTPATIENT
Start: 2019-05-13 | End: 2019-05-17

## 2019-05-13 RX ORDER — POLYETHYLENE GLYCOL 3350 17 G/17G
17 POWDER, FOR SOLUTION ORAL AT BEDTIME
Refills: 0 | Status: DISCONTINUED | OUTPATIENT
Start: 2019-05-13 | End: 2019-05-17

## 2019-05-13 RX ADMIN — CHLORHEXIDINE GLUCONATE 15 MILLILITER(S): 213 SOLUTION TOPICAL at 06:30

## 2019-05-13 RX ADMIN — Medication 100 MILLIGRAM(S): at 06:30

## 2019-05-13 RX ADMIN — Medication 500000 UNIT(S): at 17:57

## 2019-05-13 RX ADMIN — Medication 3 MILLILITER(S): at 00:09

## 2019-05-13 RX ADMIN — SODIUM CHLORIDE 3 MILLILITER(S): 9 INJECTION INTRAMUSCULAR; INTRAVENOUS; SUBCUTANEOUS at 23:38

## 2019-05-13 RX ADMIN — Medication 100 MILLIGRAM(S): at 21:34

## 2019-05-13 RX ADMIN — Medication 3 MILLILITER(S): at 11:27

## 2019-05-13 RX ADMIN — SODIUM CHLORIDE 3 MILLILITER(S): 9 INJECTION INTRAMUSCULAR; INTRAVENOUS; SUBCUTANEOUS at 00:09

## 2019-05-13 RX ADMIN — SODIUM CHLORIDE 3 MILLILITER(S): 9 INJECTION INTRAMUSCULAR; INTRAVENOUS; SUBCUTANEOUS at 05:10

## 2019-05-13 RX ADMIN — Medication 8 MILLIGRAM(S): at 21:34

## 2019-05-13 RX ADMIN — Medication 400 MILLIGRAM(S): at 21:34

## 2019-05-13 RX ADMIN — Medication 1 TABLET(S): at 06:30

## 2019-05-13 RX ADMIN — Medication 500000 UNIT(S): at 12:18

## 2019-05-13 RX ADMIN — Medication 1 TABLET(S): at 17:57

## 2019-05-13 RX ADMIN — ENOXAPARIN SODIUM 40 MILLIGRAM(S): 100 INJECTION SUBCUTANEOUS at 17:57

## 2019-05-13 RX ADMIN — PANTOPRAZOLE SODIUM 40 MILLIGRAM(S): 20 TABLET, DELAYED RELEASE ORAL at 12:18

## 2019-05-13 RX ADMIN — CHLORHEXIDINE GLUCONATE 15 MILLILITER(S): 213 SOLUTION TOPICAL at 17:57

## 2019-05-13 RX ADMIN — Medication 5 MILLIGRAM(S): at 17:58

## 2019-05-13 RX ADMIN — Medication 3 MILLILITER(S): at 05:10

## 2019-05-13 RX ADMIN — Medication 500000 UNIT(S): at 06:30

## 2019-05-13 RX ADMIN — Medication 400 MILLIGRAM(S): at 14:02

## 2019-05-13 RX ADMIN — Medication 3 MILLILITER(S): at 17:40

## 2019-05-13 RX ADMIN — SODIUM CHLORIDE 3 MILLILITER(S): 9 INJECTION INTRAMUSCULAR; INTRAVENOUS; SUBCUTANEOUS at 17:40

## 2019-05-13 RX ADMIN — SODIUM CHLORIDE 3 MILLILITER(S): 9 INJECTION INTRAMUSCULAR; INTRAVENOUS; SUBCUTANEOUS at 11:27

## 2019-05-13 RX ADMIN — Medication 3 MILLILITER(S): at 23:36

## 2019-05-13 RX ADMIN — Medication 100 MILLIGRAM(S): at 14:02

## 2019-05-13 RX ADMIN — CHLORHEXIDINE GLUCONATE 1 APPLICATION(S): 213 SOLUTION TOPICAL at 21:36

## 2019-05-13 RX ADMIN — Medication 5 MILLIGRAM(S): at 06:29

## 2019-05-13 NOTE — PROGRESS NOTE ADULT - SUBJECTIVE AND OBJECTIVE BOX
Patient is a 63y old  Female who presents with a chief complaint of ICH (11 May 2019 10:18)      Interval Events: No events reported overnight    REVIEW OF SYSTEMS:  [ ] Positive  [ ] All other systems negative  [x] Unable to assess ROS because patient is Non-Verbal     Vital Signs Last 24 Hrs  T(C): 36.9 (05-12-19 @ 06:45), Max: 36.9 (05-11-19 @ 13:11)  T(F): 98.4 (05-12-19 @ 06:45), Max: 98.4 (05-11-19 @ 13:11)  HR: 62 (05-12-19 @ 06:45) (60 - 69)  BP: 124/74 (05-12-19 @ 06:45) (100/63 - 163/84)  RR: 18 (05-12-19 @ 06:45) (18 - 21)  SpO2: 95% (05-12-19 @ 06:45) (94% - 98%)    PHYSICAL EXAM:  HEENT:   [x]Tracheostomy: # 7 Cuffless Portex   [ ]Pupils equal  [ ]No oral lesions  [ ]Abnormal    SKIN  [x]No Rash  [ ] Abnormal  [ ] pressure    CARDIAC  [x]Regular  [ ]Abnormal    PULMONARY  [x]Bilateral Coarse Breath Sounds  [ ]Normal Excursion  [ ]Abnormal    GI  [x]PEG      [x] +BS		              [x]Soft, nondistended, nontender	  [ ]Abnormal    MUSCULOSKELETAL                                   [ ]Bedbound                 [ ]Abnormal    [x]OOB to chair                           EXTREMITIES                                         [x]Normal  [ ]Edema                           NEUROLOGIC  [ ] Normal, non focal  [x] Focal findings: Arousable when spoken to in her language, Moves RT Upper and Rt lower extremity on command / Left Hemplegia     PSYCHIATRIC  [ ]Alert and appropriate  [x] Sedated	 [ ]Agitated    :  Bedoya: [ ] Yes, if yes: Date of Placement:                   [x] No / Straight Cath ATC     LINES: Central Lines [ ] Yes, if yes: Date of Placement                                     [x] No    HOSPITAL MEDICATIONS:  MEDICATIONS  (STANDING):  chlorhexidine 0.12% Liquid 15 milliLiter(s) Oral Mucosa two times a day  chlorhexidine 4% Liquid 1 Application(s) Topical <User Schedule>  doxazosin 8 milliGRAM(s) Oral at bedtime  enalapril 5 milliGRAM(s) Oral two times a day  enoxaparin Injectable 40 milliGRAM(s) SubCutaneous <User Schedule>  hydrALAZINE 100 milliGRAM(s) Oral every 8 hours  labetalol 400 milliGRAM(s) Oral every 8 hours  lactobacillus acidophilus 1 Tablet(s) Oral two times a day  nystatin    Suspension 437473 Unit(s) Oral every 6 hours  pantoprazole   Suspension 40 milliGRAM(s) Oral daily    MEDICATIONS  (PRN):  acetaminophen   Tablet .. 650 milliGRAM(s) Oral every 6 hours PRN Temp greater or equal to 38C (100.4F), Mild Pain (1 - 3)  ondansetron Injectable 4 milliGRAM(s) IV Push every 6 hours PRN Nausea and/or Vomiting      LABS:                        10.8   10.5  )-----------( 280      ( 11 May 2019 07:14 )             32.3     05-11    141  |  105  |  23  ----------------------------<  104<H>  4.6   |  28  |  0.44<L>    Ca    9.3      11 May 2019 07:14  Phos  3.8     05-11  Mg     2.1     05-11              CAPILLARY BLOOD GLUCOSE    MICROBIOLOGY:     RADIOLOGY:  [ ] Reviewed and interpreted by me Patient is a 63y old  Female who presents with a chief complaint of ICH (11 May 2019 10:18)      Interval Events: No events reported overnight    REVIEW OF SYSTEMS:  [ ] Positive  [ ] All other systems negative  [x] Unable to assess ROS because patient is Non-Verbal     Vital Signs Last 24 Hrs  T(C): 36.9 (05-12-19 @ 06:45), Max: 36.9 (05-11-19 @ 13:11)  T(F): 98.4 (05-12-19 @ 06:45), Max: 98.4 (05-11-19 @ 13:11)  HR: 62 (05-12-19 @ 06:45) (60 - 69)  BP: 124/74 (05-12-19 @ 06:45) (100/63 - 163/84)  RR: 18 (05-12-19 @ 06:45) (18 - 21)  SpO2: 95% (05-12-19 @ 06:45) (94% - 98%)    PHYSICAL EXAM:  HEENT:   [x]Tracheostomy: # 7 Cuffless Portex   [ ]Pupils equal  [ ]No oral lesions  [ ]Abnormal    SKIN  [x]No Rash  [ ] Abnormal  [ ] pressure    CARDIAC  [x]Regular  [ ]Abnormal    PULMONARY  [x]Bilateral Coarse Breath Sounds  [ ]Normal Excursion  [ ]Abnormal    GI  [x]PEG      [x] +BS		              [x]Soft, nondistended, nontender	  [ ]Abnormal    MUSCULOSKELETAL                                   [ ]Bedbound                 [ ]Abnormal    [x]OOB to chair                           EXTREMITIES                                         [x]Normal  [ ]Edema                           NEUROLOGIC  [ ] Normal, non focal  [x] Focal findings: Arousable when spoken to in her language, Moves RT Upper and Rt lower extremity on command / Left Hemplegia     PSYCHIATRIC  [ ]Alert and appropriate  [x] Sedated	 [ ]Agitated    :  Bedoya: [ ] Yes, if yes: Date of Placement:                   [x] No / Straight Cath ATC     LINES: Central Lines [ ] Yes, if yes: Date of Placement                                     [x] No    HOSPITAL MEDICATIONS:  MEDICATIONS  (STANDING):  chlorhexidine 0.12% Liquid 15 milliLiter(s) Oral Mucosa two times a day  chlorhexidine 4% Liquid 1 Application(s) Topical <User Schedule>  doxazosin 8 milliGRAM(s) Oral at bedtime  enalapril 5 milliGRAM(s) Oral two times a day  enoxaparin Injectable 40 milliGRAM(s) SubCutaneous <User Schedule>  hydrALAZINE 100 milliGRAM(s) Oral every 8 hours  labetalol 400 milliGRAM(s) Oral every 8 hours  lactobacillus acidophilus 1 Tablet(s) Oral two times a day  nystatin    Suspension 981590 Unit(s) Oral every 6 hours  pantoprazole   Suspension 40 milliGRAM(s) Oral daily    MEDICATIONS  (PRN):  acetaminophen   Tablet .. 650 milliGRAM(s) Oral every 6 hours PRN Temp greater or equal to 38C (100.4F), Mild Pain (1 - 3)  ondansetron Injectable 4 milliGRAM(s) IV Push every 6 hours PRN Nausea and/or Vomiting      LABS:                        10.8   10.5  )-----------( 280      ( 11 May 2019 07:14 )             32.3     05-11    141  |  105  |  23  ----------------------------<  104<H>  4.6   |  28  |  0.44<L>    Ca    9.3      11 May 2019 07:14  Phos  3.8     05-11  Mg     2.1     05-11              CAPILLARY BLOOD GLUCOSE    MICROBIOLOGY:   Culture - Sputum . (05.06.19 @ 08:48)    -  Levofloxacin: S <=2    -  Meropenem: S 2    -  Trimethoprim/Sulfamethoxazole: S <=2/38    Gram Stain:   Few polymorphonuclear leukocytes per low power field  No Squamous epithelial cells per low power field  Few Gram Negative Rods per oil power field    -  Ceftazidime: R >16    Specimen Source: .Sputum trap    Culture Results:   Moderate Burkholderia cepacia  Normal Respiratory Katia present    Organism Identification: Burkholderia cepacia    Organism: Burkholderia cepacia    Method Type: ALISE    Culture - Sputum . (05.05.19 @ 00:41)    -  Trimethoprim/Sulfamethoxazole: S <=2/38    -  Levofloxacin: S <=2    -  Meropenem: S 2    Gram Stain:   Numerous polymorphonuclear leukocytes per low power field  Numerous Squamous epithelial cells per low power field  Numerous Gram Negative Rods seen per oil power field    -  Ceftazidime: R >16    Specimen Source: .Sputum    Culture Results:   Moderate Burkholderia cepacia complex    Normal Respiratory Katia absent    Organism Identification: Burkholderia cepacia    Organism: Burkholderia cepacia    Method Type: ALISE      RADIOLOGY:  [x ] Reviewed and interpreted by me  < from: Xray Chest 1 View- PORTABLE-Urgent (05.12.19 @ 14:33) >  EXAM:  XR CHEST PORTABLE URGENT 1V                            PROCEDURE DATE:  05/12/2019            INTERPRETATION:  CLINICAL INFORMATION: Patient with thick respiratory   secrections. Cough.    EXAM: Frontal radiograph of the chest.    COMPARISON: Chest radiograph from 4/23/2019.    FINDINGS:    Lines/Tubes: Tracheostomy tube.    Lungs: The lungs are clear.    Pleura: No pleural effusion.    Mediastinum: Heart size cannot be assessed. Calcified aortic knob.    Skeletal: No acute osseous findings.      IMPRESSION:    1.  Clear lungs.      < end of copied text >

## 2019-05-13 NOTE — PROGRESS NOTE ADULT - SUBJECTIVE AND OBJECTIVE BOX
Subjective: Patient seen and examined. No new events except as noted.     SUBJECTIVE/ROS:  ROS limited       MEDICATIONS:  MEDICATIONS  (STANDING):  ALBUTerol/ipratropium for Nebulization 3 milliLiter(s) Nebulizer every 6 hours  chlorhexidine 0.12% Liquid 15 milliLiter(s) Oral Mucosa two times a day  chlorhexidine 4% Liquid 1 Application(s) Topical <User Schedule>  doxazosin 8 milliGRAM(s) Oral at bedtime  enalapril 5 milliGRAM(s) Oral two times a day  enoxaparin Injectable 40 milliGRAM(s) SubCutaneous <User Schedule>  hydrALAZINE 100 milliGRAM(s) Oral every 8 hours  labetalol 400 milliGRAM(s) Oral every 8 hours  lactobacillus acidophilus 1 Tablet(s) Oral two times a day  nystatin    Suspension 063786 Unit(s) Oral every 6 hours  pantoprazole   Suspension 40 milliGRAM(s) Oral daily  sodium chloride 7% Inhalation 3 milliLiter(s) Inhalation every 6 hours      PHYSICAL EXAM:  T(C): 36.5 (05-13-19 @ 06:19), Max: 37 (05-12-19 @ 10:38)  HR: 56 (05-13-19 @ 06:19) (53 - 70)  BP: 111/67 (05-13-19 @ 06:19) (100/66 - 153/81)  RR: 16 (05-13-19 @ 06:19) (16 - 22)  SpO2: 93% (05-13-19 @ 06:19) (93% - 99%)  Wt(kg): --  I&O's Summary    12 May 2019 07:01  -  13 May 2019 07:00  --------------------------------------------------------  IN: 1875 mL / OUT: 1300 mL / NET: 575 mL            JVP: Normal  Neck: supple  Lung: clear   CV: S1 S2 , Murmur:  Abd: soft  Ext: No edema  Psych: flat affect  Skin: normal``    LABS/DATA:    CARDIAC MARKERS:                  proBNP:   Lipid Profile:   HgA1c:   TSH:     TELE:  EKG:

## 2019-05-13 NOTE — PROGRESS NOTE ADULT - ASSESSMENT
HTN  stable   cont current meds     Afib  resolved  off a/c due to high bleed risk     labs as per primary team

## 2019-05-13 NOTE — PROGRESS NOTE ADULT - ASSESSMENT
63 Year old female w/ PMHx HTN, HLD, Ischemic stroke (2004), who was transferred from OSH, Patient initially presented to the OSH from home with AMS as per EMS, pt was talking to a family member on the phone when she suddenly stopped talking. 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 Patient transferred to [facility] for Neuro Surgical assessment. Upon admission NIHSS = ; MRS = 2; ICH =3. Patient was given DDAVP and PLTs. During admission patient was found to have newly diagnosed A.fib and was initiated on Labetalol. Patient had Serial head CTs performed which remained unchanged, no neuro surgical intervention was performed. Patient was seen by Palliative Care family decided to proceed with Trach and PEG. Patient S/p Tracheostomy on 4/16 ( # 8 Cuffed Shiley) and PEG Placement on 4/18. During hospitalization patient found to have UTI ( 100 K E.coli ) for which she was treated with a course of Keflex ( Completed 4/21). On 4/23 patient febrile again, Patient found to have : Enterobacter / Burkholderia  growing in sputum     4/26: Patient remains afebrile. Sputum cx 4/23 growing Enterobacter/ Burkholderia, Will continue Meropenem. Patient tolerating TC during the day, will attempt TC ATC and obtain AM ABG. Patient remains with elevated BP will increase Labetalol 400 mg q 8 hrs.  4/27: Tolerated TC x24hrs. ABG reviewed and adequate to continue TC. Appears comfortable of TC. BP remains labile with elevations into 170s. Will add Enalapril. Spoke to son at bedside. Will finish ABx on 4/30.  4/29 Stable on TC ATC. Fevers, UA negative, on meropenem until 4/30 5/1 Stable, tolerating TC ATC. Following commands, continue on abx until 5/3 per ID.   5/2: DC planning in progress, likely to occur post antibiotics. Otherwise no new events. Patient doing well and medically stable.  5/3: Will complete antibiotics today. No other issues. Family reviewing rehab facilities.  5/4: No new events. Requested Sputum Culture to assess for bacterial clearance. Spoke with  and daughter at bedside who are researching rehab facilities.   5/7 Stable tolerating TC ATC. Repeat sputum cx 5/5 prelim w/GNR, ID following, will wait for final before deciding on further abx course if any, per Dr. Guevara. BP imporved, SBP 90 - 140. Per cardio, no AC 2/2 bleed.   5/8 Stable, TC ATC. Cardizem d/c 2/2 low BP, HR. More lethargic today. IF does not improve, will send for [facility] r/o acute process.  5/9: Sputum culture x 2 still growing Burkholderia. Spoke with ID and infection control, as growth appears to be colonization without other signs of infection no further treatment is recommended. However patient will still need to maintain contact isolation status. Patient observed awake and following commands upon husbands request. DC planning in progress.  5/10: No new events. Spoke with patient's  at bedside and son via phone in regards to DC planning and facility options.  5/11: Medically stable. Awaiting family choices for Rehab placement.  5/12: Patient saturating 90-91% on pulse ox on Physical exam this morning, patient suctioned with minimal but thick respiratory secretions suctioned. Spo2 increased to 95-96% S/p Lavage. Will add hypersal and duoneb to mobilize secretions.  requesting CXR ( Ordered ). 63 Year old female w/ PMHx HTN, HLD, Ischemic stroke (2004), who was transferred from OSH, Patient initially presented to the OSH from home with AMS as per EMS, pt was talking to a family member on the phone when she suddenly stopped talking. 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 Patient transferred to [facility] for Neuro Surgical assessment. Upon admission NIHSS = ; MRS = 2; ICH =3. Patient was given DDAVP and PLTs. During admission patient was found to have newly diagnosed A.fib and was initiated on Labetalol. Patient had Serial head CTs performed which remained unchanged, no neuro surgical intervention was performed. Patient was seen by Palliative Care family decided to proceed with Trach and PEG. Patient S/p Tracheostomy on 4/16 ( # 8 Cuffed Shiley) and PEG Placement on 4/18. During hospitalization patient found to have UTI ( 100 K E.coli ) for which she was treated with a course of Keflex ( Completed 4/21). On 4/23 patient febrile again, Patient found to have : Enterobacter / Burkholderia  growing in sputum     4/26: Patient remains afebrile. Sputum cx 4/23 growing Enterobacter/ Burkholderia, Will continue Meropenem. Patient tolerating TC during the day, will attempt TC ATC and obtain AM ABG. Patient remains with elevated BP will increase Labetalol 400 mg q 8 hrs.  4/27: Tolerated TC x24hrs. ABG reviewed and adequate to continue TC. Appears comfortable of TC. BP remains labile with elevations into 170s. Will add Enalapril. Spoke to son at bedside. Will finish ABx on 4/30.  4/29 Stable on TC ATC. Fevers, UA negative, on meropenem until 4/30 5/1 Stable, tolerating TC ATC. Following commands, continue on abx until 5/3 per ID.   5/2: DC planning in progress, likely to occur post antibiotics. Otherwise no new events. Patient doing well and medically stable.  5/3: Will complete antibiotics today. No other issues. Family reviewing rehab facilities.  5/4: No new events. Requested Sputum Culture to assess for bacterial clearance. Spoke with  and daughter at bedside who are researching rehab facilities.   5/7 Stable tolerating TC ATC. Repeat sputum cx 5/5 prelim w/GNR, ID following, will wait for final before deciding on further abx course if any, per Dr. Guevara. BP imporved, SBP 90 - 140. Per cardio, no AC 2/2 bleed.   5/8 Stable, TC ATC. Cardizem d/c 2/2 low BP, HR. More lethargic today. IF does not improve, will send for [facility] r/o acute process.  5/9: Sputum culture x 2 still growing Burkholderia. Spoke with ID and infection control, as growth appears to be colonization without other signs of infection no further treatment is recommended. However patient will still need to maintain contact isolation status. Patient observed awake and following commands upon husbands request. DC planning in progress.  5/10: No new events. Spoke with patient's  at bedside and son via phone in regards to DC planning and facility options.  5/11: Medically stable. Awaiting family choices for Rehab placement.  5/12: Patient saturating 90-91% on pulse ox on Physical exam this morning, patient suctioned with minimal but thick respiratory secretions suctioned. Spo2 increased to 95-96% S/p Lavage. Will add hypersal and duoneb to mobilize secretions.  requesting CXR ( Ordered ).     5/13 cloudy urine- UA ordered

## 2019-05-14 PROCEDURE — 99233 SBSQ HOSP IP/OBS HIGH 50: CPT | Mod: GC

## 2019-05-14 RX ORDER — ASPIRIN/CALCIUM CARB/MAGNESIUM 324 MG
1 TABLET ORAL
Qty: 0 | Refills: 0 | DISCHARGE

## 2019-05-14 RX ORDER — ENOXAPARIN SODIUM 100 MG/ML
40 INJECTION SUBCUTANEOUS
Refills: 0 | Status: DISCONTINUED | OUTPATIENT
Start: 2019-05-14 | End: 2019-05-17

## 2019-05-14 RX ORDER — PANTOPRAZOLE SODIUM 20 MG/1
0 TABLET, DELAYED RELEASE ORAL
Qty: 0 | Refills: 0 | DISCHARGE
Start: 2019-05-14

## 2019-05-14 RX ORDER — ACETAMINOPHEN 500 MG
2 TABLET ORAL
Qty: 0 | Refills: 0 | DISCHARGE

## 2019-05-14 RX ORDER — CHOLECALCIFEROL (VITAMIN D3) 125 MCG
1 CAPSULE ORAL
Qty: 0 | Refills: 0 | DISCHARGE

## 2019-05-14 RX ORDER — IPRATROPIUM/ALBUTEROL SULFATE 18-103MCG
3 AEROSOL WITH ADAPTER (GRAM) INHALATION
Qty: 0 | Refills: 0 | DISCHARGE
Start: 2019-05-14

## 2019-05-14 RX ORDER — HYDRALAZINE HCL 50 MG
1 TABLET ORAL
Qty: 0 | Refills: 0 | DISCHARGE
Start: 2019-05-14

## 2019-05-14 RX ORDER — DOXAZOSIN MESYLATE 4 MG
1 TABLET ORAL
Qty: 0 | Refills: 0 | DISCHARGE
Start: 2019-05-14

## 2019-05-14 RX ORDER — ACETAMINOPHEN 500 MG
650 TABLET ORAL EVERY 6 HOURS
Refills: 0 | Status: DISCONTINUED | OUTPATIENT
Start: 2019-05-14 | End: 2019-05-17

## 2019-05-14 RX ORDER — NYSTATIN 500MM UNIT
5 POWDER (EA) MISCELLANEOUS
Qty: 0 | Refills: 0 | DISCHARGE
Start: 2019-05-14

## 2019-05-14 RX ORDER — VALACYCLOVIR 500 MG/1
1 TABLET, FILM COATED ORAL
Qty: 0 | Refills: 0 | DISCHARGE

## 2019-05-14 RX ORDER — AMLODIPINE BESYLATE AND BENAZEPRIL HYDROCHLORIDE 10; 20 MG/1; MG/1
1 CAPSULE ORAL
Qty: 0 | Refills: 0 | DISCHARGE

## 2019-05-14 RX ORDER — CHLORHEXIDINE GLUCONATE 213 G/1000ML
1 SOLUTION TOPICAL
Refills: 0 | Status: DISCONTINUED | OUTPATIENT
Start: 2019-05-14 | End: 2019-05-17

## 2019-05-14 RX ORDER — POLYETHYLENE GLYCOL 3350 17 G/17G
17 POWDER, FOR SOLUTION ORAL
Qty: 0 | Refills: 0 | DISCHARGE
Start: 2019-05-14

## 2019-05-14 RX ORDER — CHLORHEXIDINE GLUCONATE 213 G/1000ML
15 SOLUTION TOPICAL
Refills: 0 | Status: DISCONTINUED | OUTPATIENT
Start: 2019-05-14 | End: 2019-05-17

## 2019-05-14 RX ORDER — LABETALOL HCL 100 MG
2 TABLET ORAL
Qty: 0 | Refills: 0 | DISCHARGE
Start: 2019-05-14

## 2019-05-14 RX ORDER — LACTOBACILLUS ACIDOPHILUS 100MM CELL
0 CAPSULE ORAL
Qty: 0 | Refills: 0 | DISCHARGE
Start: 2019-05-14

## 2019-05-14 RX ORDER — CIPROFLOXACIN LACTATE 400MG/40ML
250 VIAL (ML) INTRAVENOUS
Refills: 0 | Status: COMPLETED | OUTPATIENT
Start: 2019-05-14 | End: 2019-05-17

## 2019-05-14 RX ORDER — ENOXAPARIN SODIUM 100 MG/ML
40 INJECTION SUBCUTANEOUS
Qty: 0 | Refills: 0 | DISCHARGE
Start: 2019-05-14

## 2019-05-14 RX ORDER — DOXAZOSIN MESYLATE 4 MG
8 TABLET ORAL AT BEDTIME
Refills: 0 | Status: DISCONTINUED | OUTPATIENT
Start: 2019-05-14 | End: 2019-05-17

## 2019-05-14 RX ADMIN — Medication 500000 UNIT(S): at 00:18

## 2019-05-14 RX ADMIN — CHLORHEXIDINE GLUCONATE 15 MILLILITER(S): 213 SOLUTION TOPICAL at 05:22

## 2019-05-14 RX ADMIN — SODIUM CHLORIDE 3 MILLILITER(S): 9 INJECTION INTRAMUSCULAR; INTRAVENOUS; SUBCUTANEOUS at 06:00

## 2019-05-14 RX ADMIN — CHLORHEXIDINE GLUCONATE 15 MILLILITER(S): 213 SOLUTION TOPICAL at 17:16

## 2019-05-14 RX ADMIN — Medication 100 MILLIGRAM(S): at 22:20

## 2019-05-14 RX ADMIN — Medication 5 MILLIGRAM(S): at 17:16

## 2019-05-14 RX ADMIN — Medication 5 MILLIGRAM(S): at 05:22

## 2019-05-14 RX ADMIN — Medication 400 MILLIGRAM(S): at 05:22

## 2019-05-14 RX ADMIN — SODIUM CHLORIDE 3 MILLILITER(S): 9 INJECTION INTRAMUSCULAR; INTRAVENOUS; SUBCUTANEOUS at 11:36

## 2019-05-14 RX ADMIN — Medication 3 MILLILITER(S): at 11:36

## 2019-05-14 RX ADMIN — Medication 100 MILLIGRAM(S): at 05:22

## 2019-05-14 RX ADMIN — POLYETHYLENE GLYCOL 3350 17 GRAM(S): 17 POWDER, FOR SOLUTION ORAL at 22:20

## 2019-05-14 RX ADMIN — Medication 500000 UNIT(S): at 11:10

## 2019-05-14 RX ADMIN — Medication 500000 UNIT(S): at 17:03

## 2019-05-14 RX ADMIN — SODIUM CHLORIDE 3 MILLILITER(S): 9 INJECTION INTRAMUSCULAR; INTRAVENOUS; SUBCUTANEOUS at 17:33

## 2019-05-14 RX ADMIN — ENOXAPARIN SODIUM 40 MILLIGRAM(S): 100 INJECTION SUBCUTANEOUS at 17:03

## 2019-05-14 RX ADMIN — Medication 1 TABLET(S): at 17:03

## 2019-05-14 RX ADMIN — Medication 250 MILLIGRAM(S): at 17:09

## 2019-05-14 RX ADMIN — Medication 3 MILLILITER(S): at 17:32

## 2019-05-14 RX ADMIN — Medication 3 MILLILITER(S): at 05:58

## 2019-05-14 RX ADMIN — Medication 8 MILLIGRAM(S): at 22:20

## 2019-05-14 RX ADMIN — Medication 3 MILLILITER(S): at 23:14

## 2019-05-14 RX ADMIN — Medication 400 MILLIGRAM(S): at 22:20

## 2019-05-14 RX ADMIN — CHLORHEXIDINE GLUCONATE 1 APPLICATION(S): 213 SOLUTION TOPICAL at 22:21

## 2019-05-14 RX ADMIN — Medication 500000 UNIT(S): at 05:22

## 2019-05-14 RX ADMIN — SODIUM CHLORIDE 3 MILLILITER(S): 9 INJECTION INTRAMUSCULAR; INTRAVENOUS; SUBCUTANEOUS at 23:15

## 2019-05-14 RX ADMIN — PANTOPRAZOLE SODIUM 40 MILLIGRAM(S): 20 TABLET, DELAYED RELEASE ORAL at 11:10

## 2019-05-14 RX ADMIN — Medication 1 TABLET(S): at 05:22

## 2019-05-14 RX ADMIN — Medication 10 MILLIGRAM(S): at 14:26

## 2019-05-14 NOTE — PROGRESS NOTE ADULT - SUBJECTIVE AND OBJECTIVE BOX
Subjective: Patient seen and examined. No new events except as noted.     SUBJECTIVE/ROS:        MEDICATIONS:  MEDICATIONS  (STANDING):  ALBUTerol/ipratropium for Nebulization 3 milliLiter(s) Nebulizer every 6 hours  chlorhexidine 0.12% Liquid 15 milliLiter(s) Oral Mucosa two times a day  chlorhexidine 4% Liquid 1 Application(s) Topical <User Schedule>  doxazosin 8 milliGRAM(s) Oral at bedtime  enalapril 5 milliGRAM(s) Oral two times a day  enoxaparin Injectable 40 milliGRAM(s) SubCutaneous <User Schedule>  hydrALAZINE 100 milliGRAM(s) Oral every 8 hours  labetalol 400 milliGRAM(s) Oral every 8 hours  lactobacillus acidophilus 1 Tablet(s) Oral two times a day  nystatin    Suspension 224856 Unit(s) Oral every 6 hours  pantoprazole   Suspension 40 milliGRAM(s) Oral daily  polyethylene glycol 3350 17 Gram(s) Oral at bedtime  sodium chloride 7% Inhalation 3 milliLiter(s) Inhalation every 6 hours      PHYSICAL EXAM:  T(C): 36.5 (05-14-19 @ 04:18), Max: 37 (05-13-19 @ 18:02)  HR: 55 (05-14-19 @ 09:30) (55 - 75)  BP: 131/75 (05-14-19 @ 04:18) (117/71 - 149/66)  RR: 18 (05-14-19 @ 09:30) (16 - 20)  SpO2: 97% (05-14-19 @ 09:30) (93% - 99%)  Wt(kg): --  I&O's Summary    13 May 2019 07:01  -  14 May 2019 07:00  --------------------------------------------------------  IN: 1820 mL / OUT: 2100 mL / NET: -280 mL            JVP: Normal  Neck: supple  Lung: clear   CV: S1 S2 , Murmur:  Abd: soft  Ext: No edema    Psych: flat affect  Skin: normal``    LABS/DATA:    CARDIAC MARKERS:                  proBNP:   Lipid Profile:   HgA1c:   TSH:     TELE:  EKG:

## 2019-05-14 NOTE — DISCHARGE NOTE PROVIDER - INSTRUCTIONS
Jevity 1.5 at 75ml/hr x 18 hrs + Tolu Active 2x daily to provide total 1350ml, calories 2165, 30/kg, protein  86gm, 1.2/kg, protein based on IBW=1.8gm/kg  free water in formula 1026ml ; additional free water added 512wmM13 hrs , total free water 1850ml.

## 2019-05-14 NOTE — DISCHARGE NOTE PROVIDER - CARE PROVIDERS DIRECT ADDRESSES
,DirectAddress_Unknown,[email or web address],[email or web address]

## 2019-05-14 NOTE — DISCHARGE NOTE PROVIDER - NSDCCPCAREPLAN_GEN_ALL_CORE_FT
PRINCIPAL DISCHARGE DIAGNOSIS  Diagnosis: Pontine hemorrhage  Assessment and Plan of Treatment: PRINCIPAL DISCHARGE DIAGNOSIS  Diagnosis: Pontine hemorrhage  Assessment and Plan of Treatment: Patient with Admitted with Pontine Hemorrhage likely 2/2 Uncontrolled HTN   Head CT 4/17: Similar Appearing Hemorrhage in Mid brain, No hydrocephalus   No Neuro Surgical Intervention performed  Patient w/ significant neurological impairment with slow improvement.   Can f/u in Neurosurgery clinic. See contact attached.      SECONDARY DISCHARGE DIAGNOSES  Diagnosis: Urinary retention  Assessment and Plan of Treatment: Required intermittent straight cath ever8 hours during hospitalization.  Will discharge with sánchez catheter. Patient should have trial of void when appropriate.  Continue Doxazosin 8mg QHS.       Diagnosis: Dysphagia  Assessment and Plan of Treatment: S/p PEG 4/18   Continue PEG feeds as tolerated.  If any concerns regarding PEG patient can follow up in clinic with Dr. Robles or Associates. See contact attached.    Diagnosis: Hypertension  Assessment and Plan of Treatment: SBP Goal 100- 160 Per Neuro Sx  Labetalol 400 mg q 8 hrs    Continue Hydralazine 100 mg q 8 hrs and Enalapril 5 mg BID.   Can follow up with Cardiologist, Dr. Angus Morgan in clinic. See contact attached.    Diagnosis: Atrial fibrillation  Assessment and Plan of Treatment: Newly Diagnosed A.FIB   Continue Labetalol 400 mg q 8 hr   Cardizem d/c 2/2 low HR and BP   Not on AC for new Afib given recent ICH.   Can schedule appointment with Cardiologist, Dr. Angus Morgan.    Diagnosis: Infection  Assessment and Plan of Treatment: Enterobacter Colace / Burkholderia Cepacia  S/p 10 day course of Meropenem, ended 5/3  Repeat sputum cx 5/5 and 5/6 remain positive for Burkholderia. Per ID and infection control, likely a colonizer which does not need further treatment but will need to remain on contact isolation.  UTI: UCx growing pan-sensitive E.coli. Treating with ciprofloxacin 500mg BID for 7 day course (5/14-21).    Diagnosis: Respiratory failure  Assessment and Plan of Treatment: Patient S/p Tracheostomy 4/16  ( # 8 Cuffed Shiley )   TC ATC since 4/28, down-sized to #7 Cuffless Portex on 5/1  added Hyper-Sal and Duoneb to mobilize secretions  5/12 Repeat CXR with clear lungs.  Continue trac collar with humidified oxygen, FiO2 28% to maintain O2 sat above 92%  If any concerns regarding tracheostomy, patient can schedule appointment to see Otolaryngologist, Dr. Quintero or Associates. See contact attached. PRINCIPAL DISCHARGE DIAGNOSIS  Diagnosis: Pontine hemorrhage  Assessment and Plan of Treatment: Patient with Admitted with Pontine Hemorrhage likely 2/2 Uncontrolled HTN   Head CT 4/17: Similar Appearing Hemorrhage in Mid brain, No hydrocephalus   No Neuro Surgical Intervention performed  Patient w/ significant neurological impairment with slow improvement.   Aspirin not given due to bleed, Will start Lipitor 80mg HS  Can f/u in Neurosurgery clinic. See contact attached.      SECONDARY DISCHARGE DIAGNOSES  Diagnosis: Urinary retention  Assessment and Plan of Treatment: Required intermittent straight cath ever8 hours during hospitalization.  Will discharge with sánchez catheter. Patient should have trial of void when appropriate.  Continue Doxazosin 8mg QHS.       Diagnosis: Dysphagia  Assessment and Plan of Treatment: S/p PEG 4/18   Continue PEG feeds as tolerated.  If any concerns regarding PEG patient can follow up in clinic with Dr. Robles or Associates. See contact attached.    Diagnosis: Hypertension  Assessment and Plan of Treatment: SBP Goal 100- 160 Per Neuro Sx  Labetalol 400 mg q 8 hrs    Continue Hydralazine 100 mg q 8 hrs and Enalapril 5 mg BID.   Can follow up with Cardiologist, Dr. Angus Morgan in clinic. See contact attached.    Diagnosis: Atrial fibrillation  Assessment and Plan of Treatment: Newly Diagnosed A.FIB   Continue Labetalol 400 mg q 8 hr   Cardizem d/c 2/2 low HR and BP   Not on AC for new Afib given recent ICH.   Can schedule appointment with Cardiologist, Dr. Angus Morgan.    Diagnosis: Infection  Assessment and Plan of Treatment: Enterobacter Colace / Burkholderia Cepacia  S/p 10 day course of Meropenem, ended 5/3  Repeat sputum cx 5/5 and 5/6 remain positive for Burkholderia. Per ID and infection control, likely a colonizer which does not need further treatment but will need to remain on contact isolation.  UTI: UCx growing pan-sensitive E.coli. Treating with ciprofloxacin 500mg BID for 7 day course (5/14-21).    Diagnosis: Respiratory failure  Assessment and Plan of Treatment: Patient S/p Tracheostomy 4/16  ( # 8 Cuffed Katelynnley )   TC ATC since 4/28, down-sized to #7 Cuffless Portex on 5/1  added Hyper-Sal and Duoneb to mobilize secretions  5/12 Repeat CXR with clear lungs.  Continue trac collar with humidified oxygen, FiO2 28% to maintain O2 sat above 92%  If any concerns regarding tracheostomy, patient can schedule appointment to see Otolaryngologist, Dr. Quintero or Associates. See contact attached.

## 2019-05-14 NOTE — DISCHARGE NOTE PROVIDER - HOSPITAL COURSE
63 Year old female w/ PMHx HTN, HLD, Ischemic stroke (2004), who was transferred from OSH, Patient initially presented to the OSH from home with AMS as per EMS, pt was talking to a family member on the phone when she suddenly stopped talking. 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 Patient transferred to [facility] for Neuro Surgical assessment. Upon admission NIHSS = ; MRS = 2; ICH =3. Patient was given DDAVP and PLTs. During admission patient was found to have newly diagnosed A.fib and was initiated on Labetalol. Patient had Serial head CTs performed which remained unchanged, no neuro surgical intervention was performed. Patient was seen by Palliative Care family decided to proceed with Trach and PEG. Patient S/p Tracheostomy on 4/16 ( # 8 Cuffed Shiley) and PEG Placement on 4/18. During hospitalization patient found to have UTI ( 100 K E.coli ) for which she was treated with a course of Keflex ( Completed 4/21). On 4/23 patient febrile again, Patient found to have : Enterobacter / Burkholderia  growing in sputum         4/26: Patient remains afebrile. Sputum cx 4/23 growing Enterobacter/ Burkholderia, Will continue Meropenem. Patient tolerating TC during the day, will attempt TC ATC and obtain AM ABG. Patient remains with elevated BP will increase Labetalol 400 mg q 8 hrs.    4/27: Tolerated TC x24hrs. ABG reviewed and adequate to continue TC. Appears comfortable of TC. BP remains labile with elevations into 170s. Will add Enalapril. Spoke to son at bedside. Will finish ABx on 4/30.    4/29 Stable on TC ATC. Fevers, UA negative, on meropenem until 4/30 5/1 Stable, tolerating TC ATC. Following commands, continue on abx until 5/3 per ID.     5/2: DC planning in progress, likely to occur post antibiotics. Otherwise no new events. Patient doing well and medically stable.    5/3: Will complete antibiotics today. No other issues. Family reviewing rehab facilities.    5/4: No new events. Requested Sputum Culture to assess for bacterial clearance. Spoke with  and daughter at bedside who are researching rehab facilities.     5/7 Stable tolerating TC ATC. Repeat sputum cx 5/5 prelim w/GNR, ID following, will wait for final before deciding on further abx course if any, per Dr. Guevara. BP imporved, SBP 90 - 140. Per cardio, no AC 2/2 bleed.     5/8 Stable, TC ATC. Cardizem d/c 2/2 low BP, HR. More lethargic today. IF does not improve, will send for [facility] r/o acute process.    5/9: Sputum culture x 2 still growing Burkholderia. Spoke with ID and infection control, as growth appears to be colonization without other signs of infection no further treatment is recommended. However patient will still need to maintain contact isolation status. Patient observed awake and following commands upon husbands request. DC planning in progress.    5/10: No new events. Spoke with patient's  at bedside and son via phone in regards to DC planning and facility options.    5/11: Medically stable. Awaiting family choices for Rehab placement.    5/12: Patient saturating 90-91% on pulse ox on Physical exam this morning, patient suctioned with minimal but thick respiratory secretions suctioned. Spo2 increased to 95-96% S/p Lavage. Will add hypersal and duoneb to mobilize secretions.  requesting CXR ( Ordered ).       5/13 cloudy urine- UA ordered Ms. Dodd is a 63 Year old female w/ PMHx HTN, HLD, Ischemic stroke (2004), who was transferred from OSH, Patient initially presented to the OSH from home with AMS as per EMS, pt was talking to a family member on the phone when she suddenly stopped talking. 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 Patient transferred to [facility] for Neuro Surgical assessment. Upon admission NIHSS = ; MRS = 2; ICH =3. Patient was given DDAVP and PLTs. During admission patient was found to have newly diagnosed A.fib and was initiated on Labetalol. Patient had Serial head CTs performed which remained unchanged, no neuro surgical intervention was performed. Patient was seen by Palliative Care family decided to proceed with Trach and PEG. Patient S/p Tracheostomy on 4/16 ( # 8 Cuffed Shiley) and PEG Placement on 4/18. During hospitalization patient found to have UTI ( 100 K E.coli ) for which she was treated with a course of Keflex ( Completed 4/21). On 4/23 patient febrile again, Patient found to have : Enterobacter / Burkholderia  growing in sputum for which she was placed on contact isolation and completed a 10 day course of meropepenem on 5/3. Patient again febrile and found to have UTI with pan-sensitive E.coli which is being treated with ciprofloxacin to complete 7 days on 5/21.  Patient was able to be weaned from ventilator and has been tolerating trach collar continuously as of  4/26. Patient remains medically stable for discharge to rehab facility.

## 2019-05-14 NOTE — PROGRESS NOTE ADULT - SUBJECTIVE AND OBJECTIVE BOX
Patient is a 63y old  Female who presents with a chief complaint of ICH (11 May 2019 10:18)      Interval Events: No events reported overnight    REVIEW OF SYSTEMS:  [ ] Positive  [ ] All other systems negative  [x] Unable to assess ROS because patient is Non-Verbal     Vital Signs Last 24 Hrs  T(C): 36.9 (05-12-19 @ 06:45), Max: 36.9 (05-11-19 @ 13:11)  T(F): 98.4 (05-12-19 @ 06:45), Max: 98.4 (05-11-19 @ 13:11)  HR: 62 (05-12-19 @ 06:45) (60 - 69)  BP: 124/74 (05-12-19 @ 06:45) (100/63 - 163/84)  RR: 18 (05-12-19 @ 06:45) (18 - 21)  SpO2: 95% (05-12-19 @ 06:45) (94% - 98%)    PHYSICAL EXAM:  HEENT:   [x]Tracheostomy: # 7 Cuffless Portex   [ ]Pupils equal  [ ]No oral lesions  [ ]Abnormal    SKIN  [x]No Rash  [ ] Abnormal  [ ] pressure    CARDIAC  [x]Regular  [ ]Abnormal    PULMONARY  [x]Bilateral Coarse Breath Sounds  [ ]Normal Excursion  [ ]Abnormal    GI  [x]PEG      [x] +BS		              [x]Soft, nondistended, nontender	  [ ]Abnormal    MUSCULOSKELETAL                                   [ ]Bedbound                 [ ]Abnormal    [x]OOB to chair                           EXTREMITIES                                         [x]Normal  [ ]Edema                           NEUROLOGIC  [ ] Normal, non focal  [x] Focal findings: Arousable when spoken to in her language, Moves RT Upper and Rt lower extremity on command / Left Hemplegia     PSYCHIATRIC  [ ]Alert and appropriate  [x] Sedated	 [ ]Agitated    :  Bedoya: [ ] Yes, if yes: Date of Placement:                   [x] No / Straight Cath ATC     LINES: Central Lines [ ] Yes, if yes: Date of Placement                                     [x] No    HOSPITAL MEDICATIONS:  MEDICATIONS  (STANDING):  chlorhexidine 0.12% Liquid 15 milliLiter(s) Oral Mucosa two times a day  chlorhexidine 4% Liquid 1 Application(s) Topical <User Schedule>  doxazosin 8 milliGRAM(s) Oral at bedtime  enalapril 5 milliGRAM(s) Oral two times a day  enoxaparin Injectable 40 milliGRAM(s) SubCutaneous <User Schedule>  hydrALAZINE 100 milliGRAM(s) Oral every 8 hours  labetalol 400 milliGRAM(s) Oral every 8 hours  lactobacillus acidophilus 1 Tablet(s) Oral two times a day  nystatin    Suspension 819043 Unit(s) Oral every 6 hours  pantoprazole   Suspension 40 milliGRAM(s) Oral daily    MEDICATIONS  (PRN):  acetaminophen   Tablet .. 650 milliGRAM(s) Oral every 6 hours PRN Temp greater or equal to 38C (100.4F), Mild Pain (1 - 3)  ondansetron Injectable 4 milliGRAM(s) IV Push every 6 hours PRN Nausea and/or Vomiting      LABS:                        10.8   10.5  )-----------( 280      ( 11 May 2019 07:14 )             32.3     05-11    141  |  105  |  23  ----------------------------<  104<H>  4.6   |  28  |  0.44<L>    Ca    9.3      11 May 2019 07:14  Phos  3.8     05-11  Mg     2.1     05-11              CAPILLARY BLOOD GLUCOSE    MICROBIOLOGY:   Culture - Sputum . (05.06.19 @ 08:48)    -  Levofloxacin: S <=2    -  Meropenem: S 2    -  Trimethoprim/Sulfamethoxazole: S <=2/38    Gram Stain:   Few polymorphonuclear leukocytes per low power field  No Squamous epithelial cells per low power field  Few Gram Negative Rods per oil power field    -  Ceftazidime: R >16    Specimen Source: .Sputum trap    Culture Results:   Moderate Burkholderia cepacia  Normal Respiratory Katia present    Organism Identification: Burkholderia cepacia    Organism: Burkholderia cepacia    Method Type: ALISE    Culture - Sputum . (05.05.19 @ 00:41)    -  Trimethoprim/Sulfamethoxazole: S <=2/38    -  Levofloxacin: S <=2    -  Meropenem: S 2    Gram Stain:   Numerous polymorphonuclear leukocytes per low power field  Numerous Squamous epithelial cells per low power field  Numerous Gram Negative Rods seen per oil power field    -  Ceftazidime: R >16    Specimen Source: .Sputum    Culture Results:   Moderate Burkholderia cepacia complex    Normal Respiratory Katia absent    Organism Identification: Burkholderia cepacia    Organism: Burkholderia cepacia    Method Type: ALISE      RADIOLOGY:  [x ] Reviewed and interpreted by me  < from: Xray Chest 1 View- PORTABLE-Urgent (05.12.19 @ 14:33) >  EXAM:  XR CHEST PORTABLE URGENT 1V                            PROCEDURE DATE:  05/12/2019            INTERPRETATION:  CLINICAL INFORMATION: Patient with thick respiratory   secrections. Cough.    EXAM: Frontal radiograph of the chest.    COMPARISON: Chest radiograph from 4/23/2019.    FINDINGS:    Lines/Tubes: Tracheostomy tube.    Lungs: The lungs are clear.    Pleura: No pleural effusion.    Mediastinum: Heart size cannot be assessed. Calcified aortic knob.    Skeletal: No acute osseous findings.      IMPRESSION:    1.  Clear lungs.      < end of copied text > Patient is a 63y old  Female who presents with a chief complaint of ICH (11 May 2019 10:18)      Interval Events: No events reported overnight    REVIEW OF SYSTEMS:  [ ] Positive  [ ] All other systems negative  [x] Unable to assess ROS because patient is Non-Verbal     Vital Signs Last 24 Hrs  T(C): 36.9 (05-12-19 @ 06:45), Max: 36.9 (05-11-19 @ 13:11)  T(F): 98.4 (05-12-19 @ 06:45), Max: 98.4 (05-11-19 @ 13:11)  HR: 62 (05-12-19 @ 06:45) (60 - 69)  BP: 124/74 (05-12-19 @ 06:45) (100/63 - 163/84)  RR: 18 (05-12-19 @ 06:45) (18 - 21)  SpO2: 95% (05-12-19 @ 06:45) (94% - 98%)    PHYSICAL EXAM:  HEENT:   [x]Tracheostomy: # 7 Cuffless Portex   TC 28% ATC  [ ]Pupils equal  [ ]No oral lesions  [ ]Abnormal    SKIN  [x]No Rash  [ ] Abnormal  [ ] pressure    CARDIAC  [x]Regular  [ ]Abnormal    PULMONARY  [x]Bilateral Coarse Breath Sounds  [ ]Normal Excursion  [ ]Abnormal    GI  [x]PEG      [x] +BS		              [x]Soft, nondistended, nontender	  [ ]Abnormal    MUSCULOSKELETAL                                   [ ]Bedbound                 [ ]Abnormal    [x]OOB to chair                           EXTREMITIES                                         [x]Normal  [ ]Edema                           NEUROLOGIC  [ ] Normal, non focal  [x] Focal findings: Arousable when spoken to in her language, Moves RT Upper and Rt lower extremity on command / Left Hemplegia     PSYCHIATRIC  [ ]Alert and appropriate  [x] Sedated	 [ ]Agitated    :  Bedoya: [ ] Yes, if yes: Date of Placement:                   [x] No / Straight Cath ATC     LINES: Central Lines [ ] Yes, if yes: Date of Placement                                     [x] No    HOSPITAL MEDICATIONS:  MEDICATIONS  (STANDING):  chlorhexidine 0.12% Liquid 15 milliLiter(s) Oral Mucosa two times a day  chlorhexidine 4% Liquid 1 Application(s) Topical <User Schedule>  doxazosin 8 milliGRAM(s) Oral at bedtime  enalapril 5 milliGRAM(s) Oral two times a day  enoxaparin Injectable 40 milliGRAM(s) SubCutaneous <User Schedule>  hydrALAZINE 100 milliGRAM(s) Oral every 8 hours  labetalol 400 milliGRAM(s) Oral every 8 hours  lactobacillus acidophilus 1 Tablet(s) Oral two times a day  nystatin    Suspension 078371 Unit(s) Oral every 6 hours  pantoprazole   Suspension 40 milliGRAM(s) Oral daily    MEDICATIONS  (PRN):  acetaminophen   Tablet .. 650 milliGRAM(s) Oral every 6 hours PRN Temp greater or equal to 38C (100.4F), Mild Pain (1 - 3)  ondansetron Injectable 4 milliGRAM(s) IV Push every 6 hours PRN Nausea and/or Vomiting      LABS:                        10.8   10.5  )-----------( 280      ( 11 May 2019 07:14 )             32.3     05-11    141  |  105  |  23  ----------------------------<  104<H>  4.6   |  28  |  0.44<L>    Ca    9.3      11 May 2019 07:14  Phos  3.8     05-11  Mg     2.1     05-11              CAPILLARY BLOOD GLUCOSE    MICROBIOLOGY:   Culture - Sputum . (05.06.19 @ 08:48)    -  Levofloxacin: S <=2    -  Meropenem: S 2    -  Trimethoprim/Sulfamethoxazole: S <=2/38    Gram Stain:   Few polymorphonuclear leukocytes per low power field  No Squamous epithelial cells per low power field  Few Gram Negative Rods per oil power field    -  Ceftazidime: R >16    Specimen Source: .Sputum trap    Culture Results:   Moderate Burkholderia cepacia  Normal Respiratory Katia present    Organism Identification: Burkholderia cepacia    Organism: Burkholderia cepacia    Method Type: ALISE    Culture - Sputum . (05.05.19 @ 00:41)    -  Trimethoprim/Sulfamethoxazole: S <=2/38    -  Levofloxacin: S <=2    -  Meropenem: S 2    Gram Stain:   Numerous polymorphonuclear leukocytes per low power field  Numerous Squamous epithelial cells per low power field  Numerous Gram Negative Rods seen per oil power field    -  Ceftazidime: R >16    Specimen Source: .Sputum    Culture Results:   Moderate Burkholderia cepacia complex    Normal Respiratory Katia absent    Organism Identification: Burkholderia cepacia    Organism: Burkholderia cepacia    Method Type: ALISE      RADIOLOGY:  [x ] Reviewed and interpreted by me  < from: Xray Chest 1 View- PORTABLE-Urgent (05.12.19 @ 14:33) >  EXAM:  XR CHEST PORTABLE URGENT 1V                            PROCEDURE DATE:  05/12/2019            INTERPRETATION:  CLINICAL INFORMATION: Patient with thick respiratory   secrections. Cough.    EXAM: Frontal radiograph of the chest.    COMPARISON: Chest radiograph from 4/23/2019.    FINDINGS:    Lines/Tubes: Tracheostomy tube.    Lungs: The lungs are clear.    Pleura: No pleural effusion.    Mediastinum: Heart size cannot be assessed. Calcified aortic knob.    Skeletal: No acute osseous findings.      IMPRESSION:    1.  Clear lungs.      < end of copied text >

## 2019-05-14 NOTE — PROGRESS NOTE ADULT - ASSESSMENT
63 Year old female w/ PMHx HTN, HLD, Ischemic stroke (2004), who was transferred from OSH, Patient initially presented to the OSH from home with AMS as per EMS, pt was talking to a family member on the phone when she suddenly stopped talking. 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 Patient transferred to [facility] for Neuro Surgical assessment. Upon admission NIHSS = ; MRS = 2; ICH =3. Patient was given DDAVP and PLTs. During admission patient was found to have newly diagnosed A.fib and was initiated on Labetalol. Patient had Serial head CTs performed which remained unchanged, no neuro surgical intervention was performed. Patient was seen by Palliative Care family decided to proceed with Trach and PEG. Patient S/p Tracheostomy on 4/16 ( # 8 Cuffed Shiley) and PEG Placement on 4/18. During hospitalization patient found to have UTI ( 100 K E.coli ) for which she was treated with a course of Keflex ( Completed 4/21). On 4/23 patient febrile again, Patient found to have : Enterobacter / Burkholderia  growing in sputum     4/26: Patient remains afebrile. Sputum cx 4/23 growing Enterobacter/ Burkholderia, Will continue Meropenem. Patient tolerating TC during the day, will attempt TC ATC and obtain AM ABG. Patient remains with elevated BP will increase Labetalol 400 mg q 8 hrs.  4/27: Tolerated TC x24hrs. ABG reviewed and adequate to continue TC. Appears comfortable of TC. BP remains labile with elevations into 170s. Will add Enalapril. Spoke to son at bedside. Will finish ABx on 4/30.  4/29 Stable on TC ATC. Fevers, UA negative, on meropenem until 4/30 5/1 Stable, tolerating TC ATC. Following commands, continue on abx until 5/3 per ID.   5/2: DC planning in progress, likely to occur post antibiotics. Otherwise no new events. Patient doing well and medically stable.  5/3: Will complete antibiotics today. No other issues. Family reviewing rehab facilities.  5/4: No new events. Requested Sputum Culture to assess for bacterial clearance. Spoke with  and daughter at bedside who are researching rehab facilities.   5/7 Stable tolerating TC ATC. Repeat sputum cx 5/5 prelim w/GNR, ID following, will wait for final before deciding on further abx course if any, per Dr. Guevara. BP imporved, SBP 90 - 140. Per cardio, no AC 2/2 bleed.   5/8 Stable, TC ATC. Cardizem d/c 2/2 low BP, HR. More lethargic today. IF does not improve, will send for [facility] r/o acute process.  5/9: Sputum culture x 2 still growing Burkholderia. Spoke with ID and infection control, as growth appears to be colonization without other signs of infection no further treatment is recommended. However patient will still need to maintain contact isolation status. Patient observed awake and following commands upon husbands request. DC planning in progress.  5/10: No new events. Spoke with patient's  at bedside and son via phone in regards to DC planning and facility options.  5/11: Medically stable. Awaiting family choices for Rehab placement.  5/12: Patient saturating 90-91% on pulse ox on Physical exam this morning, patient suctioned with minimal but thick respiratory secretions suctioned. Spo2 increased to 95-96% S/p Lavage. Will add hypersal and duoneb to mobilize secretions.  requesting CXR ( Ordered ).     5/13 cloudy urine- UA ordered

## 2019-05-14 NOTE — DISCHARGE NOTE PROVIDER - NSDCHC_MEDRECSTATUS_GEN_ALL_CORE
Admission Reconciliation is Not Complete  Discharge Reconciliation is Completed Admission Reconciliation is Not Complete  Discharge Reconciliation is Not Complete Admission Reconciliation is Completed  Discharge Reconciliation is Completed

## 2019-05-14 NOTE — DISCHARGE NOTE PROVIDER - CARE PROVIDER_API CALL
Chapincito Dolan)  Neurology  [address] [facility], [address] [place] [address]  Phone: [phone]  Fax: [phone]  Follow Up Time: 2 weeks    Nicole Robles)  Gastroenterology; Internal Medicine  [address] [facility] [address]  [place], [place] [id number]  Phone: [phone]  Fax: [phone]  Follow Up Time: Routine    Bridgette Quintero)  Otolaryngology  [address] [facility], [facility] [address]  [place] [address]  Phone: [phone]  Fax: [phone]  Follow Up Time: Routine    Angus Morgan)  Cardiovascular Disease; Internal Medicine  [address] [facility], [facility] [address]  [place] [address]  Phone: [phone]  Fax: [phone]  Follow Up Time: 2 weeks

## 2019-05-14 NOTE — DISCHARGE NOTE PROVIDER - PROVIDER TOKENS
PROVIDER:[TOKEN:[[id number]],FOLLOWUP:[2 weeks]],PROVIDER:[TOKEN:[[id number]],FOLLOWUP:[Routine]],PROVIDER:[TOKEN:[[id number]],FOLLOWUP:[Routine]],PROVIDER:[TOKEN:[[id number]],FOLLOWUP:[2 weeks]]

## 2019-05-15 PROCEDURE — 99233 SBSQ HOSP IP/OBS HIGH 50: CPT | Mod: GC

## 2019-05-15 RX ADMIN — Medication 500000 UNIT(S): at 05:55

## 2019-05-15 RX ADMIN — SODIUM CHLORIDE 3 MILLILITER(S): 9 INJECTION INTRAMUSCULAR; INTRAVENOUS; SUBCUTANEOUS at 05:14

## 2019-05-15 RX ADMIN — CHLORHEXIDINE GLUCONATE 15 MILLILITER(S): 213 SOLUTION TOPICAL at 05:53

## 2019-05-15 RX ADMIN — Medication 100 MILLIGRAM(S): at 13:34

## 2019-05-15 RX ADMIN — Medication 3 MILLILITER(S): at 12:58

## 2019-05-15 RX ADMIN — Medication 500000 UNIT(S): at 11:35

## 2019-05-15 RX ADMIN — SODIUM CHLORIDE 3 MILLILITER(S): 9 INJECTION INTRAMUSCULAR; INTRAVENOUS; SUBCUTANEOUS at 23:54

## 2019-05-15 RX ADMIN — Medication 100 MILLIGRAM(S): at 05:55

## 2019-05-15 RX ADMIN — Medication 8 MILLIGRAM(S): at 21:08

## 2019-05-15 RX ADMIN — SODIUM CHLORIDE 3 MILLILITER(S): 9 INJECTION INTRAMUSCULAR; INTRAVENOUS; SUBCUTANEOUS at 12:59

## 2019-05-15 RX ADMIN — Medication 5 MILLIGRAM(S): at 05:55

## 2019-05-15 RX ADMIN — Medication 500000 UNIT(S): at 23:11

## 2019-05-15 RX ADMIN — SODIUM CHLORIDE 3 MILLILITER(S): 9 INJECTION INTRAMUSCULAR; INTRAVENOUS; SUBCUTANEOUS at 18:16

## 2019-05-15 RX ADMIN — Medication 5 MILLIGRAM(S): at 17:59

## 2019-05-15 RX ADMIN — Medication 3 MILLILITER(S): at 23:54

## 2019-05-15 RX ADMIN — Medication 3 MILLILITER(S): at 18:14

## 2019-05-15 RX ADMIN — Medication 100 MILLIGRAM(S): at 21:08

## 2019-05-15 RX ADMIN — Medication 3 MILLILITER(S): at 05:13

## 2019-05-15 RX ADMIN — Medication 400 MILLIGRAM(S): at 21:08

## 2019-05-15 RX ADMIN — Medication 250 MILLIGRAM(S): at 17:59

## 2019-05-15 RX ADMIN — Medication 1 TABLET(S): at 05:55

## 2019-05-15 RX ADMIN — Medication 500000 UNIT(S): at 17:58

## 2019-05-15 RX ADMIN — PANTOPRAZOLE SODIUM 40 MILLIGRAM(S): 20 TABLET, DELAYED RELEASE ORAL at 11:35

## 2019-05-15 RX ADMIN — CHLORHEXIDINE GLUCONATE 1 APPLICATION(S): 213 SOLUTION TOPICAL at 21:09

## 2019-05-15 RX ADMIN — Medication 400 MILLIGRAM(S): at 13:34

## 2019-05-15 RX ADMIN — Medication 250 MILLIGRAM(S): at 05:55

## 2019-05-15 RX ADMIN — Medication 500000 UNIT(S): at 00:10

## 2019-05-15 RX ADMIN — POLYETHYLENE GLYCOL 3350 17 GRAM(S): 17 POWDER, FOR SOLUTION ORAL at 21:08

## 2019-05-15 RX ADMIN — CHLORHEXIDINE GLUCONATE 15 MILLILITER(S): 213 SOLUTION TOPICAL at 17:58

## 2019-05-15 RX ADMIN — ENOXAPARIN SODIUM 40 MILLIGRAM(S): 100 INJECTION SUBCUTANEOUS at 17:59

## 2019-05-15 NOTE — PROGRESS NOTE ADULT - SUBJECTIVE AND OBJECTIVE BOX
Subjective: Patient seen and examined. No new events except as noted.     SUBJECTIVE/ROS:        MEDICATIONS:  MEDICATIONS  (STANDING):  ALBUTerol/ipratropium for Nebulization 3 milliLiter(s) Nebulizer every 6 hours  chlorhexidine 0.12% Liquid 15 milliLiter(s) Oral Mucosa two times a day  chlorhexidine 4% Liquid 1 Application(s) Topical <User Schedule>  ciprofloxacin     Tablet 250 milliGRAM(s) Oral two times a day  doxazosin 8 milliGRAM(s) Oral at bedtime  enalapril 5 milliGRAM(s) Oral two times a day  enoxaparin Injectable 40 milliGRAM(s) SubCutaneous <User Schedule>  hydrALAZINE 100 milliGRAM(s) Oral every 8 hours  labetalol 400 milliGRAM(s) Oral every 8 hours  lactobacillus acidophilus 1 Tablet(s) Oral two times a day  nystatin    Suspension 978650 Unit(s) Oral every 6 hours  pantoprazole   Suspension 40 milliGRAM(s) Oral daily  polyethylene glycol 3350 17 Gram(s) Oral at bedtime  sodium chloride 7% Inhalation 3 milliLiter(s) Inhalation every 6 hours      PHYSICAL EXAM:  T(C): 36.7 (05-15-19 @ 13:32), Max: 37 (05-14-19 @ 20:46)  HR: 72 (05-15-19 @ 13:32) (57 - 80)  BP: 127/67 (05-15-19 @ 13:32) (105/65 - 133/70)  RR: 18 (05-15-19 @ 13:32) (16 - 20)  SpO2: 98% (05-15-19 @ 13:32) (92% - 98%)  Wt(kg): --  I&O's Summary    14 May 2019 07:01  -  15 May 2019 07:00  --------------------------------------------------------  IN: 1955 mL / OUT: 1600 mL / NET: 355 mL            JVP: Normal  Neck: supple  Lung: clear   CV: S1 S2 , Murmur:  Abd: soft  Ext: No edema  Psych: flat affect  Skin: normal``    LABS/DATA:    CARDIAC MARKERS:                  proBNP:   Lipid Profile:   HgA1c:   TSH:     TELE:  EKG:

## 2019-05-15 NOTE — PROGRESS NOTE ADULT - PROBLEM SELECTOR PLAN 2
Patient S/p Tracheostomy 4/16  ( # 8 Cuffed Danna )   TC ATC since 4/28, down-sized to #7 Cuffless Portex on 5/1  Will add Hyper-Sal and Duoneb to mobilize secretions  Check Repeat CXR As per  Patient S/p Tracheostomy 4/16  ( # 8 Cuffed Danna )   TC ATC since 4/28, down-sized to #7 Cuffless Portex on 5/1  Will add Hyper-Sal and Duoneb to mobilize secretions  5/12 Repeat CXR with clear lungs   Continue Hypersal and Duonebs for copious secretions

## 2019-05-15 NOTE — PROGRESS NOTE ADULT - SUBJECTIVE AND OBJECTIVE BOX
Patient is a 63y old  Female who presents with a chief complaint of ICH (14 May 2019 09:45)      Interval Events:    REVIEW OF SYSTEMS:  [ ] Positive  [ ] All other systems negative  [ ] Unable to assess ROS because ________    Vital Signs Last 24 Hrs  T(C): 36.8 (05-15-19 @ 05:13), Max: 37 (19 @ 20:46)  T(F): 98.3 (05-15-19 @ 05:13), Max: 98.6 (19 @ 20:46)  HR: 57 (05-15-19 @ 05:56) (55 - 80)  BP: 128/78 (05-15-19 @ 05:13) (99/60 - 133/70)  RR: 20 (05-15-19 @ 05:13) (16 - 20)  SpO2: 98% (05-15-19 @ 05:14) (96% - 98%)    PHYSICAL EXAM:  HEENT:   [ ]Tracheostomy:  [ ]Pupils equal  [ ]No oral lesions  [ ]Abnormal    SKIN  [ ]No Rash  [ ] Abnormal  [ ] pressure    CARDIAC  [ ]Regular  [ ]Abnormal    PULMONARY  [ ]Bilateral Clear Breath Sounds  [ ]Normal Excursion  [ ]Abnormal    GI  [ ]PEG      [ ] +BS		              [ ]Soft, nondistended, nontender	  [ ]Abnormal    MUSCULOSKELETAL                                   [ ]Bedbound                 [ ]Abnormal    [ ]Ambulatory/OOB to chair                           EXTREMITIES                                         [ ]Normal  [ ]Edema                           NEUROLOGIC  [ ] Normal, non focal  [ ] Focal findings:    PSYCHIATRIC  [ ]Alert and appropriate  [ ] Sedated	 [ ]Agitated    :  Bedoya: [ ] Yes, if yes: Date of Placement:                   [  ] No    LINES: Central Lines [ ] Yes, if yes: Date of Placement                                     [  ] No    HOSPITAL MEDICATIONS:  MEDICATIONS  (STANDING):  ALBUTerol/ipratropium for Nebulization 3 milliLiter(s) Nebulizer every 6 hours  chlorhexidine 0.12% Liquid 15 milliLiter(s) Oral Mucosa two times a day  chlorhexidine 4% Liquid 1 Application(s) Topical <User Schedule>  ciprofloxacin     Tablet 250 milliGRAM(s) Oral two times a day  doxazosin 8 milliGRAM(s) Oral at bedtime  enalapril 5 milliGRAM(s) Oral two times a day  enoxaparin Injectable 40 milliGRAM(s) SubCutaneous <User Schedule>  hydrALAZINE 100 milliGRAM(s) Oral every 8 hours  labetalol 400 milliGRAM(s) Oral every 8 hours  lactobacillus acidophilus 1 Tablet(s) Oral two times a day  nystatin    Suspension 915146 Unit(s) Oral every 6 hours  pantoprazole   Suspension 40 milliGRAM(s) Oral daily  polyethylene glycol 3350 17 Gram(s) Oral at bedtime  sodium chloride 7% Inhalation 3 milliLiter(s) Inhalation every 6 hours    MEDICATIONS  (PRN):  acetaminophen   Tablet .. 650 milliGRAM(s) Oral every 6 hours PRN Temp greater or equal to 38C (100.4F), Mild Pain (1 - 3)  bisacodyl Suppository 10 milliGRAM(s) Rectal daily PRN Constipation  ondansetron Injectable 4 milliGRAM(s) IV Push every 6 hours PRN Nausea and/or Vomiting        Urinalysis Basic - ( 13 May 2019 14:26 )    Color: Yellow / Appearance: Slightly Turbid / S.020 / pH: x  Gluc: x / Ketone: Negative  / Bili: Negative / Urobili: Negative   Blood: x / Protein: Trace / Nitrite: Negative   Leuk Esterase: Large / RBC: 6 /hpf / WBC 36 /HPF   Sq Epi: x / Non Sq Epi: 0 / Bacteria: Moderate Patient is a 63y old  Female who presents with a chief complaint of ICH (14 May 2019 09:45)    Interval Events:    No events overnight. Tolerating TC ATC.     REVIEW OF SYSTEMS:  [ ] Positive  [ ] All other systems negative  [ ] Unable to assess ROS because ________  [X} Follows commands when  is at bedside.     Vital Signs Last 24 Hrs  T(C): 36.8 (05-15-19 @ 05:13), Max: 37 (19 @ 20:46)  T(F): 98.3 (05-15-19 @ 05:13), Max: 98.6 (19 @ 20:46)  HR: 57 (05-15-19 @ 05:56) (55 - 80)  BP: 128/78 (05-15-19 @ 05:13) (99/60 - 133/70)  RR: 20 (05-15-19 @ 05:13) (16 - 20)  SpO2: 98% (05-15-19 @ 05:14) (96% - 98%)    PHYSICAL EXAM:  HEENT:   [X ]Tracheostomy:  #7 Cuffless Portex   [ ]Pupils equal  [ ]No oral lesions  [ ]Abnormal    SKIN  [X ]No Rash  [ ] Abnormal  [ ] pressure    CARDIAC  [X ]Regular  [ ]Abnormal    PULMONARY  [X ]Bilateral Clear Breath Sounds  [ ]Normal Excursion  [ ]Abnormal    GI  [ X]PEG      [X ] +BS		              [ X]Soft, nondistended, nontender	  [ ]Abnormal    MUSCULOSKELETAL                                   [X ]Bedbound                 [ ]Abnormal    [ ]Ambulatory/OOB to chair                           EXTREMITIES                                         [ X]Normal  [ ]Edema                           NEUROLOGIC  [ ] Normal, non focal  [ ] Focal findings:    PSYCHIATRIC  [ ]Alert and appropriate  [ ] Sedated	 [ ]Agitated    :  Bedoya: [ ] Yes, if yes: Date of Placement:                   [  X] No    LINES: Central Lines [ ] Yes, if yes: Date of Placement                                     [  X] No    HOSPITAL MEDICATIONS:  MEDICATIONS  (STANDING):  ALBUTerol/ipratropium for Nebulization 3 milliLiter(s) Nebulizer every 6 hours  chlorhexidine 0.12% Liquid 15 milliLiter(s) Oral Mucosa two times a day  chlorhexidine 4% Liquid 1 Application(s) Topical <User Schedule>  ciprofloxacin     Tablet 250 milliGRAM(s) Oral two times a day  doxazosin 8 milliGRAM(s) Oral at bedtime  enalapril 5 milliGRAM(s) Oral two times a day  enoxaparin Injectable 40 milliGRAM(s) SubCutaneous <User Schedule>  hydrALAZINE 100 milliGRAM(s) Oral every 8 hours  labetalol 400 milliGRAM(s) Oral every 8 hours  lactobacillus acidophilus 1 Tablet(s) Oral two times a day  nystatin    Suspension 505467 Unit(s) Oral every 6 hours  pantoprazole   Suspension 40 milliGRAM(s) Oral daily  polyethylene glycol 3350 17 Gram(s) Oral at bedtime  sodium chloride 7% Inhalation 3 milliLiter(s) Inhalation every 6 hours    MEDICATIONS  (PRN):  acetaminophen   Tablet .. 650 milliGRAM(s) Oral every 6 hours PRN Temp greater or equal to 38C (100.4F), Mild Pain (1 - 3)  bisacodyl Suppository 10 milliGRAM(s) Rectal daily PRN Constipation  ondansetron Injectable 4 milliGRAM(s) IV Push every 6 hours PRN Nausea and/or Vomiting      Urinalysis Basic - ( 13 May 2019 14:26 )    Color: Yellow / Appearance: Slightly Turbid / S.020 / pH: x  Gluc: x / Ketone: Negative  / Bili: Negative / Urobili: Negative   Blood: x / Protein: Trace / Nitrite: Negative   Leuk Esterase: Large / RBC: 6 /hpf / WBC 36 /HPF   Sq Epi: x / Non Sq Epi: 0 / Bacteria: Moderate

## 2019-05-15 NOTE — PROGRESS NOTE ADULT - ASSESSMENT
63 Year old female w/ PMHx HTN, HLD, Ischemic stroke (2004), who was transferred from OSH, Patient initially presented to the OSH from home with AMS as per EMS, pt was talking to a family member on the phone when she suddenly stopped talking. 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 Patient transferred to [facility] for Neuro Surgical assessment. Upon admission NIHSS = ; MRS = 2; ICH =3. Patient was given DDAVP and PLTs. During admission patient was found to have newly diagnosed A.fib and was initiated on Labetalol. Patient had Serial head CTs performed which remained unchanged, no neuro surgical intervention was performed. Patient was seen by Palliative Care family decided to proceed with Trach and PEG. Patient S/p Tracheostomy on 4/16 ( # 8 Cuffed Shiley) and PEG Placement on 4/18. During hospitalization patient found to have UTI ( 100 K E.coli ) for which she was treated with a course of Keflex ( Completed 4/21). On 4/23 patient febrile again, Patient found to have : Enterobacter / Burkholderia  growing in sputum     4/26: Patient remains afebrile. Sputum cx 4/23 growing Enterobacter/ Burkholderia, Will continue Meropenem. Patient tolerating TC during the day, will attempt TC ATC and obtain AM ABG. Patient remains with elevated BP will increase Labetalol 400 mg q 8 hrs.  4/27: Tolerated TC x24hrs. ABG reviewed and adequate to continue TC. Appears comfortable of TC. BP remains labile with elevations into 170s. Will add Enalapril. Spoke to son at bedside. Will finish ABx on 4/30.  4/29 Stable on TC ATC. Fevers, UA negative, on meropenem until 4/30 5/1 Stable, tolerating TC ATC. Following commands, continue on abx until 5/3 per ID.   5/2: DC planning in progress, likely to occur post antibiotics. Otherwise no new events. Patient doing well and medically stable.  5/3: Will complete antibiotics today. No other issues. Family reviewing rehab facilities.  5/4: No new events. Requested Sputum Culture to assess for bacterial clearance. Spoke with  and daughter at bedside who are researching rehab facilities.   5/7 Stable tolerating TC ATC. Repeat sputum cx 5/5 prelim w/GNR, ID following, will wait for final before deciding on further abx course if any, per Dr. Guevara. BP imporved, SBP 90 - 140. Per cardio, no AC 2/2 bleed.   5/8 Stable, TC ATC. Cardizem d/c 2/2 low BP, HR. More lethargic today. IF does not improve, will send for [facility] r/o acute process.  5/9: Sputum culture x 2 still growing Burkholderia. Spoke with ID and infection control, as growth appears to be colonization without other signs of infection no further treatment is recommended. However patient will still need to maintain contact isolation status. Patient observed awake and following commands upon husbands request. DC planning in progress.  5/10: No new events. Spoke with patient's  at bedside and son via phone in regards to DC planning and facility options.  5/11: Medically stable. Awaiting family choices for Rehab placement.  5/12: Patient saturating 90-91% on pulse ox on Physical exam this morning, patient suctioned with minimal but thick respiratory secretions suctioned. Spo2 increased to 95-96% S/p Lavage. Will add hypersal and duoneb to mobilize secretions.  requesting CXR ( Ordered ).     5/13 cloudy urine- UA ordered 63 Year old female w/ PMHx HTN, HLD, Ischemic stroke (2004), who was transferred from OSH, Patient initially presented to the OSH from home with AMS as per EMS, pt was talking to a family member on the phone when she suddenly stopped talking. 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 Patient transferred to [facility] for Neuro Surgical assessment. Upon admission NIHSS = ; MRS = 2; ICH =3. Patient was given DDAVP and PLTs. During admission patient was found to have newly diagnosed A.fib and was initiated on Labetalol. Patient had Serial head CTs performed which remained unchanged, no neuro surgical intervention was performed. Patient was seen by Palliative Care family decided to proceed with Trach and PEG. Patient S/p Tracheostomy on 4/16 ( # 8 Cuffed Shiley) and PEG Placement on 4/18. During hospitalization patient found to have UTI ( 100 K E.coli ) for which she was treated with a course of Keflex ( Completed 4/21). On 4/23 patient febrile again, Patient found to have : Enterobacter / Burkholderia  growing in sputum     4/26: Patient remains afebrile. Sputum cx 4/23 growing Enterobacter/ Burkholderia, Will continue Meropenem. Patient tolerating TC during the day, will attempt TC ATC and obtain AM ABG. Patient remains with elevated BP will increase Labetalol 400 mg q 8 hrs.  4/27: Tolerated TC x24hrs. ABG reviewed and adequate to continue TC. Appears comfortable of TC. BP remains labile with elevations into 170s. Will add Enalapril. Spoke to son at bedside. Will finish ABx on 4/30.  4/29 Stable on TC ATC. Fevers, UA negative, on meropenem until 4/30 5/1 Stable, tolerating TC ATC. Following commands, continue on abx until 5/3 per ID.   5/2: DC planning in progress, likely to occur post antibiotics. Otherwise no new events. Patient doing well and medically stable.  5/3: Will complete antibiotics today. No other issues. Family reviewing rehab facilities.  5/4: No new events. Requested Sputum Culture to assess for bacterial clearance. Spoke with  and daughter at bedside who are researching rehab facilities.   5/7 Stable tolerating TC ATC. Repeat sputum cx 5/5 prelim w/GNR, ID following, will wait for final before deciding on further abx course if any, per Dr. Guevara. BP imporved, SBP 90 - 140. Per cardio, no AC 2/2 bleed.   5/8 Stable, TC ATC. Cardizem d/c 2/2 low BP, HR. More lethargic today. IF does not improve, will send for [facility] r/o acute process.  5/9: Sputum culture x 2 still growing Burkholderia. Spoke with ID and infection control, as growth appears to be colonization without other signs of infection no further treatment is recommended. However patient will still need to maintain contact isolation status. Patient observed awake and following commands upon husbands request. DC planning in progress.  5/10: No new events. Spoke with patient's  at bedside and son via phone in regards to DC planning and facility options.  5/11: Medically stable. Awaiting family choices for Rehab placement.  5/12: Patient saturating 90-91% on pulse ox on Physical exam this morning, patient suctioned with minimal but thick respiratory secretions suctioned. Spo2 increased to 95-96% S/p Lavage. Will add hypersal and duoneb to mobilize secretions.  requesting CXR ( Ordered ).     5/13 cloudy urine- UA ordered  5/15 Stable, tolerating TC ATC. Pending d/c to [facility], pending Urine Cx sensitivities.

## 2019-05-16 PROCEDURE — 99233 SBSQ HOSP IP/OBS HIGH 50: CPT | Mod: GC

## 2019-05-16 RX ADMIN — CHLORHEXIDINE GLUCONATE 1 APPLICATION(S): 213 SOLUTION TOPICAL at 21:42

## 2019-05-16 RX ADMIN — PANTOPRAZOLE SODIUM 40 MILLIGRAM(S): 20 TABLET, DELAYED RELEASE ORAL at 11:55

## 2019-05-16 RX ADMIN — Medication 3 MILLILITER(S): at 06:15

## 2019-05-16 RX ADMIN — Medication 500000 UNIT(S): at 05:57

## 2019-05-16 RX ADMIN — Medication 8 MILLIGRAM(S): at 21:40

## 2019-05-16 RX ADMIN — Medication 100 MILLIGRAM(S): at 14:46

## 2019-05-16 RX ADMIN — SODIUM CHLORIDE 3 MILLILITER(S): 9 INJECTION INTRAMUSCULAR; INTRAVENOUS; SUBCUTANEOUS at 12:27

## 2019-05-16 RX ADMIN — CHLORHEXIDINE GLUCONATE 15 MILLILITER(S): 213 SOLUTION TOPICAL at 17:26

## 2019-05-16 RX ADMIN — Medication 3 MILLILITER(S): at 17:57

## 2019-05-16 RX ADMIN — Medication 3 MILLILITER(S): at 12:23

## 2019-05-16 RX ADMIN — SODIUM CHLORIDE 3 MILLILITER(S): 9 INJECTION INTRAMUSCULAR; INTRAVENOUS; SUBCUTANEOUS at 23:36

## 2019-05-16 RX ADMIN — Medication 400 MILLIGRAM(S): at 21:41

## 2019-05-16 RX ADMIN — Medication 500000 UNIT(S): at 17:26

## 2019-05-16 RX ADMIN — Medication 5 MILLIGRAM(S): at 05:56

## 2019-05-16 RX ADMIN — Medication 100 MILLIGRAM(S): at 21:41

## 2019-05-16 RX ADMIN — Medication 500000 UNIT(S): at 11:55

## 2019-05-16 RX ADMIN — Medication 250 MILLIGRAM(S): at 17:26

## 2019-05-16 RX ADMIN — Medication 5 MILLIGRAM(S): at 17:26

## 2019-05-16 RX ADMIN — Medication 100 MILLIGRAM(S): at 05:56

## 2019-05-16 RX ADMIN — CHLORHEXIDINE GLUCONATE 15 MILLILITER(S): 213 SOLUTION TOPICAL at 05:56

## 2019-05-16 RX ADMIN — ENOXAPARIN SODIUM 40 MILLIGRAM(S): 100 INJECTION SUBCUTANEOUS at 17:26

## 2019-05-16 RX ADMIN — SODIUM CHLORIDE 3 MILLILITER(S): 9 INJECTION INTRAMUSCULAR; INTRAVENOUS; SUBCUTANEOUS at 06:15

## 2019-05-16 RX ADMIN — Medication 1 TABLET(S): at 17:26

## 2019-05-16 RX ADMIN — Medication 1 TABLET(S): at 05:57

## 2019-05-16 RX ADMIN — SODIUM CHLORIDE 3 MILLILITER(S): 9 INJECTION INTRAMUSCULAR; INTRAVENOUS; SUBCUTANEOUS at 17:58

## 2019-05-16 RX ADMIN — Medication 250 MILLIGRAM(S): at 05:56

## 2019-05-16 RX ADMIN — POLYETHYLENE GLYCOL 3350 17 GRAM(S): 17 POWDER, FOR SOLUTION ORAL at 21:41

## 2019-05-16 RX ADMIN — Medication 400 MILLIGRAM(S): at 05:56

## 2019-05-16 RX ADMIN — Medication 3 MILLILITER(S): at 23:36

## 2019-05-16 NOTE — CHART NOTE - NSCHARTNOTEFT_GEN_A_CORE
Nutrition Follow Up Note  RCU      Source: medical record/team     63 year old Female admitted with AMS found to have pontine hemorrhage S/P trach  and PEG placement . Pt currently off antibiotics, discharge planning in progress. No reports of GI intolerance noted.     Diet : NPO with tube feeding via PEG   Enteral Nutrition:  Jevity 1.5 at 75ml/hr x 18 hrs + Tolu Active 2x daily to provide total 1350ml, calories 2165, 30/kg, protein  86gm, 1.2/kg, protein based on IBW=1.8gm/kg  free water in formula 1026ml ; additional free water added [id number] hrs , total free water 1850ml        Daily Weight in k.1 (05-15) questionable accuracy, PCA Jessica requested to reweigh  Weight Change:  dosing weight 73.2kg,  previous weight  72.9kg        IBW=  105lbs (47.7kg)    Pertinent Medications: MEDICATIONS  (STANDING):  ALBUTerol/ipratropium for Nebulization 3 milliLiter(s) Nebulizer every 6 hours  chlorhexidine 0.12% Liquid 15 milliLiter(s) Oral Mucosa two times a day  chlorhexidine 4% Liquid 1 Application(s) Topical <User Schedule>  ciprofloxacin     Tablet 250 milliGRAM(s) Oral two times a day  doxazosin 8 milliGRAM(s) Oral at bedtime  enalapril 5 milliGRAM(s) Oral two times a day  enoxaparin Injectable 40 milliGRAM(s) SubCutaneous <User Schedule>  hydrALAZINE 100 milliGRAM(s) Oral every 8 hours  labetalol 400 milliGRAM(s) Oral every 8 hours  lactobacillus acidophilus 1 Tablet(s) Oral two times a day  nystatin    Suspension 131576 Unit(s) Oral every 6 hours  pantoprazole   Suspension 40 milliGRAM(s) Oral daily  polyethylene glycol 3350 17 Gram(s) Oral at bedtime  sodium chloride 7% Inhalation 3 milliLiter(s) Inhalation every 6 hours    MEDICATIONS  (PRN):  acetaminophen   Tablet .. 650 milliGRAM(s) Oral every 6 hours PRN Temp greater or equal to 38C (100.4F), Mild Pain (1 - 3)  bisacodyl Suppository 10 milliGRAM(s) Rectal daily PRN Constipation  ondansetron Injectable 4 milliGRAM(s) IV Push every 6 hours PRN Nausea and/or Vomiting    Pertinent Labs: unremarkable  Finger Sticks:  NA    Skin per nursing documentation: no pressure breakdown  Edema: generalized 1+  per RN Jayme  Estimated Needs:   [X ] no change since previous assessment  [ ] recalculated:     Previous Nutrition Diagnosis: Increased nutrient needs  Nutrition Diagnosis is: ongoing      Recommend  1) continue  Jevity 1.5 at 75ml/hr x 18 hrs + Tolu Active 2x daily to provide total 1350ml, calories 2165, 30/kg, protein  86gm, 1.2/kg, protein based on IBW=1.8gm/kg  free water in formula 1026ml ; additional free water added [id number] hrs , total free water 1850ml  2) adjust free water based on hydration  3) continue Tolu Active probiotic 2x daily    Monitoring and Evaluation:     Continue to Monitor tube feed tolerance , weight, skin, labs.     RD remains available upon request and will follow up per protocol

## 2019-05-16 NOTE — PROGRESS NOTE ADULT - SUBJECTIVE AND OBJECTIVE BOX
Subjective: Patient seen and examined. No new events except as noted.     SUBJECTIVE/ROS:        MEDICATIONS:  MEDICATIONS  (STANDING):  ALBUTerol/ipratropium for Nebulization 3 milliLiter(s) Nebulizer every 6 hours  chlorhexidine 0.12% Liquid 15 milliLiter(s) Oral Mucosa two times a day  chlorhexidine 4% Liquid 1 Application(s) Topical <User Schedule>  ciprofloxacin     Tablet 250 milliGRAM(s) Oral two times a day  doxazosin 8 milliGRAM(s) Oral at bedtime  enalapril 5 milliGRAM(s) Oral two times a day  enoxaparin Injectable 40 milliGRAM(s) SubCutaneous <User Schedule>  hydrALAZINE 100 milliGRAM(s) Oral every 8 hours  labetalol 400 milliGRAM(s) Oral every 8 hours  lactobacillus acidophilus 1 Tablet(s) Oral two times a day  nystatin    Suspension 995917 Unit(s) Oral every 6 hours  pantoprazole   Suspension 40 milliGRAM(s) Oral daily  polyethylene glycol 3350 17 Gram(s) Oral at bedtime  sodium chloride 7% Inhalation 3 milliLiter(s) Inhalation every 6 hours      PHYSICAL EXAM:  T(C): 36.8 (05-16-19 @ 05:53), Max: 37.1 (05-16-19 @ 01:54)  HR: 87 (05-16-19 @ 06:15) (57 - 88)  BP: 128/76 (05-16-19 @ 05:53) (94/61 - 131/72)  RR: 20 (05-16-19 @ 05:53) (18 - 20)  SpO2: 98% (05-16-19 @ 06:15) (92% - 99%)  Wt(kg): --  I&O's Summary    15 May 2019 07:01  -  16 May 2019 07:00  --------------------------------------------------------  IN: 1995 mL / OUT: 1800 mL / NET: 195 mL            JVP: Normal  Neck: supple  Lung: clear   CV: S1 S2 , Murmur:  Abd: soft  Ext: No edema  Psych: flat affect  Skin: normal``    LABS/DATA:    CARDIAC MARKERS:                  proBNP:   Lipid Profile:   HgA1c:   TSH:     TELE:  EKG:

## 2019-05-16 NOTE — PROGRESS NOTE ADULT - ASSESSMENT
63 Year old female w/ PMHx HTN, HLD, Ischemic stroke (2004), who was transferred from OSH, Patient initially presented to the OSH from home with AMS as per EMS, pt was talking to a family member on the phone when she suddenly stopped talking. 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 Patient transferred to [facility] for Neuro Surgical assessment. Upon admission NIHSS = ; MRS = 2; ICH =3. Patient was given DDAVP and PLTs. During admission patient was found to have newly diagnosed A.fib and was initiated on Labetalol. Patient had Serial head CTs performed which remained unchanged, no neuro surgical intervention was performed. Patient was seen by Palliative Care family decided to proceed with Trach and PEG. Patient S/p Tracheostomy on 4/16 ( # 8 Cuffed Shiley) and PEG Placement on 4/18. During hospitalization patient found to have UTI ( 100 K E.coli ) for which she was treated with a course of Keflex ( Completed 4/21). On 4/23 patient febrile again, Patient found to have : Enterobacter / Burkholderia  growing in sputum     4/26: Patient remains afebrile. Sputum cx 4/23 growing Enterobacter/ Burkholderia, Will continue Meropenem. Patient tolerating TC during the day, will attempt TC ATC and obtain AM ABG. Patient remains with elevated BP will increase Labetalol 400 mg q 8 hrs.  4/27: Tolerated TC x24hrs. ABG reviewed and adequate to continue TC. Appears comfortable of TC. BP remains labile with elevations into 170s. Will add Enalapril. Spoke to son at bedside. Will finish ABx on 4/30.  4/29 Stable on TC ATC. Fevers, UA negative, on meropenem until 4/30 5/1 Stable, tolerating TC ATC. Following commands, continue on abx until 5/3 per ID.   5/2: DC planning in progress, likely to occur post antibiotics. Otherwise no new events. Patient doing well and medically stable.  5/3: Will complete antibiotics today. No other issues. Family reviewing rehab facilities.  5/4: No new events. Requested Sputum Culture to assess for bacterial clearance. Spoke with  and daughter at bedside who are researching rehab facilities.   5/7 Stable tolerating TC ATC. Repeat sputum cx 5/5 prelim w/GNR, ID following, will wait for final before deciding on further abx course if any, per Dr. Guevara. BP imporved, SBP 90 - 140. Per cardio, no AC 2/2 bleed.   5/8 Stable, TC ATC. Cardizem d/c 2/2 low BP, HR. More lethargic today. IF does not improve, will send for [facility] r/o acute process.  5/9: Sputum culture x 2 still growing Burkholderia. Spoke with ID and infection control, as growth appears to be colonization without other signs of infection no further treatment is recommended. However patient will still need to maintain contact isolation status. Patient observed awake and following commands upon husbands request. DC planning in progress.  5/10: No new events. Spoke with patient's  at bedside and son via phone in regards to DC planning and facility options.  5/11: Medically stable. Awaiting family choices for Rehab placement.  5/12: Patient saturating 90-91% on pulse ox on Physical exam this morning, patient suctioned with minimal but thick respiratory secretions suctioned. Spo2 increased to 95-96% S/p Lavage. Will add hypersal and duoneb to mobilize secretions.  requesting CXR ( Ordered ).     5/13 cloudy urine- UA ordered  5/15 Stable, tolerating TC ATC. Pending d/c to [place], pending Urine Cx sensitivities.   5/16: Urine Cx growing pan-sensitive E.coli. will continue cipro and plan for discharge to rehab on 5/17.

## 2019-05-16 NOTE — PROGRESS NOTE ADULT - SUBJECTIVE AND OBJECTIVE BOX
Patient is a 63y old  Female who presents with a chief complaint of ICH (15 May 2019 15:18)      Interval Events:    REVIEW OF SYSTEMS:  [ ] Positive  [ ] All other systems negative  [ ] Unable to assess ROS because ________    Vital Signs Last 24 Hrs  T(C): 36.8 (05-16-19 @ 05:53), Max: 37.1 (05-16-19 @ 01:54)  T(F): 98.2 (05-16-19 @ 05:53), Max: 98.8 (05-16-19 @ 01:54)  HR: 87 (05-16-19 @ 06:15) (57 - 88)  BP: 128/76 (05-16-19 @ 05:53) (94/61 - 131/72)  RR: 20 (05-16-19 @ 05:53) (18 - 20)  SpO2: 98% (05-16-19 @ 06:15) (92% - 99%)    PHYSICAL EXAM:  HEENT:   [ ]Tracheostomy:  [ ]Pupils equal  [ ]No oral lesions  [ ]Abnormal    SKIN  [ ]No Rash  [ ] Abnormal  [ ] pressure    CARDIAC  [ ]Regular  [ ]Abnormal    PULMONARY  [ ]Bilateral Clear Breath Sounds  [ ]Normal Excursion  [ ]Abnormal    GI  [ ]PEG      [ ] +BS		              [ ]Soft, nondistended, nontender	  [ ]Abnormal    MUSCULOSKELETAL                                   [ ]Bedbound                 [ ]Abnormal    [ ]Ambulatory/OOB to chair                           EXTREMITIES                                         [ ]Normal  [ ]Edema                           NEUROLOGIC  [ ] Normal, non focal  [ ] Focal findings:    PSYCHIATRIC  [ ]Alert and appropriate  [ ] Sedated	 [ ]Agitated    :  Bedoya: [ ] Yes, if yes: Date of Placement:                   [  ] No    LINES: Central Lines [ ] Yes, if yes: Date of Placement                                     [  ] No    HOSPITAL MEDICATIONS:  MEDICATIONS  (STANDING):  ALBUTerol/ipratropium for Nebulization 3 milliLiter(s) Nebulizer every 6 hours  chlorhexidine 0.12% Liquid 15 milliLiter(s) Oral Mucosa two times a day  chlorhexidine 4% Liquid 1 Application(s) Topical <User Schedule>  ciprofloxacin     Tablet 250 milliGRAM(s) Oral two times a day  doxazosin 8 milliGRAM(s) Oral at bedtime  enalapril 5 milliGRAM(s) Oral two times a day  enoxaparin Injectable 40 milliGRAM(s) SubCutaneous <User Schedule>  hydrALAZINE 100 milliGRAM(s) Oral every 8 hours  labetalol 400 milliGRAM(s) Oral every 8 hours  lactobacillus acidophilus 1 Tablet(s) Oral two times a day  nystatin    Suspension 942571 Unit(s) Oral every 6 hours  pantoprazole   Suspension 40 milliGRAM(s) Oral daily  polyethylene glycol 3350 17 Gram(s) Oral at bedtime  sodium chloride 7% Inhalation 3 milliLiter(s) Inhalation every 6 hours    MEDICATIONS  (PRN):  acetaminophen   Tablet .. 650 milliGRAM(s) Oral every 6 hours PRN Temp greater or equal to 38C (100.4F), Mild Pain (1 - 3)  bisacodyl Suppository 10 milliGRAM(s) Rectal daily PRN Constipation  ondansetron Injectable 4 milliGRAM(s) IV Push every 6 hours PRN Nausea and/or Vomiting      LABS:                  CAPILLARY BLOOD GLUCOSE    MICROBIOLOGY:     RADIOLOGY:  [ ] Reviewed and interpreted by me Patient is a 63y old  Female who presents with a chief complaint of ICH       Interval Events: No events reported over night    REVIEW OF SYSTEMS:  [ ] Positive  [ ] All other systems negative  [X ] Unable to assess ROS because ___non-verbal    Vital Signs Last 24 Hrs  T(C): 36.8 (05-16-19 @ 05:53), Max: 37.1 (05-16-19 @ 01:54)  T(F): 98.2 (05-16-19 @ 05:53), Max: 98.8 (05-16-19 @ 01:54)  HR: 87 (05-16-19 @ 06:15) (57 - 88)  BP: 128/76 (05-16-19 @ 05:53) (94/61 - 131/72)  RR: 20 (05-16-19 @ 05:53) (18 - 20)  SpO2: 98% (05-16-19 @ 06:15) (92% - 99%)          PHYSICAL EXAM:  HEENT:   [x]Tracheostomy: #7 Cuffless Portex  [x] Pupils equal  [ ]No oral lesions  [ ]Abnormal    SKIN  [x]No Rash  [ ] Abnormal  [ ] pressure    CARDIAC  [x]Regular  [ ]Abnormal    PULMONARY  [x]Bilateral Clear Breath Sounds  [ ]Normal Excursion  [ ]Abnormal    GI  [x]PEG      [x] +BS		              [x]Soft, nondistended, nontender	  [ ]Abnormal    MUSCULOSKELETAL                                   [ ]Bedbound                 [ ]Abnormal    [x]OOB to chair                           EXTREMITIES                                         [x]Normal  [ ]Edema                           NEUROLOGIC  [ ] Normal, non focal  [x] Focal findings: Arousable; appears to respond more when  speaks to her in original language; follows commands occasionally with RUE/RLL; +Left hemiplegia.    PSYCHIATRIC  [X]Alert  [ ] Sedated	 [ ]Agitated    :  Bedoya: [ ] Yes, if yes: Date of Placement:                   [x] No Straight Cath ATC     LINES: Central Lines [ ] Yes, if yes: Date of Placement                                     [x] No          HOSPITAL MEDICATIONS:  MEDICATIONS  (STANDING):  ALBUTerol/ipratropium for Nebulization 3 milliLiter(s) Nebulizer every 6 hours  chlorhexidine 0.12% Liquid 15 milliLiter(s) Oral Mucosa two times a day  chlorhexidine 4% Liquid 1 Application(s) Topical <User Schedule>  ciprofloxacin     Tablet 250 milliGRAM(s) Oral two times a day  doxazosin 8 milliGRAM(s) Oral at bedtime  enalapril 5 milliGRAM(s) Oral two times a day  enoxaparin Injectable 40 milliGRAM(s) SubCutaneous <User Schedule>  hydrALAZINE 100 milliGRAM(s) Oral every 8 hours  labetalol 400 milliGRAM(s) Oral every 8 hours  lactobacillus acidophilus 1 Tablet(s) Oral two times a day  nystatin    Suspension 465257 Unit(s) Oral every 6 hours  pantoprazole   Suspension 40 milliGRAM(s) Oral daily  polyethylene glycol 3350 17 Gram(s) Oral at bedtime  sodium chloride 7% Inhalation 3 milliLiter(s) Inhalation every 6 hours    MEDICATIONS  (PRN):  acetaminophen   Tablet .. 650 milliGRAM(s) Oral every 6 hours PRN Temp greater or equal to 38C (100.4F), Mild Pain (1 - 3)  bisacodyl Suppository 10 milliGRAM(s) Rectal daily PRN Constipation  ondansetron Injectable 4 milliGRAM(s) IV Push every 6 hours PRN Nausea and/or Vomiting      LABS:                  CAPILLARY BLOOD GLUCOSE    MICROBIOLOGY:     RADIOLOGY:  [ ] Reviewed and interpreted by me

## 2019-05-16 NOTE — PROGRESS NOTE ADULT - PROBLEM SELECTOR PLAN 2
Patient S/p Tracheostomy 4/16  ( # 8 Cuffed Shikavin )   TC ATC since 4/28, down-sized to #7 Cuffless Portex on 5/1  added Hyper-Sal and Duoneb to mobilize secretions  5/12 Repeat CXR with clear lungs   Continue Hypersal and Duonebs for copious secretions

## 2019-05-16 NOTE — PROGRESS NOTE ADULT - ASSESSMENT
HTN  labile  stable  cont current meds     Afib  resolved  off a/c due to high bleed risk     labs as per primary team

## 2019-05-17 VITALS
HEART RATE: 76 BPM | TEMPERATURE: 99 F | DIASTOLIC BLOOD PRESSURE: 66 MMHG | OXYGEN SATURATION: 98 % | RESPIRATION RATE: 20 BRPM | SYSTOLIC BLOOD PRESSURE: 113 MMHG

## 2019-05-17 PROCEDURE — 87186 SC STD MICRODIL/AGAR DIL: CPT

## 2019-05-17 PROCEDURE — 94799 UNLISTED PULMONARY SVC/PX: CPT

## 2019-05-17 PROCEDURE — 83605 ASSAY OF LACTIC ACID: CPT

## 2019-05-17 PROCEDURE — 82330 ASSAY OF CALCIUM: CPT

## 2019-05-17 PROCEDURE — 99239 HOSP IP/OBS DSCHRG MGMT >30: CPT | Mod: GC

## 2019-05-17 PROCEDURE — 82947 ASSAY GLUCOSE BLOOD QUANT: CPT

## 2019-05-17 PROCEDURE — 74018 RADEX ABDOMEN 1 VIEW: CPT

## 2019-05-17 PROCEDURE — 94664 DEMO&/EVAL PT USE INHALER: CPT

## 2019-05-17 PROCEDURE — 96374 THER/PROPH/DIAG INJ IV PUSH: CPT

## 2019-05-17 PROCEDURE — 85730 THROMBOPLASTIN TIME PARTIAL: CPT

## 2019-05-17 PROCEDURE — 70450 CT HEAD/BRAIN W/O DYE: CPT

## 2019-05-17 PROCEDURE — 84443 ASSAY THYROID STIM HORMONE: CPT

## 2019-05-17 PROCEDURE — 83036 HEMOGLOBIN GLYCOSYLATED A1C: CPT

## 2019-05-17 PROCEDURE — 84436 ASSAY OF TOTAL THYROXINE: CPT

## 2019-05-17 PROCEDURE — 97162 PT EVAL MOD COMPLEX 30 MIN: CPT

## 2019-05-17 PROCEDURE — 36415 COLL VENOUS BLD VENIPUNCTURE: CPT

## 2019-05-17 PROCEDURE — 87070 CULTURE OTHR SPECIMN AEROBIC: CPT

## 2019-05-17 PROCEDURE — 71045 X-RAY EXAM CHEST 1 VIEW: CPT

## 2019-05-17 PROCEDURE — 80061 LIPID PANEL: CPT

## 2019-05-17 PROCEDURE — 70498 CT ANGIOGRAPHY NECK: CPT

## 2019-05-17 PROCEDURE — 85027 COMPLETE CBC AUTOMATED: CPT

## 2019-05-17 PROCEDURE — 86900 BLOOD TYPING SEROLOGIC ABO: CPT

## 2019-05-17 PROCEDURE — 84295 ASSAY OF SERUM SODIUM: CPT

## 2019-05-17 PROCEDURE — 80048 BASIC METABOLIC PNL TOTAL CA: CPT

## 2019-05-17 PROCEDURE — 97110 THERAPEUTIC EXERCISES: CPT

## 2019-05-17 PROCEDURE — 97530 THERAPEUTIC ACTIVITIES: CPT

## 2019-05-17 PROCEDURE — 80053 COMPREHEN METABOLIC PANEL: CPT

## 2019-05-17 PROCEDURE — 82962 GLUCOSE BLOOD TEST: CPT

## 2019-05-17 PROCEDURE — 85610 PROTHROMBIN TIME: CPT

## 2019-05-17 PROCEDURE — 81001 URINALYSIS AUTO W/SCOPE: CPT

## 2019-05-17 PROCEDURE — 82565 ASSAY OF CREATININE: CPT

## 2019-05-17 PROCEDURE — 84484 ASSAY OF TROPONIN QUANT: CPT

## 2019-05-17 PROCEDURE — 97760 ORTHOTIC MGMT&TRAING 1ST ENC: CPT

## 2019-05-17 PROCEDURE — 36430 TRANSFUSION BLD/BLD COMPNT: CPT

## 2019-05-17 PROCEDURE — 84480 ASSAY TRIIODOTHYRONINE (T3): CPT

## 2019-05-17 PROCEDURE — 87040 BLOOD CULTURE FOR BACTERIA: CPT

## 2019-05-17 PROCEDURE — 85014 HEMATOCRIT: CPT

## 2019-05-17 PROCEDURE — 82550 ASSAY OF CK (CPK): CPT

## 2019-05-17 PROCEDURE — C1889: CPT

## 2019-05-17 PROCEDURE — 84100 ASSAY OF PHOSPHORUS: CPT

## 2019-05-17 PROCEDURE — 94002 VENT MGMT INPAT INIT DAY: CPT

## 2019-05-17 PROCEDURE — L8699: CPT

## 2019-05-17 PROCEDURE — 83735 ASSAY OF MAGNESIUM: CPT

## 2019-05-17 PROCEDURE — 93306 TTE W/DOPPLER COMPLETE: CPT

## 2019-05-17 PROCEDURE — P9037: CPT

## 2019-05-17 PROCEDURE — 99291 CRITICAL CARE FIRST HOUR: CPT | Mod: 25

## 2019-05-17 PROCEDURE — 84132 ASSAY OF SERUM POTASSIUM: CPT

## 2019-05-17 PROCEDURE — 31720 CLEARANCE OF AIRWAYS: CPT

## 2019-05-17 PROCEDURE — 93970 EXTREMITY STUDY: CPT

## 2019-05-17 PROCEDURE — 86850 RBC ANTIBODY SCREEN: CPT

## 2019-05-17 PROCEDURE — 82803 BLOOD GASES ANY COMBINATION: CPT

## 2019-05-17 PROCEDURE — 82553 CREATINE MB FRACTION: CPT

## 2019-05-17 PROCEDURE — 93005 ELECTROCARDIOGRAM TRACING: CPT

## 2019-05-17 PROCEDURE — 70496 CT ANGIOGRAPHY HEAD: CPT

## 2019-05-17 PROCEDURE — 96375 TX/PRO/DX INJ NEW DRUG ADDON: CPT

## 2019-05-17 PROCEDURE — 31500 INSERT EMERGENCY AIRWAY: CPT

## 2019-05-17 PROCEDURE — 84439 ASSAY OF FREE THYROXINE: CPT

## 2019-05-17 PROCEDURE — 87086 URINE CULTURE/COLONY COUNT: CPT

## 2019-05-17 PROCEDURE — 80076 HEPATIC FUNCTION PANEL: CPT

## 2019-05-17 PROCEDURE — 86803 HEPATITIS C AB TEST: CPT

## 2019-05-17 PROCEDURE — 86901 BLOOD TYPING SEROLOGIC RH(D): CPT

## 2019-05-17 PROCEDURE — 94640 AIRWAY INHALATION TREATMENT: CPT

## 2019-05-17 PROCEDURE — 82435 ASSAY OF BLOOD CHLORIDE: CPT

## 2019-05-17 PROCEDURE — 94003 VENT MGMT INPAT SUBQ DAY: CPT

## 2019-05-17 RX ORDER — POLYETHYLENE GLYCOL 3350 17 G/17G
17 POWDER, FOR SOLUTION ORAL AT BEDTIME
Refills: 0 | Status: DISCONTINUED | OUTPATIENT
Start: 2019-05-17 | End: 2019-05-17

## 2019-05-17 RX ORDER — ATORVASTATIN CALCIUM 80 MG/1
80 TABLET, FILM COATED ORAL AT BEDTIME
Refills: 0 | Status: DISCONTINUED | OUTPATIENT
Start: 2019-05-17 | End: 2019-05-17

## 2019-05-17 RX ORDER — CIPROFLOXACIN LACTATE 400MG/40ML
1 VIAL (ML) INTRAVENOUS
Qty: 0 | Refills: 0 | DISCHARGE
Start: 2019-05-17

## 2019-05-17 RX ORDER — IPRATROPIUM/ALBUTEROL SULFATE 18-103MCG
3 AEROSOL WITH ADAPTER (GRAM) INHALATION EVERY 6 HOURS
Refills: 0 | Status: DISCONTINUED | OUTPATIENT
Start: 2019-05-17 | End: 2019-05-17

## 2019-05-17 RX ORDER — FAMOTIDINE 10 MG/ML
1 INJECTION INTRAVENOUS
Qty: 0 | Refills: 0 | DISCHARGE
Start: 2019-05-17

## 2019-05-17 RX ORDER — POLYETHYLENE GLYCOL 3350 17 G/17G
17 POWDER, FOR SOLUTION ORAL
Qty: 0 | Refills: 0 | DISCHARGE
Start: 2019-05-17

## 2019-05-17 RX ORDER — IPRATROPIUM/ALBUTEROL SULFATE 18-103MCG
3 AEROSOL WITH ADAPTER (GRAM) INHALATION
Qty: 0 | Refills: 0 | DISCHARGE
Start: 2019-05-17

## 2019-05-17 RX ORDER — DOXAZOSIN MESYLATE 4 MG
1 TABLET ORAL
Qty: 0 | Refills: 0 | DISCHARGE
Start: 2019-05-17

## 2019-05-17 RX ORDER — CIPROFLOXACIN LACTATE 400MG/40ML
10 VIAL (ML) INTRAVENOUS
Qty: 0 | Refills: 0 | DISCHARGE
Start: 2019-05-17 | End: 2019-05-21

## 2019-05-17 RX ORDER — ACETAMINOPHEN 500 MG
2 TABLET ORAL
Qty: 0 | Refills: 0 | DISCHARGE
Start: 2019-05-17

## 2019-05-17 RX ORDER — FAMOTIDINE 10 MG/ML
20 INJECTION INTRAVENOUS DAILY
Refills: 0 | Status: DISCONTINUED | OUTPATIENT
Start: 2019-05-17 | End: 2019-05-17

## 2019-05-17 RX ORDER — CIPROFLOXACIN LACTATE 400MG/40ML
500 VIAL (ML) INTRAVENOUS EVERY 12 HOURS
Refills: 0 | Status: DISCONTINUED | OUTPATIENT
Start: 2019-05-17 | End: 2019-05-17

## 2019-05-17 RX ORDER — CIPROFLOXACIN LACTATE 400MG/40ML
1 VIAL (ML) INTRAVENOUS
Qty: 8 | Refills: 0
Start: 2019-05-17

## 2019-05-17 RX ORDER — ENOXAPARIN SODIUM 100 MG/ML
40 INJECTION SUBCUTANEOUS
Qty: 0 | Refills: 0 | DISCHARGE
Start: 2019-05-17

## 2019-05-17 RX ORDER — ATORVASTATIN CALCIUM 80 MG/1
1 TABLET, FILM COATED ORAL
Qty: 0 | Refills: 0 | DISCHARGE
Start: 2019-05-17

## 2019-05-17 RX ORDER — MOXIFLOXACIN HYDROCHLORIDE TABLETS, 400 MG 400 MG/1
1 TABLET, FILM COATED ORAL
Qty: 0 | Refills: 0 | DISCHARGE
Start: 2019-05-17 | End: 2019-05-21

## 2019-05-17 RX ADMIN — CHLORHEXIDINE GLUCONATE 15 MILLILITER(S): 213 SOLUTION TOPICAL at 05:03

## 2019-05-17 RX ADMIN — Medication 500000 UNIT(S): at 11:36

## 2019-05-17 RX ADMIN — Medication 100 MILLIGRAM(S): at 05:04

## 2019-05-17 RX ADMIN — Medication 3 MILLILITER(S): at 12:49

## 2019-05-17 RX ADMIN — PANTOPRAZOLE SODIUM 40 MILLIGRAM(S): 20 TABLET, DELAYED RELEASE ORAL at 11:36

## 2019-05-17 RX ADMIN — Medication 250 MILLIGRAM(S): at 05:04

## 2019-05-17 RX ADMIN — Medication 5 MILLIGRAM(S): at 05:04

## 2019-05-17 RX ADMIN — Medication 500000 UNIT(S): at 05:04

## 2019-05-17 RX ADMIN — Medication 500000 UNIT(S): at 00:12

## 2019-05-17 RX ADMIN — SODIUM CHLORIDE 3 MILLILITER(S): 9 INJECTION INTRAMUSCULAR; INTRAVENOUS; SUBCUTANEOUS at 12:49

## 2019-05-17 RX ADMIN — Medication 1 TABLET(S): at 05:03

## 2019-05-17 RX ADMIN — Medication 100 MILLIGRAM(S): at 11:37

## 2019-05-17 RX ADMIN — SODIUM CHLORIDE 3 MILLILITER(S): 9 INJECTION INTRAMUSCULAR; INTRAVENOUS; SUBCUTANEOUS at 05:53

## 2019-05-17 RX ADMIN — Medication 400 MILLIGRAM(S): at 05:03

## 2019-05-17 RX ADMIN — Medication 3 MILLILITER(S): at 05:53

## 2019-05-17 NOTE — PROGRESS NOTE ADULT - PROBLEM SELECTOR PROBLEM 6
Dysphagia

## 2019-05-17 NOTE — PROGRESS NOTE ADULT - PROBLEM SELECTOR PROBLEM 8
Need for prophylactic measure

## 2019-05-17 NOTE — PROGRESS NOTE ADULT - PROBLEM SELECTOR PLAN 3
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 Per ID and infection control, likely a colonizer which does not need further treatment but will need to remain on contact isolation.  UTI: UCx growing pan-sensitive E.coli. Treating with ciprofloxacin 500mg BID for 7 day course (5/14-21)

## 2019-05-17 NOTE — PROGRESS NOTE ADULT - PROBLEM SELECTOR PROBLEM 7
Urinary retention

## 2019-05-17 NOTE — PROGRESS NOTE ADULT - PROVIDER SPECIALTY LIST ADULT
Cardiology
ENT
Gastroenterology
Infectious Disease
NSICU
Neurosurgery
Neurosurgery
Pulmonology
Cardiology
Pulmonology

## 2019-05-17 NOTE — PROGRESS NOTE ADULT - PROBLEM SELECTOR PLAN 8
Continue Prophylactic Lovenox Dosing ( Cleared by Neuro Sx)   Continue Protonix

## 2019-05-17 NOTE — PROGRESS NOTE ADULT - PROBLEM SELECTOR PROBLEM 2
Respiratory failure
Fever
Respiratory failure
Fever
Respiratory failure

## 2019-05-17 NOTE — PROGRESS NOTE ADULT - PROBLEM SELECTOR PROBLEM 3
Infection
Respiratory failure
Infection
Respiratory failure
Infection

## 2019-05-17 NOTE — PROGRESS NOTE ADULT - SUBJECTIVE AND OBJECTIVE BOX
Subjective: Patient seen and examined. No new events except as noted.     SUBJECTIVE/ROS:        MEDICATIONS:  MEDICATIONS  (STANDING):  ALBUTerol/ipratropium for Nebulization 3 milliLiter(s) Nebulizer every 6 hours  chlorhexidine 0.12% Liquid 15 milliLiter(s) Oral Mucosa two times a day  chlorhexidine 4% Liquid 1 Application(s) Topical <User Schedule>  doxazosin 8 milliGRAM(s) Oral at bedtime  enalapril 5 milliGRAM(s) Oral two times a day  enoxaparin Injectable 40 milliGRAM(s) SubCutaneous <User Schedule>  hydrALAZINE 100 milliGRAM(s) Oral every 8 hours  labetalol 400 milliGRAM(s) Oral every 8 hours  lactobacillus acidophilus 1 Tablet(s) Oral two times a day  nystatin    Suspension 712380 Unit(s) Oral every 6 hours  pantoprazole   Suspension 40 milliGRAM(s) Oral daily  polyethylene glycol 3350 17 Gram(s) Oral at bedtime  sodium chloride 7% Inhalation 3 milliLiter(s) Inhalation every 6 hours      PHYSICAL EXAM:  T(C): 36.7 (05-17-19 @ 04:28), Max: 37.1 (05-16-19 @ 20:33)  HR: 58 (05-17-19 @ 05:55) (6 - 78)  BP: 124/68 (05-17-19 @ 04:28) (102/64 - 124/68)  RR: 19 (05-17-19 @ 04:46) (18 - 21)  SpO2: 95% (05-17-19 @ 05:55) (95% - 99%)  Wt(kg): --  I&O's Summary    16 May 2019 07:01  -  17 May 2019 07:00  --------------------------------------------------------  IN: 2290 mL / OUT: 1600 mL / NET: 690 mL            JVP: Normal  Neck: supple  Lung: clear   CV: S1 S2 , Murmur:  Abd: soft  Ext: No edema  Psych: flat affect  Skin: normal``    LABS/DATA:    CARDIAC MARKERS:                  proBNP:   Lipid Profile:   HgA1c:   TSH:     TELE:  EKG:

## 2019-05-17 NOTE — PROGRESS NOTE ADULT - ATTENDING COMMENTS
63 Year old female w/ PMHx HTN, HLD, Ischemic stroke p/w pontine ICH and course requiring trach/peg and completed course of carbapenem for burkholderia respiratory infection. Doing well clinically off abx. Pt comfortable and moving extremities with husbands instruction. Cont TC support.  No signficiant bradycardia or BP issues today. Resolved with d/c CCB. D/c planning .
63 Year old female w/ PMHx HTN, HLD, Ischemic stroke p/w pontine ICH and course requiring trach/peg and completed course of carbapenem for burkholderia respiratory infection. Doing well clinically off abx. Pt comfortable and moving extremities with husbands instruction. Cont TC support.  No significant bradycardia or BP issues today. Resolved with d/c CCB. D/c planning .
I have seen and examined the patient. I agree with the above history, physical exam, and plan of care except for as detailed below.    Hemorrhagic storke, hypertensive emergency. Acute respiratory failure s/p trach. Newly diagnosed Afib. Enterobacter and burkholderia pneumonia.     - Complete course of meropenem  - Rate control  - Continue antihypertensives  - Wean from vent as tolerated  - Dispo planning
I have seen and examined the patient. I agree with the above history, physical exam, and plan of care except for as detailed below.    Hemorrhagic stroke, hypertensive emergency. Acute respiratory failure s/p trach. Newly diagnosed Afib. Enterobacter and burkholderia pneumonia.     - Complete course of meropenem through 5/3  - Continue antihypertensives  - TC ATC, downsized 4/30  - more responsive, especially with . Following simple commands  - Dispo planning
I have seen and examined the patient. I agree with the above history, physical exam, and plan of care except for as detailed below.    Hemorrhagic stroke, hypertensive emergency. Acute respiratory failure s/p trach. Newly diagnosed Afib. Enterobacter and burkholderia pneumonia.     - Complete course of meropenem through 5/3  - Continue antihypertensives  - TC ATC, downsized 4/30  - more responsive, especially with . Following simple commands  - Dispo planning
I have seen and examined the patient. I agree with the above history, physical exam, and plan of care except for as detailed below.    Hemorrhagic stroke, hypertensive emergency. Acute respiratory failure s/p trach. Newly diagnosed Afib. Enterobacter and burkholderia pneumonia.     - meropenem finished 5/3  - Continue antihypertensives  - TC ATC, downsized 4/30  - more responsive, especially with . Following simple commands  - Dispo planning
pt seen and examined    63 Year old female w/ PMHx HTN, HLD, Ischemic stroke p/w pontine ICH and course requiring trach/peg and completed course of carbapenem for burkholderia respiratory infection. Doing well clinically off abx. Pt comfortable and moving extremities with husbands instruction. Cont TC support.  No significant bradycardia or BP issues today. Resolved with d/c CCB. D/c planning .
AGree with above managment
Nicole Robles MD, FACP, FACG, AGAF  [facility]  [phone]
Nicole Robles MD, FACP, FACG, AGAF  [facility]  [phone]
63 Year old female w/ PMHx HTN, HLD, Ischemic stroke p/w pontine ICH and course requiring trach/peg and completed course of carbapenem for burkholderia respiratory infection. Doing well clinically off abx. Pt comfortable and moving extremities with husbands instruction. R ext >L ext but 3-4/5. Cont TC support. Pt w/ brief episodes of bradycardia and BP occ lower than needed. Dec CCB dose and cont other bp meds. If still occuring would d/c CCB altogether. D/c planning
Hemorrhagic stroke, hypertensive emergency. Acute respiratory failure s/p trach. Newly diagnosed Afib. Enterobacter and burkholderia pneumonia.     - Pneumonia - meropenem completed 5/3; f/u repeat sputum cultures  - Continue antihypertensives, rate control  - TC ATC, downsized 4/30  -Urinary retention - continues to require straight cath  - more responsive, especially with . Following simple commands  - Dispo planning.     I was physically present for the key portions of the evaluation and management (E/M) service provided.  I agree with the above history, physical, and plan which I have reviewed and edited where appropriate.
I have seen and examined the patient. I agree with the above history, physical exam, and plan of care except for as detailed below.    Hemorrhagic stroke, hypertensive emergency. Acute respiratory failure s/p trach. Newly diagnosed Afib. Enterobacter and burkholderia pneumonia.     - Complete course of meropenem through 5/3  - Continue antihypertensives  - TC ATC, downsized 4/30  - Dispo planning
pt seen and examined    63 Year old female w/ PMHx HTN, HLD, Ischemic stroke p/w pontine ICH and course requiring trach/peg and completed course of carbapenem for burkholderia respiratory infection. Doing well clinically off abx. Pt comfortable and moving extremities with husbands instruction. Cont TC support.  No significant bradycardia or BP issues today. Resolved with d/c CCB. D/c planning .
Patient seen and examined. Agree with above.  Awake and follows commands when asked by her  and son. Moving left side and attempting to move right toes. Weak cough when asked.   Day 4/7 of PO Cipro for complicated UTI, pansensitive Ecoli   c/w trach care, chest PT/bed vibration, suctioning, bronchodilators, hypersal Q6 hours. Aspiration precautions    DVT ppx, PEG feeds  PT/OT    Discharge planning to [facility] later today    I was physically present for the key portions of the evaluation and management (E/M) service provided.  I agree with the above history, physical, and plan which I have reviewed and edited where appropriate.     Plan discussed with RCU team, son and  at bedside.
Patient seen and examined. Agree with above.  Seen sitting in a chair, on TC with 28% humidified oxygen, breathing comfortably.  and son at bedside.   Noted to have cloudy urine on straight cath 5/13, afebrile, VSS. UA+LE and many bacteria- will send urine cultures and start Cipro (had Ecoli, sensitive in the past).  Minimal respiratory secretions - c/w trach care, chest PT, suctioning  DVT ppx, PEG feeds  PT/OT    Discharge planning - pending Urine culture results but anticipate a short course of antibiotics.     I was physically present for the key portions of the evaluation and management (E/M) service provided.  I agree with the above history, physical, and plan which I have reviewed and edited where appropriate.     Plan discussed with RCu team and family.
Patient seen and examined. Agree with above.  Seen sitting in a chair, on TC with 28% humidified oxygen, breathing comfortably.  at bedside. Awake and follows commands when asked by her . Moving left side and attempting to move right toes. Unable to generate cough when asked.     Noted to have cloudy urine on straight cath 5/13, afebrile, VSS. UA+LE and many bacteria- started Cipro (had sensitive Ecoli in the past), urine cultures with >100K GNR, awaiting speciation and sensitivities.  Minimal respiratory secretions, however very weak cough- c/w trach care, chest PT/bed vibration, suctioning, bronchodilators, hypersal Q6 hours. Aspiration precautions    DVT ppx, PEG feeds  PT/OT    Discharge planning - pending Urine culture results but anticipate a short course of antibiotics for uncomplicated UTI. Plan for [facility]    I was physically present for the key portions of the evaluation and management (E/M) service provided.  I agree with the above history, physical, and plan which I have reviewed and edited where appropriate.     Plan discussed with RCU team.
Pt is a 64 yo F with h/o HTN, HLD and ischemic stroke (2004), who initially presented to OSH from home with AMS as per EMS; pt was talking to a family member on the phone when she suddenly stopped talking. Pt was found to have a SBP >240s and a pontine hemorrhage on CT = hypertensive emergency with pontine bleed; pt intubated and placed on a Cardene drip. Pt transferred to [facility]. During admission pt was found to have newly diagnosed A. fib and started on Labetalol. Pt had serial head CTs performed which remained unchanged (no neuro surgical intervention was performed). Pt was seen by Palliative Care and family decided to proceed with Trach and PEG. s/p Tracheostomy on 4/16 ( # 8 Cuffed Katelynnley) and PEG on 4/18. Pt also Rx'd for E. coli UTI     Resp: Cont TC and then place on MV during night  ID: Started on empiric Abx 2 to fever; cont Meropenem x 5-7 days and dc Vanco  CVS: Increase Labetalol to 300mg q 8hrs  Heme: No AC for A fib 2 to pontine hemorrhage  FEN: Cont enteral feeds  Neuro: Follow neuro exam  Social: Pt is full code/ Son and  updated today    CCT: 35 min independent of the PA
I have seen and examined the patient. I agree with the above history, physical exam, and plan of care except for as detailed below.    Hemorrhagic storke, hypertensive emergency. Acute respiratory failure s/p trach. Newly diagnosed Afib. Enterobacter and burkholderia pneumonia.     - Complete course of meropenem  - Rate control  - Continue antihypertensives  - Wean from vent as tolerated  - Dispo planning
Patient seen and examined. Agree with above.  Seen sitting in a chair, on TC with 28% humidified oxygen, breathing comfortably.  at bedside.   Noted to have cloudy urine on straight cath earlier this morning - afebrile, VSS. Will send for UA.  Minimal respiratory secretions - c/w trach care, chest PT, suctioning  DVT ppx, PEG feeds    Discharge planning - pending UA results. d/w son over the phone.
Pt is a 62 yo F with h/o HTN, HLD and ischemic stroke (2004), who initially presented to OSH from home with AMS as per EMS; pt was talking to a family member on the phone when she suddenly stopped talking. Pt was found to have a SBP >240s and a pontine hemorrhage on CT = hypertensive emergency with pontine bleed; pt intubated and placed on a Cardene drip. Pt transferred to [facility]. During admission pt was found to have newly diagnosed A. fib and started on Labetalol. Pt had serial head CTs performed which remained unchanged (no neuro surgical intervention was performed). Pt was seen by Palliative Care and family decided to proceed with Trach and PEG. s/p Tracheostomy on 4/16 ( # 8 Cuffed Danna) and PEG on 4/18. Pt also Rx'd for E. coli UTI     Resp: Cont TC ATC  ID: Cont Meropenem x 7 days sputum with Enterobacter and Burkholderia  CVS: Increase Labetalol to 400mg q 8hrs  Heme: No AC for A fib 2 to pontine hemorrhage  FEN: Cont enteral feeds  Neuro: Follow neuro exam  Social: Pt is full code/ Son and  again updated today
Pt is a 64 yo F with h/o HTN, HLD and ischemic stroke (2004), who initially presented to OSH from home with AMS as per EMS; pt was talking to a family member on the phone when she suddenly stopped talking. Pt was found to have a SBP >240s and a pontine hemorrhage on CT; pt intubated and placed on a Cardene drip. Pt transferred to [facility]. During admission pt was found to have newly diagnosed A.fib and started on Labetalol. Pt had serial head CTs performed which remained unchanged (no neuro surgical intervention was performed). Pt was seen by Palliative Care and family decided to proceed with Trach and PEG. s/p Tracheostomy on 4/16 ( # 8 Cuffed Katelynnley) and PEG on 4/18. Pt also Rx'd for E. coli UTI     Resp: Cont TC  ID: No Abx  CVS: Cont antiHTN meds  Heme: No AC for A fib 2 to pontine hemorrhage  FEN: Cont enteral feeds  Neuro: Follow neuro exam  Social: Pt is full code
63 Year old female w/ PMHx HTN, HLD, Ischemic stroke p/w pontine ICH and course requiring trach/peg and completed course of carbapenem for burkholderia respiratory infection. Doing well clinically off abx. Pt comfortable and moving extremities with husbands instruction. Cont TC support.  Had brief episodes of bradycardia and BP occ lower than needed prior which has resolved with d/c CCB.  Cont other bp meds. If still occurring would d/c CCB altogether. D/c planning .
63 Year old female w/ PMHx HTN, HLD, Ischemic stroke p/w pontine ICH and course requiring trach/peg and completed course of carbapenem for burkholderia respiratory infection. Pt comfortable and moving extremities with husbands instruction. R ext >L ext but 3-4/5. Cont TC support.
I have seen and examined the patient. I agree with the above history, physical exam, and plan of care except for as detailed below.    Hemorrhagic stroke, hypertensive emergency. Acute respiratory failure s/p trach. Newly diagnosed Afib. Enterobacter and burkholderia pneumonia.     - Complete course of meropenem through today  - Continue antihypertensives  - TC ATC, will downsize  - Dispo planning
Patient seen and examined. Agree with above.  Seen sitting in a chair, on TC with 28% humidified oxygen, breathing comfortably.  at bedside. Awake and follows commands when asked by her . Moving left side and attempting to move right toes. Unable to generate cough when asked.     Noted to have cloudy urine on straight cath 5/13, afebrile, VSS. UA+LE and many bacteria- started Cipro (had sensitive Ecoli in the past), urine cultures pending.  Minimal respiratory secretions, however very weak cough- c/w trach care, chest PT/bed vibration, suctioning, bronchodilators, hypersal Q6 hours. Aspiration precautions    DVT ppx, PEG feeds  PT/OT    Discharge planning - pending Urine culture results but anticipate a short course of antibiotics. Plan for [facility]    I was physically present for the key portions of the evaluation and management (E/M) service provided.  I agree with the above history, physical, and plan which I have reviewed and edited where appropriate.     Plan discussed with RCU team and son over the phone.
Pt is a 62 yo F with h/o HTN, HLD and ischemic stroke (2004), who initially presented to OSH from home with AMS as per EMS; pt was talking to a family member on the phone when she suddenly stopped talking. Pt was found to have a SBP >240s and a pontine hemorrhage on CT = hypertensive emergency with pontine bleed; pt intubated and placed on a Cardene drip. Pt transferred to [facility]. During admission pt was found to have newly diagnosed A. fib and started on Labetalol. Pt had serial head CTs performed which remained unchanged (no neuro surgical intervention was performed). Pt was seen by Palliative Care and family decided to proceed with Trach and PEG. s/p Tracheostomy on 4/16 ( # 8 Cuffed Shiley) and PEG on 4/18. Pt also Rx'd for E. coli UTI     Resp: Cont TC and then place on MV during night  ID: Started on empiric Abx 2 to fever; if Cx remain neg will dc Abx  CVS: AntiHTN meds time interval to be changed and may need to increase Labetalol  Heme: No AC for A fib 2 to pontine hemorrhage  FEN: Cont enteral feeds  Neuro: Follow neuro exam  Social: Pt is full code/ Son and  updated today    CCT: 35 min independent of the PA
I have seen and examined the patient. I agree with the above history, physical exam, and plan of care except for as detailed below.    Hemorrhagic storke, hypertensive emergency. Acute respiratory failure s/p trach. Newly diagnosed Afib. Enterobacter and burkholderia pneumonia.     - Complete course of meropenem  - Continue antihypertensives  - Wean from vent as tolerated  - Spoke to son via telephone and  at bedside  - Dispo planning

## 2019-05-17 NOTE — PROGRESS NOTE ADULT - ASSESSMENT
63 Year old female w/ PMHx HTN, HLD, Ischemic stroke (2004), who was transferred from OSH, Patient initially presented to the OSH from home with AMS as per EMS, pt was talking to a family member on the phone when she suddenly stopped talking. 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 Patient transferred to [facility] for Neuro Surgical assessment. Upon admission NIHSS = ; MRS = 2; ICH =3. Patient was given DDAVP and PLTs. During admission patient was found to have newly diagnosed A.fib and was initiated on Labetalol. Patient had Serial head CTs performed which remained unchanged, no neuro surgical intervention was performed. Patient was seen by Palliative Care family decided to proceed with Trach and PEG. Patient S/p Tracheostomy on 4/16 ( # 8 Cuffed Shiley) and PEG Placement on 4/18. During hospitalization patient found to have UTI ( 100 K E.coli ) for which she was treated with a course of Keflex ( Completed 4/21). On 4/23 patient febrile again, Patient found to have : Enterobacter / Burkholderia  growing in sputum     4/26: Patient remains afebrile. Sputum cx 4/23 growing Enterobacter/ Burkholderia, Will continue Meropenem. Patient tolerating TC during the day, will attempt TC ATC and obtain AM ABG. Patient remains with elevated BP will increase Labetalol 400 mg q 8 hrs.  4/27: Tolerated TC x24hrs. ABG reviewed and adequate to continue TC. Appears comfortable of TC. BP remains labile with elevations into 170s. Will add Enalapril. Spoke to son at bedside. Will finish ABx on 4/30.  4/29 Stable on TC ATC. Fevers, UA negative, on meropenem until 4/30 5/1 Stable, tolerating TC ATC. Following commands, continue on abx until 5/3 per ID.   5/2: DC planning in progress, likely to occur post antibiotics. Otherwise no new events. Patient doing well and medically stable.  5/3: Will complete antibiotics today. No other issues. Family reviewing rehab facilities.  5/4: No new events. Requested Sputum Culture to assess for bacterial clearance. Spoke with  and daughter at bedside who are researching rehab facilities.   5/7 Stable tolerating TC ATC. Repeat sputum cx 5/5 prelim w/GNR, ID following, will wait for final before deciding on further abx course if any, per Dr. Guevara. BP imporved, SBP 90 - 140. Per cardio, no AC 2/2 bleed.   5/8 Stable, TC ATC. Cardizem d/c 2/2 low BP, HR. More lethargic today. IF does not improve, will send for [facility] r/o acute process.  5/9: Sputum culture x 2 still growing Burkholderia. Spoke with ID and infection control, as growth appears to be colonization without other signs of infection no further treatment is recommended. However patient will still need to maintain contact isolation status. Patient observed awake and following commands upon husbands request. DC planning in progress.  5/10: No new events. Spoke with patient's  at bedside and son via phone in regards to DC planning and facility options.  5/11: Medically stable. Awaiting family choices for Rehab placement.  5/12: Patient saturating 90-91% on pulse ox on Physical exam this morning, patient suctioned with minimal but thick respiratory secretions suctioned. Spo2 increased to 95-96% S/p Lavage. Will add hypersal and duoneb to mobilize secretions.  requesting CXR ( Ordered ).     5/13 cloudy urine- UA ordered  5/15 Stable, tolerating TC ATC. Pending d/c to [place], pending Urine Cx sensitivities.   5/16: Urine Cx growing pan-sensitive E.coli. will continue cipro and plan for discharge to rehab on 5/17.  5/17: Will complete 7 day course of ciprofloxacin for UTI. Planning for discharge to rehab today.

## 2019-05-17 NOTE — PROGRESS NOTE ADULT - PROBLEM SELECTOR PLAN 7
Bladder Scan q 8 hrs, Straight Cath for residual Urine > 300 cc   Continue Doxazosin
Bladder Scan q 8 hrs, Straight Cath for residual Urine > 300 cc   Continue Doxazosin QHS
Bladder Scan q 8hrs, Straight Cath for residual Urine > 300 cc   Continue Doxazosin
Bladder Scan q 8 hrs, Straight Cath for residual Urine > 300 cc   Continue Doxazosin
Bladder Scan q 8 hrs, Straight Cath for residual Urine > 300 cc   Continue Doxazosin
Bladder Scan q 8 hrs, Straight Cath for residual Urine > 300 cc   Continue Doxazosin QHS
Bladder Scan q 8 hrs, Straight Cath for residual Urine > 300 cc   Continue Doxazosin
Bladder Scan q 8 hrs, Straight Cath for residual Urine > 300 cc   Continue Doxazosin QHS
Bladder Scan q 8 hrs, Straight Cath for residual Urine > 300 cc   Continue Doxazosin QHS
Bladder Scan q 8 hrs, Straight Cath for residual Urine > 300 cc   Continue Doxazosin
Bladder Scan q 8 hrs, Straight Cath for residual Urine > 300 cc   Continue Doxazosin QHS
Bladder Scan q 8hrs, Straight Cath for residual Urine > 300 cc   Continue Doxazosin
Required intermittent straight cath ever8 hours during hospitalization.  Will discharge with sánchez catheter. Patient should have trial of void when appropriate.  Continue Doxazosin QHS
Bladder Scan q 8 hrs, Straight Cath for residual Urine > 300 cc   Continue Doxazosin

## 2019-05-17 NOTE — PROGRESS NOTE ADULT - REASON FOR ADMISSION
ICH

## 2019-05-17 NOTE — PROGRESS NOTE ADULT - SUBJECTIVE AND OBJECTIVE BOX
Patient is a 63y old  Female who presents with a chief complaint of ICH (17 May 2019 08:58)      Interval Events:    REVIEW OF SYSTEMS:  [ ] Positive  [ ] All other systems negative  [ ] Unable to assess ROS because ________    Vital Signs Last 24 Hrs  T(C): 36.7 (05-17-19 @ 04:28), Max: 37.1 (05-16-19 @ 20:33)  T(F): 98.1 (05-17-19 @ 04:28), Max: 98.7 (05-16-19 @ 20:33)  HR: 58 (05-17-19 @ 05:55) (6 - 78)  BP: 124/68 (05-17-19 @ 04:28) (102/64 - 124/68)  RR: 19 (05-17-19 @ 04:46) (18 - 21)  SpO2: 95% (05-17-19 @ 05:55) (95% - 99%)    PHYSICAL EXAM:  HEENT:   [ ]Tracheostomy:  [ ]Pupils equal  [ ]No oral lesions  [ ]Abnormal    SKIN  [ ]No Rash  [ ] Abnormal  [ ] pressure    CARDIAC  [ ]Regular  [ ]Abnormal    PULMONARY  [ ]Bilateral Clear Breath Sounds  [ ]Normal Excursion  [ ]Abnormal    GI  [ ]PEG      [ ] +BS		              [ ]Soft, nondistended, nontender	  [ ]Abnormal    MUSCULOSKELETAL                                   [ ]Bedbound                 [ ]Abnormal    [ ]Ambulatory/OOB to chair                           EXTREMITIES                                         [ ]Normal  [ ]Edema                           NEUROLOGIC  [ ] Normal, non focal  [ ] Focal findings:    PSYCHIATRIC  [ ]Alert and appropriate  [ ] Sedated	 [ ]Agitated    :  Bedoya: [ ] Yes, if yes: Date of Placement:                   [  ] No    LINES: Central Lines [ ] Yes, if yes: Date of Placement                                     [  ] No    HOSPITAL MEDICATIONS:  MEDICATIONS  (STANDING):  ALBUTerol/ipratropium for Nebulization 3 milliLiter(s) Nebulizer every 6 hours  chlorhexidine 0.12% Liquid 15 milliLiter(s) Oral Mucosa two times a day  chlorhexidine 4% Liquid 1 Application(s) Topical <User Schedule>  doxazosin 8 milliGRAM(s) Oral at bedtime  enalapril 5 milliGRAM(s) Oral two times a day  enoxaparin Injectable 40 milliGRAM(s) SubCutaneous <User Schedule>  hydrALAZINE 100 milliGRAM(s) Oral every 8 hours  labetalol 400 milliGRAM(s) Oral every 8 hours  lactobacillus acidophilus 1 Tablet(s) Oral two times a day  nystatin    Suspension 214718 Unit(s) Oral every 6 hours  pantoprazole   Suspension 40 milliGRAM(s) Oral daily  polyethylene glycol 3350 17 Gram(s) Oral at bedtime  sodium chloride 7% Inhalation 3 milliLiter(s) Inhalation every 6 hours    MEDICATIONS  (PRN):  acetaminophen   Tablet .. 650 milliGRAM(s) Oral every 6 hours PRN Temp greater or equal to 38C (100.4F), Mild Pain (1 - 3)  bisacodyl Suppository 10 milliGRAM(s) Rectal daily PRN Constipation  ondansetron Injectable 4 milliGRAM(s) IV Push every 6 hours PRN Nausea and/or Vomiting      LABS:                  CAPILLARY BLOOD GLUCOSE    MICROBIOLOGY:     RADIOLOGY:  [ ] Reviewed and interpreted by me Patient is a 63y old  Female who presents with a chief complaint of ICH.........f/u respiratory failure      Interval Events: no events reported over night    REVIEW OF SYSTEMS:  [ ] Positive  [ ] All other systems negative  [X] Unable to assess ROS because __non-verbal    Vital Signs Last 24 Hrs  T(C): 36.7 (05-17-19 @ 04:28), Max: 37.1 (05-16-19 @ 20:33)  T(F): 98.1 (05-17-19 @ 04:28), Max: 98.7 (05-16-19 @ 20:33)  HR: 58 (05-17-19 @ 05:55) (6 - 78)  BP: 124/68 (05-17-19 @ 04:28) (102/64 - 124/68)  RR: 19 (05-17-19 @ 04:46) (18 - 21)  SpO2: 95% (05-17-19 @ 05:55) (95% - 99%)      PHYSICAL EXAM:  HEENT:   [x]Tracheostomy: #7 Cuffless Portex  [x] Pupils equal  [ ]No oral lesions  [ ]Abnormal    SKIN  [x]No Rash  [ ] Abnormal  [ ] pressure    CARDIAC  [x]Regular  [ ]Abnormal    PULMONARY  [x]Bilateral Clear Breath Sounds  [ ]Normal Excursion  [ ]Abnormal    GI  [x]PEG      [x] +BS		              [x]Soft, nondistended, nontender	  [ ]Abnormal    MUSCULOSKELETAL                                   [ ]Bedbound                 [ ]Abnormal    [x]OOB to chair                           EXTREMITIES                                         [x]Normal  [ ]Edema                           NEUROLOGIC  [ ] Normal, non focal  [x] Focal findings: Arousable; appears to respond more when  speaks to her in original language; follows commands occasionally with RUE/RLL; +Left hemiplegia.    PSYCHIATRIC  [X]Alert  [ ] Sedated	 [ ]Agitated    :  Bedoya: [ ] Yes, if yes: Date of Placement:                   [x] No Straight Cath ATC     LINES: Central Lines [ ] Yes, if yes: Date of Placement                                     [x] No              HOSPITAL MEDICATIONS:  MEDICATIONS  (STANDING):  ALBUTerol/ipratropium for Nebulization 3 milliLiter(s) Nebulizer every 6 hours  chlorhexidine 0.12% Liquid 15 milliLiter(s) Oral Mucosa two times a day  chlorhexidine 4% Liquid 1 Application(s) Topical <User Schedule>  doxazosin 8 milliGRAM(s) Oral at bedtime  enalapril 5 milliGRAM(s) Oral two times a day  enoxaparin Injectable 40 milliGRAM(s) SubCutaneous <User Schedule>  hydrALAZINE 100 milliGRAM(s) Oral every 8 hours  labetalol 400 milliGRAM(s) Oral every 8 hours  lactobacillus acidophilus 1 Tablet(s) Oral two times a day  nystatin    Suspension 889746 Unit(s) Oral every 6 hours  pantoprazole   Suspension 40 milliGRAM(s) Oral daily  polyethylene glycol 3350 17 Gram(s) Oral at bedtime  sodium chloride 7% Inhalation 3 milliLiter(s) Inhalation every 6 hours    MEDICATIONS  (PRN):  acetaminophen   Tablet .. 650 milliGRAM(s) Oral every 6 hours PRN Temp greater or equal to 38C (100.4F), Mild Pain (1 - 3)  bisacodyl Suppository 10 milliGRAM(s) Rectal daily PRN Constipation  ondansetron Injectable 4 milliGRAM(s) IV Push every 6 hours PRN Nausea and/or Vomiting      LABS:                  CAPILLARY BLOOD GLUCOSE    MICROBIOLOGY:     RADIOLOGY:  [ ] Reviewed and interpreted by me

## 2019-05-17 NOTE — PROGRESS NOTE ADULT - PROBLEM SELECTOR PROBLEM 1
Pontine hemorrhage
Tracheostomy in place
Pontine hemorrhage

## 2019-05-17 NOTE — PROGRESS NOTE ADULT - PROBLEM SELECTOR PROBLEM 4
Afib

## 2019-05-17 NOTE — PROGRESS NOTE ADULT - PROBLEM SELECTOR PROBLEM 5
HTN (hypertension)

## 2020-07-23 NOTE — PROGRESS NOTE ADULT - PROBLEM SELECTOR PLAN 3
[facility]  [facility] [address] [place] [address]    Glendy Arredondo : 1986 MRN: [id number]    2020    Time Session Began: 11:10am Time Session Ended: 11:57 AM    Session Type: 09678 (21-30 minutes) 20111 (5-10minutes)    Telephone Assessment and management services used related to COVID-19 precautions currently in place     Verified patient identity:  [x] Yes    Patient location:  Home, dad again present as well with client's permission    Intervention: Behavioral, Supportive    Suicide/Homicide/Violence Ideation: No  If Yes, explain: none    Mental Status Exam: [] Improved  [x] Same  [] Regressed    Current Outpatient Medications   Medication Sig   • triamcinolone0.1% Cream in Cetaphil Cream , Phenol 0.25% compounded derm cream Apply twice a day to eruption. (arms and legs)   • ketoconazole (NIZORAL) 2 % cream MIX WITH EQUAL AMOUNT OF HYDROCORTISONE CREAM AND APPLY UNDER THE BREAST TWICE DAILY AS NEEDED   • triamcinolone (ARISTOCORT) 0.1 % ointment APPLY EXTERNALLY TO THE AFFECTED AREA TWICE DAILY. APPLY CETAPHIL CREAM ON TOP EACH TIME YOU USE.   • escitalopram (LEXAPRO) 20 MG tablet Two tablets daily   • LORazepam (ATIVAN) 1 MG tablet One tablet four times daily as needed for anxiety. Fill on or after 20.   • OLANZapine (ZYPREXA) 10 MG tablet Take 1 tablet by mouth nightly.   • OLANZapine (ZYPREXA) 15 MG tablet One tablet hs with 10 mg tablet to make 25 mg hs.   • methIMAzole (TAPAZOLE) 5 MG tablet Take 1 tablet by mouth daily. Repeat lab work prior to appt   • cyclobenzaprine (FLEXERIL) 5 MG tablet TAKE 1 TABLET BY MOUTH THREE TIMES DAILY AS NEEDED FOR MUSCLE SPASMS   • ketoconazole (NIZORAL) 2 % cream Mix with equal amount of hydrocortisone cream , apply bid   • hydroCORTisone (CORTIZONE) 2.5 % cream Apply to skin fold irritation twice daily with 2% ketoconazole cream until clear, then as needed.   • clobetasol (TEMOVATE) 0.05  % topical solution Apply daily as directed   • IRON PO Take 325 mg by mouth.   • Cholecalciferol (VITAMIN D3 PO)    • MELOXICAM PO Take by mouth daily.   • fluticasone (FLONASE) 50 MCG/ACT nasal spray Spray 2 sprays in each nostril as needed.    • esomeprazole (NEXIUM) 40 MG capsule Take 80 mg by mouth daily (before breakfast).    • ondansetron (ZOFRAN ODT) 4 MG disintegrating tablet Take 1 tablet by mouth every 6 hours.   • Daily Multiple Vitamins TABS Take 1 tablet by mouth daily. chewable   • Loratadine 10 MG CAPS Take 1 capsule by mouth daily.     No current facility-administered medications for this visit.        Change in Medication(s) Reported: no  If Yes, explain: none reported    Patient/Family Education Provided: Yes    Patient/Family Displays Understanding: Yes  If No, explain: NA    Chief complaint in patient's own words: Client said her depression and anxiety are still occurring.    DARP:     D:Client said her depression and anxiety are still occurring. Client said she feels down about her weight. However, she has been exercising. She noticed that staying busy has been helping with her anxiety. She and dad noticed that yesterday there was a big increase in anxiety, she was doing a lot of stomping. Client said she recognized she had a negative attitude yesterday. There has also been some conflict about client being dishonest, though client \"came clean\" in session about it.    A: Processed emotions. Discussed choices, how they impact others. Also focused on strategies to improve anxiety, appropriately communicate needs. Told to call if necessary prior to next session.    R: Client was communicative.    P: 7/30/20 at 11am Focus on reviewing goals.     Need for Community Resources Assessed: Yes  Resources Needed: No  If Yes, what resources: NA    Primary Diagnosis:(Per transfer therapist)   F25.0 Schizoaffective disorder, bipolar type (CMS/HCC)  (primary encounter diagnosis)  F41.1 Generalized anxiety  disorder  F84.0 Autistic disorder, residual state        F43.29 Adjustment disorder with mixed emotional features       Treatment Plan: unchanged    Discharge Plan: Continue with sessions until goals are met and adequately maintained.    Next Appointment:7/30/20 at 11am Focus on reviewing goals.     MARCELLE Lester   Patient Afebrile since initiation of ABX   CXR 4/23: Clear Lungs   Sputum cx 4/23: Enterobacter Colace / Burkholderia Cepacia, on contact isolation  Both organisms Sensitive to Meropenem   Bld Cx 4/23: No growth, UA 4/23: Negative  Continue Meropenem will treat 7 Day Course  ( Ends 4/30)  seen by ID 4/29 monitor for fever, wbc final abx plan depending on clinical course Patient Afebrile since initiation of ABX   CXR 4/23: Clear Lungs   Sputum cx 4/23: Enterobacter Colace / Burkholderia Cepacia, on contact isolation  Both organisms Sensitive to Meropenem   Bld Cx 4/23: No growth, UA 4/23: Negative  Continue Meropenem will treat 10 Day Course  ( Ends 5/3)  seen by ID 4/29 monitor for fever, wbc final abx plan depending on clinical course Satisfactory

## 2020-07-27 NOTE — PROGRESS NOTE ADULT - SUBJECTIVE AND OBJECTIVE BOX
Patient is a 63y old  Female who presents with a chief complaint of ICH (2019 12:10)      Interval Events: Patient was Febrile overnight T.Max 101.5, Patient given one does of Meropenem and Vanco. Patient with episode of  Vomiting x 1 after turning     REVIEW OF SYSTEMS:  [ ] Positive  [ ] All other systems negative  [x] Unable to assess ROS because patient is Non-Verbal     Vital Signs Last 24 Hrs  T(C): 37.3 (19 @ 06:36), Max: 38.6 (19 @ 21:35)  T(F): 99.2 (19 @ 06:36), Max: 101.5 (23-19 @ 21:35)  HR: 69 (19 @ 06:36) (69 - 101)  BP: 166/82 (19 @ 06:36) (148/76 - 172/81)  RR: 18 (19 @ 06:36) (16 - 22)  SpO2: 98% (19 @ 06:36) (92% - 100%)    PHYSICAL EXAM:  HEENT:   [x]Tracheostomy: # 8 Cuffed Shiley   [x]Pupils equal  [ ]No oral lesions  [ ]Abnormal    SKIN  [x]No Rash  [ ] Abnormal  [ ] pressure    CARDIAC  [x]Regular  [ ]Abnormal    PULMONARY  [x]Bilateral Clear Breath Sounds  [ ]Normal Excursion  [ ]Abnormal    GI  [x]PEG      [x] +BS		              [x]Soft, nondistended, nontender	  [ ]Abnormal    MUSCULOSKELETAL                                   [ ]Bedbound                 [ ]Abnormal    [x]OOB to chair                           EXTREMITIES                                         [x]Normal  [ ]Edema                           NEUROLOGIC  [ ] Normal, non focal  [x] Focal findings: No following commands, No withdrawal to pain     PSYCHIATRIC  [ ]Alert and appropriate  [x] Sedated	 [ ]Agitated    :  Bedoya: [ ] Yes, if yes: Date of Placement:                   [x] No Straight Cath ATC     LINES: Central Lines [ ] Yes, if yes: Date of Placement                                     [x] No    HOSPITAL MEDICATIONS:  MEDICATIONS  (STANDING):  ALBUTerol/ipratropium for Nebulization 3 milliLiter(s) Nebulizer every 6 hours  chlorhexidine 0.12% Liquid 15 milliLiter(s) Oral Mucosa two times a day  chlorhexidine 4% Liquid 1 Application(s) Topical <User Schedule>  diltiazem    Tablet 60 milliGRAM(s) Oral every 6 hours  docusate sodium Liquid 100 milliGRAM(s) Oral every 8 hours  doxazosin 8 milliGRAM(s) Oral at bedtime  enoxaparin Injectable 40 milliGRAM(s) SubCutaneous <User Schedule>  hydrALAZINE 100 milliGRAM(s) Oral every 8 hours  labetalol 100 milliGRAM(s) Oral two times a day  pantoprazole   Suspension 40 milliGRAM(s) Oral daily  polyethylene glycol 3350 17 Gram(s) Oral <User Schedule>  senna 2 Tablet(s) Oral at bedtime  sodium chloride 7% Inhalation 3 milliLiter(s) Inhalation every 6 hours    MEDICATIONS  (PRN):  acetaminophen   Tablet .. 650 milliGRAM(s) Oral every 6 hours PRN Temp greater or equal to 38C (100.4F), Mild Pain (1 - 3)  ondansetron Injectable 4 milliGRAM(s) IV Push every 6 hours PRN Nausea and/or Vomiting  oxyCODONE    Solution 10 milliGRAM(s) Oral every 4 hours PRN Severe Pain (7 - 10)      LABS:                        10.6   12.0  )-----------( 373      ( 2019 06:53 )             32.4     04-24    140  |  98  |  22  ----------------------------<  138<H>  4.0   |  28  |  0.62    Ca    9.3      2019 06:51  Phos  4.6     04-24  Mg     2.2     04-24        Urinalysis Basic - ( 2019 17:46 )    Color: Light Yellow / Appearance: Slightly Turbid / S.016 / pH: x  Gluc: x / Ketone: Negative  / Bili: Negative / Urobili: Negative   Blood: x / Protein: Negative / Nitrite: Negative   Leuk Esterase: Negative / RBC: 8 /hpf / WBC 2 /HPF   Sq Epi: x / Non Sq Epi: 1 /hpf / Bacteria: Negative      Arterial Blood Gas:   @ 10:06  7.46/44/93/31/97/6.5  ABG lactate: --      CAPILLARY BLOOD GLUCOSE    MICROBIOLOGY:     RADIOLOGY:  [ ] Reviewed and interpreted by me    Mode: AC/ CMV (Assist Control/ Continuous Mandatory Ventilation)  RR (machine): 14  TV (machine): 400  FiO2: 30  PEEP: 5  ITime: 1  MAP: 9  PIP: 17 How Severe Is Your Hair Loss?: moderate Patient is a 63y old  Female who presents with a chief complaint of ICH (2019 12:10)      Interval Events: Patient was Febrile overnight T.Max 101.5, Patient given one does of Meropenem and Vanco. Patient with episode of  Vomiting x 1 after turning this morning     REVIEW OF SYSTEMS:  [ ] Positive  [ ] All other systems negative  [x] Unable to assess ROS because patient is Non-Verbal     Vital Signs Last 24 Hrs  T(C): 37.3 (19 @ 06:36), Max: 38.6 (19 @ 21:35)  T(F): 99.2 (- @ 06:36), Max: 101.5 (23-19 @ 21:35)  HR: 69 (19 @ 06:36) (69 - 101)  BP: 166/82 (19 @ 06:36) (148/76 - 172/81)  RR: 18 (19 @ 06:36) (16 - 22)  SpO2: 98% (19 @ 06:36) (92% - 100%)    PHYSICAL EXAM:  HEENT:   [x]Tracheostomy: # 8 Cuffed Shiley   [x]Pupils equal  [ ]No oral lesions  [ ]Abnormal    SKIN  [x]No Rash  [ ] Abnormal  [ ] pressure    CARDIAC  [x]Regular  [ ]Abnormal    PULMONARY  [x]Bilateral Clear Breath Sounds  [ ]Normal Excursion  [ ]Abnormal    GI  [x]PEG      [x] +BS		              [x]Soft, nondistended, nontender	  [ ]Abnormal    MUSCULOSKELETAL                                   [ ]Bedbound                 [ ]Abnormal    [x]OOB to chair                           EXTREMITIES                                         [x]Normal  [ ]Edema                           NEUROLOGIC  [ ] Normal, non focal  [x] Focal findings: No following commands, No withdrawal to pain     PSYCHIATRIC  [ ]Alert and appropriate  [x] Sedated	 [ ]Agitated    :  Bedoya: [ ] Yes, if yes: Date of Placement:                   [x] No Straight Cath ATC     LINES: Central Lines [ ] Yes, if yes: Date of Placement                                     [x] No    HOSPITAL MEDICATIONS:  MEDICATIONS  (STANDING):  ALBUTerol/ipratropium for Nebulization 3 milliLiter(s) Nebulizer every 6 hours  chlorhexidine 0.12% Liquid 15 milliLiter(s) Oral Mucosa two times a day  chlorhexidine 4% Liquid 1 Application(s) Topical <User Schedule>  diltiazem    Tablet 60 milliGRAM(s) Oral every 6 hours  docusate sodium Liquid 100 milliGRAM(s) Oral every 8 hours  doxazosin 8 milliGRAM(s) Oral at bedtime  enoxaparin Injectable 40 milliGRAM(s) SubCutaneous <User Schedule>  hydrALAZINE 100 milliGRAM(s) Oral every 8 hours  labetalol 100 milliGRAM(s) Oral two times a day  pantoprazole   Suspension 40 milliGRAM(s) Oral daily  polyethylene glycol 3350 17 Gram(s) Oral <User Schedule>  senna 2 Tablet(s) Oral at bedtime  sodium chloride 7% Inhalation 3 milliLiter(s) Inhalation every 6 hours    MEDICATIONS  (PRN):  acetaminophen   Tablet .. 650 milliGRAM(s) Oral every 6 hours PRN Temp greater or equal to 38C (100.4F), Mild Pain (1 - 3)  ondansetron Injectable 4 milliGRAM(s) IV Push every 6 hours PRN Nausea and/or Vomiting  oxyCODONE    Solution 10 milliGRAM(s) Oral every 4 hours PRN Severe Pain (7 - 10)      LABS:                        10.6   12.0  )-----------( 373      ( 2019 06:53 )             32.4     04-24    140  |  98  |  22  ----------------------------<  138<H>  4.0   |  28  |  0.62    Ca    9.3      2019 06:51  Phos  4.6     04-24  Mg     2.2     04-24        Urinalysis Basic - ( 2019 17:46 )    Color: Light Yellow / Appearance: Slightly Turbid / S.016 / pH: x  Gluc: x / Ketone: Negative  / Bili: Negative / Urobili: Negative   Blood: x / Protein: Negative / Nitrite: Negative   Leuk Esterase: Negative / RBC: 8 /hpf / WBC 2 /HPF   Sq Epi: x / Non Sq Epi: 1 /hpf / Bacteria: Negative      Arterial Blood Gas:   @ 10:06  7.46/44/93/31/97/6.5  ABG lactate: --      CAPILLARY BLOOD GLUCOSE    MICROBIOLOGY:     RADIOLOGY:  [ ] Reviewed and interpreted by me    Mode: AC/ CMV (Assist Control/ Continuous Mandatory Ventilation)  RR (machine): 14  TV (machine): 400  FiO2: 30  PEEP: 5  ITime: 1  MAP: 9  PIP: 17 Additional History: Patient reports that he has not noticed any side effects being on the propecia.

## 2020-08-17 NOTE — ED ADULT NURSE NOTE - NSFALLRSKOUTCOME_ED_ALL_ED
Subjective:      Shaji Kern is a 6 y.o. male here with mother and grandmother. Patient brought in for med check      History of Present Illness:  Current Medications  Metadate CD 40 mg     Response to Medications  Seems to be doing ok with this dose      School  [employer]  Grades  Did not get grades with this past year, but doing ok  Discipline  ok    Side Effects: mildly decreased appetite    Mood   No problem  Headache  Not significant  Abdominal Pain  Not significant  Appetite a little decreased  Weight Loss: none  Insomnia  Not significant  OCD   No  Tics  No   Let Down  Not significanbt  Chest Pain:  No  Irregular/Skipped heart beats:  No    Still problems on and off with constipation; not giving the miralax regularly; does seem to help when given        Review of Systems   Constitutional: Negative.  Negative for activity change, appetite change, fatigue, fever and irritability.   HENT: Negative.  Negative for congestion, ear pain, rhinorrhea, sore throat and trouble swallowing.    Eyes: Negative.  Negative for pain, discharge, redness and visual disturbance.   Respiratory: Negative.  Negative for cough, shortness of breath, wheezing and stridor.    Cardiovascular: Negative.  Negative for chest pain.   Gastrointestinal: Negative.  Negative for abdominal pain, constipation, diarrhea, nausea and vomiting.   Genitourinary: Negative.  Negative for decreased urine volume, difficulty urinating and dysuria.   Musculoskeletal: Negative.  Negative for arthralgias and myalgias.   Skin: Negative.  Negative for rash.   Neurological: Negative.  Negative for weakness and headaches.   Hematological: Negative for adenopathy.   Psychiatric/Behavioral: Negative.  Negative for behavioral problems and sleep disturbance.   All other systems reviewed and are negative.      Objective:     Physical Exam  Vitals signs and nursing note reviewed.   Constitutional:       General: He is active. He is not in acute distress.     Appearance:  He is well-developed. He is not ill-appearing, toxic-appearing or diaphoretic.   HENT:      Head: Normocephalic and atraumatic.      Right Ear: Tympanic membrane and external ear normal.      Left Ear: Tympanic membrane and external ear normal.      Nose: Nose normal. No congestion or rhinorrhea.      Mouth/Throat:      Mouth: Mucous membranes are moist.      Pharynx: Oropharynx is clear. No oropharyngeal exudate.      Tonsils: No tonsillar exudate.   Eyes:      General:         Right eye: No discharge.         Left eye: No discharge.      Conjunctiva/sclera: Conjunctivae normal.      Right eye: Right conjunctiva is not injected.      Left eye: Left conjunctiva is not injected.      Pupils: Pupils are equal, round, and reactive to light.   Neck:      Musculoskeletal: Normal range of motion and neck supple. No neck rigidity.   Cardiovascular:      Rate and Rhythm: Normal rate and regular rhythm.      Heart sounds: S1 normal and S2 normal. No murmur.   Pulmonary:      Effort: Pulmonary effort is normal. No respiratory distress or retractions.      Breath sounds: Normal breath sounds and air entry. No stridor or decreased air movement. No wheezing, rhonchi or rales.   Abdominal:      General: Bowel sounds are normal. There is no distension.      Palpations: Abdomen is soft. There is no mass.      Tenderness: There is no abdominal tenderness. There is no guarding or rebound.      Hernia: No hernia is present.   Musculoskeletal: Normal range of motion.   Skin:     General: Skin is warm and dry.      Coloration: Skin is not jaundiced or pale.      Findings: No lesion, petechiae or rash. Rash is not purpuric.   Neurological:      Mental Status: He is alert and oriented for age.   Psychiatric:         Behavior: Behavior is cooperative.         Assessment:        1. Attention deficit hyperactivity disorder (ADHD), combined type         Plan:       Shaji was seen today for med check.    Diagnoses and all orders for this  "visit:    Attention deficit hyperactivity disorder (ADHD), combined type  -     methylphenidate HCl (METADATE CD) 40 MG CR capsule; Take 1 capsule (40 mg total) by mouth every morning.    recheck 6 months  Will restart miralax for constipation, titrate dose for one soft stool per day and continue for 2 months and then may try to wean; increase fiber in diet; start having a "potty time"    " Fall with Harm Risk

## 2021-06-12 NOTE — PATIENT PROFILE ADULT - FALL HARM RISK
TRANSFER - OUT REPORT:    Verbal report given to Erika(name) on Pema Rothman  being transferred to [address] [place] [address](unit) for routine post - op       Report consisted of patients Situation, Background, Assessment and   Recommendations(SBAR). Information from the following report(s) SBAR, Procedure Summary and Intake/Output was reviewed with the receiving nurse. Lines:   Peripheral IV 06/11/21 Right; Lower Forearm (Active)   Site Assessment Clean, dry, & intact 06/12/21 1328   Phlebitis Assessment 0 06/12/21 1328   Infiltration Assessment 0 06/12/21 1328   Dressing Status Clean, dry, & intact 06/12/21 1328   Dressing Type Transparent;Tape 06/12/21 1328   Hub Color/Line Status Pink; Infusing 06/12/21 1328        Opportunity for questions and clarification was provided.       Patient transported with:   Beautylish other

## 2021-10-12 NOTE — PROGRESS NOTE ADULT - PROBLEM SELECTOR PLAN 4
Newly Diagnosed A.FIB   Continue Labetalol 400 mg q 8 hr   Cardizem d/c 2/2 low HR and BP   Not on AC for new Afib given recent ICH declines

## 2022-01-01 NOTE — ED ADULT NURSE NOTE - NURSING ED SKIN COLOR
You can access the FollowMyHealth Patient Portal offered by [facility] by registering at the following website: [email or web address]. By joining [employer]’s FollowMyHealth portal, you will also be able to view your health information using other applications (apps) compatible with our system.
normal for race

## 2022-04-15 NOTE — PROGRESS NOTE ADULT - ASSESSMENT
From: Vlad Fay  To: Chandana Peguero  Sent: 4/15/2022 11:43 AM CDT  Subject: Olmesartan    Just picked up my RX and it has no refill's on it. could you please resend a new RX to [facility] for the refill's.    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

## 2023-11-26 ENCOUNTER — EMERGENCY (EMERGENCY)
Facility: HOSPITAL | Age: 68
LOS: 1 days | Discharge: NURSING, REHAB OR LONG-TERM CARE | End: 2023-11-26
Attending: EMERGENCY MEDICINE | Admitting: EMERGENCY MEDICINE
Payer: MEDICARE

## 2023-11-26 VITALS
HEART RATE: 56 BPM | OXYGEN SATURATION: 98 % | DIASTOLIC BLOOD PRESSURE: 59 MMHG | RESPIRATION RATE: 20 BRPM | WEIGHT: 169.98 LBS | TEMPERATURE: 100 F | SYSTOLIC BLOOD PRESSURE: 131 MMHG

## 2023-11-26 VITALS
TEMPERATURE: 99 F | SYSTOLIC BLOOD PRESSURE: 142 MMHG | OXYGEN SATURATION: 99 % | DIASTOLIC BLOOD PRESSURE: 79 MMHG | RESPIRATION RATE: 20 BRPM | HEART RATE: 60 BPM

## 2023-11-26 DIAGNOSIS — Z90.49 ACQUIRED ABSENCE OF OTHER SPECIFIED PARTS OF DIGESTIVE TRACT: Chronic | ICD-10-CM

## 2023-11-26 DIAGNOSIS — K94.23 GASTROSTOMY MALFUNCTION: ICD-10-CM

## 2023-11-26 PROBLEM — I63.9 CEREBRAL INFARCTION, UNSPECIFIED: Chronic | Status: ACTIVE | Noted: 2019-04-11

## 2023-11-26 PROBLEM — I10 ESSENTIAL (PRIMARY) HYPERTENSION: Chronic | Status: ACTIVE | Noted: 2019-04-11

## 2023-11-26 PROBLEM — E78.00 PURE HYPERCHOLESTEROLEMIA, UNSPECIFIED: Chronic | Status: ACTIVE | Noted: 2019-04-11

## 2023-11-26 PROCEDURE — 99284 EMERGENCY DEPT VISIT MOD MDM: CPT

## 2023-11-26 PROCEDURE — 99284 EMERGENCY DEPT VISIT MOD MDM: CPT | Mod: 25

## 2023-11-26 PROCEDURE — 49465 FLUORO EXAM OF G/COLON TUBE: CPT

## 2023-11-26 PROCEDURE — 43762 RPLC GTUBE NO REVJ TRC: CPT

## 2023-11-26 PROCEDURE — L8699: CPT

## 2023-11-26 RX ORDER — ROBINUL 0.2 MG/ML
1 INJECTION INTRAMUSCULAR; INTRAVENOUS
Refills: 0 | DISCHARGE

## 2023-11-26 RX ORDER — SCOPALAMINE 1 MG/3D
1 PATCH, EXTENDED RELEASE TRANSDERMAL
Refills: 0 | DISCHARGE

## 2023-11-26 RX ORDER — FERROUS SULFATE 325(65) MG
5 TABLET ORAL
Refills: 0 | DISCHARGE

## 2023-11-26 RX ORDER — DOCUSATE SODIUM 100 MG
2 CAPSULE ORAL
Refills: 0 | DISCHARGE

## 2023-11-26 RX ORDER — INSULIN GLARGINE 100 [IU]/ML
5 INJECTION, SOLUTION SUBCUTANEOUS
Refills: 0 | DISCHARGE

## 2023-11-26 RX ORDER — CHLORHEXIDINE GLUCONATE 213 G/1000ML
15 SOLUTION TOPICAL EVERY 12 HOURS
Refills: 0 | Status: DISCONTINUED | OUTPATIENT
Start: 2023-11-26 | End: 2023-11-29

## 2023-11-26 RX ORDER — DIATRIZOATE MEGLUMINE 180 MG/ML
50 INJECTION, SOLUTION INTRAVESICAL ONCE
Refills: 0 | Status: COMPLETED | OUTPATIENT
Start: 2023-11-26 | End: 2023-11-26

## 2023-11-26 RX ORDER — POLYETHYLENE GLYCOL 3350 17 G/17G
17 POWDER, FOR SOLUTION ORAL
Refills: 0 | DISCHARGE

## 2023-11-26 RX ORDER — ALBUTEROL 90 UG/1
3 AEROSOL, METERED ORAL
Refills: 0 | DISCHARGE

## 2023-11-26 RX ADMIN — DIATRIZOATE MEGLUMINE 50 MILLILITER(S): 180 INJECTION, SOLUTION INTRAVESICAL at 00:45

## 2023-11-26 NOTE — ED PROVIDER NOTE - NSICDXPASTMEDICALHX_GEN_ALL_CORE_FT
PAST MEDICAL HISTORY:  Cerebrovascular accident (CVA), unspecified mechanism     High cholesterol     Hypertension, unspecified type      PAST MEDICAL HISTORY:  Cerebrovascular accident (CVA), unspecified mechanism     High cholesterol     Hypertension, unspecified type     PEG (percutaneous endoscopic gastrostomy) status     Ventilator dependent

## 2023-11-26 NOTE — ED PROCEDURE NOTE - CPROC ED TIME OUT STATEMENT1
“Patient's name, , procedure and correct site were confirmed during the [place] Timeout.” “Patient's name, , procedure and correct site were confirmed during the [place] Timeout.”

## 2023-11-26 NOTE — ED PROVIDER NOTE - PATIENT PORTAL LINK FT
You can access the FollowMyHealth Patient Portal offered by [facility] by registering at the following website: [email or web address]. By joining [employer]’s FollowMyHealth portal, you will also be able to view your health information using other applications (apps) compatible with our system.

## 2023-11-26 NOTE — ED PROVIDER NOTE - NSFOLLOWUPINSTRUCTIONS_ED_ALL_ED_FT
How to Care for a Feeding Tube, Adult  A feeding tube is a soft, flexible tube used to give medicine, water, and liquid food. A person may need a feeding tube if they have trouble swallowing or cannot have food or medicine by mouth. The tube is put right into the stomach. The following information offers guidance on how to care for the skin around your feeding tube (tube site).    Supplies needed:  Clean washcloth, gauze pads, or soft paper towels.  Cotton swabs.  Skin barrier ointment or cream, such as petroleum jelly.  Soap and water, or sterile saline.  Pre-cut foam pads or gauze for around the tube.  Medical tape.  Anchoring device. This is not always used.  Syringe.  Cleaning brush or toothbrush. This is only used for cleanings.  How to care for the tube site  Feeding tube with a bumper attached to a person's abdomen.  Have all supplies ready and near you.  Wash your hands with soap and water for at least 20 seconds.  Remove the foam pad or gauze under the tube stabilizing disc (bumper), if you have one.  You may have to do this a few times a day right after your feeding tube is put in because the gauze or pad will get soiled or wet.  As the site heals, you may not have to replace the pads or gauze as often. But, you still need to clean and check the area every day.  Gently turn the bumper so it does not stick to your skin.  Check your skin around the tube site for redness, rash, swelling, drainage, or extra tissue growth.  Check the number on the tube (guide darian) where it meets your skin. It should not change. If it does, your feeding tube might be coming out.  Moisten gauze pads and cotton swabs with water and soap, or saline.  Take the moistenedcotton swab and wipe under the bumper, right near the opening of the abdomen (stoma).  Take the moistened gauze pad and clean the skin around the tube site.  If you used soap, rinse with water.  Use a washcloth, dry gauze pad, or soft paper towel to dry your skin and stoma site. Dry the tube and bumper, too.  If your skin is red, put a barrier cream or ointment on a cotton swab. Apply it around the site, under the bumper. This will help the site heal.  Put a new pre-cut foam pad or gauze around the tube, under the bumper. If the site is healed and there is no drainage, you can leave off the foam pads or gauze.  Put tape around the edges of the foam pad or gauze to keep it in place.  Use tape or an anchoring device to hold the loose end of the tube against your skin. This helps keep you from pulling on it. Change where you put the tape to avoid damaging the skin.  Throw away used supplies.  Wash your hand with soap and water for at least 20 seconds.  General tips  A feeding tube connector with a moat and a center hole to cover. The moat is the open space inside the connector.  Use, clean, and reuse feeding tube equipment only as told by your health care provider.  Do not use antibiotic ointment or cream on your feeding tube site unless you are told to.  If the end of your feeding tube has a moat, clean it once a day or as told. The moat is the open space inside the feeding tube connector. Follow the 's instructions on how to clean the moat. The instructions may include:  Remove the cap from the end of your feeding tube.  Cover the hole in the middle of the tube port with a cleaning brush.  Hold the end of the tube over a deep bowl, or wrap it with paper towels or a washcloth to soak up the water.  Use a syringe of water to flush out the moat.  Use the cleaning brush or toothbrush to clean the tube cap and around the inside of the moat. This brush can only be used for tube cleanings.  Dry the end of your feeding tube and the cap with gauze or paper towel.  Put the cap back on your feeding tube.  Contact a health care provider if:  You notice a change in the guide marks on your feeding tube where it meets your skin. Changes could mean your feeding tube has moved or is coming out.  You see any of these on your skin around the tube site:  Redness.  Rash.  Swelling.  Drainage.  Extra tissue growth.  You have questions or concerns about your feeding tube or the tube site.  This information is not intended to replace advice given to you by your health care provider. Make sure you discuss any questions you have with your health care provider.

## 2023-11-26 NOTE — ED ADULT NURSE REASSESSMENT NOTE - NS ED NURSE REASSESS COMMENT FT1
tried to call [facility] again, unable to get a nurse or nursing supervisor on the phone tried to call [facility] again, unable to get a nurse or nursing supervisor on the phone, tried ext [phone] multiple times with no answer

## 2023-11-26 NOTE — ED ADULT NURSE NOTE - NSFALLRISKINTERV_ED_ALL_ED
Assistance OOB with selected safe patient handling equipment if applicable/Assistance with ambulation/Communicate fall risk and risk factors to all staff, patient, and family/Monitor gait and stability/Monitor for mental status changes and reorient to person, place, and time, as needed/Move patient closer to nursing station/within visual sight of ED staff/Provide visual cue: yellow wristband, yellow gown, etc/Reinforce activity limits and safety measures with patient and family/Toileting schedule using arm’s reach rule for commode and bathroom/Use of alarms - bed, stretcher, chair and/or video monitoring/Call bell, personal items and telephone in reach/Instruct patient to call for assistance before getting out of bed/chair/stretcher/Non-slip footwear applied when patient is off stretcher/[place] to call system/Physically safe environment - no spills, clutter or unnecessary equipment/Purposeful Proactive Rounding/Room/bathroom lighting operational, light cord in reach

## 2023-11-26 NOTE — ED PROVIDER NOTE - WR ORDER STATUS 1
Reviewed patient home health care documentation patient did have a face-to-face evaluation after her hospitalization on5/12/2023    Agree with the plan of care from signed and sent
Resulted

## 2023-11-26 NOTE — ED PROVIDER NOTE - OBJECTIVE STATEMENT
68 y.o. F vent dependent, BIBEMS for PEG replacement - had malfunction, sánchez in place, no further history available

## 2024-10-25 ENCOUNTER — EMERGENCY (EMERGENCY)
Facility: HOSPITAL | Age: 69
LOS: 1 days | Discharge: HOME | End: 2024-10-25
Attending: EMERGENCY MEDICINE | Admitting: EMERGENCY MEDICINE
Payer: MEDICARE

## 2024-10-25 VITALS
DIASTOLIC BLOOD PRESSURE: 81 MMHG | OXYGEN SATURATION: 97 % | RESPIRATION RATE: 15 BRPM | SYSTOLIC BLOOD PRESSURE: 145 MMHG | HEART RATE: 62 BPM

## 2024-10-25 VITALS
WEIGHT: 119.93 LBS | RESPIRATION RATE: 15 BRPM | TEMPERATURE: 98 F | OXYGEN SATURATION: 97 % | HEIGHT: 61 IN | DIASTOLIC BLOOD PRESSURE: 84 MMHG | HEART RATE: 58 BPM | SYSTOLIC BLOOD PRESSURE: 149 MMHG

## 2024-10-25 DIAGNOSIS — Z90.49 ACQUIRED ABSENCE OF OTHER SPECIFIED PARTS OF DIGESTIVE TRACT: Chronic | ICD-10-CM

## 2024-10-25 PROBLEM — Z99.11 DEPENDENCE ON RESPIRATOR [VENTILATOR] STATUS: Chronic | Status: ACTIVE | Noted: 2023-11-26

## 2024-10-25 PROBLEM — Z93.1 GASTROSTOMY STATUS: Chronic | Status: ACTIVE | Noted: 2023-11-26

## 2024-10-25 PROCEDURE — 99284 EMERGENCY DEPT VISIT MOD MDM: CPT | Mod: FS,25

## 2024-10-25 PROCEDURE — 49465 FLUORO EXAM OF G/COLON TUBE: CPT

## 2024-10-25 PROCEDURE — 43762 RPLC GTUBE NO REVJ TRC: CPT

## 2024-10-25 RX ORDER — IOHEXOL 350 MG/ML
50 INJECTION, SOLUTION INTRAVENOUS ONCE
Refills: 0 | Status: COMPLETED | OUTPATIENT
Start: 2024-10-25 | End: 2024-10-25

## 2024-10-25 RX ADMIN — IOHEXOL 50 MILLILITER(S): 350 INJECTION, SOLUTION INTRAVENOUS at 11:15

## 2024-10-25 NOTE — ED ADULT TRIAGE NOTE - TEMPERATURE IN CELSIUS (DEGREES C)
Final Anesthesia Post-op Assessment    Patient: Laura Robertson  Procedure(s) Performed: BILATERAL BROW AND LEVATOR PTOSIS REPAIR   Anesthesia type: Monitor Anesthesia Care    Vital Last Value   Temperature 36.3 °C (97.3 °F) (12/05/17 1402)   Pulse 73 (12/05/17 1402)   Respiratory Rate 16 (12/05/17 1402)   Non-Invasive   Blood Pressure (!) 207/93 (12/05/17 1402)   Arterial  Blood Pressure     Pulse Oximetry 96 % (12/05/17 1402)     Last 24 I/O:   Intake/Output Summary (Last 24 hours) at 12/05/17 1405  Last data filed at 12/05/17 1358   Gross per 24 hour   Intake              800 ml   Output               30 ml   Net              770 ml       PATIENT LOCATION: Day Surgery  POST-OP VITAL SIGNS: stable  LEVEL OF CONSCIOUSNESS: participates in exam, awake, oriented, answers questions appropriately and alert  RESPIRATORY STATUS: spontaneous ventilation  CARDIOVASCULAR: hypertensive and stable  HYDRATION: euvolemic    PAIN MANAGEMENT: well controlled  NAUSEA: None  AIRWAY PATENCY: patent  POST-OP ASSESSMENT: no complications, patient tolerated procedure well with no complications, no evidence of recall and sufficiently recovered from acute administration of anesthesia effects and able to participate in evaluation  COMPLICATIONS: none  HANDOFF:  Handoff to receiving nurse was performed and questions were answered      
36.7

## 2024-10-25 NOTE — ED ADULT NURSE NOTE - NSFALLHARMRISKINTERV_ED_ALL_ED

## 2024-10-25 NOTE — ED CLERICAL - CLERICAL COMMENTS
called [facility] ems at [phone] for transport back to [facility].  jethro will  with next available.

## 2024-10-25 NOTE — ED PROVIDER NOTE - OBJECTIVE STATEMENT
69-year-old female with history of CVA, aphasia, dysphagia (PEG tube), ventilator dependent, hypertension, hyperlipidemia, COPD, GERD, history of DVT, pressure ulcers, diabetes mellitus, and anemia brought in by ambulance for PEG tube displacement.  Patient is nonverbal, unable to provide additional history  Resident at [facility]  PCP Alena Stroud

## 2024-10-25 NOTE — ED PROVIDER NOTE - NSICDXPASTMEDICALHX_GEN_ALL_CORE_FT
PAST MEDICAL HISTORY:  Cerebrovascular accident (CVA), unspecified mechanism     High cholesterol     Hypertension, unspecified type     PEG (percutaneous endoscopic gastrostomy) status     Ventilator dependent

## 2024-10-25 NOTE — ED PROVIDER NOTE - PATIENT PORTAL LINK FT
You can access the FollowMyHealth Patient Portal offered by [facility] by registering at the following website: [email or web address]. By joining [employer]’s FollowMyHealth portal, you will also be able to view your health information using other applications (apps) compatible with our system.

## 2024-10-25 NOTE — ED PROVIDER NOTE - NSFOLLOWUPINSTRUCTIONS_ED_ALL_ED_FT
PEG (Percutaneous Endoscopic Gastrostomy) Tube Insertion    WHAT YOU NEED TO KNOW:    What do I need to know about PEG tube insertion? PEG insertion is a procedure to place a soft, plastic feeding tube into your stomach. You may get nutrition or medicine through the tube.    How do I prepare for PEG tube insertion?    Your healthcare provider will talk to you about how to prepare for the procedure. You may be told not to eat or drink anything after midnight on the day of your procedure. Arrange to have someone drive you home when you are discharged.    Tell your provider about all the medicines you currently take. Your provider will tell you if you need to stop any medicine for the procedure, and when to stop. Your provider will tell you which medicines to take or not take on the day of your procedure.    Tell your provider about any allergies you have, including to anesthesia or medicines. You may be given an antibiotic to help prevent a bacterial infection.    Your provider will tell you if you need any tests before your procedure, and when to have them.  What will happen during PEG tube insertion?    You may be given medicine in your IV to help keep you relaxed and calm. You may also be given local anesthesia to numb the procedure area. With local anesthesia, you may still feel pressure or pushing, but you should not feel any pain.    Your provider will guide an endoscope into your stomach. An endoscope is a bendable tube with a light on the end. The endoscope may be inserted through your mouth or rectum. Air may be injected into your stomach so your provider can see the area clearly.    Your provider will make a small incision in your abdomen. Your provider will place the PEG tube through the incision and connect it to your stomach. Your provider may place a bandage on the incision site.  What can I expect after PEG tube insertion? The PEG tube will be taped to your abdomen to prevent pulling. A bandage will keep it clean and help prevent infection. You may see drainage for a couple days after the procedure. The incision area where the tube is inserted may be sore or tender. This should get better in a couple days.    What are the risks of PEG tube insertion?    The endoscope may cause damage or bleeding. Liquid from your stomach may get into your lungs and cause an infection. You may have bruising or develop an infection at the incision site. Your PEG tube may become blocked. The tube may break, crack, or leak. Your stomach may not empty into your intestines correctly.    The PEG tube may move out of place or come out of your incision. You may have stomach leakage around the tube insertion site. A fistula (abnormal tissue opening) may form between your skin and stomach or intestines. Your stoma (the hole where the tube was put in) may become infected. The infection may spread to other areas of your body and become life-threatening.  CARE AGREEMENT:    You have the right to help plan your care. Learn about your health condition and how it may be treated. Discuss treatment options with your healthcare providers to decide what care you want to receive. You always have the right to refuse treatment.

## 2024-11-20 PROCEDURE — 49465 FLUORO EXAM OF G/COLON TUBE: CPT

## 2024-11-20 PROCEDURE — 43762 RPLC GTUBE NO REVJ TRC: CPT

## 2024-11-20 PROCEDURE — L8699: CPT

## 2024-11-20 PROCEDURE — 99283 EMERGENCY DEPT VISIT LOW MDM: CPT | Mod: 25

## 2024-12-29 ENCOUNTER — EMERGENCY (EMERGENCY)
Facility: HOSPITAL | Age: 69
LOS: 1 days | Discharge: HOME | End: 2024-12-29
Attending: EMERGENCY MEDICINE | Admitting: EMERGENCY MEDICINE
Payer: MEDICARE

## 2024-12-29 VITALS
WEIGHT: 160.06 LBS | RESPIRATION RATE: 18 BRPM | OXYGEN SATURATION: 99 % | TEMPERATURE: 97 F | SYSTOLIC BLOOD PRESSURE: 122 MMHG | DIASTOLIC BLOOD PRESSURE: 68 MMHG | HEART RATE: 96 BPM

## 2024-12-29 VITALS
DIASTOLIC BLOOD PRESSURE: 70 MMHG | OXYGEN SATURATION: 99 % | HEART RATE: 70 BPM | SYSTOLIC BLOOD PRESSURE: 115 MMHG | RESPIRATION RATE: 20 BRPM

## 2024-12-29 DIAGNOSIS — Z90.49 ACQUIRED ABSENCE OF OTHER SPECIFIED PARTS OF DIGESTIVE TRACT: Chronic | ICD-10-CM

## 2024-12-29 PROCEDURE — 43762 RPLC GTUBE NO REVJ TRC: CPT

## 2024-12-29 PROCEDURE — 49465 FLUORO EXAM OF G/COLON TUBE: CPT

## 2024-12-29 PROCEDURE — 99284 EMERGENCY DEPT VISIT MOD MDM: CPT | Mod: 25

## 2024-12-29 PROCEDURE — 99284 EMERGENCY DEPT VISIT MOD MDM: CPT

## 2024-12-29 RX ORDER — IOHEXOL 300 MG/ML
50 INJECTION, SOLUTION INTRAVENOUS ONCE
Refills: 0 | Status: COMPLETED | OUTPATIENT
Start: 2024-12-29 | End: 2024-12-29

## 2024-12-29 RX ADMIN — IOHEXOL 50 MILLILITER(S): 300 INJECTION, SOLUTION INTRAVENOUS at 14:43

## 2024-12-29 NOTE — ED PROVIDER NOTE - NSFOLLOWUPINSTRUCTIONS_ED_ALL_ED_FT
A feeding tube is a soft, flexible tube used to give medicine, water, and liquid food. A person may need a feeding tube if they have trouble swallowing or cannot have food or medicine by mouth. The tube is put right into the stomach.    The following information gives steps on how to care for the skin around a feeding tube, or the tube site. This information is meant for adults and children over 1 year of age.    Supplies needed:  Clean washcloth, gauze pads, or soft paper towels.  Cotton swabs.  Skin barrier ointment or cream, such as petroleum jelly.  Soap and water, or sterile saline.  Pre-cut foam pads or gauze for around the tube.  Medical tape.  Anchoring device. This is not always used.  Syringe.  Cleaning brush or toothbrush. This is only used for cleanings.  How to care for the tube site  Feeding tube with a bumper attached to a person's belly.  Have all supplies ready and near you.  Wash your hands with soap and water for at least 20 seconds.  Remove the foam pad or gauze under the tube stabilizing disc (bumper), if there is one.  You may have to do this a few times a day right after the feeding tube is put in because the gauze or pad will get soiled or wet.  As the site heals, you may not have to replace the pads or gauze as often. But you still need to clean and check the area every day.  Gently turn the bumper so it does not stick to the skin.  Check the skin around the tube site for redness, rash, swelling, drainage, or growth of extra tissue.  Check the number on the tube (guide darian) where it meets the skin. It should not change. If it does, the feeding tube might be coming out.  Moisten gauze pads and cotton swabs with water and soap, or saline.  Take the moistenedcotton swab and wipe under the bumper, right near the opening in the belly (stoma).  Take the moistened gauze pad and clean the skin around the tube site.  If you used soap, rinse with water.  Use a washcloth, dry gauze pad, or soft paper towel to dry the skin and stoma site. Dry the tube and bumper too.  If the skin is red, put a barrier cream or ointment on a cotton swab. Apply it around the site, under the bumper. This will help the site heal.  Put a new pre-cut foam pad or gauze around the tube, under the bumper. If the site is healed and there is no drainage, you can leave off the foam pads or gauze.  Put tape around the edges of the foam pad or gauze to keep it in place.  Use tape or an anchoring device to hold the loose end of the tube against the skin. This helps keep the tube from getting pulled on. Change where you put the tape to avoid damaging the skin.  Throw away used supplies.  Wash your hands with soap and water for at least 20 seconds.  How to care for the moat  A feeding tube connector with a moat and a center hole to cover. The moat is the open space inside the connector.  If the end of the feeding tube has a moat, clean it once a day or as told. The moat is the open space inside the feeding tube connector. Follow the 's instructions on how to clean the moat. You may be told to:  Remove the cap from the end of the feeding tube.  Cover the hole in the middle of the tube port with a cleaning brush.  Hold the end of the tube over a deep bowl, or wrap it with paper towels or a washcloth to soak up the water.  Use a syringe of water to flush out the moat.  Use the cleaning brush or toothbrush to clean the tube cap and around the inside of the moat. This brush can only be used for tube cleanings.  Dry the end of the feeding tube and the cap with gauze or paper towel.  Put the cap back on the feeding tube.  General tips  Use, clean, and reuse feeding tube equipment only as told by the health care provider.  Do not use antibiotic ointment or cream on the feeding tube site unless you're told to.  Contact a health care provider if:  You notice a change in the guide marks on the feeding tube where it meets the skin. Changes could mean the feeding tube has moved or is coming out.  You see any of these on the skin around the tube site:  Redness.  Rash.  Swelling.  Drainage.  Extra growth of tissue.  You have questions or concerns about the feeding tube or the tube site.  This information is not intended to replace advice given to you by your health care provider. Make sure you discuss any questions you have with your health care provider.    Document Revised: 04/18/2024 Document Reviewed: 04/18/2024  Elsevier Patient Education © 2024 [employer]

## 2024-12-29 NOTE — ED ADULT NURSE NOTE - OBJECTIVE STATEMENT
Patient BIBA from assisted living for displayed G tube, patient has tracheostomy. Has redness / wound dressing to sacral area. Patient safety precautions in place, awaiting evaluation.

## 2024-12-29 NOTE — ED ADULT NURSE NOTE - NSFALLUNIVINTERV_ED_ALL_ED
Bed/Stretcher in lowest position, wheels locked, appropriate side rails in place/Call bell, personal items and telephone in reach/Instruct patient to call for assistance before getting out of bed/chair/stretcher/Non-slip footwear applied when patient is off stretcher/[place] to call system/Physically safe environment - no spills, clutter or unnecessary equipment/Purposeful proactive rounding/Room/bathroom lighting operational, light cord in reach

## 2024-12-29 NOTE — ED PROVIDER NOTE - OBJECTIVE STATEMENT
69 female PMH of respiratory failure, tracheostomy to oxygen, intracerebral hemorrhage, HTN, CHF, aphasia, presents to the emergency department by ambulance from [facility] with report of G-tube dislodgment, to be replaced and sent back to the facility.

## 2024-12-29 NOTE — ED PROVIDER NOTE - EMS DETAILS FREE TEXT FOR MDM ADDL HISTORY OBTAINED FROM QUESTION
Mom called regarding Denayja weight loss.  Would like to schedule an full per appt.   EMS states that patient was transported due to G-tube dislodgment, to be replaced, no acute events, patient is stable, currently on 3 L oxygen with trach

## 2024-12-29 NOTE — ED PROVIDER NOTE - PATIENT PORTAL LINK FT
You can access the FollowMyHealth Patient Portal offered by [facility] by registering at the following website: [email or web address]. By joining [employer]’s FollowMyHealth portal, you will also be able to view your health information using other applications (apps) compatible with our system.

## 2024-12-29 NOTE — ED PROVIDER NOTE - CARE PROVIDER_API CALL
Alena Stroud [facility]  Internal Medicine  [address] [place] [address]  Phone: [phone]  Fax: ([phone]  Follow Up Time:

## 2024-12-29 NOTE — ED PROVIDER NOTE - PHYSICAL EXAMINATION
Trach in place, patient no acute distress, abdomen soft nontender, Bedoya catheter in place of gastric stoma

## 2025-01-01 ENCOUNTER — EMERGENCY (EMERGENCY)
Facility: HOSPITAL | Age: 70
LOS: 1 days | End: 2025-01-01
Attending: EMERGENCY MEDICINE | Admitting: EMERGENCY MEDICINE
Payer: MEDICARE

## 2025-01-01 DIAGNOSIS — Z90.49 ACQUIRED ABSENCE OF OTHER SPECIFIED PARTS OF DIGESTIVE TRACT: Chronic | ICD-10-CM

## 2025-01-01 PROCEDURE — 99285 EMERGENCY DEPT VISIT HI MDM: CPT

## 2025-01-01 PROCEDURE — 99282 EMERGENCY DEPT VISIT SF MDM: CPT

## 2025-01-13 NOTE — DISCHARGE NOTE FOR THE EXPIRED PATIENT - NS DECEASED NEXTOFKIN_PHONE
[id number] Olumiant Pregnancy And Lactation Text: Based on animal studies, Olumiant may cause embryo-fetal harm when administered to pregnant women.  The medication should not be used in pregnancy.  Breastfeeding is not recommended during treatment.

## 2025-01-13 NOTE — ED PROVIDER NOTE - OBJECTIVE STATEMENT
Patient brought in by EMS from [facility] for respiratory distress.  While patient was being transported into the ER patient went to cardiac arrest.  Per EMS report they were called to [facility] because patient was febrile and tachycardic with respiratory distress.  No further history available  RON Stroud

## 2025-01-13 NOTE — ED PROVIDER NOTE - CLINICAL SUMMARY MEDICAL DECISION MAKING FREE TEXT BOX
Patient brought in by EMS from [facility] for respiratory distress.  While patient was being transported into the ER patient went to cardiac arrest.  Per EMS report they were called to [facility] because patient was febrile and tachycardic with respiratory distress.  No further history available  RON Stroud    Plan attempted resuscitation unsuccessfully time of death 1710 patient son arrived in the ER was notified